# Patient Record
Sex: FEMALE | Race: WHITE | NOT HISPANIC OR LATINO | Employment: OTHER | URBAN - METROPOLITAN AREA
[De-identification: names, ages, dates, MRNs, and addresses within clinical notes are randomized per-mention and may not be internally consistent; named-entity substitution may affect disease eponyms.]

---

## 2018-09-22 ENCOUNTER — APPOINTMENT (OUTPATIENT)
Dept: RADIOLOGY | Facility: MEDICAL CENTER | Age: 78
DRG: 023 | End: 2018-09-22
Attending: EMERGENCY MEDICINE
Payer: MEDICARE

## 2018-09-22 ENCOUNTER — HOSPITAL ENCOUNTER (INPATIENT)
Facility: MEDICAL CENTER | Age: 78
LOS: 17 days | DRG: 023 | End: 2018-10-09
Attending: EMERGENCY MEDICINE | Admitting: HOSPITALIST
Payer: MEDICARE

## 2018-09-22 ENCOUNTER — HOSPITAL ENCOUNTER (OUTPATIENT)
Dept: RADIOLOGY | Facility: MEDICAL CENTER | Age: 78
End: 2018-09-22

## 2018-09-22 ENCOUNTER — APPOINTMENT (OUTPATIENT)
Dept: RADIOLOGY | Facility: MEDICAL CENTER | Age: 78
DRG: 023 | End: 2018-09-22
Attending: NEUROLOGICAL SURGERY
Payer: MEDICARE

## 2018-09-22 DIAGNOSIS — S06.349A TRAUMATIC HEMORRHAGE OF RIGHT CEREBRUM WITH LOSS OF CONSCIOUSNESS, INITIAL ENCOUNTER (HCC): ICD-10-CM

## 2018-09-22 DIAGNOSIS — I62.9 ACUTE INTRACRANIAL HEMORRHAGE (HCC): ICD-10-CM

## 2018-09-22 DIAGNOSIS — D68.32 HEMORRHAGIC DISORDER DUE TO EXTRINSIC CIRCULATING ANTICOAGULANTS (HCC): ICD-10-CM

## 2018-09-22 PROBLEM — I48.0 PAROXYSMAL ATRIAL FIBRILLATION (HCC): Status: ACTIVE | Noted: 2018-09-22

## 2018-09-22 LAB
ALBUMIN SERPL BCP-MCNC: 4.4 G/DL (ref 3.2–4.9)
ALBUMIN/GLOB SERPL: 1.8 G/DL
ALP SERPL-CCNC: 83 U/L (ref 30–99)
ALT SERPL-CCNC: 18 U/L (ref 2–50)
ANION GAP SERPL CALC-SCNC: 10 MMOL/L (ref 0–11.9)
APTT PPP: 29 SEC (ref 24.7–36)
AST SERPL-CCNC: 16 U/L (ref 12–45)
BASOPHILS # BLD AUTO: 0.3 % (ref 0–1.8)
BASOPHILS # BLD: 0.03 K/UL (ref 0–0.12)
BILIRUB SERPL-MCNC: 0.5 MG/DL (ref 0.1–1.5)
BUN SERPL-MCNC: 25 MG/DL (ref 8–22)
CALCIUM SERPL-MCNC: 9.5 MG/DL (ref 8.5–10.5)
CHLORIDE SERPL-SCNC: 104 MMOL/L (ref 96–112)
CHOLEST SERPL-MCNC: 183 MG/DL (ref 100–199)
CO2 SERPL-SCNC: 25 MMOL/L (ref 20–33)
CREAT SERPL-MCNC: 0.65 MG/DL (ref 0.5–1.4)
EKG IMPRESSION: NORMAL
EOSINOPHIL # BLD AUTO: 0.02 K/UL (ref 0–0.51)
EOSINOPHIL NFR BLD: 0.2 % (ref 0–6.9)
ERYTHROCYTE [DISTWIDTH] IN BLOOD BY AUTOMATED COUNT: 43.8 FL (ref 35.9–50)
GLOBULIN SER CALC-MCNC: 2.5 G/DL (ref 1.9–3.5)
GLUCOSE BLD-MCNC: 142 MG/DL (ref 65–99)
GLUCOSE BLD-MCNC: 158 MG/DL (ref 65–99)
GLUCOSE SERPL-MCNC: 144 MG/DL (ref 65–99)
HCT VFR BLD AUTO: 36.5 % (ref 37–47)
HDLC SERPL-MCNC: 37 MG/DL
HGB BLD-MCNC: 12.2 G/DL (ref 12–16)
IMM GRANULOCYTES # BLD AUTO: 0.05 K/UL (ref 0–0.11)
IMM GRANULOCYTES NFR BLD AUTO: 0.5 % (ref 0–0.9)
INR PPP: 1.32 (ref 0.87–1.13)
LDLC SERPL CALC-MCNC: 131 MG/DL
LYMPHOCYTES # BLD AUTO: 0.85 K/UL (ref 1–4.8)
LYMPHOCYTES NFR BLD: 8.5 % (ref 22–41)
MAGNESIUM SERPL-MCNC: 1.8 MG/DL (ref 1.5–2.5)
MCH RBC QN AUTO: 29.8 PG (ref 27–33)
MCHC RBC AUTO-ENTMCNC: 33.4 G/DL (ref 33.6–35)
MCV RBC AUTO: 89 FL (ref 81.4–97.8)
MONOCYTES # BLD AUTO: 0.61 K/UL (ref 0–0.85)
MONOCYTES NFR BLD AUTO: 6.1 % (ref 0–13.4)
NEUTROPHILS # BLD AUTO: 8.45 K/UL (ref 2–7.15)
NEUTROPHILS NFR BLD: 84.4 % (ref 44–72)
NRBC # BLD AUTO: 0 K/UL
NRBC BLD-RTO: 0 /100 WBC
PLATELET # BLD AUTO: 132 K/UL (ref 164–446)
PMV BLD AUTO: 10.6 FL (ref 9–12.9)
POTASSIUM SERPL-SCNC: 3.9 MMOL/L (ref 3.6–5.5)
PROT SERPL-MCNC: 6.9 G/DL (ref 6–8.2)
PROTHROMBIN TIME: 16.5 SEC (ref 12–14.6)
RBC # BLD AUTO: 4.1 M/UL (ref 4.2–5.4)
SODIUM SERPL-SCNC: 137 MMOL/L (ref 135–145)
SODIUM SERPL-SCNC: 138 MMOL/L (ref 135–145)
SODIUM SERPL-SCNC: 139 MMOL/L (ref 135–145)
TRIGL SERPL-MCNC: 74 MG/DL (ref 0–149)
WBC # BLD AUTO: 10 K/UL (ref 4.8–10.8)

## 2018-09-22 PROCEDURE — 160035 HCHG PACU - 1ST 60 MINS PHASE I: Performed by: NEUROLOGICAL SURGERY

## 2018-09-22 PROCEDURE — 501445 HCHG STAPLER, SKIN DISP: Performed by: NEUROLOGICAL SURGERY

## 2018-09-22 PROCEDURE — 770022 HCHG ROOM/CARE - ICU (200)

## 2018-09-22 PROCEDURE — 501838 HCHG SUTURE GENERAL: Performed by: NEUROLOGICAL SURGERY

## 2018-09-22 PROCEDURE — 009630Z DRAINAGE OF CEREBRAL VENTRICLE WITH DRAINAGE DEVICE, PERCUTANEOUS APPROACH: ICD-10-PCS | Performed by: NEUROLOGICAL SURGERY

## 2018-09-22 PROCEDURE — 700102 HCHG RX REV CODE 250 W/ 637 OVERRIDE(OP)

## 2018-09-22 PROCEDURE — 93005 ELECTROCARDIOGRAM TRACING: CPT

## 2018-09-22 PROCEDURE — 160029 HCHG SURGERY MINUTES - 1ST 30 MINS LEVEL 4: Performed by: NEUROLOGICAL SURGERY

## 2018-09-22 PROCEDURE — 160002 HCHG RECOVERY MINUTES (STAT): Performed by: NEUROLOGICAL SURGERY

## 2018-09-22 PROCEDURE — 500394: Performed by: NEUROLOGICAL SURGERY

## 2018-09-22 PROCEDURE — 700117 HCHG RX CONTRAST REV CODE 255: Performed by: EMERGENCY MEDICINE

## 2018-09-22 PROCEDURE — 85610 PROTHROMBIN TIME: CPT

## 2018-09-22 PROCEDURE — 700105 HCHG RX REV CODE 258: Performed by: HOSPITALIST

## 2018-09-22 PROCEDURE — 99223 1ST HOSP IP/OBS HIGH 75: CPT | Mod: AI | Performed by: HOSPITALIST

## 2018-09-22 PROCEDURE — 85025 COMPLETE CBC W/AUTO DIFF WBC: CPT

## 2018-09-22 PROCEDURE — 160048 HCHG OR STATISTICAL LEVEL 1-5: Performed by: NEUROLOGICAL SURGERY

## 2018-09-22 PROCEDURE — 700111 HCHG RX REV CODE 636 W/ 250 OVERRIDE (IP): Performed by: NEUROLOGICAL SURGERY

## 2018-09-22 PROCEDURE — 70496 CT ANGIOGRAPHY HEAD: CPT

## 2018-09-22 PROCEDURE — 700111 HCHG RX REV CODE 636 W/ 250 OVERRIDE (IP): Performed by: HOSPITALIST

## 2018-09-22 PROCEDURE — 700101 HCHG RX REV CODE 250: Mod: JW

## 2018-09-22 PROCEDURE — 700111 HCHG RX REV CODE 636 W/ 250 OVERRIDE (IP): Performed by: ANESTHESIOLOGY

## 2018-09-22 PROCEDURE — 500075 HCHG BLADE, CLIPPER NEURO: Performed by: NEUROLOGICAL SURGERY

## 2018-09-22 PROCEDURE — C1729 CATH, DRAINAGE: HCPCS | Performed by: NEUROLOGICAL SURGERY

## 2018-09-22 PROCEDURE — 96365 THER/PROPH/DIAG IV INF INIT: CPT

## 2018-09-22 PROCEDURE — 700111 HCHG RX REV CODE 636 W/ 250 OVERRIDE (IP)

## 2018-09-22 PROCEDURE — 700105 HCHG RX REV CODE 258: Performed by: NEUROLOGICAL SURGERY

## 2018-09-22 PROCEDURE — 160009 HCHG ANES TIME/MIN: Performed by: NEUROLOGICAL SURGERY

## 2018-09-22 PROCEDURE — 80053 COMPREHEN METABOLIC PANEL: CPT

## 2018-09-22 PROCEDURE — 99291 CRITICAL CARE FIRST HOUR: CPT

## 2018-09-22 PROCEDURE — C9132 KCENTRA, PER I.U.: HCPCS | Mod: JG | Performed by: EMERGENCY MEDICINE

## 2018-09-22 PROCEDURE — 85730 THROMBOPLASTIN TIME PARTIAL: CPT

## 2018-09-22 PROCEDURE — 84295 ASSAY OF SERUM SODIUM: CPT

## 2018-09-22 PROCEDURE — 500440 HCHG DRESSING, STERILE ROLL (KERLIX): Performed by: NEUROLOGICAL SURGERY

## 2018-09-22 PROCEDURE — 500393 HCHG DRAIN, VENTRIC CATH: Performed by: NEUROLOGICAL SURGERY

## 2018-09-22 PROCEDURE — 700111 HCHG RX REV CODE 636 W/ 250 OVERRIDE (IP): Performed by: EMERGENCY MEDICINE

## 2018-09-22 PROCEDURE — 99291 CRITICAL CARE FIRST HOUR: CPT | Performed by: INTERNAL MEDICINE

## 2018-09-22 PROCEDURE — 160041 HCHG SURGERY MINUTES - EA ADDL 1 MIN LEVEL 4: Performed by: NEUROLOGICAL SURGERY

## 2018-09-22 PROCEDURE — A9270 NON-COVERED ITEM OR SERVICE: HCPCS

## 2018-09-22 PROCEDURE — 80061 LIPID PANEL: CPT

## 2018-09-22 PROCEDURE — 96375 TX/PRO/DX INJ NEW DRUG ADDON: CPT

## 2018-09-22 PROCEDURE — 700101 HCHG RX REV CODE 250: Performed by: INTERNAL MEDICINE

## 2018-09-22 PROCEDURE — 83735 ASSAY OF MAGNESIUM: CPT

## 2018-09-22 PROCEDURE — 70450 CT HEAD/BRAIN W/O DYE: CPT

## 2018-09-22 PROCEDURE — 36415 COLL VENOUS BLD VENIPUNCTURE: CPT

## 2018-09-22 PROCEDURE — 82962 GLUCOSE BLOOD TEST: CPT | Mod: 91

## 2018-09-22 RX ORDER — HALOPERIDOL 5 MG/ML
1 INJECTION INTRAMUSCULAR
Status: DISCONTINUED | OUTPATIENT
Start: 2018-09-22 | End: 2018-09-22 | Stop reason: HOSPADM

## 2018-09-22 RX ORDER — ONDANSETRON 4 MG/1
4 TABLET, ORALLY DISINTEGRATING ORAL EVERY 4 HOURS PRN
Status: DISCONTINUED | OUTPATIENT
Start: 2018-09-22 | End: 2018-09-24

## 2018-09-22 RX ORDER — SODIUM CHLORIDE 9 MG/ML
INJECTION, SOLUTION INTRAVENOUS CONTINUOUS
Status: DISCONTINUED | OUTPATIENT
Start: 2018-09-22 | End: 2018-09-24

## 2018-09-22 RX ORDER — ACETAMINOPHEN 325 MG/1
650 TABLET ORAL EVERY 4 HOURS PRN
Status: DISCONTINUED | OUTPATIENT
Start: 2018-09-22 | End: 2018-09-24

## 2018-09-22 RX ORDER — BISACODYL 10 MG
10 SUPPOSITORY, RECTAL RECTAL
Status: DISCONTINUED | OUTPATIENT
Start: 2018-09-22 | End: 2018-09-24

## 2018-09-22 RX ORDER — GLYCOPYRROLATE 0.2 MG/ML
0.2 INJECTION INTRAMUSCULAR; INTRAVENOUS
Status: DISCONTINUED | OUTPATIENT
Start: 2018-09-22 | End: 2018-09-22 | Stop reason: HOSPADM

## 2018-09-22 RX ORDER — LORAZEPAM 2 MG/ML
2 INJECTION INTRAMUSCULAR
Status: DISCONTINUED | OUTPATIENT
Start: 2018-09-22 | End: 2018-10-09 | Stop reason: HOSPADM

## 2018-09-22 RX ORDER — HYDRALAZINE HYDROCHLORIDE 20 MG/ML
10 INJECTION INTRAMUSCULAR; INTRAVENOUS EVERY 4 HOURS PRN
Status: DISCONTINUED | OUTPATIENT
Start: 2018-09-22 | End: 2018-10-09 | Stop reason: HOSPADM

## 2018-09-22 RX ORDER — ACETAMINOPHEN 650 MG/1
650 SUPPOSITORY RECTAL EVERY 4 HOURS PRN
Status: DISCONTINUED | OUTPATIENT
Start: 2018-09-22 | End: 2018-09-24

## 2018-09-22 RX ORDER — ONDANSETRON 2 MG/ML
4 INJECTION INTRAMUSCULAR; INTRAVENOUS EVERY 4 HOURS PRN
Status: DISCONTINUED | OUTPATIENT
Start: 2018-09-22 | End: 2018-09-24

## 2018-09-22 RX ORDER — ONDANSETRON 2 MG/ML
4 INJECTION INTRAMUSCULAR; INTRAVENOUS ONCE
Status: COMPLETED | OUTPATIENT
Start: 2018-09-22 | End: 2018-09-22

## 2018-09-22 RX ORDER — LABETALOL HYDROCHLORIDE 5 MG/ML
10 INJECTION, SOLUTION INTRAVENOUS EVERY 4 HOURS PRN
Status: DISCONTINUED | OUTPATIENT
Start: 2018-09-22 | End: 2018-09-24

## 2018-09-22 RX ORDER — SODIUM CHLORIDE 9 MG/ML
INJECTION, SOLUTION INTRAVENOUS CONTINUOUS
Status: DISCONTINUED | OUTPATIENT
Start: 2018-09-22 | End: 2018-09-22 | Stop reason: HOSPADM

## 2018-09-22 RX ORDER — NALOXONE HYDROCHLORIDE 0.4 MG/ML
0.1 INJECTION, SOLUTION INTRAMUSCULAR; INTRAVENOUS; SUBCUTANEOUS PRN
Status: DISCONTINUED | OUTPATIENT
Start: 2018-09-22 | End: 2018-09-22 | Stop reason: HOSPADM

## 2018-09-22 RX ORDER — ONDANSETRON 2 MG/ML
4 INJECTION INTRAMUSCULAR; INTRAVENOUS
Status: DISCONTINUED | OUTPATIENT
Start: 2018-09-22 | End: 2018-09-22 | Stop reason: HOSPADM

## 2018-09-22 RX ORDER — HYDROMORPHONE HYDROCHLORIDE 2 MG/ML
0.25 INJECTION, SOLUTION INTRAMUSCULAR; INTRAVENOUS; SUBCUTANEOUS
Status: DISCONTINUED | OUTPATIENT
Start: 2018-09-22 | End: 2018-09-23

## 2018-09-22 RX ORDER — ENALAPRILAT 1.25 MG/ML
1.25 INJECTION INTRAVENOUS EVERY 6 HOURS PRN
Status: DISCONTINUED | OUTPATIENT
Start: 2018-09-22 | End: 2018-10-09 | Stop reason: HOSPADM

## 2018-09-22 RX ORDER — OXYCODONE HYDROCHLORIDE 5 MG/1
5 TABLET ORAL
Status: DISCONTINUED | OUTPATIENT
Start: 2018-09-22 | End: 2018-09-23

## 2018-09-22 RX ORDER — CEFAZOLIN SODIUM 1 G/50ML
1 INJECTION, SOLUTION INTRAVENOUS EVERY 8 HOURS
Status: DISCONTINUED | OUTPATIENT
Start: 2018-09-22 | End: 2018-10-04

## 2018-09-22 RX ORDER — POLYETHYLENE GLYCOL 3350 17 G/17G
1 POWDER, FOR SOLUTION ORAL
Status: DISCONTINUED | OUTPATIENT
Start: 2018-09-22 | End: 2018-09-24

## 2018-09-22 RX ORDER — OXYCODONE HYDROCHLORIDE 5 MG/1
2.5 TABLET ORAL
Status: DISCONTINUED | OUTPATIENT
Start: 2018-09-22 | End: 2018-09-23

## 2018-09-22 RX ORDER — METOPROLOL TARTRATE 1 MG/ML
5 INJECTION, SOLUTION INTRAVENOUS EVERY 6 HOURS
Status: DISCONTINUED | OUTPATIENT
Start: 2018-09-22 | End: 2018-09-24

## 2018-09-22 RX ORDER — AMOXICILLIN 250 MG
2 CAPSULE ORAL 2 TIMES DAILY
Status: DISCONTINUED | OUTPATIENT
Start: 2018-09-22 | End: 2018-09-24

## 2018-09-22 RX ADMIN — HYDROMORPHONE HYDROCHLORIDE 0.25 MG: 2 INJECTION INTRAMUSCULAR; INTRAVENOUS; SUBCUTANEOUS at 15:13

## 2018-09-22 RX ADMIN — CEFAZOLIN SODIUM 1 G: 1 INJECTION, SOLUTION INTRAVENOUS at 21:38

## 2018-09-22 RX ADMIN — METOPROLOL TARTRATE 5 MG: 1 INJECTION, SOLUTION INTRAVENOUS at 20:01

## 2018-09-22 RX ADMIN — SODIUM CHLORIDE 500 MG: 9 INJECTION, SOLUTION INTRAVENOUS at 17:55

## 2018-09-22 RX ADMIN — SODIUM CHLORIDE: 9 INJECTION, SOLUTION INTRAVENOUS at 12:16

## 2018-09-22 RX ADMIN — METOPROLOL TARTRATE 5 MG: 1 INJECTION, SOLUTION INTRAVENOUS at 15:13

## 2018-09-22 RX ADMIN — IOHEXOL 88 ML: 350 INJECTION, SOLUTION INTRAVENOUS at 08:51

## 2018-09-22 RX ADMIN — PROTHROMBIN, COAGULATION FACTOR VII HUMAN, COAGULATION FACTOR IX HUMAN, COAGULATION FACTOR X HUMAN, PROTEIN C, PROTEIN S HUMAN, AND WATER 3252 UNITS: KIT at 08:49

## 2018-09-22 RX ADMIN — ONDANSETRON 4 MG: 2 INJECTION INTRAMUSCULAR; INTRAVENOUS at 15:13

## 2018-09-22 RX ADMIN — ONDANSETRON HYDROCHLORIDE 4 MG: 2 INJECTION, SOLUTION INTRAMUSCULAR; INTRAVENOUS at 08:38

## 2018-09-22 RX ADMIN — CEFAZOLIN SODIUM 1 G: 1 INJECTION, SOLUTION INTRAVENOUS at 14:14

## 2018-09-22 RX ADMIN — HYDROMORPHONE HYDROCHLORIDE 0.25 MG: 2 INJECTION INTRAMUSCULAR; INTRAVENOUS; SUBCUTANEOUS at 21:29

## 2018-09-22 RX ADMIN — FENTANYL CITRATE 50 MCG: 50 INJECTION, SOLUTION INTRAMUSCULAR; INTRAVENOUS at 11:16

## 2018-09-22 ASSESSMENT — PAIN SCALES - GENERAL
PAINLEVEL_OUTOF10: 3
PAINLEVEL_OUTOF10: 7
PAINLEVEL_OUTOF10: 6
PAINLEVEL_OUTOF10: 3
PAINLEVEL_OUTOF10: 10
PAINLEVEL_OUTOF10: 5
PAINLEVEL_OUTOF10: 8
PAINLEVEL_OUTOF10: 4
PAINLEVEL_OUTOF10: 3

## 2018-09-22 ASSESSMENT — LIFESTYLE VARIABLES: EVER_SMOKED: NEVER

## 2018-09-22 ASSESSMENT — COPD QUESTIONNAIRES
DO YOU EVER COUGH UP ANY MUCUS OR PHLEGM?: NO/ONLY WITH OCCASIONAL COLDS OR INFECTIONS
DURING THE PAST 4 WEEKS HOW MUCH DID YOU FEEL SHORT OF BREATH: NONE/LITTLE OF THE TIME
HAVE YOU SMOKED AT LEAST 100 CIGARETTES IN YOUR ENTIRE LIFE: NO/DON'T KNOW
COPD SCREENING SCORE: 2

## 2018-09-22 ASSESSMENT — PAIN SCALES - WONG BAKER: WONGBAKER_NUMERICALRESPONSE: HURTS A LITTLE MORE

## 2018-09-22 NOTE — CONSULTS
PULMONARY AND CRITICAL CARE MEDICINE CONSULTATION    Date of Consultation:  9/22/2018    Requesting Physician:  Juan Alberto Santizo MD    Consulting Physician:  Zeke Norton MD    Reason for Consultation: Critical care management in lady with intracranial hemorrhage    Chief Complaint: Intracranial hemorrhage    History of Present Illness:    I was kindly asked to see and evaluate Yessi Hess, a 78 y.o. female for evaluation and management of the above problem.    The history is obtained from healthcare providers and the medical record as this lady cannot give me any history.  This lady has a history of paroxysmal atrial fibrillation and takes Eliquis.  She apparently fell 1 week ago.  She presented to an outside hospital at that time and a CT scan of her head revealed no evidence of an intracranial hemorrhage.  This morning she awakened with a headache and presented back to an outside hospital and CT imaging revealed an acute intracranial hemorrhage.  She has an intraparenchymal hemorrhage with intraventricular hemorrhage and mass-effect.  Dr. Juan Alberto Santizo evaluated her from neurosurgery and has emergently taken her to the operating theater and performed bilateral extraventricular drain placement.  She is now critically ill in the ICU for very close neurological observation.    Medications Prior to Admission:    No current facility-administered medications on file prior to encounter.      No current outpatient prescriptions on file prior to encounter.       Current Medications:      Current Facility-Administered Medications:   •  Respiratory Care per Protocol, , Nebulization, Continuous RT, Rowena Calero M.D.  •  NS infusion, , Intravenous, Continuous, Rowena Calero M.D., Last Rate: 80 mL/hr at 09/22/18 1216  •  acetaminophen (TYLENOL) tablet 650 mg, 650 mg, Oral, Q4HRS PRN **OR** acetaminophen (TYLENOL) suppository 650 mg, 650 mg, Rectal, Q4HRS PRN, Rowena Calero M.D.  •  MD Alert...ICU  Electrolyte Replacement per Pharmacy, , Other, pharmacy to dose, Rowena Calreo M.D.  •  ondansetron (ZOFRAN ODT) dispertab 4 mg, 4 mg, Oral, Q4HRS PRN **OR** ondansetron (ZOFRAN) syringe/vial injection 4 mg, 4 mg, Intravenous, Q4HRS PRN, Rowena Calero M.D.  •  senna-docusate (PERICOLACE or SENOKOT S) 8.6-50 MG per tablet 2 Tab, 2 Tab, Oral, BID **AND** polyethylene glycol/lytes (MIRALAX) PACKET 1 Packet, 1 Packet, Oral, QDAY PRN **AND** magnesium hydroxide (MILK OF MAGNESIA) suspension 30 mL, 30 mL, Oral, QDAY PRN **AND** bisacodyl (DULCOLAX) suppository 10 mg, 10 mg, Rectal, QDAY PRN, Rowena Calero M.D.  •  LORazepam (ATIVAN) injection 2 mg, 2 mg, Intravenous, Q5 MIN PRN, Rowena Calero M.D.  •  Notify provider if pain remains uncontrolled, , , CONTINUOUS **AND** Use the numeric rating scale (NRS-11) on regular floors and Critical-Care Pain Observation Tool (CPOT) on ICUs/Trauma to assess pain, , , CONTINUOUS **AND** Pulse Ox (Oximetry), , , CONTINUOUS **AND** Pharmacy Consult Request ...Pain Management Review, , Other, PRN **AND** If patient difficult to arouse and/or has respiratory depression, stop any opiates that are currently infusing and call a Rapid Response., , , CONTINUOUS **AND** oxyCODONE immediate-release (ROXICODONE) tablet 2.5 mg, 2.5 mg, Oral, Q3HRS PRN **AND** oxyCODONE immediate-release (ROXICODONE) tablet 5 mg, 5 mg, Oral, Q3HRS PRN **AND** HYDROmorphone (DILAUDID) injection 0.25 mg, 0.25 mg, Intravenous, Q3HRS PRN, Rowena Calero M.D.  •  labetalol (NORMODYNE,TRANDATE) injection 10 mg, 10 mg, Intravenous, Q4HRS PRN, Rowena Calero M.D.  •  hydrALAZINE (APRESOLINE) injection 10 mg, 10 mg, Intravenous, Q4HRS PRN, Rowena Calero M.D.  •  enalaprilat (VASOTEC) injection 1.25 mg, 1.25 mg, Intravenous, Q6HRS PRN, Rowena Calero M.D.  •  ceFAZolin in dextrose (ANCEF) IVPB premix 1 g, 1 g, Intravenous, Q8HRS, Juan Alberto Santizo III, M.D.  •  levETIRAcetam (KEPPRA) 500 mg in  mL  "IVPB, 500 mg, Intravenous, Q12HRS, Juan Alberto Santizo III, M.D.  •  Metoprolol Tartrate (LOPRESSOR) injection 5 mg, 5 mg, Intravenous, Q6HRS, Zeke Norton M.D.    Allergies:    Corticosteroids    Past Surgical History:    No past surgical history on file.    Past Medical History:    History reviewed. No pertinent past medical history.    Social History:    Social History     Social History   • Marital status:      Spouse name: N/A   • Number of children: N/A   • Years of education: N/A     Occupational History   • Not on file.     Social History Main Topics   • Smoking status: Not on file   • Smokeless tobacco: Not on file   • Alcohol use Not on file   • Drug use: Unknown   • Sexual activity: Not on file     Other Topics Concern   • Not on file     Social History Narrative   • No narrative on file       Family History:    No family history on file.    Review of System:    Review of Systems   Unable to perform ROS: Acuity of condition       Physical Examination:    /62   Pulse 69   Temp (!) 35.5 °C (95.9 °F)   Resp 14   Ht 1.702 m (5' 7\")   Wt 65.8 kg (145 lb)   SpO2 100%   BMI 22.71 kg/m²   Physical Exam   HENT:   Mouth/Throat: Oropharynx is clear and moist.   Bilateral extraventricular drains are present.  She has extensive ecchymosis on her forehead and face, mostly on the left side.   Eyes: Pupils are equal, round, and reactive to light. Right eye exhibits no discharge. Left eye exhibits no discharge. No scleral icterus.   Neck: Normal range of motion. Neck supple. No JVD present. No tracheal deviation present.   Cardiovascular: Intact distal pulses.  Exam reveals no gallop and no friction rub.    Sinus rhythm   Pulmonary/Chest: No stridor. She has no wheezes. She has no rales.   Abdominal: Soft. Bowel sounds are normal. She exhibits no distension. There is no tenderness. There is no rebound.   Musculoskeletal: She exhibits no tenderness or deformity.   No clubbing or cyanosis "   Neurological:   Somnolent following surgery   Skin: Skin is warm and dry. No rash noted. She is not diaphoretic. No erythema.       Laboratory Data:          Invalid input(s): [id number]  Recent Labs      09/22/18   0810   WBC  10.0   RBC  4.10*   HEMOGLOBIN  12.2   HEMATOCRIT  36.5*   MCV  89.0   MCH  29.8   MCHC  33.4*   RDW  43.8   PLATELETCT  132*   MPV  10.6     Recent Labs      09/22/18   0810   SODIUM  139   POTASSIUM  3.9   CHLORIDE  104   CO2  25   GLUCOSE  144*   BUN  25*   CREATININE  0.65   CALCIUM  9.5                   Imaging:    I personally viewed the CXR and CT scan images as well as reviewed the radiology interpretation reports.    OUTSIDE IMAGES-CT HEAD   Final Result      CT-CTA HEAD WITH & W/O-POST PROCESS   Final Result   Addendum 1 of 1   These findings were discussed with Dr. Santizo on 9/22/2018 9:08 AM.      Final      1.  Stable intraventricular, intraparenchymal, subarachnoid and subdural hemorrhage with 5 mm RIGHT-to-LEFT midline shift again seen.   2.  No intracranial aneurysm, focal stenosis, or abrupt large vessel cut off.   3.  LEFT frontal scalp hematoma.      OUTSIDE IMAGES-CT CERVICAL SPINE   Final Result      CT-HEAD W/O    (Results Pending)       Assessment and Plan:    Acute intracranial hemorrhage (HCC)   Assessment & Plan    S/P fall 1 week ago - CT head on 9/14 negative for acute intracranial hemorrhage  Acute onset of headache earlier this morning  CT scan today with intraventricular hemorrhage, right greater than left and intraparenchymal hemorrhage  Noncommunicating hydrocephalus  S/P emergent bilateral EVD placement by Dr. Sukhdev Park, 500 mg IV twice daily for seizure prophylaxis  Strict blood pressure control - goal SBP < 160  Hourly neurologic checks        Paroxysmal atrial fibrillation (HCC)   Assessment & Plan    Previously on apixaban, 5 mg twice daily - now strictly contraindicated  Currently in sinus rhythm  Rate control  Takes metoprolol, 25 mg twice  daily            This lady is critically ill in the ICU following placement of bilateral EVDs for acute intracranial hemorrhage with hydrocephalus.  I have assessed and reassessed her respiratory status, blood pressure, hemodynamics, cardiovascular status and neurologic status.  She requires very close neurological observation in the ICU.  She is at high risk for worsening respiratory, cardiovascular and CNS system dysfunction.    High risk of deterioration and worsening vital organ dysfunction and death without the above critical care interventions.    Thank you for allowing me to participate in the care of this lady.  I will continue to follow her with great interest.    Critical Care Time: 35 minutes  [id number]  No time overlap  Time excludes procedures  Discussed with RN, RT, hospitalist    Zeke Norton MD  Pulmonary and Critical Care Medicine

## 2018-09-22 NOTE — ED NOTES
Pt to Pre-op via transport tech.    Per admit MD request Pre-Op notified to have Dr. Santizo contact Pt's daughter before operation.

## 2018-09-22 NOTE — PROGRESS NOTES
Casted IVH on Eliquis. CT with developeing HCP. Rapid reversal, CTA STAT to r/o aneurysm, needs L +/- R EVD to attempt to clear IVH, admit hospitalist ICU, may benefit from IV tPA

## 2018-09-22 NOTE — OR SURGEON
Immediate Post OP Note    PreOp Diagnosis: IVH/IPH, non communcating hydrocephalus    PostOp Diagnosis: same    Procedure(s):  VENTRICULOSTOMY, bilateral    Surgeon(s):  Juan Alberto Santizo III, M.D.    Anesthesiologist/Type of Anesthesia:  No anesthesia staff entered./* No anesthesia type entered *    Surgical Staff:  * No surgical staff found *    Specimens removed if any:  * No specimens in log *    Estimated Blood Loss: < 5 cc    Findings: ICP 15 on insertion, bloody CSF on right with slow flow, brick clear CSF on left    Complications: none    Dispo: attempt extubation, EVD at 0, head Ct later today, family updated        9/22/2018 10:03 AM Juan Alberto Santizo III, M.D.

## 2018-09-22 NOTE — ED NOTES
Med rec complete per pt family and home pharmacy   Allergies reviewed  No oral ABX within lat 30 days

## 2018-09-22 NOTE — ASSESSMENT & PLAN NOTE
S/P fall 1 week prior  CT scan on 9/22 with intraventricular hemorrhage, right greater than left and intraparenchymal hemorrhage  Noncommunicating hydrocephalus - s/p emergent bilateral EVD placement by Dr. Santizo on 9/22 --> removed 10/2  Intraventricular TPA given by NeuroSx with improvement in EVD output  Seizure precautions, continue Kera  Neurology consultation at the request of rehab  Therapies/rehabilitation

## 2018-09-22 NOTE — PROGRESS NOTES
1200 patient picked up from PACU.  Bedside report/neuro assessment done.    1225 pt arrived on the unit.  Very lethargic but orientedx4.  Delayed responses but moves all extremities.

## 2018-09-22 NOTE — H&P
"Hospital Medicine History & Physical Note    Date of Service  9/22/2018    Primary Care Physician  No primary care provider on file.    Consultants  Inrtensivist Dr. Frank  Neurosurgery Dr. Santizo    Code Status  FULL    Chief Complaint  Headache, transferred from [facility] for ICH    History of Presenting Illness  78 y.o. female who presented 9/22/2018 with a headache this morning.  She had suffered a fall from water under a new kitchen mat a week ago, and had had a CT head at that time which was negative.  She presented to the [facility] ER for a headache today some nausea, and was found to have a subdural hematoma as well as a subarachnoid hemorrhage.  She was transferred to [facility] for further evaluation by neurosurgery.  She has been taken now to the OR.    Review of Systems  Review of Systems   Unable to perform ROS: Mental acuity   Is able to say she's nauseated, and her head hurts    Past Medical History   has no past medical history on file.  Unable to obtain, we do know she has a history of atrial fibrillation for which she takes eliquis and metoprolol    Surgical History   has no past surgical history on file.   Unknown    Family History  family history is not on file.   Ujnobtainable at this time as she is lethargic    Social History   Unknown, unobtainable at this time    Allergies  Allergies   Allergen Reactions   • Corticosteroids Rash     \"Family does not know the exact name of the drug\"       Medications  Prior to Admission Medications   Prescriptions Last Dose Informant Patient Reported? Taking?   apixaban (ELIQUIS) 5mg Tab 9/22/2018 at 0400 Patient Yes Yes   Sig: Take 5 mg by mouth 2 Times a Day.   metoprolol (LOPRESSOR) 25 MG Tab unk at unk Patient Yes Yes   Sig: Take 25 mg by mouth 2 times a day.      Facility-Administered Medications: None       Physical Exam  Temp:  [35.5 °C (95.9 °F)] 35.5 °C (95.9 °F)  Pulse:  [65-74] 72  Resp:  [17-23] 20  BP: (137)/(62) 137/62    Physical Exam "   Constitutional: She is oriented to person, place, and time. She appears well-developed and well-nourished.   HENT:   Head: Normocephalic and atraumatic.   Eyes: Pupils are equal, round, and reactive to light.   Neck: Normal range of motion. Tracheal deviation present.   Cardiovascular: Normal rate and regular rhythm.    Murmur (3/6 SABA) heard.  Pulmonary/Chest: Effort normal and breath sounds normal. No respiratory distress. She has no wheezes. She has no rales.   Abdominal: Soft. Bowel sounds are normal. She exhibits no distension. There is no tenderness.   Musculoskeletal: Normal range of motion. She exhibits no edema.   5/5 MS throughout when prompted   Neurological: She is alert and oriented to person, place, and time. No cranial nerve deficit.   Lethargic but arousable   Skin: Skin is warm and dry. No erythema.   Psychiatric: She has a normal mood and affect.   Nursing note and vitals reviewed.      Laboratory:  Recent Labs      09/22/18   0810   WBC  10.0   RBC  4.10*   HEMOGLOBIN  12.2   HEMATOCRIT  36.5*   MCV  89.0   MCH  29.8   MCHC  33.4*   RDW  43.8   PLATELETCT  132*   MPV  10.6     Recent Labs      09/22/18   0810   SODIUM  139   POTASSIUM  3.9   CHLORIDE  104   CO2  25   GLUCOSE  144*   BUN  25*   CREATININE  0.65   CALCIUM  9.5     Recent Labs      09/22/18   0810   ALTSGPT  18   ASTSGOT  16   ALKPHOSPHAT  83   TBILIRUBIN  0.5   GLUCOSE  144*     Recent Labs      09/22/18   0810   APTT  29.0   INR  1.32*       Urinalysis:    No results found     Imaging:  CT-HEAD W/O   Final Result      1.  Interval placement of bilateral ventriculostomy catheters with reduction of ventricular dilation.   2.  RIGHT temporal subarachnoid hemorrhage is slightly more apparent than prior exam.   3.  RIGHT hemispheric subdural hematoma and temporal intraparenchymal hemorrhage are stable.   4.  Slight worsening RIGHT LEFT midline shift.      OUTSIDE IMAGES-CT HEAD   Final Result      CT-CTA HEAD WITH & W/O-POST PROCESS    Final Result   Addendum 1 of 1   These findings were discussed with Dr. Santizo on 9/22/2018 9:08 AM.      Final      1.  Stable intraventricular, intraparenchymal, subarachnoid and subdural hemorrhage with 5 mm RIGHT-to-LEFT midline shift again seen.   2.  No intracranial aneurysm, focal stenosis, or abrupt large vessel cut off.   3.  LEFT frontal scalp hematoma.      OUTSIDE IMAGES-CT CERVICAL SPINE   Final Result      EC-ECHOCARDIOGRAM COMPLETE W/O CONT    (Results Pending)         Assessment/Plan:  I anticipate this patient will require at least two midnights for appropriate medical management, necessitating inpatient admission.    Acute intracranial hemorrhage (HCC)- (present on admission)   Assessment & Plan    She has been given reversal for Eliquis, and taken to the OR by Dr. Santizo  She received drains bilaterally  I have ordered ICH order set, bp control (currently 130s/80s)  Dr. Santizo following  Will go to ICU for q1 hour neuro checks        Paroxysmal atrial fibrillation (HCC)- (present on admission)   Assessment & Plan    Holding eliquis, got reversal  Continue lopressor prn  Currently rate controlled            VTE prophylaxis: SCDs

## 2018-09-22 NOTE — ED TRIAGE NOTES
Chief Complaint   Patient presents with   • Other     Transfer from [facility] for brain bleed   • Head Ache     This AM   • N/V     This AM       Pt transferred by EMS from [facility] for a brain bleed.     EMS Vitals:   BP: 123/80s  HR: 60s  Spo2: 96% on RA   BGL: 153    Interventions: 18G saline lock Right AC    On arrival to ED pt is A&O x 4. Pt received 50mcg fentanyl and 4mg zofran at [facility] at 0630.    Pt has Hx significant for fall with head injury 1 week ago. CT showed negative bleed at the time.   Pt takes: Eliquis

## 2018-09-22 NOTE — OP REPORT
DATE OF SERVICE:  09/22/2018    PREOPERATIVE DIAGNOSES:  1.  Intracerebral hemorrhage.  2.  Interventricular hemorrhage.  3.  Anticoagulation.  4.  Hydrocephalus, noncommunicative.    POSTOPERATIVE DIAGNOSES:  1.  Intracerebral hemorrhage.  2.  Interventricular hemorrhage.  3.  Anticoagulation.  4.  Hydrocephalus, noncommunicative.    PROCEDURES PERFORMED:  1.  Left-sided external ventricular drain.  2.  Right-sided external ventricular drain, both 50 modifier.    SURGEON:  Juan Alberto Santizo III, MD    ASSISTANT:  None.    COMPLICATIONS:  None.    DRAINS LEFT:  Bilateral external ventricular drain set at 0.    ANESTHESIA:  General.    COMPLICATIONS:  None.    ESTIMATED BLOOD LOSS:  Less  than 5 mL.    FINDINGS:  Clear blood-tinged clear CSF on the left, ICP approximately 12-15,   blood-tinged slow flow on the right.    DISPOSITION:  Patient is extubated to recovery and back to the ICU.    HISTORY OF PRESENT ILLNESS:  This 78-year-old woman, although she fell a week   ago, was doing well.  She had a negative CAT scan reportedly at an outside   hospital.  She had a sudden onset headache, has a right intraparenchymal   hemorrhage with intraventricular extension, near complete casting of right   much more than left third and fourth ventricles.  I explained risks, benefits   and alternatives via phone to her and her family for bilateral external   ventricular drainage for possible interventricular TPA administration and   treatment of the hydrocephalus to allow the blood to resolve.  I explained   risks, benefits and alternatives, which include pain, infection, bleeding, CSF   leak, failure to completely resolve symptoms, neurologic deficit, weakness,   numbness, speech difficulties, prolonged intubation, need for a shunt in the   future.  They understood the risks and benefits and agreed the consent.  She   was reversed on Eliquis.  She had a CT angiogram that ruled out aneurysm or   AVM.    SUMMARY OF OPERATIVE  PROCEDURE:  The patient was brought to the operating   suite and placed under general anesthesia.  Mack catheter was placed, lines   were secured.  She was on a regular OR bed supine in a horseshoe head and a   donut.  All padded pressure points secured.  We luisa out at the mid pupillary   line in the mid glabella, performed bilaterally, left and right, 2 small   incisions.  We clipped the frontal area bilaterally of hair.  She was prepped   and draped in sterile fashion.  Preoperative antibiotics were given.  Proper   time-out was performed.  Patient was given preoperative Keppra.    The bilateral incisions were made and soft tissues were resected.  Hemostasis   was achieved with monopolar electrocautery.  With self-retaining retractors,   we made a twist drill bur hole bilaterally.  We punctured the dura.  On the   left, we placed an EVD catheter at 6 cm at the skull.  Clear CSF draining ICP   approximately 12-15.  We tunneled this out laterally and secured to the skin   with a stitch.  The right-sided drain had a slow flow blood-tinged CSF as   expected.  I put a little bit deeper 7 cm at the skull in order to be inside   the clot to allow TPA administration.  This was also tunneled out laterally   and secured to the skin with stitch.  We irrigated the wounds and closed them   with running locking 3-0 Vicryl.  We stitched drains additionally to the head   at other points.  We applied a sterile dressing with triple antibiotic   ointment.    There were no complications.  Needle and sponge count were correct at the end   of the case.       ____________________________________     MD SASKIA Pierre III / WHITLEY    DD:  09/22/2018 11:04:04  DT:  09/22/2018 12:17:01    D#:  [id number]  Job#:  [id number]

## 2018-09-22 NOTE — ED NOTES
CT reports Pt with N/V. ERP informed and order received for 4mg Zofran.     4mg Zofran given per MAR.

## 2018-09-22 NOTE — ASSESSMENT & PLAN NOTE
On metoprolol 50mg TID and Digoxin 125 mcg daily  Anticoagulation will continue to be held until cleared by neurosurgery

## 2018-09-22 NOTE — ED PROVIDER NOTES
ED Provider Note    ED Provider Note    Primary care provider: No primary care provider on file.  Means of arrival: EMS, transfer  History obtained from: Patient  History limited by: None    CHIEF COMPLAINT  Chief Complaint   Patient presents with   • Other     Transfer from [place] for brain bleed   • Head Ache     This AM   • N/V     This AM       HPI  Yessi Hess is a 78 y.o. female who presents to the Emergency Department in transfer from [facility] where patient was found to have an intraparenchymal and intraventricular bleed.  No mass-effect noted.  She has a history of atrial fibrillation is on Eliquis.  She reportedly fell about a week ago.  She underwent CT scanning at that time which was normal.  She states she did not have a headache and was feeling okay until early this morning when she developed a headache which prompted her presentation to [facility].  She has had some nausea and vomiting.  No new falls reported.  She did suffer some trauma and hematoma to her left face at the time.  CT head results show extensive new intraventricular hemorrhage a new right subdural hematoma, new intraparenchymal hemorrhage involving the right temporal lobe and new subfalcine herniation of to 9 mm.    REVIEW OF SYSTEMS  Review of Systems   Constitutional: Negative for fever.   HENT: Negative for congestion.    Respiratory: Negative for shortness of breath.    Cardiovascular: Negative for chest pain.   Gastrointestinal: Positive for nausea and vomiting.   Musculoskeletal: Positive for falls. Negative for neck pain.   Neurological: Positive for headaches. Negative for sensory change.       PAST MEDICAL HISTORY   has a past medical history of A-fib (HCC) and Lymphoma of spleen (HCC) (2016).    SURGICAL HISTORY  patient denies any surgical history    SOCIAL HISTORY  Social History   Substance Use Topics   • Smoking status: Not on file   • Smokeless tobacco: Not on file   • Alcohol use Not on file  "     History   Drug use: Unknown       FAMILY HISTORY  No family history on file.    CURRENT MEDICATIONS  Home Medications     Reviewed by Payton Dixon, Pharmacy Intern (Pharmacy Intern) on 09/22/18 at 0926  Med List Status: Complete   Medication Last Dose Status   apixaban (ELIQUIS) 5mg Tab 9/22/2018 Active   metoprolol (LOPRESSOR) 25 MG Tab unk Active                ALLERGIES  Allergies   Allergen Reactions   • Corticosteroids Rash     \"Family does not know the exact name of the drug\"       PHYSICAL EXAM  VITAL SIGNS: /62   Pulse 78   Temp 37.1 °C (98.8 °F)   Resp 13   Ht 1.702 m (5' 7\")   Wt 66.2 kg (145 lb 15.1 oz)   SpO2 94%   BMI 22.86 kg/m²   Vitals reviewed.  Constitutional: Patient is oriented to person, place, and time. Appears well-developed and well-nourished. Appears uncomfortable, tired.    Head/face: Normocephalic and atraumatic.  Patient has resolving ecchymosis to her left face that extends up her forehead, and down into her left neck.  It is ecchymotic but also shows signs of resolution with some yellowing.  Ears: Normal external ears bilaterally.   Mouth/Throat: Oropharynx is clear and moist  Eyes: Conjunctivae are normal. Pupils are equal, round, and reactive to light.   Neck: Normal range of motion. Neck supple.  No midline tenderness or step-offs.  Cardiovascular: Normal rate, regular rhythm and normal heart sounds. Normal peripheral pulses.  Pulmonary/Chest: Effort normal and breath sounds normal. No respiratory distress, no wheezes, rhonchi, or rales.  Abdominal: Soft. Bowel sounds are normal. There is no tenderness. No rebound or guarding, or peritoneal signs.  Musculoskeletal: No edema and no tenderness.   Lymphadenopathy: No cervical adenopathy.   Neurological: No focal deficits. CN II through XII intact.  Normal strength and sensation of her bilateral upper and lower extremities.  Gait not tested.  She is slow to respond but she is appropriate and can answer questions in " a low voice.  Skin: Skin is warm and dry. No erythema. No pallor.   Psychiatric: Patient has a normal mood and affect.     LABS  Results for orders placed or performed during the hospital encounter of 09/22/18   CBC WITH DIFFERENTIAL   Result Value Ref Range    WBC 10.0 4.8 - 10.8 K/uL    RBC 4.10 (L) 4.20 - 5.40 M/uL    Hemoglobin 12.2 12.0 - 16.0 g/dL    Hematocrit 36.5 (L) 37.0 - 47.0 %    MCV 89.0 81.4 - 97.8 fL    MCH 29.8 27.0 - 33.0 pg    MCHC 33.4 (L) 33.6 - 35.0 g/dL    RDW 43.8 35.9 - 50.0 fL    Platelet Count 132 (L) 164 - 446 K/uL    MPV 10.6 9.0 - 12.9 fL    Neutrophils-Polys 84.40 (H) 44.00 - 72.00 %    Lymphocytes 8.50 (L) 22.00 - 41.00 %    Monocytes 6.10 0.00 - 13.40 %    Eosinophils 0.20 0.00 - 6.90 %    Basophils 0.30 0.00 - 1.80 %    Immature Granulocytes 0.50 0.00 - 0.90 %    Nucleated RBC 0.00 /100 WBC    Neutrophils (Absolute) 8.45 (H) 2.00 - 7.15 K/uL    Lymphs (Absolute) 0.85 (L) 1.00 - 4.80 K/uL    Monos (Absolute) 0.61 0.00 - 0.85 K/uL    Eos (Absolute) 0.02 0.00 - 0.51 K/uL    Baso (Absolute) 0.03 0.00 - 0.12 K/uL    Immature Granulocytes (abs) 0.05 0.00 - 0.11 K/uL    NRBC (Absolute) 0.00 K/uL   COMP METABOLIC PANEL   Result Value Ref Range    Sodium 139 135 - 145 mmol/L    Potassium 3.9 3.6 - 5.5 mmol/L    Chloride 104 96 - 112 mmol/L    Co2 25 20 - 33 mmol/L    Anion Gap 10.0 0.0 - 11.9    Glucose 144 (H) 65 - 99 mg/dL    Bun 25 (H) 8 - 22 mg/dL    Creatinine 0.65 0.50 - 1.40 mg/dL    Calcium 9.5 8.5 - 10.5 mg/dL    AST(SGOT) 16 12 - 45 U/L    ALT(SGPT) 18 2 - 50 U/L    Alkaline Phosphatase 83 30 - 99 U/L    Total Bilirubin 0.5 0.1 - 1.5 mg/dL    Albumin 4.4 3.2 - 4.9 g/dL    Total Protein 6.9 6.0 - 8.2 g/dL    Globulin 2.5 1.9 - 3.5 g/dL    A-G Ratio 1.8 g/dL   PROTHROMBIN TIME   Result Value Ref Range    PT 16.5 (H) 12.0 - 14.6 sec    INR 1.32 (H) 0.87 - 1.13   APTT   Result Value Ref Range    APTT 29.0 24.7 - 36.0 sec   ESTIMATED GFR   Result Value Ref Range    GFR If   American >60 >60 mL/min/1.73 m 2    GFR If Non African American >60 >60 mL/min/1.73 m 2   Sodium Serum (NA)   Result Value Ref Range    Sodium 137 135 - 145 mmol/L   LIPID PROFILE   Result Value Ref Range    Cholesterol,Tot 183 100 - 199 mg/dL    Triglycerides 74 0 - 149 mg/dL    HDL 37 (A) >=40 mg/dL     (H) <100 mg/dL   MAGNESIUM   Result Value Ref Range    Magnesium 1.8 1.5 - 2.5 mg/dL   Sodium Serum (NA)   Result Value Ref Range    Sodium 138 135 - 145 mmol/L   Sodium Serum (NA)   Result Value Ref Range    Sodium 137 135 - 145 mmol/L   ACCU-CHEK GLUCOSE   Result Value Ref Range    Glucose - Accu-Ck 158 (H) 65 - 99 mg/dL   ACCU-CHEK GLUCOSE   Result Value Ref Range    Glucose - Accu-Ck 142 (H) 65 - 99 mg/dL   ACCU-CHEK GLUCOSE   Result Value Ref Range    Glucose - Accu-Ck 128 (H) 65 - 99 mg/dL   EKG (NOW)   Result Value Ref Range    Report       [facility] Emergency Dept.    Test Date:  2018  Pt Name:    COURTNEY KAMARA                 Department: ER  MRN:        [id number]                      Room:       [facility]  Gender:     Female                       Technician: [id number]  :        [id number]                   Requested By:ER TRIAGE PROTOCOL  Order #:    [id number]                    Reading MD:    Measurements  Intervals                                Axis  Rate:       66                           P:          60  AZ:         180                          QRS:        13  QRSD:       80                           T:          20  QT:         416  QTc:        436    Interpretive Statements  SINUS RHYTHM  MULTIPLE PREMATURE COMPLEXES, VENT & SUPRAVEN  No previous ECG available for comparison         All labs reviewed by me.    EKG Interpretation  Interpreted by me    Rhythm: normal sinus   Rate: 66  Axis: normal  Ectopy: none  Conduction: Multiple PVCs ST Segments: no acute change  T Waves: no acute change  Q Waves: none    Clinical Impression: No old EKG for comparison.  No acute findings.   Multiple PVCs.  No acute ST segment elevation.  Sinus rhythm normal rate.    RADIOLOGY  CT-HEAD W/O   Final Result      1.  Interval placement of bilateral ventriculostomy catheters with reduction of ventricular dilation.   2.  RIGHT temporal subarachnoid hemorrhage is slightly more apparent than prior exam.   3.  RIGHT hemispheric subdural hematoma and temporal intraparenchymal hemorrhage are stable.   4.  Slight worsening RIGHT LEFT midline shift.      OUTSIDE IMAGES-CT HEAD   Final Result      CT-CTA HEAD WITH & W/O-POST PROCESS   Final Result   Addendum 1 of 1   These findings were discussed with Dr. Santizo on 9/22/2018 9:08 AM.      Final      1.  Stable intraventricular, intraparenchymal, subarachnoid and subdural hemorrhage with 5 mm RIGHT-to-LEFT midline shift again seen.   2.  No intracranial aneurysm, focal stenosis, or abrupt large vessel cut off.   3.  LEFT frontal scalp hematoma.      OUTSIDE IMAGES-CT CERVICAL SPINE   Final Result      EC-ECHOCARDIOGRAM COMPLETE W/O CONT    (Results Pending)   MR-BRAIN-WITH & W/O    (Results Pending)     The radiologist's interpretation of all radiological studies have been reviewed by me.    COURSE & MEDICAL DECISION MAKING  Pertinent Labs & Imaging studies reviewed. (See chart for details)    Obtained and reviewed past medical records.  No prior encounters in our EMR    7:28 AM - Patient seen and examined at bedside.  Patient's transferred from an outlying facility with a history of a fall a few weeks ago.  She did well but then suddenly developed headache the morning before her presentation and was found to have an intra-cranial hemorrhage and transferred here for neurosurgical evaluation.  On my exam, patient appears tired, she appears uncomfortable but she is neurologically intact.  And though she has a soft voice, she is appropriate and can answer questions.      7:53 AM neurosurgery paged    8 AM, discussed with Dr. Santizo, neurosurgeon who recommends admission to  the hospitalist, to the ICU with every hour neuro checks.  Recommends keeping the blood pressure less than 160 systolic.  Recommend CTA in 3-4 hours and reversing her anticoagulation.  Hospitalist paged.    8:05 AM, discussed with pharmacy regarding reversal agents.  Will find out when patient last took her Eliquis.    807, discussed with Dr. Rowena Calero, hospitalist who is updated on plan of care and neurosurgical intervention.    8:09 AM, CT called to urgently get the patient to CT for scanner as neurosurgery is awaiting results.    8:10 AM, nurse updated on plan of care.    Dr. Santizo called back shortly after nurse update to request CTA be done now is that he feels the patient will likely need more urgent/emergent surgical intervention.    She received reversal agent here in the ED.  Labs reviewed.  Patient remains intact here in the emergency department.  She did have nausea and vomiting while getting imaged and received additional anti-emetics.  Patient was transferred to the neuro ICU in critical condition.    The total critical care time on this patient is 40 minutes, resuscitating patient, speaking with admitting physician, and deciphering test results. This 40 minutes is exclusive of separately billable procedures.      FINAL IMPRESSION  1. Traumatic hemorrhage of right cerebrum with loss of consciousness, initial encounter ([facility])       Critical care time: 40 minutes

## 2018-09-22 NOTE — PROGRESS NOTES
2 RN skin check    EVD drains on bilateral head  Large bruising on left side of the face  Red, slow blenching bilateral heels: elevated using pillows

## 2018-09-22 NOTE — CARE PLAN
Problem: Pain Management  Goal: Pain level will decrease to patient's comfort goal    Intervention: Follow pain managment plan developed in collaboration with patient and Interdisciplinary Team  CPOT scale used to ensure adequate pain control      Problem: Skin Integrity  Goal: Risk for impaired skin integrity will decrease    Intervention: Assess risk factors for impaired skin integrity and/or pressure ulcers  2RN skin check done upon pt's arrival to the unit.

## 2018-09-22 NOTE — ASSESSMENT & PLAN NOTE
She had been on anticoagulation due to afib which was reversed on admission  S/P bilateral EVDs  Repeat CT noted.  She has been working with PT and OT, rehab consulted.  Neurosurgery following  Close BP control

## 2018-09-22 NOTE — CONSULTS
DATE OF SERVICE:  09/22/2018    EMERGENCY ROOM CONSULTATION    CHIEF COMPLAINT:  Mental status changes, interventricular hemorrhage, and   intraparenchymal hemorrhage.    HISTORY OF PRESENT ILLNESS:  This 78-year-old right-handed woman who   apparently fell from standing without any problems afterwards.  She had a lot   of facial bruising.  [place]  She reportedly woke up this morning with a significant headache.  She   did take her Eliquis this morning, she is becoming more sleepy.  She was taken   to an outside hospital and it shows nearly casted right greater than left   interventricular hemorrhage secondary to an intraparenchymal hemorrhage   decompression.  She has a small subdural on the right.  She has minimal right   to left midline shift.  She has no overlying skull fracture.  She has a   cephalohematoma on the left side.  She is on her way to CT angiogram.  Her   Eliquis is being reversed.    PAST MEDICAL HISTORY:  She denies any further medical history except for   atrial fibrillation.    PAST SURGICAL HISTORY:  She denies.    SOCIAL HISTORY:  She does not smoke.  She does not drink.  She has no family   at her bedside.    PHYSICAL EXAMINATION:  GENERAL:  Although, she is lethargic, she answers her name.  She knows where   she is.  She does not know the year.  HEENT:  Her pupils are symmetric.  Her extraocular motions are intact.  Face   is full.  Tongue is midline.  she had a lot of neck ecchymosis in various   stages of healing.  EXTREMITIES:  She seems to move her arms and legs symmetrically with good   sensation in the face, arms and legs.    ASSESSMENT AND PLAN:  The patient is anticoagulated for atrial fibrillation   with spontaneous intracerebral hemorrhage with casted interventricular   hemorrhage.  I am recommending a left versus right versus bilateral external   ventricular drains for possible intraventricular TPA administration and    drainage of the hydrocephalus, which is forming.  I explained the risks,   benefits, and alternatives to her and her family including pain, infection,   bleeding, CSF leak, failure to completely resolve symptoms, neurologic   deficits, prolonged intubation and need for a shunt in the future.  They can   move forward with emergent EVD placement, should go to the ICU for q. 1 hour   neuro checks, head CT later on today.  MRI of the brain with and without   contrast to evaluate for bleeding mass in the morning.  The mortality of   casting ventricles like this is 50%, but right now she is doing surprisingly   well.    Thank you for allowing us to participate in her care.  Should get daily coags.    We will evaluate need for future administration of PCC, Eliquis reversal in   the future.       ____________________________________     MD SASKIA Pierre III / WHITLEY    DD:  09/22/2018 10:03:19  DT:  09/22/2018 13:00:14    D#:  [id number]  Job#:  [id number]

## 2018-09-22 NOTE — ASSESSMENT & PLAN NOTE
Unable to fully anticoagulate for now until cleared by neurosurgery  Continue digoxin and metoprolol

## 2018-09-23 ENCOUNTER — APPOINTMENT (OUTPATIENT)
Dept: CARDIOLOGY | Facility: MEDICAL CENTER | Age: 78
DRG: 023 | End: 2018-09-23
Attending: INTERNAL MEDICINE
Payer: MEDICARE

## 2018-09-23 ENCOUNTER — APPOINTMENT (OUTPATIENT)
Dept: RADIOLOGY | Facility: MEDICAL CENTER | Age: 78
DRG: 023 | End: 2018-09-23
Attending: NEUROLOGICAL SURGERY
Payer: MEDICARE

## 2018-09-23 PROBLEM — E83.42 HYPOMAGNESEMIA: Status: ACTIVE | Noted: 2018-09-23

## 2018-09-23 PROBLEM — E87.6 HYPOKALEMIA: Status: ACTIVE | Noted: 2018-09-23

## 2018-09-23 LAB
ANION GAP SERPL CALC-SCNC: 8 MMOL/L (ref 0–11.9)
BASOPHILS # BLD AUTO: 0.2 % (ref 0–1.8)
BASOPHILS # BLD: 0.03 K/UL (ref 0–0.12)
BUN SERPL-MCNC: 18 MG/DL (ref 8–22)
CALCIUM SERPL-MCNC: 8.5 MG/DL (ref 8.5–10.5)
CHLORIDE SERPL-SCNC: 107 MMOL/L (ref 96–112)
CO2 SERPL-SCNC: 24 MMOL/L (ref 20–33)
CREAT SERPL-MCNC: 0.57 MG/DL (ref 0.5–1.4)
EOSINOPHIL # BLD AUTO: 0.02 K/UL (ref 0–0.51)
EOSINOPHIL NFR BLD: 0.2 % (ref 0–6.9)
ERYTHROCYTE [DISTWIDTH] IN BLOOD BY AUTOMATED COUNT: 43.4 FL (ref 35.9–50)
GLUCOSE BLD-MCNC: 110 MG/DL (ref 65–99)
GLUCOSE BLD-MCNC: 119 MG/DL (ref 65–99)
GLUCOSE BLD-MCNC: 128 MG/DL (ref 65–99)
GLUCOSE BLD-MCNC: 131 MG/DL (ref 65–99)
GLUCOSE SERPL-MCNC: 113 MG/DL (ref 65–99)
HCT VFR BLD AUTO: 31.2 % (ref 37–47)
HGB BLD-MCNC: 10.2 G/DL (ref 12–16)
IMM GRANULOCYTES # BLD AUTO: 0.06 K/UL (ref 0–0.11)
IMM GRANULOCYTES NFR BLD AUTO: 0.5 % (ref 0–0.9)
INR PPP: 1.19 (ref 0.87–1.13)
LV EJECT FRACT  99904: 65
LV EJECT FRACT MOD 2C 99903: 85.06
LV EJECT FRACT MOD 4C 99902: 68.95
LV EJECT FRACT MOD BP 99901: 77.46
LYMPHOCYTES # BLD AUTO: 0.89 K/UL (ref 1–4.8)
LYMPHOCYTES NFR BLD: 7.2 % (ref 22–41)
MAGNESIUM SERPL-MCNC: 1.9 MG/DL (ref 1.5–2.5)
MCH RBC QN AUTO: 29 PG (ref 27–33)
MCHC RBC AUTO-ENTMCNC: 32.7 G/DL (ref 33.6–35)
MCV RBC AUTO: 88.6 FL (ref 81.4–97.8)
MONOCYTES # BLD AUTO: 0.85 K/UL (ref 0–0.85)
MONOCYTES NFR BLD AUTO: 6.9 % (ref 0–13.4)
NEUTROPHILS # BLD AUTO: 10.54 K/UL (ref 2–7.15)
NEUTROPHILS NFR BLD: 85 % (ref 44–72)
NRBC # BLD AUTO: 0.02 K/UL
NRBC BLD-RTO: 0.2 /100 WBC
PLATELET # BLD AUTO: 145 K/UL (ref 164–446)
PMV BLD AUTO: 10.6 FL (ref 9–12.9)
POTASSIUM SERPL-SCNC: 3.6 MMOL/L (ref 3.6–5.5)
PROTHROMBIN TIME: 15.3 SEC (ref 12–14.6)
RBC # BLD AUTO: 3.52 M/UL (ref 4.2–5.4)
SODIUM SERPL-SCNC: 137 MMOL/L (ref 135–145)
SODIUM SERPL-SCNC: 139 MMOL/L (ref 135–145)
WBC # BLD AUTO: 12.4 K/UL (ref 4.8–10.8)

## 2018-09-23 PROCEDURE — 85610 PROTHROMBIN TIME: CPT

## 2018-09-23 PROCEDURE — 70553 MRI BRAIN STEM W/O & W/DYE: CPT

## 2018-09-23 PROCEDURE — 84295 ASSAY OF SERUM SODIUM: CPT

## 2018-09-23 PROCEDURE — 700111 HCHG RX REV CODE 636 W/ 250 OVERRIDE (IP): Performed by: HOSPITALIST

## 2018-09-23 PROCEDURE — 85025 COMPLETE CBC W/AUTO DIFF WBC: CPT

## 2018-09-23 PROCEDURE — 700105 HCHG RX REV CODE 258: Performed by: NEUROLOGICAL SURGERY

## 2018-09-23 PROCEDURE — G8997 SWALLOW GOAL STATUS: HCPCS | Mod: CJ

## 2018-09-23 PROCEDURE — 82962 GLUCOSE BLOOD TEST: CPT

## 2018-09-23 PROCEDURE — A9585 GADOBUTROL INJECTION: HCPCS | Performed by: NEUROLOGICAL SURGERY

## 2018-09-23 PROCEDURE — 99233 SBSQ HOSP IP/OBS HIGH 50: CPT | Performed by: HOSPITALIST

## 2018-09-23 PROCEDURE — 700117 HCHG RX CONTRAST REV CODE 255: Performed by: NEUROLOGICAL SURGERY

## 2018-09-23 PROCEDURE — 700101 HCHG RX REV CODE 250: Performed by: INTERNAL MEDICINE

## 2018-09-23 PROCEDURE — 700105 HCHG RX REV CODE 258: Performed by: HOSPITALIST

## 2018-09-23 PROCEDURE — 700111 HCHG RX REV CODE 636 W/ 250 OVERRIDE (IP): Performed by: NEUROLOGICAL SURGERY

## 2018-09-23 PROCEDURE — 80048 BASIC METABOLIC PNL TOTAL CA: CPT

## 2018-09-23 PROCEDURE — 83735 ASSAY OF MAGNESIUM: CPT

## 2018-09-23 PROCEDURE — 93306 TTE W/DOPPLER COMPLETE: CPT | Mod: 26 | Performed by: INTERNAL MEDICINE

## 2018-09-23 PROCEDURE — 700105 HCHG RX REV CODE 258: Performed by: INTERNAL MEDICINE

## 2018-09-23 PROCEDURE — 92610 EVALUATE SWALLOWING FUNCTION: CPT

## 2018-09-23 PROCEDURE — 700111 HCHG RX REV CODE 636 W/ 250 OVERRIDE (IP): Performed by: INTERNAL MEDICINE

## 2018-09-23 PROCEDURE — 93306 TTE W/DOPPLER COMPLETE: CPT

## 2018-09-23 PROCEDURE — 770022 HCHG ROOM/CARE - ICU (200)

## 2018-09-23 PROCEDURE — 99291 CRITICAL CARE FIRST HOUR: CPT | Performed by: INTERNAL MEDICINE

## 2018-09-23 PROCEDURE — G8996 SWALLOW CURRENT STATUS: HCPCS | Mod: CK

## 2018-09-23 RX ORDER — MAGNESIUM SULFATE HEPTAHYDRATE 40 MG/ML
2 INJECTION, SOLUTION INTRAVENOUS ONCE
Status: COMPLETED | OUTPATIENT
Start: 2018-09-23 | End: 2018-09-23

## 2018-09-23 RX ORDER — OXYCODONE HYDROCHLORIDE 5 MG/1
2.5 TABLET ORAL
Status: DISCONTINUED | OUTPATIENT
Start: 2018-09-23 | End: 2018-09-24

## 2018-09-23 RX ORDER — SODIUM CHLORIDE 9 MG/ML
500 INJECTION, SOLUTION INTRAVENOUS ONCE
Status: COMPLETED | OUTPATIENT
Start: 2018-09-23 | End: 2018-09-23

## 2018-09-23 RX ORDER — POTASSIUM CHLORIDE 7.45 MG/ML
10 INJECTION INTRAVENOUS
Status: COMPLETED | OUTPATIENT
Start: 2018-09-23 | End: 2018-09-23

## 2018-09-23 RX ORDER — OXYCODONE HYDROCHLORIDE 5 MG/1
5 TABLET ORAL
Status: DISCONTINUED | OUTPATIENT
Start: 2018-09-23 | End: 2018-09-24

## 2018-09-23 RX ORDER — HYDROMORPHONE HYDROCHLORIDE 2 MG/ML
.25-1 INJECTION, SOLUTION INTRAMUSCULAR; INTRAVENOUS; SUBCUTANEOUS
Status: DISCONTINUED | OUTPATIENT
Start: 2018-09-23 | End: 2018-09-24

## 2018-09-23 RX ORDER — GADOBUTROL 604.72 MG/ML
6.5 INJECTION INTRAVENOUS ONCE
Status: COMPLETED | OUTPATIENT
Start: 2018-09-23 | End: 2018-09-23

## 2018-09-23 RX ADMIN — METOPROLOL TARTRATE 5 MG: 1 INJECTION, SOLUTION INTRAVENOUS at 00:22

## 2018-09-23 RX ADMIN — CEFAZOLIN SODIUM 1 G: 1 INJECTION, SOLUTION INTRAVENOUS at 05:45

## 2018-09-23 RX ADMIN — POTASSIUM CHLORIDE 10 MEQ: 7.46 INJECTION, SOLUTION INTRAVENOUS at 09:10

## 2018-09-23 RX ADMIN — SODIUM CHLORIDE 500 ML: 9 INJECTION, SOLUTION INTRAVENOUS at 15:30

## 2018-09-23 RX ADMIN — POTASSIUM CHLORIDE 10 MEQ: 7.46 INJECTION, SOLUTION INTRAVENOUS at 10:14

## 2018-09-23 RX ADMIN — CEFAZOLIN SODIUM 1 G: 1 INJECTION, SOLUTION INTRAVENOUS at 22:41

## 2018-09-23 RX ADMIN — SODIUM CHLORIDE 500 MG: 9 INJECTION, SOLUTION INTRAVENOUS at 04:58

## 2018-09-23 RX ADMIN — METOPROLOL TARTRATE 5 MG: 1 INJECTION, SOLUTION INTRAVENOUS at 23:55

## 2018-09-23 RX ADMIN — HYDROMORPHONE HYDROCHLORIDE 0.5 MG: 2 INJECTION INTRAMUSCULAR; INTRAVENOUS; SUBCUTANEOUS at 11:27

## 2018-09-23 RX ADMIN — SODIUM CHLORIDE 500 MG: 9 INJECTION, SOLUTION INTRAVENOUS at 18:10

## 2018-09-23 RX ADMIN — HYDROMORPHONE HYDROCHLORIDE 0.25 MG: 2 INJECTION INTRAMUSCULAR; INTRAVENOUS; SUBCUTANEOUS at 01:12

## 2018-09-23 RX ADMIN — ONDANSETRON 4 MG: 2 INJECTION INTRAMUSCULAR; INTRAVENOUS at 11:12

## 2018-09-23 RX ADMIN — HYDROMORPHONE HYDROCHLORIDE 0.25 MG: 2 INJECTION INTRAMUSCULAR; INTRAVENOUS; SUBCUTANEOUS at 07:14

## 2018-09-23 RX ADMIN — SODIUM CHLORIDE: 9 INJECTION, SOLUTION INTRAVENOUS at 01:55

## 2018-09-23 RX ADMIN — HYDROMORPHONE HYDROCHLORIDE 1 MG: 2 INJECTION INTRAMUSCULAR; INTRAVENOUS; SUBCUTANEOUS at 15:00

## 2018-09-23 RX ADMIN — ONDANSETRON 4 MG: 2 INJECTION INTRAMUSCULAR; INTRAVENOUS at 22:22

## 2018-09-23 RX ADMIN — METOPROLOL TARTRATE 5 MG: 1 INJECTION, SOLUTION INTRAVENOUS at 18:10

## 2018-09-23 RX ADMIN — MAGNESIUM SULFATE HEPTAHYDRATE 2 G: 40 INJECTION, SOLUTION INTRAVENOUS at 07:59

## 2018-09-23 RX ADMIN — GADOBUTROL 6.5 ML: 604.72 INJECTION INTRAVENOUS at 15:15

## 2018-09-23 RX ADMIN — METOPROLOL TARTRATE 5 MG: 1 INJECTION, SOLUTION INTRAVENOUS at 12:56

## 2018-09-23 RX ADMIN — CEFAZOLIN SODIUM 1 G: 1 INJECTION, SOLUTION INTRAVENOUS at 15:29

## 2018-09-23 ASSESSMENT — ENCOUNTER SYMPTOMS
NERVOUS/ANXIOUS: 0
MYALGIAS: 0
ADENOPATHY: 0
HALLUCINATIONS: 0
CHILLS: 0
COUGH: 0
SORE THROAT: 0
FALLS: 1
CHOKING: 0
FEVER: 0
NAUSEA: 1
COLOR CHANGE: 0
NAUSEA: 0
ABDOMINAL PAIN: 0
SHORTNESS OF BREATH: 0
FATIGUE: 1
VOMITING: 0
NECK PAIN: 0
SENSORY CHANGE: 0
EYE REDNESS: 0
JOINT SWELLING: 0
CONSTIPATION: 0
PHOTOPHOBIA: 0
SEIZURES: 0
CHEST TIGHTNESS: 0
HEADACHES: 1
BACK PAIN: 0
EYE PAIN: 0
DIZZINESS: 0
VOMITING: 1

## 2018-09-23 ASSESSMENT — PAIN SCALES - GENERAL
PAINLEVEL_OUTOF10: 6
PAINLEVEL_OUTOF10: 0
PAINLEVEL_OUTOF10: 7
PAINLEVEL_OUTOF10: 0
PAINLEVEL_OUTOF10: 4
PAINLEVEL_OUTOF10: 7
PAINLEVEL_OUTOF10: 7
PAINLEVEL_OUTOF10: 0
PAINLEVEL_OUTOF10: 5
PAINLEVEL_OUTOF10: 4

## 2018-09-23 NOTE — PROGRESS NOTES
Neurosurgery Progress Note    Subjective:  Pt with IVH / SAH / SDH  Bilateral EVD day #1, at 0 tragus, putting out about 5cc each  ICP low  Pt stable  CT stable , CTA benign, MRI pending     Exam:  GCS 15, awake, appropriate, moves ext x4, grossly strong     Pulse  Av.6  Min: 69  Max: 79  Resp  Av.1  Min: 12  Max: 29  Temp  Av.9 °C (98.5 °F)  Min: 35.7 °C (96.2 °F)  Max: 37.6 °C (99.6 °F)  SpO2  Av.6 %  Min: 92 %  Max: 100 %    ICP  Av.4 MM HG  Min: 1 MM HG  Max: 16 MM HG    Recent Labs      18   0810  18   0542   WBC  10.0  12.4*   RBC  4.10*  3.52*   HEMOGLOBIN  12.2  10.2*   HEMATOCRIT  36.5*  31.2*   MCV  89.0  88.6   MCH  29.8  29.0   MCHC  33.4*  32.7*   RDW  43.8  43.4   PLATELETCT  132*  145*   MPV  10.6  10.6     Recent Labs      18   0810   18   1808  18   0009  18   0542   SODIUM  139   < >  138  137  139   POTASSIUM  3.9   --    --    --   3.6   CHLORIDE  104   --    --    --   107   CO2  25   --    --    --   24   GLUCOSE  144*   --    --    --   113*   BUN  25*   --    --    --   18   CREATININE  0.65   --    --    --   0.57   CALCIUM  9.5   --    --    --   8.5    < > = values in this interval not displayed.     Recent Labs      18   0810  18   0542   APTT  29.0   --    INR  1.32*  1.19*           Intake/Output       18 - 18 - 1859       Total  Total       Intake    P.O.  0  -- 0  --  -- --    P.O. 0 -- 0 -- -- --    I.V.  458.7  960 1418.7  320  -- 320    Volume (mL) (NS infusion) 458.7 960 1418.7 320 -- 320    IV Piggyback  50  608.3 658.3  --  -- --    Volume (mL) (ceFAZolin in dextrose (ANCEF) IVPB premix 1 g) 50 75 125 -- -- --    Volume (mL) (levETIRAcetam (KEPPRA) 500 mg in  mL IVPB) -- 533.3 533.3 -- -- --    Total Intake 508.7 1568.3 2077 320 -- 320       Output    Urine  500  540 1040  150  -- 150    Output (mL) (Urethral Catheter  Latex 16 Fr.)  150 -- 150    Drains  85  130 215  43  -- 43    Output (mL) (ICP/Ventriculostomy Right) 22 59 81 22 -- 22    Output (mL) (ICP/Ventriculostomy Left) 63 71 134 21 -- 21    Stool  --  -- --  --  -- --    Number of Times Stooled 0 x 0 x 0 x 0 x -- 0 x    Total Output  193 -- 193       Net I/O     -76.3 898.3 822 127 -- 127            Intake/Output Summary (Last 24 hours) at 09/23/18 1126  Last data filed at 09/23/18 1100   Gross per 24 hour   Intake             2397 ml   Output             1448 ml   Net              949 ml            • Pharmacy Consult Request   PRN    And   • oxyCODONE immediate-release  2.5 mg Q3HRS PRN    And   • oxyCODONE immediate-release  5 mg Q3HRS PRN    And   • HYDROmorphone  0.25-1 mg Q2HRS PRN   • Respiratory Care per Protocol   Continuous RT   • NS   Continuous   • acetaminophen  650 mg Q4HRS PRN    Or   • acetaminophen  650 mg Q4HRS PRN   • MD Alert...Adult ICU Electrolyte Replacement per Pharmacy   pharmacy to dose   • ondansetron  4 mg Q4HRS PRN    Or   • ondansetron  4 mg Q4HRS PRN   • senna-docusate  2 Tab BID    And   • polyethylene glycol/lytes  1 Packet QDAY PRN    And   • magnesium hydroxide  30 mL QDAY PRN    And   • bisacodyl  10 mg QDAY PRN   • LORazepam  2 mg Q5 MIN PRN   • labetalol  10 mg Q4HRS PRN   • hydrALAZINE  10 mg Q4HRS PRN   • enalaprilat  1.25 mg Q6HRS PRN   • ceFAZolin  1 g Q8HRS   • levETIRAcetam (KEPPRA) IV  500 mg Q12HRS   • Metoprolol Tartrate  5 mg Q6HRS       Assessment and Plan:  Hospital day #2  EVD #1  Will continue to follow  Keep EVD at 0, pt/family understands they will be in for some time  MRI today  Continue IM / pulmonary care

## 2018-09-23 NOTE — CARE PLAN
Problem: Pain Management  Goal: Pain level will decrease to patient's comfort goal  Outcome: PROGRESSING AS EXPECTED  Assess pain Q2H and intervene as needed through pharmacological and non-pharmacological interventions.

## 2018-09-23 NOTE — PROGRESS NOTES
Hospital Medicine Daily Progress Note    Date of Service  9/23/2018    Chief Complaint  78 y.o. female admitted 9/22/2018 with headache.    Hospital Course    Mr Hess has a history of atrial fibrillation and is on anticoagulation.  She presented on 9/22/18 to [facility] with a headache and was found to have subdural and well as subarachnoid hemmorhage.  She was transferred to [facility] for neurosurgical coverage and higher level of care       Interval Problem Update  ICP 5-16, on q1 hour neuro checks, moves all ext with good strengh  On 2L nasal cannula  She is technically oriented x 3, gets confused, doesn't recall recent events  Complains of 3/10 headache, no blurry vision, no N/V, better with dilaudid    Consultants/Specialty  Critical Care, I discussed the patient's condition with Dr. Norton this morning on ICU Rounds  Neurosurgery    Code Status  Full Code    Disposition  At high risk of neurologic decompensation, keep in ICU    Review of Systems  Review of Systems   Constitutional: Positive for malaise/fatigue.   Eyes: Negative for blurred vision and double vision.   Respiratory: Negative for cough and hemoptysis.    Gastrointestinal: Negative for nausea and vomiting.   Musculoskeletal: Negative for back pain and neck pain.   Skin: Negative for itching and rash.   Neurological: Positive for weakness and headaches.        Physical Exam  Temp:  [35.7 °C (96.2 °F)-37.6 °C (99.6 °F)] 37 °C (98.6 °F)  Pulse:  [69-79] 75  Resp:  [12-29] 12    Physical Exam   Constitutional: She is oriented to person, place, and time. She appears well-developed and well-nourished.   HENT:   Head: Normocephalic.   Bilateral EVD, pink CSF  Bruising around face   Eyes: Pupils are equal, round, and reactive to light. Conjunctivae and EOM are normal. Right eye exhibits no discharge. Left eye exhibits no discharge.   Cardiovascular: Regular rhythm and intact distal pulses.    No murmur heard.  Tachycardic   Pulmonary/Chest: Effort  normal and breath sounds normal. No respiratory distress. She has no wheezes.   Abdominal: Soft. Bowel sounds are normal. She exhibits no distension. There is no tenderness. There is no rebound.   Musculoskeletal: Normal range of motion. She exhibits no edema.   Neurological: She is alert and oriented to person, place, and time. No cranial nerve deficit.   Skin: Skin is warm and dry. No rash noted. She is not diaphoretic. No erythema.   ]   Psychiatric: She has a normal mood and affect.       Fluids    Intake/Output Summary (Last 24 hours) at 09/23/18 0856  Last data filed at 09/23/18 0800   Gross per 24 hour   Intake             2237 ml   Output             1371 ml   Net              866 ml       Laboratory  Recent Labs      09/22/18   0810  09/23/18   0542   WBC  10.0  12.4*   RBC  4.10*  3.52*   HEMOGLOBIN  12.2  10.2*   HEMATOCRIT  36.5*  31.2*   MCV  89.0  88.6   MCH  29.8  29.0   MCHC  33.4*  32.7*   RDW  43.8  43.4   PLATELETCT  132*  145*   MPV  10.6  10.6     Recent Labs      09/22/18   0810   09/22/18   1808  09/23/18   0009  09/23/18   0542   SODIUM  139   < >  138  137  139   POTASSIUM  3.9   --    --    --   3.6   CHLORIDE  104   --    --    --   107   CO2  25   --    --    --   24   GLUCOSE  144*   --    --    --   113*   BUN  25*   --    --    --   18   CREATININE  0.65   --    --    --   0.57   CALCIUM  9.5   --    --    --   8.5    < > = values in this interval not displayed.     Recent Labs      09/22/18   0810  09/23/18   0542   APTT  29.0   --    INR  1.32*  1.19*         Recent Labs      09/22/18   1300   TRIGLYCERIDE  74   HDL  37*   LDL  131*       Imaging  EC-ECHOCARDIOGRAM COMPLETE W/O CONT   Final Result      MR-BRAIN-WITH & W/O   Final Result      1.  Large intraparenchymal hemorrhage in the right posterior medial temporal lobe which measures 5 x 3.3 x 3.3 cm in greatest diameter and has a moderate amount of surrounding vasogenic edema displacing the right temporal horn anteriorly and  causing    marked right to left midline shift of the ventricular system of 13 mm.      2.  Marked amount of diffuse intraventricular hemorrhage especially pronounced in the right lateral ventricle and fourth ventricle.      3.  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      4.  Small holohemispheric right sided subdural hematoma effacing the underlying cortical sulci and gyri and measuring 13 mm in greatest diameter in the right frontal temporal region.      5.  No significant interval change from earlier head CT scan dated 9/22/2018      6.  3.6 cm subcutaneous hematoma in the left frontal region      CT-HEAD W/O   Final Result      1.  Interval placement of bilateral ventriculostomy catheters with reduction of ventricular dilation.   2.  RIGHT temporal subarachnoid hemorrhage is slightly more apparent than prior exam.   3.  RIGHT hemispheric subdural hematoma and temporal intraparenchymal hemorrhage are stable.   4.  Slight worsening RIGHT LEFT midline shift.      OUTSIDE IMAGES-CT HEAD   Final Result      CT-CTA HEAD WITH & W/O-POST PROCESS   Final Result   Addendum 1 of 1   These findings were discussed with Dr. Santizo on 9/22/2018 9:08 AM.      Final      1.  Stable intraventricular, intraparenchymal, subarachnoid and subdural hemorrhage with 5 mm RIGHT-to-LEFT midline shift again seen.   2.  No intracranial aneurysm, focal stenosis, or abrupt large vessel cut off.   3.  LEFT frontal scalp hematoma.      OUTSIDE IMAGES-CT CERVICAL SPINE   Final Result      DX-ABDOMEN FOR TUBE PLACEMENT    (Results Pending)        Assessment/Plan  Acute intracranial hemorrhage (HCC)- (present on admission)   Assessment & Plan    Anticoagulation reveresed  S/P bilateral ventriculostomy drains  Monitor ICP continuously  Neurosurgery following        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)- (present on admission)   Assessment & Plan    Anticoagulation reveresed         Paroxysmal atrial fibrillation (HCC)- (present on admission)   Assessment & Plan    Currently rate controlled  No anticoagulation due to intracranial bleed        Hypomagnesemia- (present on admission)   Assessment & Plan    Replace        Hypokalemia   Assessment & Plan    Replace             VTE prophylaxis: no chemoprophylaxis as she has ICH

## 2018-09-23 NOTE — PROGRESS NOTES
Dr. Santizo and BORIS Gold at the bedside.  Order received to discontinue Q5hrs sodium level.  Maintain NA normal.

## 2018-09-23 NOTE — PROGRESS NOTES
Critical Care Progress Note    Date of admission  9/22/2018    Chief Complaint  78 y.o. female admitted 9/22/2018 with intracranial hemorrhage.    Hospital Course  This lady was admitted with an acute intracranial hemorrhage.  She is undergone bilateral EVDs for hydrocephalus.    Interval Problem Update  Reviewed last 24 hour events:       -lethargic   -replete K and Mg   -EVD's in place   -ICP 5-16   -neuro checks q 1   -SR   -MRI today      Review of Systems  Review of Systems   Constitutional: Positive for fatigue. Negative for chills and fever.   HENT: Negative for ear discharge, ear pain, nosebleeds, sore throat and tinnitus.    Eyes: Negative for photophobia, pain and redness.   Respiratory: Negative for cough, choking, chest tightness and shortness of breath.    Cardiovascular: Negative for chest pain and leg swelling.   Gastrointestinal: Negative for abdominal pain, constipation, nausea and vomiting.   Endocrine: Negative for cold intolerance and heat intolerance.   Genitourinary: Negative for dysuria, frequency, hematuria and urgency.   Musculoskeletal: Negative for back pain, joint swelling and myalgias.   Skin: Negative for color change, pallor and rash.   Allergic/Immunologic: Negative for food allergies.   Neurological: Positive for headaches. Negative for dizziness and seizures.   Hematological: Negative for adenopathy.   Psychiatric/Behavioral: Negative for hallucinations and self-injury. The patient is not nervous/anxious.         Vital Signs for last 24 hours   Temp:  [35.5 °C (95.9 °F)-37.6 °C (99.6 °F)] 37.2 °C (99 °F)  Pulse:  [65-79] 79  Resp:  [12-29] 20  BP: (137)/(62) 137/62    Hemodynamic parameters for last 24 hours       Vent Settings for last 24 hours       Physical Exam   Physical Exam   HENT:   Head: Normocephalic.   Right Ear: External ear normal.   Left Ear: External ear normal.   Mouth/Throat: Oropharynx is clear and moist.   Bruising along her left forehead and left face.  Bilateral  EVDs in place.   Eyes: Pupils are equal, round, and reactive to light. Conjunctivae are normal. Right eye exhibits no discharge. Left eye exhibits no discharge. No scleral icterus.   Neck: Neck supple. No tracheal deviation present.   Cardiovascular: Exam reveals no gallop and no friction rub.    No murmur heard.  Sinus rhythm   Pulmonary/Chest: No stridor. She has no wheezes. She has no rales.   Abdominal: Soft. Bowel sounds are normal. She exhibits no distension. There is no tenderness. There is no rebound.   Musculoskeletal: She exhibits no tenderness or deformity.   No clubbing or cyanosis   Neurological:   A little lethargic.  Arouses.  Answers questions.  Moves all 4 extremities.   Skin: Skin is warm and dry. No rash noted. She is not diaphoretic. No erythema. No pallor.       Medications  Current Facility-Administered Medications   Medication Dose Route Frequency Provider Last Rate Last Dose   • Respiratory Care per Protocol   Nebulization Continuous RT Rowena Calero M.D.       • NS infusion   Intravenous Continuous Rowena Calero M.D. 80 mL/hr at 09/23/18 0155     • acetaminophen (TYLENOL) tablet 650 mg  650 mg Oral Q4HRS PRN Rowena Calero M.D.        Or   • acetaminophen (TYLENOL) suppository 650 mg  650 mg Rectal Q4HRS PRN Rowena Calero M.D.       • MD Alert...ICU Electrolyte Replacement per Pharmacy   Other pharmacy to dose Rowena Calero M.D.       • ondansetron (ZOFRAN ODT) dispertab 4 mg  4 mg Oral Q4HRS PRN Rowena Calero M.D.        Or   • ondansetron (ZOFRAN) syringe/vial injection 4 mg  4 mg Intravenous Q4HRS PRN Rowena Calero M.D.   4 mg at 09/22/18 1513   • senna-docusate (PERICOLACE or SENOKOT S) 8.6-50 MG per tablet 2 Tab  2 Tab Oral BID Rowena Calero M.D.   Stopped at 09/22/18 1800    And   • polyethylene glycol/lytes (MIRALAX) PACKET 1 Packet  1 Packet Oral QDAY PRN Rowena Calero M.D.        And   • magnesium hydroxide (MILK OF MAGNESIA) suspension 30 mL  30 mL Oral  QDAY PRN Rowena Calero M.D.        And   • bisacodyl (DULCOLAX) suppository 10 mg  10 mg Rectal QDAY PRN Rowena Calero M.D.       • LORazepam (ATIVAN) injection 2 mg  2 mg Intravenous Q5 MIN PRN Rowena Calero M.D.       • Pharmacy Consult Request ...Pain Management Review   Other PRN Rowena Calero M.D.        And   • oxyCODONE immediate-release (ROXICODONE) tablet 2.5 mg  2.5 mg Oral Q3HRS PRN Rowena Calero M.D.        And   • oxyCODONE immediate-release (ROXICODONE) tablet 5 mg  5 mg Oral Q3HRS PRN Rowena Calero M.D.        And   • HYDROmorphone (DILAUDID) injection 0.25 mg  0.25 mg Intravenous Q3HRS PRN Rowena Calero M.D.   0.25 mg at 09/23/18 0112   • labetalol (NORMODYNE,TRANDATE) injection 10 mg  10 mg Intravenous Q4HRS PRN Rowena Calero M.D.       • hydrALAZINE (APRESOLINE) injection 10 mg  10 mg Intravenous Q4HRS PRMO Calero M.D.       • enalaprilat (VASOTEC) injection 1.25 mg  1.25 mg Intravenous Q6HRS PRMO Calero M.D.       • ceFAZolin in dextrose (ANCEF) IVPB premix 1 g  1 g Intravenous Q8HRS Juan Alberto Santizo III, M.D.   Stopped at 09/23/18 0615   • levETIRAcetam (KEPPRA) 500 mg in  mL IVPB  500 mg Intravenous Q12HRS Juan Alberto Santizo III, M.D.   Stopped at 09/23/18 0513   • Metoprolol Tartrate (LOPRESSOR) injection 5 mg  5 mg Intravenous Q6HRS Zeke Norton M.D.   Stopped at 09/23/18 0600       Fluids    Intake/Output Summary (Last 24 hours) at 09/23/18 0643  Last data filed at 09/23/18 0600   Gross per 24 hour   Intake             2077 ml   Output             1255 ml   Net              822 ml       Laboratory  Recent Results (from the past 48 hour(s))   EKG (NOW)    Collection Time: 09/22/18  7:23 AM   Result Value Ref Range    Report       [facility]    Test Date:  2018-09-22  Pt Name:    COURTNEY KAMARA                 Department: ER  MRN:        [id number]                      Room:       [facility]  Gender:     Female                        Technician: [id number]  :        [id number]                   Requested By:ER TRIAGE PROTOCOL  Order #:    [id number]                    Reading MD:    Measurements  Intervals                                Axis  Rate:       66                           P:          60  MS:         180                          QRS:        13  QRSD:       80                           T:          20  QT:         416  QTc:        436    Interpretive Statements  SINUS RHYTHM  MULTIPLE PREMATURE COMPLEXES, VENT & SUPRAVEN  No previous ECG available for comparison     CBC WITH DIFFERENTIAL    Collection Time: 18  8:10 AM   Result Value Ref Range    WBC 10.0 4.8 - 10.8 K/uL    RBC 4.10 (L) 4.20 - 5.40 M/uL    Hemoglobin 12.2 12.0 - 16.0 g/dL    Hematocrit 36.5 (L) 37.0 - 47.0 %    MCV 89.0 81.4 - 97.8 fL    MCH 29.8 27.0 - 33.0 pg    MCHC 33.4 (L) 33.6 - 35.0 g/dL    RDW 43.8 35.9 - 50.0 fL    Platelet Count 132 (L) 164 - 446 K/uL    MPV 10.6 9.0 - 12.9 fL    Neutrophils-Polys 84.40 (H) 44.00 - 72.00 %    Lymphocytes 8.50 (L) 22.00 - 41.00 %    Monocytes 6.10 0.00 - 13.40 %    Eosinophils 0.20 0.00 - 6.90 %    Basophils 0.30 0.00 - 1.80 %    Immature Granulocytes 0.50 0.00 - 0.90 %    Nucleated RBC 0.00 /100 WBC    Neutrophils (Absolute) 8.45 (H) 2.00 - 7.15 K/uL    Lymphs (Absolute) 0.85 (L) 1.00 - 4.80 K/uL    Monos (Absolute) 0.61 0.00 - 0.85 K/uL    Eos (Absolute) 0.02 0.00 - 0.51 K/uL    Baso (Absolute) 0.03 0.00 - 0.12 K/uL    Immature Granulocytes (abs) 0.05 0.00 - 0.11 K/uL    NRBC (Absolute) 0.00 K/uL   COMP METABOLIC PANEL    Collection Time: 18  8:10 AM   Result Value Ref Range    Sodium 139 135 - 145 mmol/L    Potassium 3.9 3.6 - 5.5 mmol/L    Chloride 104 96 - 112 mmol/L    Co2 25 20 - 33 mmol/L    Anion Gap 10.0 0.0 - 11.9    Glucose 144 (H) 65 - 99 mg/dL    Bun 25 (H) 8 - 22 mg/dL    Creatinine 0.65 0.50 - 1.40 mg/dL    Calcium 9.5 8.5 - 10.5 mg/dL    AST(SGOT) 16 12 - 45 U/L    ALT(SGPT) 18 2 - 50 U/L     Alkaline Phosphatase 83 30 - 99 U/L    Total Bilirubin 0.5 0.1 - 1.5 mg/dL    Albumin 4.4 3.2 - 4.9 g/dL    Total Protein 6.9 6.0 - 8.2 g/dL    Globulin 2.5 1.9 - 3.5 g/dL    A-G Ratio 1.8 g/dL   PROTHROMBIN TIME    Collection Time: 09/22/18  8:10 AM   Result Value Ref Range    PT 16.5 (H) 12.0 - 14.6 sec    INR 1.32 (H) 0.87 - 1.13   APTT    Collection Time: 09/22/18  8:10 AM   Result Value Ref Range    APTT 29.0 24.7 - 36.0 sec   ESTIMATED GFR    Collection Time: 09/22/18  8:10 AM   Result Value Ref Range    GFR If African American >60 >60 mL/min/1.73 m 2    GFR If Non African American >60 >60 mL/min/1.73 m 2   Sodium Serum (NA)    Collection Time: 09/22/18  1:00 PM   Result Value Ref Range    Sodium 137 135 - 145 mmol/L   LIPID PROFILE    Collection Time: 09/22/18  1:00 PM   Result Value Ref Range    Cholesterol,Tot 183 100 - 199 mg/dL    Triglycerides 74 0 - 149 mg/dL    HDL 37 (A) >=40 mg/dL     (H) <100 mg/dL   MAGNESIUM    Collection Time: 09/22/18  1:00 PM   Result Value Ref Range    Magnesium 1.8 1.5 - 2.5 mg/dL   ACCU-CHEK GLUCOSE    Collection Time: 09/22/18  1:03 PM   Result Value Ref Range    Glucose - Accu-Ck 158 (H) 65 - 99 mg/dL   ACCU-CHEK GLUCOSE    Collection Time: 09/22/18  6:07 PM   Result Value Ref Range    Glucose - Accu-Ck 142 (H) 65 - 99 mg/dL   Sodium Serum (NA)    Collection Time: 09/22/18  6:08 PM   Result Value Ref Range    Sodium 138 135 - 145 mmol/L   ACCU-CHEK GLUCOSE    Collection Time: 09/23/18 12:08 AM   Result Value Ref Range    Glucose - Accu-Ck 128 (H) 65 - 99 mg/dL   Sodium Serum (NA)    Collection Time: 09/23/18 12:09 AM   Result Value Ref Range    Sodium 137 135 - 145 mmol/L   ACCU-CHEK GLUCOSE    Collection Time: 09/23/18  5:39 AM   Result Value Ref Range    Glucose - Accu-Ck 110 (H) 65 - 99 mg/dL   CBC WITH DIFFERENTIAL    Collection Time: 09/23/18  5:42 AM   Result Value Ref Range    WBC 12.4 (H) 4.8 - 10.8 K/uL    RBC 3.52 (L) 4.20 - 5.40 M/uL    Hemoglobin 10.2  (L) 12.0 - 16.0 g/dL    Hematocrit 31.2 (L) 37.0 - 47.0 %    MCV 88.6 81.4 - 97.8 fL    MCH 29.0 27.0 - 33.0 pg    MCHC 32.7 (L) 33.6 - 35.0 g/dL    RDW 43.4 35.9 - 50.0 fL    Platelet Count 145 (L) 164 - 446 K/uL    MPV 10.6 9.0 - 12.9 fL    Neutrophils-Polys 85.00 (H) 44.00 - 72.00 %    Lymphocytes 7.20 (L) 22.00 - 41.00 %    Monocytes 6.90 0.00 - 13.40 %    Eosinophils 0.20 0.00 - 6.90 %    Basophils 0.20 0.00 - 1.80 %    Immature Granulocytes 0.50 0.00 - 0.90 %    Nucleated RBC 0.20 /100 WBC    Neutrophils (Absolute) 10.54 (H) 2.00 - 7.15 K/uL    Lymphs (Absolute) 0.89 (L) 1.00 - 4.80 K/uL    Monos (Absolute) 0.85 0.00 - 0.85 K/uL    Eos (Absolute) 0.02 0.00 - 0.51 K/uL    Baso (Absolute) 0.03 0.00 - 0.12 K/uL    Immature Granulocytes (abs) 0.06 0.00 - 0.11 K/uL    NRBC (Absolute) 0.02 K/uL   BASIC METABOLIC PANEL    Collection Time: 09/23/18  5:42 AM   Result Value Ref Range    Sodium 139 135 - 145 mmol/L    Potassium 3.6 3.6 - 5.5 mmol/L    Chloride 107 96 - 112 mmol/L    Co2 24 20 - 33 mmol/L    Glucose 113 (H) 65 - 99 mg/dL    Bun 18 8 - 22 mg/dL    Creatinine 0.57 0.50 - 1.40 mg/dL    Calcium 8.5 8.5 - 10.5 mg/dL    Anion Gap 8.0 0.0 - 11.9   MAGNESIUM    Collection Time: 09/23/18  5:42 AM   Result Value Ref Range    Magnesium 1.9 1.5 - 2.5 mg/dL   PROTHROMBIN TIME    Collection Time: 09/23/18  5:42 AM   Result Value Ref Range    PT 15.3 (H) 12.0 - 14.6 sec    INR 1.19 (H) 0.87 - 1.13   ESTIMATED GFR    Collection Time: 09/23/18  5:42 AM   Result Value Ref Range    GFR If African American >60 >60 mL/min/1.73 m 2    GFR If Non African American >60 >60 mL/min/1.73 m 2       Imaging  CT:    Reviewed    Assessment/Plan  Acute intracranial hemorrhage (HCC)- (present on admission)   Assessment & Plan    S/P fall 1 week ago - CT head on 9/14 negative for acute intracranial hemorrhage  CT scan on 9/22 with intraventricular hemorrhage, right greater than left and intraparenchymal hemorrhage  Noncommunicating  hydrocephalus - S/P emergent bilateral EVD placement by Dr. Santizo on 9/22  Continue Keppra, 500 mg IV twice daily for seizure prophylaxis  Strict blood pressure control - goal SBP < 160  Continue hourly neuro checks  MRI today        Paroxysmal atrial fibrillation (HCC)- (present on admission)   Assessment & Plan    Previously on apixaban, 5 mg twice daily - now strictly contraindicated  She is now in sinus rhythm  Takes metoprolol, 25 mg twice daily - I will give her IV metoprolol until she can safely take enteral medications        Hypomagnesemia   Assessment & Plan    Replete magnesium        Hypokalemia   Assessment & Plan    Replete potassium             VTE:  Contraindicated  Ulcer: Not Indicated  Lines: Mack Catheter  Ongoing indication addressed    I have performed a physical exam and reviewed and updated ROS and Plan today (9/23/2018). In review of yesterday's note (9/22/2018), there are no changes except as documented above.     Discussed patient condition and risk of morbidity and/or mortality with Hospitalist, Family, RN, RT, Charge nurse / hot rounds and QA team  The patient remains critically ill.  Critical care time = 32 minutes in directly providing and coordinating critical care and extensive data review.  No time overlap and excludes procedures.    [id number]    I have assessed and reassessed her blood pressure, hemodynamics, cardiovascular status and neurologic status.  She is at increased risk for worsening CNS system dysfunction.  She is critically ill in ICU with an acute intracranial hemorrhage and bilateral EVDs in place with ICP monitoring.    High risk of deterioration and worsening vital organ dysfunction and death without the above critical care interventions.    Zeke Norton MD  Pulmonary and Critical Care Medicine

## 2018-09-23 NOTE — CARE PLAN
Problem: Infection  Goal: Will remain free from infection  Outcome: PROGRESSING AS EXPECTED  Implement standard precautions and perform hand washing before and after patient contact. Standard precautions implemented and hand washing performed before and after patient contact and as needed.

## 2018-09-23 NOTE — PROGRESS NOTES
BORIS Gold updated on pt's confusion.  Pt oriented x 2 but otherwise her neuro exams are unchanged.  Bilateral EVD drains still work appropriately for ICP 5-15.  No new orders at this time.

## 2018-09-24 ENCOUNTER — APPOINTMENT (OUTPATIENT)
Dept: RADIOLOGY | Facility: MEDICAL CENTER | Age: 78
DRG: 023 | End: 2018-09-24
Attending: INTERNAL MEDICINE
Payer: MEDICARE

## 2018-09-24 PROBLEM — D68.32 HEMORRHAGIC DISORDER DUE TO EXTRINSIC CIRCULATING ANTICOAGULANTS (HCC): Status: ACTIVE | Noted: 2018-09-24

## 2018-09-24 LAB
ANION GAP SERPL CALC-SCNC: 8 MMOL/L (ref 0–11.9)
APTT PPP: 27.9 SEC (ref 24.7–36)
BASOPHILS # BLD AUTO: 0.2 % (ref 0–1.8)
BASOPHILS # BLD: 0.02 K/UL (ref 0–0.12)
BUN SERPL-MCNC: 16 MG/DL (ref 8–22)
CALCIUM SERPL-MCNC: 9.6 MG/DL (ref 8.5–10.5)
CHLORIDE SERPL-SCNC: 109 MMOL/L (ref 96–112)
CO2 SERPL-SCNC: 26 MMOL/L (ref 20–33)
CREAT SERPL-MCNC: 0.59 MG/DL (ref 0.5–1.4)
EKG IMPRESSION: NORMAL
EOSINOPHIL # BLD AUTO: 0.03 K/UL (ref 0–0.51)
EOSINOPHIL NFR BLD: 0.3 % (ref 0–6.9)
ERYTHROCYTE [DISTWIDTH] IN BLOOD BY AUTOMATED COUNT: 44.2 FL (ref 35.9–50)
GLUCOSE BLD-MCNC: 117 MG/DL (ref 65–99)
GLUCOSE SERPL-MCNC: 99 MG/DL (ref 65–99)
HCT VFR BLD AUTO: 33.7 % (ref 37–47)
HGB BLD-MCNC: 10.7 G/DL (ref 12–16)
IMM GRANULOCYTES # BLD AUTO: 0.05 K/UL (ref 0–0.11)
IMM GRANULOCYTES NFR BLD AUTO: 0.5 % (ref 0–0.9)
INR PPP: 1.16 (ref 0.87–1.13)
INR PPP: 1.25 (ref 0.87–1.13)
LYMPHOCYTES # BLD AUTO: 1.5 K/UL (ref 1–4.8)
LYMPHOCYTES NFR BLD: 14.2 % (ref 22–41)
MAGNESIUM SERPL-MCNC: 2.6 MG/DL (ref 1.5–2.5)
MCH RBC QN AUTO: 28.7 PG (ref 27–33)
MCHC RBC AUTO-ENTMCNC: 31.8 G/DL (ref 33.6–35)
MCV RBC AUTO: 90.3 FL (ref 81.4–97.8)
MONOCYTES # BLD AUTO: 0.82 K/UL (ref 0–0.85)
MONOCYTES NFR BLD AUTO: 7.8 % (ref 0–13.4)
NEUTROPHILS # BLD AUTO: 8.11 K/UL (ref 2–7.15)
NEUTROPHILS NFR BLD: 77 % (ref 44–72)
NRBC # BLD AUTO: 0 K/UL
NRBC BLD-RTO: 0 /100 WBC
OSMOLALITY SERPL: 301 MOSM/KG H2O (ref 278–298)
OSMOLALITY UR: 429 MOSM/KG H2O (ref 300–900)
PLATELET # BLD AUTO: 149 K/UL (ref 164–446)
PMV BLD AUTO: 10.4 FL (ref 9–12.9)
POTASSIUM SERPL-SCNC: 4.1 MMOL/L (ref 3.6–5.5)
PROTHROMBIN TIME: 14.9 SEC (ref 12–14.6)
PROTHROMBIN TIME: 15.8 SEC (ref 12–14.6)
RBC # BLD AUTO: 3.73 M/UL (ref 4.2–5.4)
SODIUM SERPL-SCNC: 143 MMOL/L (ref 135–145)
SODIUM SERPL-SCNC: 145 MMOL/L (ref 135–145)
SODIUM SERPL-SCNC: 146 MMOL/L (ref 135–145)
SODIUM UR-SCNC: 168 MMOL/L
WBC # BLD AUTO: 10.5 K/UL (ref 4.8–10.8)

## 2018-09-24 PROCEDURE — 85025 COMPLETE CBC W/AUTO DIFF WBC: CPT

## 2018-09-24 PROCEDURE — 770022 HCHG ROOM/CARE - ICU (200)

## 2018-09-24 PROCEDURE — 700111 HCHG RX REV CODE 636 W/ 250 OVERRIDE (IP): Performed by: INTERNAL MEDICINE

## 2018-09-24 PROCEDURE — 99233 SBSQ HOSP IP/OBS HIGH 50: CPT | Performed by: HOSPITALIST

## 2018-09-24 PROCEDURE — 93005 ELECTROCARDIOGRAM TRACING: CPT | Performed by: HOSPITALIST

## 2018-09-24 PROCEDURE — 83935 ASSAY OF URINE OSMOLALITY: CPT

## 2018-09-24 PROCEDURE — A9270 NON-COVERED ITEM OR SERVICE: HCPCS | Performed by: INTERNAL MEDICINE

## 2018-09-24 PROCEDURE — 92526 ORAL FUNCTION THERAPY: CPT

## 2018-09-24 PROCEDURE — 700102 HCHG RX REV CODE 250 W/ 637 OVERRIDE(OP): Performed by: INTERNAL MEDICINE

## 2018-09-24 PROCEDURE — 70450 CT HEAD/BRAIN W/O DYE: CPT

## 2018-09-24 PROCEDURE — 700101 HCHG RX REV CODE 250: Performed by: INTERNAL MEDICINE

## 2018-09-24 PROCEDURE — 83735 ASSAY OF MAGNESIUM: CPT

## 2018-09-24 PROCEDURE — 700101 HCHG RX REV CODE 250: Performed by: NEUROLOGICAL SURGERY

## 2018-09-24 PROCEDURE — 700111 HCHG RX REV CODE 636 W/ 250 OVERRIDE (IP): Performed by: NEUROLOGICAL SURGERY

## 2018-09-24 PROCEDURE — 84300 ASSAY OF URINE SODIUM: CPT

## 2018-09-24 PROCEDURE — 93010 ELECTROCARDIOGRAM REPORT: CPT | Performed by: INTERNAL MEDICINE

## 2018-09-24 PROCEDURE — 85730 THROMBOPLASTIN TIME PARTIAL: CPT

## 2018-09-24 PROCEDURE — 3E0S3GC INTRODUCTION OF OTHER THERAPEUTIC SUBSTANCE INTO EPIDURAL SPACE, PERCUTANEOUS APPROACH: ICD-10-PCS | Performed by: NEUROLOGICAL SURGERY

## 2018-09-24 PROCEDURE — 700105 HCHG RX REV CODE 258: Performed by: NEUROLOGICAL SURGERY

## 2018-09-24 PROCEDURE — 700111 HCHG RX REV CODE 636 W/ 250 OVERRIDE (IP): Performed by: HOSPITALIST

## 2018-09-24 PROCEDURE — 85610 PROTHROMBIN TIME: CPT | Mod: 91

## 2018-09-24 PROCEDURE — 99291 CRITICAL CARE FIRST HOUR: CPT | Performed by: INTERNAL MEDICINE

## 2018-09-24 PROCEDURE — 82962 GLUCOSE BLOOD TEST: CPT

## 2018-09-24 PROCEDURE — 84295 ASSAY OF SERUM SODIUM: CPT

## 2018-09-24 PROCEDURE — 80048 BASIC METABOLIC PNL TOTAL CA: CPT

## 2018-09-24 PROCEDURE — 700105 HCHG RX REV CODE 258: Performed by: INTERNAL MEDICINE

## 2018-09-24 PROCEDURE — 99292 CRITICAL CARE ADDL 30 MIN: CPT | Performed by: INTERNAL MEDICINE

## 2018-09-24 PROCEDURE — 83930 ASSAY OF BLOOD OSMOLALITY: CPT

## 2018-09-24 RX ORDER — ACETAMINOPHEN 650 MG/1
650 SUPPOSITORY RECTAL EVERY 4 HOURS PRN
Status: DISCONTINUED | OUTPATIENT
Start: 2018-09-24 | End: 2018-09-30

## 2018-09-24 RX ORDER — DILTIAZEM HYDROCHLORIDE 5 MG/ML
20 INJECTION INTRAVENOUS ONCE
Status: COMPLETED | OUTPATIENT
Start: 2018-09-24 | End: 2018-09-24

## 2018-09-24 RX ORDER — DIGOXIN 0.25 MG/ML
500 INJECTION INTRAMUSCULAR; INTRAVENOUS ONCE
Status: COMPLETED | OUTPATIENT
Start: 2018-09-24 | End: 2018-09-24

## 2018-09-24 RX ORDER — SODIUM CHLORIDE, SODIUM LACTATE, POTASSIUM CHLORIDE, CALCIUM CHLORIDE 600; 310; 30; 20 MG/100ML; MG/100ML; MG/100ML; MG/100ML
500 INJECTION, SOLUTION INTRAVENOUS ONCE
Status: COMPLETED | OUTPATIENT
Start: 2018-09-24 | End: 2018-09-24

## 2018-09-24 RX ORDER — HYDROMORPHONE HYDROCHLORIDE 2 MG/ML
.25-1 INJECTION, SOLUTION INTRAMUSCULAR; INTRAVENOUS; SUBCUTANEOUS
Status: DISCONTINUED | OUTPATIENT
Start: 2018-09-24 | End: 2018-10-09

## 2018-09-24 RX ORDER — OXYCODONE HYDROCHLORIDE 5 MG/1
2.5 TABLET ORAL
Status: DISCONTINUED | OUTPATIENT
Start: 2018-09-24 | End: 2018-10-09

## 2018-09-24 RX ORDER — LABETALOL HYDROCHLORIDE 5 MG/ML
5-10 INJECTION, SOLUTION INTRAVENOUS EVERY 4 HOURS PRN
Status: DISCONTINUED | OUTPATIENT
Start: 2018-09-24 | End: 2018-10-09 | Stop reason: HOSPADM

## 2018-09-24 RX ORDER — DIGOXIN 0.25 MG/ML
250 INJECTION INTRAMUSCULAR; INTRAVENOUS ONCE
Status: DISCONTINUED | OUTPATIENT
Start: 2018-09-24 | End: 2018-09-24

## 2018-09-24 RX ORDER — DIGOXIN 0.25 MG/ML
250 INJECTION INTRAMUSCULAR; INTRAVENOUS EVERY 6 HOURS
Status: DISCONTINUED | OUTPATIENT
Start: 2018-09-24 | End: 2018-09-24

## 2018-09-24 RX ORDER — POLYETHYLENE GLYCOL 3350 17 G/17G
1 POWDER, FOR SOLUTION ORAL
Status: DISCONTINUED | OUTPATIENT
Start: 2018-09-24 | End: 2018-10-09

## 2018-09-24 RX ORDER — AMOXICILLIN 250 MG
2 CAPSULE ORAL 2 TIMES DAILY
Status: DISCONTINUED | OUTPATIENT
Start: 2018-09-24 | End: 2018-10-09

## 2018-09-24 RX ORDER — ACETAMINOPHEN 325 MG/1
650 TABLET ORAL EVERY 4 HOURS PRN
Status: DISCONTINUED | OUTPATIENT
Start: 2018-09-24 | End: 2018-09-30

## 2018-09-24 RX ORDER — METOPROLOL TARTRATE 50 MG/1
50 TABLET, FILM COATED ORAL TWICE DAILY
Status: DISCONTINUED | OUTPATIENT
Start: 2018-09-24 | End: 2018-09-25

## 2018-09-24 RX ORDER — METOPROLOL TARTRATE 50 MG/1
50 TABLET, FILM COATED ORAL TWICE DAILY
Status: DISCONTINUED | OUTPATIENT
Start: 2018-09-24 | End: 2018-09-24

## 2018-09-24 RX ORDER — DIGOXIN 0.25 MG/ML
125 INJECTION INTRAMUSCULAR; INTRAVENOUS EVERY 6 HOURS
Status: DISCONTINUED | OUTPATIENT
Start: 2018-09-24 | End: 2018-09-24

## 2018-09-24 RX ORDER — ONDANSETRON 4 MG/1
4 TABLET, ORALLY DISINTEGRATING ORAL EVERY 4 HOURS PRN
Status: DISCONTINUED | OUTPATIENT
Start: 2018-09-24 | End: 2018-10-09 | Stop reason: HOSPADM

## 2018-09-24 RX ORDER — ONDANSETRON 2 MG/ML
4 INJECTION INTRAMUSCULAR; INTRAVENOUS EVERY 4 HOURS PRN
Status: DISCONTINUED | OUTPATIENT
Start: 2018-09-24 | End: 2018-10-09 | Stop reason: HOSPADM

## 2018-09-24 RX ORDER — DIGOXIN 125 MCG
125 TABLET ORAL DAILY
Status: DISCONTINUED | OUTPATIENT
Start: 2018-09-25 | End: 2018-10-09

## 2018-09-24 RX ORDER — BISACODYL 10 MG
10 SUPPOSITORY, RECTAL RECTAL
Status: DISCONTINUED | OUTPATIENT
Start: 2018-09-24 | End: 2018-10-09

## 2018-09-24 RX ORDER — SODIUM CHLORIDE, SODIUM LACTATE, POTASSIUM CHLORIDE, CALCIUM CHLORIDE 600; 310; 30; 20 MG/100ML; MG/100ML; MG/100ML; MG/100ML
INJECTION, SOLUTION INTRAVENOUS CONTINUOUS
Status: DISCONTINUED | OUTPATIENT
Start: 2018-09-24 | End: 2018-09-27

## 2018-09-24 RX ORDER — DIGOXIN 125 MCG
125 TABLET ORAL DAILY
Status: DISCONTINUED | OUTPATIENT
Start: 2018-09-25 | End: 2018-09-24

## 2018-09-24 RX ORDER — OXYCODONE HYDROCHLORIDE 5 MG/1
5 TABLET ORAL
Status: DISCONTINUED | OUTPATIENT
Start: 2018-09-24 | End: 2018-10-09

## 2018-09-24 RX ADMIN — SODIUM CHLORIDE 30 ML: 9 INJECTION INTRAMUSCULAR; INTRAVENOUS; SUBCUTANEOUS at 21:01

## 2018-09-24 RX ADMIN — SODIUM CHLORIDE 70 ML: 9 INJECTION, SOLUTION INTRAMUSCULAR; INTRAVENOUS; SUBCUTANEOUS at 21:00

## 2018-09-24 RX ADMIN — CEFAZOLIN SODIUM 1 G: 1 INJECTION, SOLUTION INTRAVENOUS at 06:22

## 2018-09-24 RX ADMIN — LABETALOL HYDROCHLORIDE 5 MG: 5 INJECTION, SOLUTION INTRAVENOUS at 17:30

## 2018-09-24 RX ADMIN — DILTIAZEM HYDROCHLORIDE 5 MG/HR: 5 INJECTION INTRAVENOUS at 19:43

## 2018-09-24 RX ADMIN — LABETALOL HYDROCHLORIDE 5 MG: 5 INJECTION, SOLUTION INTRAVENOUS at 10:30

## 2018-09-24 RX ADMIN — HYDROMORPHONE HYDROCHLORIDE 0.5 MG: 2 INJECTION INTRAMUSCULAR; INTRAVENOUS; SUBCUTANEOUS at 16:14

## 2018-09-24 RX ADMIN — DIGOXIN 500 MCG: 0.25 INJECTION INTRAMUSCULAR; INTRAVENOUS at 22:22

## 2018-09-24 RX ADMIN — SODIUM CHLORIDE 30 ML: 9 INJECTION INTRAMUSCULAR; INTRAVENOUS; SUBCUTANEOUS at 21:00

## 2018-09-24 RX ADMIN — HYDROMORPHONE HYDROCHLORIDE 0.5 MG: 2 INJECTION INTRAMUSCULAR; INTRAVENOUS; SUBCUTANEOUS at 09:55

## 2018-09-24 RX ADMIN — LABETALOL HYDROCHLORIDE 5 MG: 5 INJECTION, SOLUTION INTRAVENOUS at 16:50

## 2018-09-24 RX ADMIN — SODIUM CHLORIDE, POTASSIUM CHLORIDE, SODIUM LACTATE AND CALCIUM CHLORIDE 500 ML: 600; 310; 30; 20 INJECTION, SOLUTION INTRAVENOUS at 15:00

## 2018-09-24 RX ADMIN — ALTEPLASE 1 MG: 2.2 INJECTION, POWDER, LYOPHILIZED, FOR SOLUTION INTRAVENOUS at 21:00

## 2018-09-24 RX ADMIN — DIGOXIN 500 MCG: 0.25 INJECTION INTRAMUSCULAR; INTRAVENOUS at 14:51

## 2018-09-24 RX ADMIN — ALTEPLASE 1 MG: 2.2 INJECTION, POWDER, LYOPHILIZED, FOR SOLUTION INTRAVENOUS at 21:01

## 2018-09-24 RX ADMIN — LABETALOL HYDROCHLORIDE 5 MG: 5 INJECTION, SOLUTION INTRAVENOUS at 10:15

## 2018-09-24 RX ADMIN — CEFAZOLIN SODIUM 1 G: 1 INJECTION, SOLUTION INTRAVENOUS at 22:00

## 2018-09-24 RX ADMIN — METOPROLOL TARTRATE 5 MG: 1 INJECTION, SOLUTION INTRAVENOUS at 05:56

## 2018-09-24 RX ADMIN — SODIUM CHLORIDE 500 MG: 9 INJECTION, SOLUTION INTRAVENOUS at 05:53

## 2018-09-24 RX ADMIN — METOPROLOL TARTRATE 50 MG: 50 TABLET ORAL at 11:43

## 2018-09-24 RX ADMIN — DILTIAZEM HYDROCHLORIDE 20 MG: 5 INJECTION INTRAVENOUS at 22:27

## 2018-09-24 RX ADMIN — CEFAZOLIN SODIUM 1 G: 1 INJECTION, SOLUTION INTRAVENOUS at 14:30

## 2018-09-24 RX ADMIN — SODIUM CHLORIDE, POTASSIUM CHLORIDE, SODIUM LACTATE AND CALCIUM CHLORIDE: 600; 310; 30; 20 INJECTION, SOLUTION INTRAVENOUS at 15:01

## 2018-09-24 RX ADMIN — SODIUM CHLORIDE 500 MG: 9 INJECTION, SOLUTION INTRAVENOUS at 17:36

## 2018-09-24 ASSESSMENT — ENCOUNTER SYMPTOMS
FATIGUE: 1
DOUBLE VISION: 0
ABDOMINAL PAIN: 0
NAUSEA: 0
WEAKNESS: 1
NECK PAIN: 0
HEADACHES: 1
BLURRED VISION: 0
MYALGIAS: 0
SHORTNESS OF BREATH: 0
VOMITING: 0
BACK PAIN: 0
HEMOPTYSIS: 0
COUGH: 0
VOICE CHANGE: 0
ABDOMINAL DISTENTION: 0
CONFUSION: 1
ARTHRALGIAS: 0
WHEEZING: 0
PALPITATIONS: 0

## 2018-09-24 ASSESSMENT — PAIN SCALES - GENERAL
PAINLEVEL_OUTOF10: 5

## 2018-09-24 NOTE — PROGRESS NOTES
Critical Care Progress Note    Date of admission  9/22/2018    Chief Complaint  78 y.o. female admitted 9/22/2018 with intracranial hemorrhage.    Hospital Course  This lady was admitted with an acute intracranial hemorrhage.  She is undergone bilateral EVDs for hydrocephalus.    Interval Problem Update  Reviewed last 24 hour events:   - B/L EVD   - Neuro: q1 hr neurochecks, AO x1-3   - HR: AF RVR   - BP: normotensive   - GI: cortrak to be placed today   - UOP: increased in the last 1-2 hours   - Mack: yes   - Tm: afeb   - Lines: PIV   - PPx: GI not indicated, DVT SCDs    Course of the day: continue AF with RVR despite digoxin loading, cardizem gtt ordered for rate control. L EVD stopped draining today and R EVD drainage markedly decreased. STAT head CT obtained for decreasing mental status. NeuroSx made aware.     Yesterday   -lethargic   -replete K and Mg   -EVD's in place   -ICP 5-16   -neuro checks q 1   -SR   -MRI today      Review of Systems  Review of Systems   Constitutional: Positive for fatigue.   HENT: Negative for voice change.    Eyes: Negative for visual disturbance.   Respiratory: Negative for shortness of breath and wheezing.    Cardiovascular: Negative for chest pain, palpitations and leg swelling.   Gastrointestinal: Negative for abdominal distention, abdominal pain, nausea and vomiting.   Musculoskeletal: Negative for arthralgias and myalgias.   Neurological: Positive for headaches.   Psychiatric/Behavioral: Positive for confusion.        Vital Signs for last 24 hours   Temp:  [36.3 °C (97.3 °F)-37.2 °C (98.9 °F)] 37 °C (98.6 °F)  Pulse:  [64-87] (P) 68  Resp:  [12-29] (P) 15    Hemodynamic parameters for last 24 hours       Vent Settings for last 24 hours       Physical Exam   Physical Exam   HENT:   Head: Normocephalic.   Right Ear: External ear normal.   Left Ear: External ear normal.   Mouth/Throat: Oropharynx is clear and moist.   Bruising along her left forehead and left face.  Bilateral  EVDs in place.   Eyes: Pupils are equal, round, and reactive to light. Conjunctivae are normal. Right eye exhibits no discharge. Left eye exhibits no discharge. No scleral icterus.   Neck: Neck supple. No tracheal deviation present.   Cardiovascular: Exam reveals no gallop and no friction rub.    No murmur heard.  Sinus rhythm   Pulmonary/Chest: No stridor. She has no wheezes. She has no rales.   Abdominal: Soft. Bowel sounds are normal. She exhibits no distension. There is no tenderness. There is no rebound.   Musculoskeletal: She exhibits no tenderness or deformity.   No clubbing or cyanosis   Neurological:   A little lethargic - worsening throughout the day.  Arouses. Answers questions appropriately, moves all 4 extremities.   Skin: Skin is warm and dry. No rash noted. She is not diaphoretic. No erythema. No pallor.       Medications  Current Facility-Administered Medications   Medication Dose Route Frequency Provider Last Rate Last Dose   • Pharmacy Consult Request ...Pain Management Review   Other PRN Zeke Norton M.D.        And   • oxyCODONE immediate-release (ROXICODONE) tablet 2.5 mg  2.5 mg Oral Q3HRS PRN Zeke Norton M.D.        And   • oxyCODONE immediate-release (ROXICODONE) tablet 5 mg  5 mg Oral Q3HRS PRN Zeke Norton M.D.        And   • HYDROmorphone (DILAUDID) injection 0.25-1 mg  0.25-1 mg Intravenous Q2HRS PRN Zeke Norton M.D.   1 mg at 09/23/18 1500   • Respiratory Care per Protocol   Nebulization Continuous RT Rowena Calero M.D.       • NS infusion   Intravenous Continuous Rowena Calero M.D. 80 mL/hr at 09/24/18 0530     • acetaminophen (TYLENOL) tablet 650 mg  650 mg Oral Q4HRS PRN Rowena Calero M.D.        Or   • acetaminophen (TYLENOL) suppository 650 mg  650 mg Rectal Q4HRS PRN Rowena Calero M.D.       • MD Alert...ICU Electrolyte Replacement per Pharmacy   Other pharmacy to dose Rowena Calero M.D.       • ondansetron (ZOFRAN ODT)  dispertab 4 mg  4 mg Oral Q4HRS PRN Rowena Calero M.D.        Or   • ondansetron (ZOFRAN) syringe/vial injection 4 mg  4 mg Intravenous Q4HRS PRN Rowena Calero M.D.   4 mg at 09/23/18 2222   • senna-docusate (PERICOLACE or SENOKOT S) 8.6-50 MG per tablet 2 Tab  2 Tab Oral BID Rowena Calero M.D.   Stopped at 09/22/18 1800    And   • polyethylene glycol/lytes (MIRALAX) PACKET 1 Packet  1 Packet Oral QDAY PRN Rowena Calero M.D.        And   • magnesium hydroxide (MILK OF MAGNESIA) suspension 30 mL  30 mL Oral QDAY PRN Rowena Calero M.D.        And   • bisacodyl (DULCOLAX) suppository 10 mg  10 mg Rectal QDAY PRN Rowena Calero M.D.       • LORazepam (ATIVAN) injection 2 mg  2 mg Intravenous Q5 MIN PRN Rowena Calero M.D.       • labetalol (NORMODYNE,TRANDATE) injection 10 mg  10 mg Intravenous Q4HRS PRN Rowena Calero M.D.       • hydrALAZINE (APRESOLINE) injection 10 mg  10 mg Intravenous Q4HRS PRN Rowena Calero M.D.       • enalaprilat (VASOTEC) injection 1.25 mg  1.25 mg Intravenous Q6HRS PRN Rowena Calero M.D.       • ceFAZolin in dextrose (ANCEF) IVPB premix 1 g  1 g Intravenous Q8HRS Juan Alberto Santizo III, M.D.   Stopped at 09/24/18 0652   • levETIRAcetam (KEPPRA) 500 mg in  mL IVPB  500 mg Intravenous Q12HRS Juan Alberto Santizo III, M.D.   Stopped at 09/24/18 0608   • Metoprolol Tartrate (LOPRESSOR) injection 5 mg  5 mg Intravenous Q6HRS Zeke Norton M.D.   5 mg at 09/24/18 0556       Fluids    Intake/Output Summary (Last 24 hours) at 09/24/18 0751  Last data filed at 09/24/18 0700   Gross per 24 hour   Intake          2721.67 ml   Output             2678 ml   Net            43.67 ml       Laboratory  Recent Results (from the past 48 hour(s))   CBC WITH DIFFERENTIAL    Collection Time: 09/22/18  8:10 AM   Result Value Ref Range    WBC 10.0 4.8 - 10.8 K/uL    RBC 4.10 (L) 4.20 - 5.40 M/uL    Hemoglobin 12.2 12.0 - 16.0 g/dL    Hematocrit 36.5 (L) 37.0 - 47.0 %    MCV 89.0 81.4 -  97.8 fL    MCH 29.8 27.0 - 33.0 pg    MCHC 33.4 (L) 33.6 - 35.0 g/dL    RDW 43.8 35.9 - 50.0 fL    Platelet Count 132 (L) 164 - 446 K/uL    MPV 10.6 9.0 - 12.9 fL    Neutrophils-Polys 84.40 (H) 44.00 - 72.00 %    Lymphocytes 8.50 (L) 22.00 - 41.00 %    Monocytes 6.10 0.00 - 13.40 %    Eosinophils 0.20 0.00 - 6.90 %    Basophils 0.30 0.00 - 1.80 %    Immature Granulocytes 0.50 0.00 - 0.90 %    Nucleated RBC 0.00 /100 WBC    Neutrophils (Absolute) 8.45 (H) 2.00 - 7.15 K/uL    Lymphs (Absolute) 0.85 (L) 1.00 - 4.80 K/uL    Monos (Absolute) 0.61 0.00 - 0.85 K/uL    Eos (Absolute) 0.02 0.00 - 0.51 K/uL    Baso (Absolute) 0.03 0.00 - 0.12 K/uL    Immature Granulocytes (abs) 0.05 0.00 - 0.11 K/uL    NRBC (Absolute) 0.00 K/uL   COMP METABOLIC PANEL    Collection Time: 09/22/18  8:10 AM   Result Value Ref Range    Sodium 139 135 - 145 mmol/L    Potassium 3.9 3.6 - 5.5 mmol/L    Chloride 104 96 - 112 mmol/L    Co2 25 20 - 33 mmol/L    Anion Gap 10.0 0.0 - 11.9    Glucose 144 (H) 65 - 99 mg/dL    Bun 25 (H) 8 - 22 mg/dL    Creatinine 0.65 0.50 - 1.40 mg/dL    Calcium 9.5 8.5 - 10.5 mg/dL    AST(SGOT) 16 12 - 45 U/L    ALT(SGPT) 18 2 - 50 U/L    Alkaline Phosphatase 83 30 - 99 U/L    Total Bilirubin 0.5 0.1 - 1.5 mg/dL    Albumin 4.4 3.2 - 4.9 g/dL    Total Protein 6.9 6.0 - 8.2 g/dL    Globulin 2.5 1.9 - 3.5 g/dL    A-G Ratio 1.8 g/dL   PROTHROMBIN TIME    Collection Time: 09/22/18  8:10 AM   Result Value Ref Range    PT 16.5 (H) 12.0 - 14.6 sec    INR 1.32 (H) 0.87 - 1.13   APTT    Collection Time: 09/22/18  8:10 AM   Result Value Ref Range    APTT 29.0 24.7 - 36.0 sec   ESTIMATED GFR    Collection Time: 09/22/18  8:10 AM   Result Value Ref Range    GFR If African American >60 >60 mL/min/1.73 m 2    GFR If Non African American >60 >60 mL/min/1.73 m 2   Sodium Serum (NA)    Collection Time: 09/22/18  1:00 PM   Result Value Ref Range    Sodium 137 135 - 145 mmol/L   LIPID PROFILE    Collection Time: 09/22/18  1:00 PM   Result  Value Ref Range    Cholesterol,Tot 183 100 - 199 mg/dL    Triglycerides 74 0 - 149 mg/dL    HDL 37 (A) >=40 mg/dL     (H) <100 mg/dL   MAGNESIUM    Collection Time: 09/22/18  1:00 PM   Result Value Ref Range    Magnesium 1.8 1.5 - 2.5 mg/dL   ACCU-CHEK GLUCOSE    Collection Time: 09/22/18  1:03 PM   Result Value Ref Range    Glucose - Accu-Ck 158 (H) 65 - 99 mg/dL   ACCU-CHEK GLUCOSE    Collection Time: 09/22/18  6:07 PM   Result Value Ref Range    Glucose - Accu-Ck 142 (H) 65 - 99 mg/dL   Sodium Serum (NA)    Collection Time: 09/22/18  6:08 PM   Result Value Ref Range    Sodium 138 135 - 145 mmol/L   ACCU-CHEK GLUCOSE    Collection Time: 09/23/18 12:08 AM   Result Value Ref Range    Glucose - Accu-Ck 128 (H) 65 - 99 mg/dL   Sodium Serum (NA)    Collection Time: 09/23/18 12:09 AM   Result Value Ref Range    Sodium 137 135 - 145 mmol/L   ACCU-CHEK GLUCOSE    Collection Time: 09/23/18  5:39 AM   Result Value Ref Range    Glucose - Accu-Ck 110 (H) 65 - 99 mg/dL   CBC WITH DIFFERENTIAL    Collection Time: 09/23/18  5:42 AM   Result Value Ref Range    WBC 12.4 (H) 4.8 - 10.8 K/uL    RBC 3.52 (L) 4.20 - 5.40 M/uL    Hemoglobin 10.2 (L) 12.0 - 16.0 g/dL    Hematocrit 31.2 (L) 37.0 - 47.0 %    MCV 88.6 81.4 - 97.8 fL    MCH 29.0 27.0 - 33.0 pg    MCHC 32.7 (L) 33.6 - 35.0 g/dL    RDW 43.4 35.9 - 50.0 fL    Platelet Count 145 (L) 164 - 446 K/uL    MPV 10.6 9.0 - 12.9 fL    Neutrophils-Polys 85.00 (H) 44.00 - 72.00 %    Lymphocytes 7.20 (L) 22.00 - 41.00 %    Monocytes 6.90 0.00 - 13.40 %    Eosinophils 0.20 0.00 - 6.90 %    Basophils 0.20 0.00 - 1.80 %    Immature Granulocytes 0.50 0.00 - 0.90 %    Nucleated RBC 0.20 /100 WBC    Neutrophils (Absolute) 10.54 (H) 2.00 - 7.15 K/uL    Lymphs (Absolute) 0.89 (L) 1.00 - 4.80 K/uL    Monos (Absolute) 0.85 0.00 - 0.85 K/uL    Eos (Absolute) 0.02 0.00 - 0.51 K/uL    Baso (Absolute) 0.03 0.00 - 0.12 K/uL    Immature Granulocytes (abs) 0.06 0.00 - 0.11 K/uL    NRBC (Absolute)  0.02 K/uL   BASIC METABOLIC PANEL    Collection Time: 09/23/18  5:42 AM   Result Value Ref Range    Sodium 139 135 - 145 mmol/L    Potassium 3.6 3.6 - 5.5 mmol/L    Chloride 107 96 - 112 mmol/L    Co2 24 20 - 33 mmol/L    Glucose 113 (H) 65 - 99 mg/dL    Bun 18 8 - 22 mg/dL    Creatinine 0.57 0.50 - 1.40 mg/dL    Calcium 8.5 8.5 - 10.5 mg/dL    Anion Gap 8.0 0.0 - 11.9   MAGNESIUM    Collection Time: 09/23/18  5:42 AM   Result Value Ref Range    Magnesium 1.9 1.5 - 2.5 mg/dL   PROTHROMBIN TIME    Collection Time: 09/23/18  5:42 AM   Result Value Ref Range    PT 15.3 (H) 12.0 - 14.6 sec    INR 1.19 (H) 0.87 - 1.13   ESTIMATED GFR    Collection Time: 09/23/18  5:42 AM   Result Value Ref Range    GFR If African American >60 >60 mL/min/1.73 m 2    GFR If Non African American >60 >60 mL/min/1.73 m 2   ACCU-CHEK GLUCOSE    Collection Time: 09/23/18  1:27 PM   Result Value Ref Range    Glucose - Accu-Ck 131 (H) 65 - 99 mg/dL   EC-ECHOCARDIOGRAM COMPLETE W/O CONT    Collection Time: 09/23/18  4:36 PM   Result Value Ref Range    Eject.Frac. MOD BP 77.46     Eject.Frac. MOD 4C 68.95     Eject.Frac. MOD 2C 85.06     Left Ventrical Ejection Fraction 65    ACCU-CHEK GLUCOSE    Collection Time: 09/23/18  6:14 PM   Result Value Ref Range    Glucose - Accu-Ck 119 (H) 65 - 99 mg/dL   ACCU-CHEK GLUCOSE    Collection Time: 09/24/18 12:01 AM   Result Value Ref Range    Glucose - Accu-Ck 117 (H) 65 - 99 mg/dL   CBC WITH DIFFERENTIAL    Collection Time: 09/24/18  5:30 AM   Result Value Ref Range    WBC 10.5 4.8 - 10.8 K/uL    RBC 3.73 (L) 4.20 - 5.40 M/uL    Hemoglobin 10.7 (L) 12.0 - 16.0 g/dL    Hematocrit 33.7 (L) 37.0 - 47.0 %    MCV 90.3 81.4 - 97.8 fL    MCH 28.7 27.0 - 33.0 pg    MCHC 31.8 (L) 33.6 - 35.0 g/dL    RDW 44.2 35.9 - 50.0 fL    Platelet Count 149 (L) 164 - 446 K/uL    MPV 10.4 9.0 - 12.9 fL    Neutrophils-Polys 77.00 (H) 44.00 - 72.00 %    Lymphocytes 14.20 (L) 22.00 - 41.00 %    Monocytes 7.80 0.00 - 13.40 %     Eosinophils 0.30 0.00 - 6.90 %    Basophils 0.20 0.00 - 1.80 %    Immature Granulocytes 0.50 0.00 - 0.90 %    Nucleated RBC 0.00 /100 WBC    Neutrophils (Absolute) 8.11 (H) 2.00 - 7.15 K/uL    Lymphs (Absolute) 1.50 1.00 - 4.80 K/uL    Monos (Absolute) 0.82 0.00 - 0.85 K/uL    Eos (Absolute) 0.03 0.00 - 0.51 K/uL    Baso (Absolute) 0.02 0.00 - 0.12 K/uL    Immature Granulocytes (abs) 0.05 0.00 - 0.11 K/uL    NRBC (Absolute) 0.00 K/uL   BASIC METABOLIC PANEL    Collection Time: 18  5:30 AM   Result Value Ref Range    Sodium 143 135 - 145 mmol/L    Potassium 4.1 3.6 - 5.5 mmol/L    Chloride 109 96 - 112 mmol/L    Co2 26 20 - 33 mmol/L    Glucose 99 65 - 99 mg/dL    Bun 16 8 - 22 mg/dL    Creatinine 0.59 0.50 - 1.40 mg/dL    Calcium 9.6 8.5 - 10.5 mg/dL    Anion Gap 8.0 0.0 - 11.9   MAGNESIUM    Collection Time: 18  5:30 AM   Result Value Ref Range    Magnesium 2.6 (H) 1.5 - 2.5 mg/dL   PROTHROMBIN TIME    Collection Time: 18  5:30 AM   Result Value Ref Range    PT 14.9 (H) 12.0 - 14.6 sec    INR 1.16 (H) 0.87 - 1.13   ESTIMATED GFR    Collection Time: 18  5:30 AM   Result Value Ref Range    GFR If African American >60 >60 mL/min/1.73 m 2    GFR If Non African American >60 >60 mL/min/1.73 m 2   EKG    Collection Time: 18  7:19 AM   Result Value Ref Range    Report       [facility]    Test Date:  2018  Pt Name:    COURTNEY KAMARA                 Department: [facility]  MRN:        [id number]                      Room:       [facility]  Gender:     Female                       Technician: [facility]  :        [id number]                   Requested By:SANTO BARNES  Order #:    [id number]                    Reading MD:    Measurements  Intervals                                Axis  Rate:       124                          P:  FL:                                      QRS:        30  QRSD:       72                           T:          -71  QT:         292  QTc:        420    Interpretive  Statements  ATRIAL FIBRILLATION, V-RATE    MULTIPLE PREMATURE COMPLEXES, VENT & SUPRAVEN  BORDERLINE REPOLARIZATION ABNORMALITY  Compared to ECG 09/22/2018 07:23:41  Sinus rhythm no longer present         Imaging  CT:    Reviewed    Assessment/Plan  Acute intracranial hemorrhage (HCC)- (present on admission)   Assessment & Plan    - S/P fall 1 week ago - CT head on 9/14 negative for acute intracranial hemorrhage  - CT scan on 9/22 with intraventricular hemorrhage, right greater than left and intraparenchymal hemorrhage  - Noncommunicating hydrocephalus - S/P emergent bilateral EVD placement by Dr. Santizo on 9/22.  - Intraventricular TPA considered by NeuroSx  - Continue Keppra, 500 mg IV twice daily for seizure prophylaxis  - Strict blood pressure control - goal SBP <160 mmHg  - Continue hourly neuro checks  - STAT head CT today  - consider hypertonic saline for vasogenic edema if neurostatus/edema worsens        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)- (present on admission)   Assessment & Plan    - reversed on hospital day 1        Paroxysmal atrial fibrillation (HCC)- (present on admission)   Assessment & Plan    - Previously on apixaban, 5 mg twice daily - now strictly contraindicated  - IV metoprolol until she can safely take enteral medications  - dig load today for RVR  - dilt gtt if needed to maintain rate <110        Hypomagnesemia- (present on admission)   Assessment & Plan    - Electrolyte replacement protocol to maintain >2        Hypokalemia   Assessment & Plan    - Electrolyte replacement protocol to maintain >4             VTE:  Contraindicated  Ulcer: Not Indicated  Lines: Mack Catheter  Ongoing indication addressed    I have performed a physical exam and reviewed and updated ROS and Plan today (9/24/2018). In review of yesterday's note (9/23/2018), there are no changes except as documented above.     Neuro status tenuous, at high likelihood to require airway  protection. This patient is critically ill, at high risk for decompensation leading to worsening vital organ dysfunction and death without critical care interventions.    Discussed patient condition and risk of morbidity and/or mortality with Hospitalist, Family, RN, RT, Pharmacy, , , Charge nurse / hot rounds and Patient.      The patient remains critically ill.  Critical care time = 90 minutes in directly providing and coordinating critical care and extensive data review.  No time overlap and excludes procedures

## 2018-09-24 NOTE — PROGRESS NOTES
1500  Dr. Leone at the bedside to assess the patient.  MD updated on low UOP and still elevated heart rate (even after loading dose of digoxin) .  Orders received.    1510  Left EVD has not drained over 2hrs.  No neuro changes but decreased output from right one and increased ICP (14.) BORIS Barron updated.  No new orders at this time but continue to monitor ICP/Neuro/drains Q2hrs and notify MD with neuro changes or increased ICPs.

## 2018-09-24 NOTE — THERAPY
Holding PT eval per RN request; pt currently in a fib and rate is not yet controlled. Will re-attempt as able.

## 2018-09-24 NOTE — PROGRESS NOTES
Hospital Medicine Daily Progress Note    Date of Service  9/24/2018    Chief Complaint  78 y.o. female admitted 9/22/2018 with headache.    Hospital Course    Ms Hess has a history of atrial fibrillation and is on anticoagulation.  She presented on 9/22/18 to [facility] with a headache and was found to have subdural and well as subarachnoid hemmorhage.  She was transferred to [facility] for neurosurgical coverage and higher level of care.  Patient was seen by Dr. Santizo of neurosurgery and had bilateral ventriculostomy's placed on 9/22/18.  Worsened mental status with lethargy as of hospital day 3      Interval Problem Update  She appears to be sleeping, tries to open eyes to loud voice but can't keep her awake  Moves all ext with purpose, not to command, doesn't appear to have focal findings but she is too confused to participate with the neurologic exam  Too sedated to provide interval history    Consultants/Specialty  Critical care, I discussed the patient's condition with Dr. Leone today on ICU rounds  Neurosurgery    Code Status  Full Code    Disposition  At high risk of neurologic decompensation, keep in ICU    Review of Systems  Review of Systems   Unable to perform ROS: Mental acuity        Physical Exam  Temp:  [36.3 °C (97.3 °F)-37.8 °C (100 °F)] (P) 37.8 °C (100 °F)  Pulse:  [] (P) 131  Resp:  [12-29] (P) 15    Physical Exam   Constitutional: She appears well-developed and well-nourished.   HENT:   Head: Normocephalic.   Bilateral EVD, pink CSF  Bruising around face   Eyes: Pupils are equal, round, and reactive to light. Conjunctivae and EOM are normal. Right eye exhibits no discharge. Left eye exhibits no discharge.   Cardiovascular: Regular rhythm and intact distal pulses.    No murmur heard.  Tachycardic   Pulmonary/Chest: Effort normal and breath sounds normal. No respiratory distress. She has no wheezes.   Abdominal: Soft. Bowel sounds are normal. She exhibits no distension. There is no  tenderness. There is no rebound.   Musculoskeletal: Normal range of motion. She exhibits no edema.   Neurological: No cranial nerve deficit.   Asleep/lethargic  Moves spontaneously   Skin: Skin is warm and dry. No rash noted. She is not diaphoretic. No erythema.   ]   Psychiatric: She has a normal mood and affect.       Fluids    Intake/Output Summary (Last 24 hours) at 09/24/18 1002  Last data filed at 09/24/18 0900   Gross per 24 hour   Intake          2561.67 ml   Output             3866 ml   Net         -1304.33 ml       Laboratory  Recent Labs      09/22/18   0810  09/23/18   0542  09/24/18   0530   WBC  10.0  12.4*  10.5   RBC  4.10*  3.52*  3.73*   HEMOGLOBIN  12.2  10.2*  10.7*   HEMATOCRIT  36.5*  31.2*  33.7*   MCV  89.0  88.6  90.3   MCH  29.8  29.0  28.7   MCHC  33.4*  32.7*  31.8*   RDW  43.8  43.4  44.2   PLATELETCT  132*  145*  149*   MPV  10.6  10.6  10.4     Recent Labs      09/22/18   0810   09/23/18   0009  09/23/18   0542  09/24/18   0530   SODIUM  139   < >  137  139  143   POTASSIUM  3.9   --    --   3.6  4.1   CHLORIDE  104   --    --   107  109   CO2  25   --    --   24  26   GLUCOSE  144*   --    --   113*  99   BUN  25*   --    --   18  16   CREATININE  0.65   --    --   0.57  0.59   CALCIUM  9.5   --    --   8.5  9.6    < > = values in this interval not displayed.     Recent Labs      09/22/18   0810  09/23/18   0542  09/24/18   0530   APTT  29.0   --    --    INR  1.32*  1.19*  1.16*         Recent Labs      09/22/18   1300   TRIGLYCERIDE  74   HDL  37*   LDL  131*       Imaging  EC-ECHOCARDIOGRAM COMPLETE W/O CONT   Final Result      MR-BRAIN-WITH & W/O   Final Result      1.  Large intraparenchymal hemorrhage in the right posterior medial temporal lobe which measures 5 x 3.3 x 3.3 cm in greatest diameter and has a moderate amount of surrounding vasogenic edema displacing the right temporal horn anteriorly and causing    marked right to left midline shift of the ventricular system of 13  mm.      2.  Marked amount of diffuse intraventricular hemorrhage especially pronounced in the right lateral ventricle and fourth ventricle.      3.  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      4.  Small holohemispheric right sided subdural hematoma effacing the underlying cortical sulci and gyri and measuring 13 mm in greatest diameter in the right frontal temporal region.      5.  No significant interval change from earlier head CT scan dated 9/22/2018      6.  3.6 cm subcutaneous hematoma in the left frontal region      CT-HEAD W/O   Final Result      1.  Interval placement of bilateral ventriculostomy catheters with reduction of ventricular dilation.   2.  RIGHT temporal subarachnoid hemorrhage is slightly more apparent than prior exam.   3.  RIGHT hemispheric subdural hematoma and temporal intraparenchymal hemorrhage are stable.   4.  Slight worsening RIGHT LEFT midline shift.      OUTSIDE IMAGES-CT HEAD   Final Result      CT-CTA HEAD WITH & W/O-POST PROCESS   Final Result   Addendum 1 of 1   These findings were discussed with Dr. Santizo on 9/22/2018 9:08 AM.      Final      1.  Stable intraventricular, intraparenchymal, subarachnoid and subdural hemorrhage with 5 mm RIGHT-to-LEFT midline shift again seen.   2.  No intracranial aneurysm, focal stenosis, or abrupt large vessel cut off.   3.  LEFT frontal scalp hematoma.      OUTSIDE IMAGES-CT CERVICAL SPINE   Final Result      DX-ABDOMEN FOR TUBE PLACEMENT    (Results Pending)        Assessment/Plan  Acute intracranial hemorrhage (HCC)- (present on admission)   Assessment & Plan    Anticoagulation reveresed  S/P bilateral ventriculostomy drains  Monitor ICP continuously  Neurosurgery following  As expected the patient has become more lethargic, continue ICU care, expected to have a long hospital cource        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)- (present on admission)   Assessment & Plan     Anticoagulation reveresed        Paroxysmal atrial fibrillation (HCC)- (present on admission)   Assessment & Plan    Rate uncontrolled to 130's  Metoprolol PO and IV as needed with goal HR less than 100  No anticoagulation due to ICH        Hypomagnesemia- (present on admission)   Assessment & Plan    Replace        Hypokalemia   Assessment & Plan    Replace             VTE prophylaxis: no chemoprophylaxis as she has ICH

## 2018-09-24 NOTE — CARE PLAN
Problem: Safety  Goal: Will remain free from injury  Pt assessed for risk to fall at beginning of shift, appropriate interventions in place per flowsheets. Pt given call light and educated on use, pt does not call. Pt educated on need for bed alarm, bed alarm on.    Problem: Acute Care of the Stroke Patient  Goal: Optimal Outcome for the Stroke patient  Baseline neuro assessment and NIHSS completed at beginning of shift with day RN. Neuro checks completed every hour. EVD drain output recorded every hour. ICP checked every hour.

## 2018-09-24 NOTE — PROGRESS NOTES
Bedside report received from Sven POLLARD. Orders reviewed. VSS. Pt resting comfortably. Family at bedside.

## 2018-09-24 NOTE — THERAPY
"Speech Language Therapy dysphagia treatment completed.   Functional Status:  Patient seen as a high follow up on this date.  Nectar thick full liquid diet was initiated yesterday, however, RN reported the patient has been lethargic and not consuming PO intake.  Patient sleeping upon entering the room but was aroused with MOD verbal and tactile cues.  Presentation of PO included single ice chips x5.  The patient presented with prolonged mastication of ice chips, delayed initiation of swallow trigger x3 seconds and weak laryngela elevation upon palpation.  The patient had no overt s/sx of aspiration with minimal PO intake consumed but she required consistent cues to maintain arousal throughout the session, which places her at a high risk for aspiration and suspect she would not meet her nutritional needs.  Provided education to recommendations of NPO/TF.  Patient stated, \"But what about soup?\"  She was agreeable to trial applesauce instead.  However, patient unable to stay awake while applesauce was opened.     Recommendations: At this time, given increased lethargy which increases her risk for aspiration, recommend the patient be NPO with alternative source of nutrition.  OK for single ice chips x3 per hour while awake.  SLP following.   Plan of Care: Will benefit from Speech Therapy 5 times per week  Post-Acute Therapy: Discharge to a transitional care facility for continued skilled therapy services.    See \"Rehab Therapy-Acute\" Patient Summary Report for complete documentation.     "

## 2018-09-24 NOTE — DIETARY
"Nutrition Support/TF Assessment     Nutrition services:   Day 2 of admit.  Yessi Hess is a 78 y.o. female with admitting DX of headache.     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           Assessment:  Estimated Nutritional Needs: based on:   Height: 170.2 cm (5' 7.01\") (copied from last entry)  Weight: 64.7 kg (142 lb 10.2 oz)  Weight to Use in Calculations: 64.7 kg (142 lb 10.2 oz)  Ideal Body Weight: 61.2 kg (135 lb)  Percent Ideal Body Weight: 105.7  Body mass index is 22.33 kg/m².    Calculation/Equation: MSJ X 1.4=5567  Total Calories / day: 0909-9262 (Calories / k-23)  Total Grams Protein / day: 78-91 (Grams Protein / k.2 - 1.4)  Total Free Water/day: 8153-4174 ml free water/day (25-30 ml/kg)      Evaluation:   1. TF appropriate due to poor mentation per discussion in rounds.   2. Cortrak placement pending.   3. Pt with low Mg (2.6) Pt on electrolyte replacement per MAR.  4. Fluids: NS @80 ml/hr     Recommendations/Plan:  1. Begin Replete with Fiber @ 25 ml/hr and advance per TF protocol to goal rate of 60 ml/hr. This will provide  1440 kcals/day, 92 g pro/day, and 1198 ml free water/day.  2. Continue to monitor and replete electrolytes PRN.  3. Fluids per MD.  4. RD following.                 "

## 2018-09-24 NOTE — PROGRESS NOTES
Dr. Leone updated on pt's elevated HR with A.fib after PRN/scheduled B-blockers.  MD will input orders.

## 2018-09-25 ENCOUNTER — APPOINTMENT (OUTPATIENT)
Dept: RADIOLOGY | Facility: MEDICAL CENTER | Age: 78
DRG: 023 | End: 2018-09-25
Attending: INTERNAL MEDICINE
Payer: MEDICARE

## 2018-09-25 ENCOUNTER — APPOINTMENT (OUTPATIENT)
Dept: RADIOLOGY | Facility: MEDICAL CENTER | Age: 78
DRG: 023 | End: 2018-09-25
Attending: NEUROLOGICAL SURGERY
Payer: MEDICARE

## 2018-09-25 LAB
ANION GAP SERPL CALC-SCNC: 9 MMOL/L (ref 0–11.9)
BASOPHILS # BLD AUTO: 0.4 % (ref 0–1.8)
BASOPHILS # BLD: 0.04 K/UL (ref 0–0.12)
BUN SERPL-MCNC: 18 MG/DL (ref 8–22)
CALCIUM SERPL-MCNC: 8.8 MG/DL (ref 8.5–10.5)
CHLORIDE SERPL-SCNC: 107 MMOL/L (ref 96–112)
CO2 SERPL-SCNC: 25 MMOL/L (ref 20–33)
CREAT SERPL-MCNC: 0.67 MG/DL (ref 0.5–1.4)
EOSINOPHIL # BLD AUTO: 0.02 K/UL (ref 0–0.51)
EOSINOPHIL NFR BLD: 0.2 % (ref 0–6.9)
ERYTHROCYTE [DISTWIDTH] IN BLOOD BY AUTOMATED COUNT: 44.4 FL (ref 35.9–50)
GLUCOSE SERPL-MCNC: 123 MG/DL (ref 65–99)
HCT VFR BLD AUTO: 33.6 % (ref 37–47)
HGB BLD-MCNC: 11 G/DL (ref 12–16)
IMM GRANULOCYTES # BLD AUTO: 0.08 K/UL (ref 0–0.11)
IMM GRANULOCYTES NFR BLD AUTO: 0.7 % (ref 0–0.9)
INR PPP: 1.29 (ref 0.87–1.13)
LYMPHOCYTES # BLD AUTO: 0.99 K/UL (ref 1–4.8)
LYMPHOCYTES NFR BLD: 8.7 % (ref 22–41)
MAGNESIUM SERPL-MCNC: 1.9 MG/DL (ref 1.5–2.5)
MCH RBC QN AUTO: 29.6 PG (ref 27–33)
MCHC RBC AUTO-ENTMCNC: 32.7 G/DL (ref 33.6–35)
MCV RBC AUTO: 90.6 FL (ref 81.4–97.8)
MONOCYTES # BLD AUTO: 0.9 K/UL (ref 0–0.85)
MONOCYTES NFR BLD AUTO: 7.9 % (ref 0–13.4)
NEUTROPHILS # BLD AUTO: 9.37 K/UL (ref 2–7.15)
NEUTROPHILS NFR BLD: 82.1 % (ref 44–72)
NRBC # BLD AUTO: 0 K/UL
NRBC BLD-RTO: 0 /100 WBC
PLATELET # BLD AUTO: 173 K/UL (ref 164–446)
PMV BLD AUTO: 10.7 FL (ref 9–12.9)
POTASSIUM SERPL-SCNC: 3.9 MMOL/L (ref 3.6–5.5)
PROTHROMBIN TIME: 16.2 SEC (ref 12–14.6)
RBC # BLD AUTO: 3.71 M/UL (ref 4.2–5.4)
SODIUM SERPL-SCNC: 141 MMOL/L (ref 135–145)
SODIUM SERPL-SCNC: 143 MMOL/L (ref 135–145)
WBC # BLD AUTO: 11.4 K/UL (ref 4.8–10.8)

## 2018-09-25 PROCEDURE — 700111 HCHG RX REV CODE 636 W/ 250 OVERRIDE (IP): Performed by: HOSPITALIST

## 2018-09-25 PROCEDURE — 700105 HCHG RX REV CODE 258: Performed by: NEUROLOGICAL SURGERY

## 2018-09-25 PROCEDURE — 700102 HCHG RX REV CODE 250 W/ 637 OVERRIDE(OP): Performed by: HOSPITALIST

## 2018-09-25 PROCEDURE — 84295 ASSAY OF SERUM SODIUM: CPT

## 2018-09-25 PROCEDURE — 85610 PROTHROMBIN TIME: CPT

## 2018-09-25 PROCEDURE — 3E0S3GC INTRODUCTION OF OTHER THERAPEUTIC SUBSTANCE INTO EPIDURAL SPACE, PERCUTANEOUS APPROACH: ICD-10-PCS | Performed by: NEUROLOGICAL SURGERY

## 2018-09-25 PROCEDURE — 85025 COMPLETE CBC W/AUTO DIFF WBC: CPT

## 2018-09-25 PROCEDURE — A9270 NON-COVERED ITEM OR SERVICE: HCPCS | Performed by: HOSPITALIST

## 2018-09-25 PROCEDURE — 700111 HCHG RX REV CODE 636 W/ 250 OVERRIDE (IP): Performed by: NEUROLOGICAL SURGERY

## 2018-09-25 PROCEDURE — 70450 CT HEAD/BRAIN W/O DYE: CPT

## 2018-09-25 PROCEDURE — 83735 ASSAY OF MAGNESIUM: CPT

## 2018-09-25 PROCEDURE — 99233 SBSQ HOSP IP/OBS HIGH 50: CPT | Performed by: HOSPITALIST

## 2018-09-25 PROCEDURE — 99291 CRITICAL CARE FIRST HOUR: CPT | Performed by: INTERNAL MEDICINE

## 2018-09-25 PROCEDURE — 80048 BASIC METABOLIC PNL TOTAL CA: CPT

## 2018-09-25 PROCEDURE — 700101 HCHG RX REV CODE 250: Performed by: INTERNAL MEDICINE

## 2018-09-25 PROCEDURE — 700105 HCHG RX REV CODE 258: Performed by: INTERNAL MEDICINE

## 2018-09-25 PROCEDURE — 770022 HCHG ROOM/CARE - ICU (200)

## 2018-09-25 PROCEDURE — 700111 HCHG RX REV CODE 636 W/ 250 OVERRIDE (IP): Performed by: INTERNAL MEDICINE

## 2018-09-25 RX ORDER — METOPROLOL TARTRATE 50 MG/1
50 TABLET, FILM COATED ORAL EVERY 8 HOURS
Status: DISCONTINUED | OUTPATIENT
Start: 2018-09-25 | End: 2018-10-09

## 2018-09-25 RX ORDER — DEXTROSE MONOHYDRATE 50 MG/ML
INJECTION, SOLUTION INTRAVENOUS CONTINUOUS
Status: DISCONTINUED | OUTPATIENT
Start: 2018-09-25 | End: 2018-09-26

## 2018-09-25 RX ORDER — METOPROLOL TARTRATE 1 MG/ML
5 INJECTION, SOLUTION INTRAVENOUS
Status: COMPLETED | OUTPATIENT
Start: 2018-09-25 | End: 2018-09-25

## 2018-09-25 RX ADMIN — ACETAMINOPHEN 650 MG: 325 TABLET, FILM COATED ORAL at 16:53

## 2018-09-25 RX ADMIN — METOPROLOL TARTRATE 5 MG: 1 INJECTION, SOLUTION INTRAVENOUS at 01:36

## 2018-09-25 RX ADMIN — SENNOSIDES AND DOCUSATE SODIUM 2 TABLET: 8.6; 5 TABLET ORAL at 16:53

## 2018-09-25 RX ADMIN — METOPROLOL TARTRATE 50 MG: 50 TABLET ORAL at 22:16

## 2018-09-25 RX ADMIN — AMIODARONE HYDROCHLORIDE 1 MG/MIN: 50 INJECTION, SOLUTION INTRAVENOUS at 05:00

## 2018-09-25 RX ADMIN — METOPROLOL TARTRATE 50 MG: 50 TABLET ORAL at 15:05

## 2018-09-25 RX ADMIN — AMIODARONE HYDROCHLORIDE 150 MG: 1.5 INJECTION, SOLUTION INTRAVENOUS at 04:48

## 2018-09-25 RX ADMIN — CEFAZOLIN SODIUM 1 G: 1 INJECTION, SOLUTION INTRAVENOUS at 05:45

## 2018-09-25 RX ADMIN — METOPROLOL TARTRATE 5 MG: 1 INJECTION, SOLUTION INTRAVENOUS at 01:07

## 2018-09-25 RX ADMIN — METOPROLOL TARTRATE 5 MG: 1 INJECTION, SOLUTION INTRAVENOUS at 03:04

## 2018-09-25 RX ADMIN — METOPROLOL TARTRATE 5 MG: 1 INJECTION, SOLUTION INTRAVENOUS at 03:31

## 2018-09-25 RX ADMIN — ALTEPLASE 1 MG: 2.2 INJECTION, POWDER, LYOPHILIZED, FOR SOLUTION INTRAVENOUS at 07:59

## 2018-09-25 RX ADMIN — SODIUM CHLORIDE 500 MG: 9 INJECTION, SOLUTION INTRAVENOUS at 05:36

## 2018-09-25 RX ADMIN — CEFAZOLIN SODIUM 1 G: 1 INJECTION, SOLUTION INTRAVENOUS at 15:05

## 2018-09-25 RX ADMIN — ALTEPLASE 1 MG: 2.2 INJECTION, POWDER, LYOPHILIZED, FOR SOLUTION INTRAVENOUS at 17:00

## 2018-09-25 RX ADMIN — DIGOXIN 125 MCG: 125 TABLET ORAL at 16:53

## 2018-09-25 RX ADMIN — DEXTROSE MONOHYDRATE: 50 INJECTION, SOLUTION INTRAVENOUS at 04:49

## 2018-09-25 RX ADMIN — SODIUM CHLORIDE 500 MG: 9 INJECTION, SOLUTION INTRAVENOUS at 16:53

## 2018-09-25 RX ADMIN — HYDROMORPHONE HYDROCHLORIDE 1 MG: 2 INJECTION INTRAMUSCULAR; INTRAVENOUS; SUBCUTANEOUS at 04:45

## 2018-09-25 RX ADMIN — CEFAZOLIN SODIUM 1 G: 1 INJECTION, SOLUTION INTRAVENOUS at 22:15

## 2018-09-25 NOTE — PROGRESS NOTES
Unable to maintain optimal waveform with R EVD. MD aware. Drainage is present post tpa instillation by PA

## 2018-09-25 NOTE — PROGRESS NOTES
2015 Dr. Santizo at bedside to flush left and right EVD drains. MD educated daughter, Nan, about procedure risks and benefits     2030 PMA paged to come to bedside to discuss with Dr. Santizo

## 2018-09-25 NOTE — PROGRESS NOTES
[facility] Hospitalist Progress Note    Date of Service: 2018    Chief Complaint  78 y.o. female admitted 2018 with HA to [facility].  Had had a GLF with neg CT one week prior.  CT on this admit showing SDH and SAH  B EVDs placed Dr Santizo     PMHx PAFIb, AC on apixaban    Interval Problem Update    tPA to EVD  CT showing increased  Moving x 4  AFib 120s  1 LNC  Pulled cortrak  UOP 850ml/12hrs    Consultants/Specialty  Neurosurgery  Pulmonology    Disposition  Cont in ICU for close monitoring of Neuro status    Dw family at bedside x 20mins.          Review of Systems   Unable to perform ROS: Mental status change      Physical Exam  Laboratory/Imaging   Hemodynamics  Temp (24hrs), Av.4 °C (99.4 °F), Min:37 °C (98.6 °F), Max:37.8 °C (100 °F)   Temperature: 37.4 °C (99.3 °F)  Pulse  Av.8  Min: 60  Max: 131 Heart Rate (Monitored): (!) 145  NIBP: 129/65      Respiratory      Respiration: 12, Pulse Oximetry: 95 %        RUL Breath Sounds: Clear, RML Breath Sounds: Clear, RLL Breath Sounds: Diminished, JUAN Breath Sounds: Clear, LLL Breath Sounds: Diminished    Fluids    Intake/Output Summary (Last 24 hours) at 18 0550  Last data filed at 18 0500   Gross per 24 hour   Intake          3007.87 ml   Output             3979 ml   Net          -971.13 ml       Nutrition  Orders Placed This Encounter   Procedures   • Diet NPO     Standing Status:   Standing     Number of Occurrences:   1     Order Specific Question:   Restrict to:     Answer:   Strict [1]     Physical Exam   Constitutional: She appears well-developed and well-nourished. No distress.   HENT:   Head: Normocephalic and atraumatic.   Post op dressing   Neck: No JVD present.   Cardiovascular: Normal rate and regular rhythm.    Pulmonary/Chest: Effort normal. No stridor. No respiratory distress. She has no wheezes. She has no rales.   Abdominal: Soft. There is no tenderness. There is no rebound and no guarding.   Musculoskeletal: She exhibits no  edema.   Neurological: She is alert.   Skin: Skin is warm and dry. No rash noted. She is not diaphoretic.   Nursing note and vitals reviewed.      Recent Labs      09/23/18   0542  09/24/18   0530 09/25/18   0442   WBC  12.4*  10.5  11.4*   RBC  3.52*  3.73*  3.71*   HEMOGLOBIN  10.2*  10.7*  11.0*   HEMATOCRIT  31.2*  33.7*  33.6*   MCV  88.6  90.3  90.6   MCH  29.0  28.7  29.6   MCHC  32.7*  31.8*  32.7*   RDW  43.4  44.2  44.4   PLATELETCT  145*  149*  173   MPV  10.6  10.4  10.7     Recent Labs      09/22/18   0810   09/23/18   0542  09/24/18   0530  09/24/18   1150  09/24/18   1740  09/25/18   0004   SODIUM  139   < >  139  143  146*  145  143   POTASSIUM  3.9   --   3.6  4.1   --    --    --    CHLORIDE  104   --   107  109   --    --    --    CO2  25   --   24  26   --    --    --    GLUCOSE  144*   --   113*  99   --    --    --    BUN  25*   --   18  16   --    --    --    CREATININE  0.65   --   0.57  0.59   --    --    --    CALCIUM  9.5   --   8.5  9.6   --    --    --     < > = values in this interval not displayed.     Recent Labs      09/22/18   0810   09/24/18   0530  09/24/18   1940  09/25/18   0442   APTT  29.0   --    --   27.9   --    INR  1.32*   < >  1.16*  1.25*  1.29*    < > = values in this interval not displayed.         Recent Labs      09/22/18   1300   TRIGLYCERIDE  74   HDL  37*   LDL  131*          Assessment/Plan     Acute intracranial hemorrhage (HCC)- (present on admission)   Assessment & Plan    Anticoagulation reveresed  S/P bilateral ventriculostomy drains: have clotted, giving tPA per Neurosurgery  Monitor ICP continuously  Neurosurgery following  As expected the patient has become more lethargic, continue ICU care, expected to have a long hospital cource        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)- (present on admission)   Assessment & Plan    Anticoagulation reveresed        Paroxysmal atrial fibrillation (HCC)- (present on admission)    Assessment & Plan    Rate uncontrolled to 130's  Metoprolol PO and IV as needed with goal HR less than 100  No anticoagulation due to ICH        Hypomagnesemia- (present on admission)   Assessment & Plan    Replace        Hypokalemia   Assessment & Plan    Replace          Quality-Core Measures   Reviewed items::  EKG reviewed, Labs reviewed, Medications reviewed and Radiology images reviewed  DVT prophylaxis pharmacological::  Contraindicated - High bleeding risk  DVT prophylaxis - mechanical:  SCDs  Ulcer Prophylaxis::  Not indicated

## 2018-09-25 NOTE — PROGRESS NOTES
Dr. Suzanne munoz, patients HR still sustaining above 110 regardless of 4 doses of metoprolol. Amiodarone protocol ordered

## 2018-09-25 NOTE — DISCHARGE PLANNING
Assessment pending. Pt unable to speak, left voice mail message for spouse requesting return call.

## 2018-09-25 NOTE — PROGRESS NOTES
EVD is without consistent waveform and appears dampened after zero process. Dr Santizo aware. TPA to follow. Patient a and o 2 and sleeping.

## 2018-09-25 NOTE — THERAPY
Discussed with RN; Pt not medically stable for skilled PT intervention. Will DC order and await re-order when medically appropriate.

## 2018-09-25 NOTE — CARE PLAN
Problem: Skin Integrity  Goal: Risk for impaired skin integrity will decrease  Outcome: PROGRESSING AS EXPECTED  q two hour turns, checking under medical devices, mepilex in place, 2 RN skin assessment     Problem: Acute Care of the Stroke Patient  Goal: Optimal Outcome for the Stroke patient    Intervention: Vital Signs and Neuro Checks per order  Neuro check and EVD check q one hour. NIHSS completed and documented. CT at 0300 on 9/23

## 2018-09-25 NOTE — PROGRESS NOTES
Dr. Palacios paged regarding patients heart rate sustaining in 115-135 regardless of previous orders. New orders received

## 2018-09-25 NOTE — PROGRESS NOTES
EVD's clogged with blood and brain material, not draining. ICP w/o waveform. Patient somnolent. CT showed increased posterior temporal IPH stroke and edema, increased IVH    First flushed each drain with 5cc preservative free saline proximally, and 5 distally    Then each catheter repositioned by pulling out 1 cm, secured to scalp, redressed    Small amount of drainage noted. Still no waveform.    Then 1 mg sterile IV-tPA given into each catheter proximally, followed by 3 cc saline flush, clamped for 10 minutes, then reopened.    Will perform this CLEAR IVH trial-like protocol for 5 more doses, 7am and 5pm, with surveillence CT Qam x 3 days to monitor for bleeding. D/w pharmacy.    Family updated, overall mortality from casted IVH is 50%. Hopefully we can clear this issue.

## 2018-09-25 NOTE — PROGRESS NOTES
1650  Dr. Leone at the bedside.  Updated on pt's still elevated -140s.  MD will put orders in.    1800  Pt with markedly decreased level on consciousness.  Unable to follow commands and oriented x1.  ICP briefly in 22 for couple seconds but lowered to 8-10 but right EVD not putting out and unable to obtain good ICP waveform;  Dr. Leone updated, order received for STAT head CT.    1830  Pt down to CT    1850  Pt back to unit    1900  NeuroSx paged regarding pt's neuro change and EVD drain status.  L EVD still not draining and R EVD barely draining but very poor ICP waveform.  Unable to obtain accurate ICP.  Pt still obtunded but moves all extremities spont and PERRL.    1915  Apolinar Ervin from neuro surgery called back.  Updated on pt's current status.    1930  BORIS Jiang called back and per Dr. Sukhdev Jiang on his way to see the patient.    1940  Dr. Santizo on the phone.  Order received for STAT coag.  MD on his way to see the patient.

## 2018-09-25 NOTE — PROGRESS NOTES
Neurosurgery Progress Note    Subjective:  Pt with IVH / SAH / SDH  Bilateral EVD day #3, at 0 tragus,  EVDs had not been working. Evaluated by Dr. Santizo.   tPA administered through each EVD yesterday pm. EVD has been working overnight, but with poor waveform and questionable ICPs reading 2.   Now drowsy similar to yesterday.     Exam:  Drowsy. Will arouse. Will open eyes.  PERRL  Will follow simple commands.    Pulse  Av.7  Min: 60  Max: 131  Resp  Av.1  Min: 12  Max: 34  Temp  Av.4 °C (99.4 °F)  Min: 37.2 °C (99 °F)  Max: 37.5 °C (99.5 °F)  SpO2  Av.7 %  Min: 92 %  Max: 98 %    ICP  Av.3 MM HG  Min: 0 MM HG  Max: 22 MM HG    Recent Labs      18   0542  18   0530  18   0442   WBC  12.4*  10.5  11.4*   RBC  3.52*  3.73*  3.71*   HEMOGLOBIN  10.2*  10.7*  11.0*   HEMATOCRIT  31.2*  33.7*  33.6*   MCV  88.6  90.3  90.6   MCH  29.0  28.7  29.6   MCHC  32.7*  31.8*  32.7*   RDW  43.4  44.2  44.4   PLATELETCT  145*  149*  173   MPV  10.6  10.4  10.7     Recent Labs      18   0542  18   0530   18   1740  18   0004  18   0442   SODIUM  139  143   < >  145  143  141   POTASSIUM  3.6  4.1   --    --    --   3.9   CHLORIDE  107  109   --    --    --   107   CO2  24  26   --    --    --      GLUCOSE  113*  99   --    --    --   123*   BUN  18  16   --    --    --   18   CREATININE  0.57  0.59   --    --    --   0.67   CALCIUM  8.5  9.6   --    --    --   8.8    < > = values in this interval not displayed.     Recent Labs      18   0530  18   1940  18   0442   APTT   --   27.9   --    INR  1.16*  1.25*  1.29*           Intake/Output       18 - 1859 18 - 18 Total  Total       Intake    P.O.  0  -- 0  --  -- --    P.O. 0 -- 0 -- -- --    I.V.  1527.6  1313.8 2841.4  --  -- --    Amiodarone Volume -- 150 150 -- -- --    Diltiazem Volume -- 132.3 132.3 --  -- --    IV Volume (NS) 640 -- 640 -- -- --    Volume (mL) (NS infusion) 640 -- 640 -- -- --    Volume (mL) (lactated ringers infusion) 247.6 996 1243.6 -- -- --    Volume (mL) (lactated ringers infusion) 0 -- 0 -- -- --    Volume (mL) (D5W infusion) -- 35.5 35.5 -- -- --    Other  90  -- 90  --  -- --    Medications (PO/Enteral Liquids) 90 -- 90 -- -- --    NG/GT  100  -- 100  --  -- --    Intake (mL) ([REMOVED] Enteral Tube 09/24/18 Cortrak - Gastric 14 Fr. Right nare) 100 -- 100 -- -- --    IV Piggyback  --  300 300  --  -- --    Volume (mL) (ceFAZolin in dextrose (ANCEF) IVPB premix 1 g) -- 100 100 -- -- --    Volume (mL) (levETIRAcetam (KEPPRA) 500 mg in  mL IVPB) -- 200 200 -- -- --    Total Intake 1717.6 1613.8 3331.4 -- -- --       Output    Urine  1945  945 2890  100  -- 100    Output (mL) (Urethral Catheter Latex 16 Fr.) 9974 127 1506 100 -- 100    Drains  69  230 299  41  -- 41    Output (mL) (ICP/Ventriculostomy Right) 28 76 104 30 -- 30    Output (mL) (ICP/Ventriculostomy Left) 41 154 195 11 -- 11    Stool  --  -- --  --  -- --    Number of Times Stooled 1 x -- 1 x -- -- --    Total Output 2014 1175 3189 141 -- 141       Net I/O     -296.4 438.8 142.4 -141 -- -141            Intake/Output Summary (Last 24 hours) at 09/25/18 0821  Last data filed at 09/25/18 0800   Gross per 24 hour   Intake          3171.37 ml   Output             1967 ml   Net          1204.37 ml            • D5W   Continuous   • amiodarone infusion  0.5-1 mg/min Continuous   • metoprolol  50 mg Q8HRS   • Pharmacy   PRN   • labetalol  5-10 mg Q4HRS PRN   • LR   Continuous   • acetaminophen  650 mg Q4HRS PRN    Or   • acetaminophen  650 mg Q4HRS PRN   • digoxin  125 mcg DAILY AT 1800   • ondansetron  4 mg Q4HRS PRN    Or   • ondansetron  4 mg Q4HRS PRN   • Pharmacy Consult Request   PRN    And   • oxyCODONE immediate-release  2.5 mg Q3HRS PRN    And   • oxyCODONE immediate-release  5 mg Q3HRS PRN    And   • HYDROmorphone  0.25-1  mg Q2HRS PRN   • polyethylene glycol/lytes  1 Packet QDAY PRN    And   • senna-docusate  2 Tab BID    And   • magnesium hydroxide  30 mL QDAY PRN    And   • bisacodyl  10 mg QDAY PRN   • DILTIAZem infusion  0-15 mg/hr Continuous   • alteplase  1 mg BID    And   • alteplase  1 mg BID   • NORepinephrine (LEVOPHED) infusion  0-30 mcg/min Continuous   • Respiratory Care per Protocol   Continuous RT   • MD Alert...Adult ICU Electrolyte Replacement per Pharmacy   pharmacy to dose   • LORazepam  2 mg Q5 MIN PRN   • hydrALAZINE  10 mg Q4HRS PRN   • enalaprilat  1.25 mg Q6HRS PRN   • ceFAZolin  1 g Q8HRS   • levETIRAcetam (KEPPRA) IV  500 mg Q12HRS       Assessment and Plan:  Hospital day #4  EVD #3  Under sterile technique, 1mg IV-tPA administered to each EVD followed by 2cc of sterile saline flush. Drain clamped for 10 min then reopened.  CT scan each am X3  Dr. Santizo has reviewed this am CT. Still concern with area of stroke with surrounding edema. Would do craniectomy as a life saving measure.   Dr. Santizo will administer the t-PA this evening.

## 2018-09-25 NOTE — PROGRESS NOTES
Critical Care Progress Note    Date of admission  9/22/2018    Chief Complaint  78 y.o. female admitted 9/22/2018 with intracranial hemorrhage.    Hospital Course  This lady was admitted with an acute intracranial hemorrhage.  She is undergone bilateral EVDs for hydrocephalus.    Interval Problem Update  Reviewed last 24 hour events:   - EVD stopped draining, TPA infused   - Neuro: q1 neurochecks   - HR: AF, RVR   - BP: Goal SBP <160, no PRNs needed   - GI: TF at goal   - UOP: adequate   - Mack: yes   - Tm: afeb   - Lines: PIV   - PPx: GI not indicated, DVT SCDs   - NC    Yesterday              - B/L EVD              - Neuro: q1 hr neurochecks, AO x1-3              - HR: AF RVR              - BP: normotensive              - GI: cortrak to be placed today              - UOP: increased in the last 1-2 hours              - Mack: yes              - Tm: afeb              - Lines: PIV              - PPx: GI not indicated, DVT SCDs    Review of Systems  Review of Systems   Unable to perform ROS: Mental status change        Vital Signs for last 24 hours   Temp:  [37.2 °C (99 °F)-37.5 °C (99.5 °F)] 37.4 °C (99.3 °F)  Pulse:  [] 129  Resp:  [12-34] 14    Hemodynamic parameters for last 24 hours       Vent Settings for last 24 hours       Physical Exam   Physical Exam   HENT:   Head: Normocephalic.   Right Ear: External ear normal.   Left Ear: External ear normal.   Mouth/Throat: Oropharynx is clear and moist.   Bruising along her left forehead and left face.  Bilateral EVDs in place.   Eyes: Pupils are equal, round, and reactive to light. Conjunctivae are normal. Right eye exhibits no discharge. Left eye exhibits no discharge. No scleral icterus.   Neck: Neck supple. No tracheal deviation present.   Cardiovascular: Exam reveals no gallop and no friction rub.    No murmur heard.  Pulmonary/Chest: No stridor. She has no wheezes. She has no rales.   Abdominal: Soft. Bowel sounds are normal. She exhibits no distension.  There is no tenderness.   Musculoskeletal: She exhibits no tenderness or deformity.   No clubbing or cyanosis   Neurological:   More somnolence today.  Follows commands   Skin: Skin is warm and dry. No rash noted. She is not diaphoretic. No erythema. No pallor.   Nursing note and vitals reviewed.      Medications  Current Facility-Administered Medications   Medication Dose Route Frequency Provider Last Rate Last Dose   • D5W infusion   Intravenous Continuous Devan Palacios M.D. 30 mL/hr at 09/25/18 0449     • amiodarone (CORDARONE) 450 mg in D5W 250 mL Infusion  0.5-1 mg/min Intravenous Continuous Devan Palacios M.D. 33 mL/hr at 09/25/18 0500 1 mg/min at 09/25/18 0500   • metoprolol (LOPRESSOR) tablet 50 mg  50 mg Feeding Tube Q8HRS Andres Wells D.O.       • Pharmacy Consult: Enteral tube feeding - review meds/change route/product selection   Other PRN Gil Leone Jr., D.O.       • labetalol (NORMODYNE,TRANDATE) injection 5-10 mg  5-10 mg Intravenous Q4HRS PRN Gil Leone Jr., D.O.   5 mg at 09/24/18 1730   • lactated ringers infusion   Intravenous Continuous Gil Leone Jr., D.O. 83 mL/hr at 09/24/18 1501     • acetaminophen (TYLENOL) tablet 650 mg  650 mg Feeding Tube Q4HRS PRN William Medina M.D.        Or   • acetaminophen (TYLENOL) suppository 650 mg  650 mg Rectal Q4HRS PRN William Medina M.D.       • digoxin (LANOXIN) tablet 125 mcg  125 mcg Feeding Tube DAILY AT 1800 William Medina M.D.       • ondansetron (ZOFRAN ODT) dispertab 4 mg  4 mg Feeding Tube Q4HRS PRN William Medina M.D.        Or   • ondansetron (ZOFRAN) syringe/vial injection 4 mg  4 mg Intravenous Q4HRS PRN William Medina M.D.       • Pharmacy Consult Request ...Pain Management Review   Other PRN William Medina M.D.        And   • oxyCODONE immediate-release (ROXICODONE) tablet 2.5 mg  2.5 mg Feeding Tube Q3HRS PRN William Medina M.D.        And   • oxyCODONE immediate-release (ROXICODONE) tablet 5 mg  5 mg Feeding Tube  Q3HRS PRN William Medina M.D.        And   • HYDROmorphone (DILAUDID) injection 0.25-1 mg  0.25-1 mg Intravenous Q2HRS PRN William Medina M.D.   1 mg at 09/25/18 0445   • polyethylene glycol/lytes (MIRALAX) PACKET 1 Packet  1 Packet Feeding Tube QDAY PRN William Medina M.D.        And   • senna-docusate (PERICOLACE or SENOKOT S) 8.6-50 MG per tablet 2 Tab  2 Tab Feeding Tube BID William Medina M.D.   Stopped at 09/24/18 1800    And   • magnesium hydroxide (MILK OF MAGNESIA) suspension 30 mL  30 mL Feeding Tube QDAY PRN William Medina M.D.        And   • bisacodyl (DULCOLAX) suppository 10 mg  10 mg Rectal QDAY PRN William Medina M.D.       • DILTIAZem (CARDIZEM) 100 mg in D5W 100 mL Infusion  0-15 mg/hr Intravenous Continuous Gil Leone Jr., D.OJhoana   Stopped at 09/25/18 0448   • alteplase (CATHFLO) syringe 1 mg  1 mg Intracatheter BID Juan Alberto Santizo III, M.D.   1 mg at 09/25/18 0759    And   • alteplase (CATHFLO) syringe 1 mg  1 mg Intracatheter BID Juan Alberto Santizo III, M.D.   1 mg at 09/25/18 0759   • norepinephrine (LEVOPHED) 8 mg in  mL Infusion  0-30 mcg/min Intravenous Continuous Devan Palacios M.D.   Stopped at 09/24/18 2215   • Respiratory Care per Protocol   Nebulization Continuous RT Rowena Calero M.D.       • MD Alert...ICU Electrolyte Replacement per Pharmacy   Other pharmacy to dose Rowena Calero M.D.       • LORazepam (ATIVAN) injection 2 mg  2 mg Intravenous Q5 MIN PRN Rowena Calero M.D.       • hydrALAZINE (APRESOLINE) injection 10 mg  10 mg Intravenous Q4HRS PRN Rowena Calero M.D.       • enalaprilat (VASOTEC) injection 1.25 mg  1.25 mg Intravenous Q6HRS PRN Rowena Calero M.D.       • ceFAZolin in dextrose (ANCEF) IVPB premix 1 g  1 g Intravenous Q8HRS Juan Alberto Santizo III, M.D.   Stopped at 09/25/18 0615   • levETIRAcetam (KEPPRA) 500 mg in  mL IVPB  500 mg Intravenous Q12HRS Juan Alberto Santizo III, M.D.   Stopped at 09/25/18 0551       Fluids    Intake/Output Summary (Last 24  hours) at 09/25/18 0843  Last data filed at 09/25/18 0800   Gross per 24 hour   Intake          3171.37 ml   Output             1967 ml   Net          1204.37 ml       Laboratory  Recent Results (from the past 48 hour(s))   ACCU-CHEK GLUCOSE    Collection Time: 09/23/18  1:27 PM   Result Value Ref Range    Glucose - Accu-Ck 131 (H) 65 - 99 mg/dL   EC-ECHOCARDIOGRAM COMPLETE W/O CONT    Collection Time: 09/23/18  4:36 PM   Result Value Ref Range    Eject.Frac. MOD BP 77.46     Eject.Frac. MOD 4C 68.95     Eject.Frac. MOD 2C 85.06     Left Ventrical Ejection Fraction 65    ACCU-CHEK GLUCOSE    Collection Time: 09/23/18  6:14 PM   Result Value Ref Range    Glucose - Accu-Ck 119 (H) 65 - 99 mg/dL   ACCU-CHEK GLUCOSE    Collection Time: 09/24/18 12:01 AM   Result Value Ref Range    Glucose - Accu-Ck 117 (H) 65 - 99 mg/dL   CBC WITH DIFFERENTIAL    Collection Time: 09/24/18  5:30 AM   Result Value Ref Range    WBC 10.5 4.8 - 10.8 K/uL    RBC 3.73 (L) 4.20 - 5.40 M/uL    Hemoglobin 10.7 (L) 12.0 - 16.0 g/dL    Hematocrit 33.7 (L) 37.0 - 47.0 %    MCV 90.3 81.4 - 97.8 fL    MCH 28.7 27.0 - 33.0 pg    MCHC 31.8 (L) 33.6 - 35.0 g/dL    RDW 44.2 35.9 - 50.0 fL    Platelet Count 149 (L) 164 - 446 K/uL    MPV 10.4 9.0 - 12.9 fL    Neutrophils-Polys 77.00 (H) 44.00 - 72.00 %    Lymphocytes 14.20 (L) 22.00 - 41.00 %    Monocytes 7.80 0.00 - 13.40 %    Eosinophils 0.30 0.00 - 6.90 %    Basophils 0.20 0.00 - 1.80 %    Immature Granulocytes 0.50 0.00 - 0.90 %    Nucleated RBC 0.00 /100 WBC    Neutrophils (Absolute) 8.11 (H) 2.00 - 7.15 K/uL    Lymphs (Absolute) 1.50 1.00 - 4.80 K/uL    Monos (Absolute) 0.82 0.00 - 0.85 K/uL    Eos (Absolute) 0.03 0.00 - 0.51 K/uL    Baso (Absolute) 0.02 0.00 - 0.12 K/uL    Immature Granulocytes (abs) 0.05 0.00 - 0.11 K/uL    NRBC (Absolute) 0.00 K/uL   BASIC METABOLIC PANEL    Collection Time: 09/24/18  5:30 AM   Result Value Ref Range    Sodium 143 135 - 145 mmol/L    Potassium 4.1 3.6 - 5.5 mmol/L     Chloride 109 96 - 112 mmol/L    Co2 26 20 - 33 mmol/L    Glucose 99 65 - 99 mg/dL    Bun 16 8 - 22 mg/dL    Creatinine 0.59 0.50 - 1.40 mg/dL    Calcium 9.6 8.5 - 10.5 mg/dL    Anion Gap 8.0 0.0 - 11.9   MAGNESIUM    Collection Time: 18  5:30 AM   Result Value Ref Range    Magnesium 2.6 (H) 1.5 - 2.5 mg/dL   PROTHROMBIN TIME    Collection Time: 18  5:30 AM   Result Value Ref Range    PT 14.9 (H) 12.0 - 14.6 sec    INR 1.16 (H) 0.87 - 1.13   ESTIMATED GFR    Collection Time: 18  5:30 AM   Result Value Ref Range    GFR If African American >60 >60 mL/min/1.73 m 2    GFR If Non African American >60 >60 mL/min/1.73 m 2   EKG    Collection Time: 18  7:19 AM   Result Value Ref Range    Report       [facility]    Test Date:  2018  Pt Name:    COURTNEY KAMARA                 Department: [facility]  MRN:        [id number]                      Room:       [facility]  Gender:     Female                       Technician: [facility]  :        [id number]                   Requested By:SANTO BARNES  Order #:    [id number]                    Reading MD: Munir Maza MD    Measurements  Intervals                                Axis  Rate:       124                          P:  NE:                                      QRS:        30  QRSD:       72                           T:          -71  QT:         292  QTc:        420    Interpretive Statements  ATRIAL FIBRILLATION, V-RATE    MULTIPLE PREMATURE COMPLEXES, VENT & SUPRAVEN  BORDERLINE REPOLARIZATION ABNORMALITY  Compared to ECG 2018 07:23:41  Sinus rhythm no longer present    Electronically Signed On 2018 14:54:56 PDT by Munir Maza MD     OSMOLALITY URINE    Collection Time: 18  9:09 AM   Result Value Ref Range    Osmolality Urine 429 300 - 900 mOsm/kg H2O   OSMOLALITY SERUM    Collection Time: 18  9:09 AM   Result Value Ref Range    Osmolality Serum 301 (H) 278 - 298 mOsm/kg H2O   URINE SODIUM RANDOM    Collection Time:  09/24/18  9:09 AM   Result Value Ref Range    Sodium, Urine -per volume 168 mmol/L   SODIUM SERUM (NA)    Collection Time: 09/24/18 11:50 AM   Result Value Ref Range    Sodium 146 (H) 135 - 145 mmol/L   SODIUM SERUM (NA)    Collection Time: 09/24/18  5:40 PM   Result Value Ref Range    Sodium 145 135 - 145 mmol/L   APTT    Collection Time: 09/24/18  7:40 PM   Result Value Ref Range    APTT 27.9 24.7 - 36.0 sec   PROTHROMBIN TIME    Collection Time: 09/24/18  7:40 PM   Result Value Ref Range    PT 15.8 (H) 12.0 - 14.6 sec    INR 1.25 (H) 0.87 - 1.13   SODIUM SERUM (NA)    Collection Time: 09/25/18 12:04 AM   Result Value Ref Range    Sodium 143 135 - 145 mmol/L   CBC WITH DIFFERENTIAL    Collection Time: 09/25/18  4:42 AM   Result Value Ref Range    WBC 11.4 (H) 4.8 - 10.8 K/uL    RBC 3.71 (L) 4.20 - 5.40 M/uL    Hemoglobin 11.0 (L) 12.0 - 16.0 g/dL    Hematocrit 33.6 (L) 37.0 - 47.0 %    MCV 90.6 81.4 - 97.8 fL    MCH 29.6 27.0 - 33.0 pg    MCHC 32.7 (L) 33.6 - 35.0 g/dL    RDW 44.4 35.9 - 50.0 fL    Platelet Count 173 164 - 446 K/uL    MPV 10.7 9.0 - 12.9 fL    Neutrophils-Polys 82.10 (H) 44.00 - 72.00 %    Lymphocytes 8.70 (L) 22.00 - 41.00 %    Monocytes 7.90 0.00 - 13.40 %    Eosinophils 0.20 0.00 - 6.90 %    Basophils 0.40 0.00 - 1.80 %    Immature Granulocytes 0.70 0.00 - 0.90 %    Nucleated RBC 0.00 /100 WBC    Neutrophils (Absolute) 9.37 (H) 2.00 - 7.15 K/uL    Lymphs (Absolute) 0.99 (L) 1.00 - 4.80 K/uL    Monos (Absolute) 0.90 (H) 0.00 - 0.85 K/uL    Eos (Absolute) 0.02 0.00 - 0.51 K/uL    Baso (Absolute) 0.04 0.00 - 0.12 K/uL    Immature Granulocytes (abs) 0.08 0.00 - 0.11 K/uL    NRBC (Absolute) 0.00 K/uL   BASIC METABOLIC PANEL    Collection Time: 09/25/18  4:42 AM   Result Value Ref Range    Sodium 141 135 - 145 mmol/L    Potassium 3.9 3.6 - 5.5 mmol/L    Chloride 107 96 - 112 mmol/L    Co2 25 20 - 33 mmol/L    Glucose 123 (H) 65 - 99 mg/dL    Bun 18 8 - 22 mg/dL    Creatinine 0.67 0.50 - 1.40 mg/dL     Calcium 8.8 8.5 - 10.5 mg/dL    Anion Gap 9.0 0.0 - 11.9   MAGNESIUM    Collection Time: 09/25/18  4:42 AM   Result Value Ref Range    Magnesium 1.9 1.5 - 2.5 mg/dL   PROTHROMBIN TIME    Collection Time: 09/25/18  4:42 AM   Result Value Ref Range    PT 16.2 (H) 12.0 - 14.6 sec    INR 1.29 (H) 0.87 - 1.13   ESTIMATED GFR    Collection Time: 09/25/18  4:42 AM   Result Value Ref Range    GFR If African American >60 >60 mL/min/1.73 m 2    GFR If Non African American >60 >60 mL/min/1.73 m 2       Imaging  CT:    Reviewed    Assessment/Plan  Acute intracranial hemorrhage (HCC)- (present on admission)   Assessment & Plan    - S/P fall 1 week ago - CT head on 9/14 negative for acute intracranial hemorrhage  - CT scan on 9/22 with intraventricular hemorrhage, right greater than left and intraparenchymal hemorrhage  - Noncommunicating hydrocephalus - S/P emergent bilateral EVD placement by Dr. Santizo on 9/22.  - Intraventricular TPA given by NeuroSx  - Continue Keppra, 500 mg IV twice daily for seizure prophylaxis  - Strict blood pressure control - goal SBP <160 mmHg  - Continue hourly neuro checks  - Repeat CT in the morning        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)- (present on admission)   Assessment & Plan    - reversed on hospital day 1        Paroxysmal atrial fibrillation (HCC)- (present on admission)   Assessment & Plan    - Previously on apixaban, 5 mg twice daily - now strictly contraindicated  - Continue digoxin and metoprolol        Hypomagnesemia- (present on admission)   Assessment & Plan    - Electrolyte replacement protocol to maintain >2        Hypokalemia   Assessment & Plan    - Electrolyte replacement protocol to maintain >4             VTE:  Contraindicated  Ulcer: Not Indicated  Lines: Mack Catheter  Ongoing indication addressed    I have performed a physical exam and reviewed and updated ROS and Plan today (9/25/2018). In review of yesterday's note (9/24/2018), there  are no changes except as documented above.     Remains at high likelihood for complete neurologic collapse requiring aggressive resuscitation, EVD is remain in place and are undergoing TPA installation. This patient is critically ill, at high risk for decompensation leading to worsening vital organ dysfunction and death without critical care interventions.    Discussed patient condition and risk of morbidity and/or mortality with Hospitalist, Family, RN, RT, Pharmacy, , , Charge nurse / hot rounds and neurosurgery.      The patient remains critically ill.  Critical care time = 34 minutes in directly providing and coordinating critical care and extensive data review.  No time overlap and excludes procedures

## 2018-09-25 NOTE — PROGRESS NOTES
Cortrak Placement    Tube Team verified patient name and medical record number prior to tube placement.  Cortrak tube (43 inches, 10 Serbian) placed at 85 cm in right nare.  Per Cortrak picture, tube appears to be in the stomach.  Nursing Instructions: Awaiting KUB to confirm placement before use for medications or feeding. Once placement confirmed, flush tube with 30 ml of water, and then remove and save stylet, in patient medication drawer.

## 2018-09-25 NOTE — ASSESSMENT & PLAN NOTE
Reversed on hospital day 1  Continue subcutaneous heparin for now but hold on full dose anticoagulation

## 2018-09-25 NOTE — PROGRESS NOTES
2 RN skin assessment   - bilateral surgical sites on head  - Bruising to face  No other areas of concern, mepilex in place, heels floated, turns q 2 hours, checking under medical devices

## 2018-09-26 ENCOUNTER — APPOINTMENT (OUTPATIENT)
Dept: RADIOLOGY | Facility: MEDICAL CENTER | Age: 78
DRG: 023 | End: 2018-09-26
Attending: NEUROLOGICAL SURGERY
Payer: MEDICARE

## 2018-09-26 LAB
ANION GAP SERPL CALC-SCNC: 10 MMOL/L (ref 0–11.9)
BASOPHILS # BLD AUTO: 0.2 % (ref 0–1.8)
BASOPHILS # BLD: 0.02 K/UL (ref 0–0.12)
BUN SERPL-MCNC: 22 MG/DL (ref 8–22)
CALCIUM SERPL-MCNC: 8.9 MG/DL (ref 8.5–10.5)
CHLORIDE SERPL-SCNC: 104 MMOL/L (ref 96–112)
CO2 SERPL-SCNC: 25 MMOL/L (ref 20–33)
CREAT SERPL-MCNC: 0.51 MG/DL (ref 0.5–1.4)
EOSINOPHIL # BLD AUTO: 0.02 K/UL (ref 0–0.51)
EOSINOPHIL NFR BLD: 0.2 % (ref 0–6.9)
ERYTHROCYTE [DISTWIDTH] IN BLOOD BY AUTOMATED COUNT: 43 FL (ref 35.9–50)
GLUCOSE SERPL-MCNC: 162 MG/DL (ref 65–99)
HCT VFR BLD AUTO: 32 % (ref 37–47)
HGB BLD-MCNC: 10.7 G/DL (ref 12–16)
IMM GRANULOCYTES # BLD AUTO: 0.12 K/UL (ref 0–0.11)
IMM GRANULOCYTES NFR BLD AUTO: 1.1 % (ref 0–0.9)
INR PPP: 1.35 (ref 0.87–1.13)
LYMPHOCYTES # BLD AUTO: 1.11 K/UL (ref 1–4.8)
LYMPHOCYTES NFR BLD: 9.9 % (ref 22–41)
MAGNESIUM SERPL-MCNC: 1.8 MG/DL (ref 1.5–2.5)
MCH RBC QN AUTO: 29.2 PG (ref 27–33)
MCHC RBC AUTO-ENTMCNC: 33.4 G/DL (ref 33.6–35)
MCV RBC AUTO: 87.4 FL (ref 81.4–97.8)
MONOCYTES # BLD AUTO: 0.89 K/UL (ref 0–0.85)
MONOCYTES NFR BLD AUTO: 7.9 % (ref 0–13.4)
NEUTROPHILS # BLD AUTO: 9.06 K/UL (ref 2–7.15)
NEUTROPHILS NFR BLD: 80.7 % (ref 44–72)
NRBC # BLD AUTO: 0 K/UL
NRBC BLD-RTO: 0 /100 WBC
PLATELET # BLD AUTO: 203 K/UL (ref 164–446)
PMV BLD AUTO: 10.4 FL (ref 9–12.9)
POTASSIUM SERPL-SCNC: 3.6 MMOL/L (ref 3.6–5.5)
PROTHROMBIN TIME: 16.8 SEC (ref 12–14.6)
RBC # BLD AUTO: 3.66 M/UL (ref 4.2–5.4)
SODIUM SERPL-SCNC: 139 MMOL/L (ref 135–145)
WBC # BLD AUTO: 11.2 K/UL (ref 4.8–10.8)

## 2018-09-26 PROCEDURE — 700102 HCHG RX REV CODE 250 W/ 637 OVERRIDE(OP): Performed by: HOSPITALIST

## 2018-09-26 PROCEDURE — 80048 BASIC METABOLIC PNL TOTAL CA: CPT

## 2018-09-26 PROCEDURE — 700102 HCHG RX REV CODE 250 W/ 637 OVERRIDE(OP): Performed by: INTERNAL MEDICINE

## 2018-09-26 PROCEDURE — 85610 PROTHROMBIN TIME: CPT

## 2018-09-26 PROCEDURE — 99233 SBSQ HOSP IP/OBS HIGH 50: CPT | Performed by: HOSPITALIST

## 2018-09-26 PROCEDURE — A9270 NON-COVERED ITEM OR SERVICE: HCPCS | Performed by: HOSPITALIST

## 2018-09-26 PROCEDURE — 700105 HCHG RX REV CODE 258: Performed by: NEUROLOGICAL SURGERY

## 2018-09-26 PROCEDURE — 3E0S3GC INTRODUCTION OF OTHER THERAPEUTIC SUBSTANCE INTO EPIDURAL SPACE, PERCUTANEOUS APPROACH: ICD-10-PCS | Performed by: NEUROLOGICAL SURGERY

## 2018-09-26 PROCEDURE — 700111 HCHG RX REV CODE 636 W/ 250 OVERRIDE (IP): Performed by: NEUROLOGICAL SURGERY

## 2018-09-26 PROCEDURE — 770022 HCHG ROOM/CARE - ICU (200)

## 2018-09-26 PROCEDURE — A9270 NON-COVERED ITEM OR SERVICE: HCPCS | Performed by: PHYSICIAN ASSISTANT

## 2018-09-26 PROCEDURE — 700111 HCHG RX REV CODE 636 W/ 250 OVERRIDE (IP): Performed by: INTERNAL MEDICINE

## 2018-09-26 PROCEDURE — 85025 COMPLETE CBC W/AUTO DIFF WBC: CPT

## 2018-09-26 PROCEDURE — 700102 HCHG RX REV CODE 250 W/ 637 OVERRIDE(OP): Performed by: PHYSICIAN ASSISTANT

## 2018-09-26 PROCEDURE — 99291 CRITICAL CARE FIRST HOUR: CPT | Performed by: INTERNAL MEDICINE

## 2018-09-26 PROCEDURE — A9270 NON-COVERED ITEM OR SERVICE: HCPCS | Performed by: INTERNAL MEDICINE

## 2018-09-26 PROCEDURE — 70450 CT HEAD/BRAIN W/O DYE: CPT

## 2018-09-26 PROCEDURE — 83735 ASSAY OF MAGNESIUM: CPT

## 2018-09-26 RX ORDER — MAGNESIUM SULFATE HEPTAHYDRATE 40 MG/ML
2 INJECTION, SOLUTION INTRAVENOUS ONCE
Status: COMPLETED | OUTPATIENT
Start: 2018-09-26 | End: 2018-09-26

## 2018-09-26 RX ORDER — LEVETIRACETAM 500 MG/1
500 TABLET ORAL 2 TIMES DAILY
Status: COMPLETED | OUTPATIENT
Start: 2018-09-26 | End: 2018-09-28

## 2018-09-26 RX ADMIN — LABETALOL HYDROCHLORIDE 5 MG: 5 INJECTION, SOLUTION INTRAVENOUS at 03:03

## 2018-09-26 RX ADMIN — MAGNESIUM SULFATE HEPTAHYDRATE 2 G: 40 INJECTION, SOLUTION INTRAVENOUS at 08:13

## 2018-09-26 RX ADMIN — POTASSIUM BICARBONATE 50 MEQ: 25 TABLET, EFFERVESCENT ORAL at 08:13

## 2018-09-26 RX ADMIN — ALTEPLASE 1 MG: 2.2 INJECTION, POWDER, LYOPHILIZED, FOR SOLUTION INTRAVENOUS at 07:00

## 2018-09-26 RX ADMIN — ACETAMINOPHEN 650 MG: 325 TABLET, FILM COATED ORAL at 04:45

## 2018-09-26 RX ADMIN — CEFAZOLIN SODIUM 1 G: 1 INJECTION, SOLUTION INTRAVENOUS at 21:34

## 2018-09-26 RX ADMIN — ALTEPLASE 1 MG: 2.2 INJECTION, POWDER, LYOPHILIZED, FOR SOLUTION INTRAVENOUS at 08:14

## 2018-09-26 RX ADMIN — LEVETIRACETAM 500 MG: 500 TABLET, FILM COATED ORAL at 17:26

## 2018-09-26 RX ADMIN — ACETAMINOPHEN 650 MG: 325 TABLET, FILM COATED ORAL at 17:26

## 2018-09-26 RX ADMIN — DIGOXIN 125 MCG: 125 TABLET ORAL at 17:26

## 2018-09-26 RX ADMIN — METOPROLOL TARTRATE 50 MG: 50 TABLET ORAL at 05:38

## 2018-09-26 RX ADMIN — SENNOSIDES AND DOCUSATE SODIUM 2 TABLET: 8.6; 5 TABLET ORAL at 18:00

## 2018-09-26 RX ADMIN — SENNOSIDES AND DOCUSATE SODIUM 2 TABLET: 8.6; 5 TABLET ORAL at 06:00

## 2018-09-26 RX ADMIN — CEFAZOLIN SODIUM 1 G: 1 INJECTION, SOLUTION INTRAVENOUS at 14:00

## 2018-09-26 RX ADMIN — POLYETHYLENE GLYCOL 3350 1 PACKET: 17 POWDER, FOR SOLUTION ORAL at 17:25

## 2018-09-26 RX ADMIN — METOPROLOL TARTRATE 50 MG: 50 TABLET ORAL at 21:34

## 2018-09-26 RX ADMIN — CEFAZOLIN SODIUM 1 G: 1 INJECTION, SOLUTION INTRAVENOUS at 05:38

## 2018-09-26 RX ADMIN — METOPROLOL TARTRATE 50 MG: 50 TABLET ORAL at 14:00

## 2018-09-26 RX ADMIN — SODIUM CHLORIDE 500 MG: 9 INJECTION, SOLUTION INTRAVENOUS at 05:38

## 2018-09-26 NOTE — PROGRESS NOTES
2 RN skin assessment. Bruise noted on left buttox, bruising on face. Blanching redness noted on posterior side of head- head shaved and waffle cushion applied. Bruise on left upper arm and middle of posterior back.

## 2018-09-26 NOTE — PROGRESS NOTES
Neurosurgery Progress Note    Subjective:  Pt with IVH / SAH / SDH  Bilateral EVD day #4, at 0 tragus,  EVDs with improved function with tPA  Now drowsy similar to yesterday.     Exam:  Drowsy. Will arouse. Will open eyes.  PERRL  Will follow simple commands.    Pulse  Av.8  Min: 68  Max: 125  Resp  Av.1  Min: 17  Max: 52  Temp  Av.3 °C (99.2 °F)  Min: 36.7 °C (98.1 °F)  Max: 38.2 °C (100.7 °F)  SpO2  Av.3 %  Min: 97 %  Max: 100 %    ICP  Av.9 MM HG  Min: -2 MM HG  Max: 14 MM HG    Recent Labs      18   0518   0543   WBC  10.5  11.4*  11.2*   RBC  3.73*  3.71*  3.66*   HEMOGLOBIN  10.7*  11.0*  10.7*   HEMATOCRIT  33.7*  33.6*  32.0*   MCV  90.3  90.6  87.4   MCH  28.7  29.6  29.2   MCHC  31.8*  32.7*  33.4*   RDW  44.2  44.4  43.0   PLATELETCT  149*  173  203   MPV  10.4  10.7  10.4     Recent Labs      18   0530   18   0004  18   0543   SODIUM  143   < >  143  141  139   POTASSIUM  4.1   --    --   3.9  3.6   CHLORIDE  109   --    --   107  104   CO2  26   --    --   25  25   GLUCOSE  99   --    --   123*  162*   BUN  16   --    --   18  22   CREATININE  0.59   --    --   0.67  0.51   CALCIUM  9.6   --    --   8.8  8.9    < > = values in this interval not displayed.     Recent Labs      18   1940  18   0543   APTT  27.9   --    --    INR  1.25*  1.29*  1.35*           Intake/Output       18 0700 - 18 0659 18 0700 - 18 659 Total  Total       Intake    P.O.  0  0 0  --  -- --    P.O. 0 0 0 -- -- --    I.V.  --  1076 1076  --  -- --    IV Volume (NS) -- 80 80 -- -- --    Volume (mL) (lactated ringers infusion) -- 996 996 -- -- --    Other  --  0 0  --  -- --    Medications (PO/Enteral Liquids) -- 0 0 -- -- --    NG/GT  75  978 950  --  -- --    Intake (mL) (Enteral Tube 18 Cortrak - Gastric 10 Fr. Right nare) 54 307 623 --  -- --    IV Piggyback  --  150 150  --  -- --    Volume (mL) (ceFAZolin in dextrose (ANCEF) IVPB premix 1 g) -- 150 150 -- -- --    Total Intake 75 1796 1871 -- -- --       Output    Urine  800  1400 2200  --  -- --    Output (mL) (Urethral Catheter Latex 16 Fr.) 800 1400 2200 -- -- --    Drains  197  174 371  --  -- --    Output (mL) (ICP/Ventriculostomy Right) 93 16 109 -- -- --    Output (mL) (ICP/Ventriculostomy Left) 104 158 262 -- -- --    Stool  --  -- --  --  -- --    Number of Times Stooled -- 0 x 0 x -- -- --    Total Output 997 1574 2571 -- -- --       Net I/O     -922 222 -700 -- -- --            Intake/Output Summary (Last 24 hours) at 09/26/18 0833  Last data filed at 09/26/18 0608   Gross per 24 hour   Intake             1871 ml   Output             2430 ml   Net             -559 ml            • magnesium sulfate  2 g Once   • metoprolol  50 mg Q8HRS   • Pharmacy   PRN   • labetalol  5-10 mg Q4HRS PRN   • LR   Continuous   • acetaminophen  650 mg Q4HRS PRN    Or   • acetaminophen  650 mg Q4HRS PRN   • digoxin  125 mcg DAILY AT 1800   • ondansetron  4 mg Q4HRS PRN    Or   • ondansetron  4 mg Q4HRS PRN   • Pharmacy Consult Request   PRN    And   • oxyCODONE immediate-release  2.5 mg Q3HRS PRN    And   • oxyCODONE immediate-release  5 mg Q3HRS PRN    And   • HYDROmorphone  0.25-1 mg Q2HRS PRN   • polyethylene glycol/lytes  1 Packet QDAY PRN    And   • senna-docusate  2 Tab BID    And   • magnesium hydroxide  30 mL QDAY PRN    And   • bisacodyl  10 mg QDAY PRN   • DILTIAZem infusion  0-15 mg/hr Continuous   • NORepinephrine (LEVOPHED) infusion  0-30 mcg/min Continuous   • Respiratory Care per Protocol   Continuous RT   • MD Alert...Adult ICU Electrolyte Replacement per Pharmacy   pharmacy to dose   • LORazepam  2 mg Q5 MIN PRN   • hydrALAZINE  10 mg Q4HRS PRN   • enalaprilat  1.25 mg Q6HRS PRN   • ceFAZolin  1 g Q8HRS   • levETIRAcetam (KEPPRA) IV  500 mg Q12HRS       Assessment and Plan:  Hospital day  #5  EVD #4  Under sterile technique, 1mg IV-tPA administered to each EVD followed by 2cc of sterile saline flush. Drain clamped for 10 min then reopened.  CT tomorrow morning.  Dr. Santizo has reviewed this am CT. Still concern with area of stroke with surrounding edema. Would do craniectomy as a life saving measure.   This morning was the final dose of tPA

## 2018-09-26 NOTE — CARE PLAN
Problem: Venous Thromboembolism (VTW)/Deep Vein Thrombosis (DVT) Prevention:  Goal: Patient will participate in Venous Thrombosis (VTE)/Deep Vein Thrombosis (DVT)Prevention Measures    Intervention: Ensure patient wears graduated elastic stockings (TANNER hose) and/or SCDs, if ordered, when in bed or chair (Remove at least once per shift for skin check)  SCD's remain on patient, in the on position while in bed.      Problem: Skin Integrity  Goal: Risk for impaired skin integrity will decrease    Intervention: Implement precautions to protect skin integrity in collaboration with the interdisciplinary team  Turn pt q2hr, assess skin integrity/ signs of new skin breakdown each turn, etc.

## 2018-09-26 NOTE — PROGRESS NOTES
[facility] Hospitalist Progress Note    Date of Service: 2018    Chief Complaint  78 y.o. female admitted 2018 with HA to [facility].  Had had a GLF with neg CT one week prior.  CT on this admit showing SDH and SAH  B EVDs placed Dr Santizo     PMHx PAFIb, AC on apixaban    Interval Problem Update    tPA to EVD  CT showing increased  Moving x 4  AFib   1 LNC  Pulled cortrak  UOP adequate    Consultants/Specialty  Neurosurgery  Pulmonology    Disposition  Cont in ICU for close monitoring of Neuro status    Dw family at bedside x 20mins.          Review of Systems   Unable to perform ROS: Mental status change      Physical Exam  Laboratory/Imaging   Hemodynamics  Temp (24hrs), Av.3 °C (99.2 °F), Min:36.9 °C (98.4 °F), Max:37.8 °C (100 °F)   Temperature: 37.6 °C (99.7 °F)  Pulse  Av.1  Min: 60  Max: 131 Heart Rate (Monitored): 76  NIBP: 158/85      Respiratory      Respiration: (!) 23, Pulse Oximetry: 100 %        RUL Breath Sounds: Clear, RML Breath Sounds: Clear, RLL Breath Sounds: Diminished, JUAN Breath Sounds: Clear, LLL Breath Sounds: Diminished    Fluids    Intake/Output Summary (Last 24 hours) at 18 0453  Last data filed at 18 0200   Gross per 24 hour   Intake           1692.5 ml   Output             1951 ml   Net           -258.5 ml       Nutrition  Orders Placed This Encounter   Procedures   • Diet NPO     Standing Status:   Standing     Number of Occurrences:   1     Order Specific Question:   Restrict to:     Answer:   Strict [1]     Physical Exam   Constitutional: She appears well-developed and well-nourished. No distress.   HENT:   Head: Normocephalic and atraumatic.   Post op dressing   Neck: No JVD present.   Cardiovascular: Normal rate and regular rhythm.    Pulmonary/Chest: Effort normal. No stridor. No respiratory distress. She has no wheezes. She has no rales.   Abdominal: Soft. There is no tenderness. There is no rebound and no guarding.   Musculoskeletal: She exhibits edema.    Neurological: She is alert.   Follows x 4 extremities   Skin: Skin is warm and dry. No rash noted. She is not diaphoretic.   Nursing note and vitals reviewed.      Recent Labs      09/23/18   0542  09/24/18 0530 09/25/18 0442   WBC  12.4*  10.5  11.4*   RBC  3.52*  3.73*  3.71*   HEMOGLOBIN  10.2*  10.7*  11.0*   HEMATOCRIT  31.2*  33.7*  33.6*   MCV  88.6  90.3  90.6   MCH  29.0  28.7  29.6   MCHC  32.7*  31.8*  32.7*   RDW  43.4  44.2  44.4   PLATELETCT  145*  149*  173   MPV  10.6  10.4  10.7     Recent Labs      09/23/18   0542  09/24/18 0530   09/24/18   1740  09/25/18   0004  09/25/18 0442   SODIUM  139  143   < >  145  143  141   POTASSIUM  3.6  4.1   --    --    --   3.9   CHLORIDE  107  109   --    --    --   107   CO2  24  26   --    --    --   25   GLUCOSE  113*  99   --    --    --   123*   BUN  18  16   --    --    --   18   CREATININE  0.57  0.59   --    --    --   0.67   CALCIUM  8.5  9.6   --    --    --   8.8    < > = values in this interval not displayed.     Recent Labs      09/24/18 0530  09/24/18 1940  09/25/18 0442   APTT   --   27.9   --    INR  1.16*  1.25*  1.29*                  Assessment/Plan     Acute intracranial hemorrhage (HCC)- (present on admission)   Assessment & Plan    Anticoagulation reveresed  S/P bilateral ventriculostomy drains: drains working s/p tPA yesterday  Repeat CT stable  Monitor ICP continuously  Neurosurgery following  As expected the patient has become more lethargic, continue ICU care, expected to have a long hospital cource        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)- (present on admission)   Assessment & Plan    Anticoagulation reveresed        Paroxysmal atrial fibrillation (HCC)- (present on admission)   Assessment & Plan    On metoprolol 50mg Q8, Dig 125mcg QD  Prn Dilt for breakthrough rate control  No anticoagulation due to ICH        Hypomagnesemia- (present on admission)   Assessment & Plan    Replace         Hypokalemia   Assessment & Plan    Replace          Quality-Core Measures   Reviewed items::  EKG reviewed, Labs reviewed, Medications reviewed and Radiology images reviewed  DVT prophylaxis pharmacological::  Contraindicated - High bleeding risk  DVT prophylaxis - mechanical:  SCDs  Ulcer Prophylaxis::  Not indicated

## 2018-09-26 NOTE — CARE PLAN
Problem: Nutritional:  Goal: Nutrition support tolerated and meeting greater than 85% of estimated needs  Outcome: MET Date Met: 09/26/18  Replete with Fiber @ 60 mL/hr

## 2018-09-26 NOTE — PROGRESS NOTES
2 RN skin check complete. mepilex placed. Moderate bruising to R lateral flank.  Blanching redness to posto sacrum.erior head.

## 2018-09-27 ENCOUNTER — APPOINTMENT (OUTPATIENT)
Dept: RADIOLOGY | Facility: MEDICAL CENTER | Age: 78
DRG: 023 | End: 2018-09-27
Attending: INTERNAL MEDICINE
Payer: MEDICARE

## 2018-09-27 ENCOUNTER — APPOINTMENT (OUTPATIENT)
Dept: RADIOLOGY | Facility: MEDICAL CENTER | Age: 78
DRG: 023 | End: 2018-09-27
Attending: NEUROLOGICAL SURGERY
Payer: MEDICARE

## 2018-09-27 PROBLEM — G93.40 ENCEPHALOPATHY ACUTE: Status: ACTIVE | Noted: 2018-09-27

## 2018-09-27 LAB
ANION GAP SERPL CALC-SCNC: 9 MMOL/L (ref 0–11.9)
BASOPHILS # BLD AUTO: 0.3 % (ref 0–1.8)
BASOPHILS # BLD: 0.03 K/UL (ref 0–0.12)
BUN SERPL-MCNC: 25 MG/DL (ref 8–22)
CALCIUM SERPL-MCNC: 9.9 MG/DL (ref 8.5–10.5)
CHLORIDE SERPL-SCNC: 102 MMOL/L (ref 96–112)
CO2 SERPL-SCNC: 29 MMOL/L (ref 20–33)
CREAT SERPL-MCNC: 0.65 MG/DL (ref 0.5–1.4)
EOSINOPHIL # BLD AUTO: 0.11 K/UL (ref 0–0.51)
EOSINOPHIL NFR BLD: 1 % (ref 0–6.9)
ERYTHROCYTE [DISTWIDTH] IN BLOOD BY AUTOMATED COUNT: 43.4 FL (ref 35.9–50)
GLUCOSE SERPL-MCNC: 126 MG/DL (ref 65–99)
HCT VFR BLD AUTO: 38.4 % (ref 37–47)
HGB BLD-MCNC: 13.1 G/DL (ref 12–16)
IMM GRANULOCYTES # BLD AUTO: 0.09 K/UL (ref 0–0.11)
IMM GRANULOCYTES NFR BLD AUTO: 0.8 % (ref 0–0.9)
INR PPP: 1.25 (ref 0.87–1.13)
LYMPHOCYTES # BLD AUTO: 0.98 K/UL (ref 1–4.8)
LYMPHOCYTES NFR BLD: 8.8 % (ref 22–41)
MAGNESIUM SERPL-MCNC: 2.3 MG/DL (ref 1.5–2.5)
MCH RBC QN AUTO: 29.2 PG (ref 27–33)
MCHC RBC AUTO-ENTMCNC: 33.7 G/DL (ref 33.6–35)
MCV RBC AUTO: 86.7 FL (ref 81.4–97.8)
MONOCYTES # BLD AUTO: 0.84 K/UL (ref 0–0.85)
MONOCYTES NFR BLD AUTO: 7.6 % (ref 0–13.4)
NEUTROPHILS # BLD AUTO: 9.04 K/UL (ref 2–7.15)
NEUTROPHILS NFR BLD: 81.5 % (ref 44–72)
NRBC # BLD AUTO: 0 K/UL
NRBC BLD-RTO: 0 /100 WBC
PLATELET # BLD AUTO: 220 K/UL (ref 164–446)
PMV BLD AUTO: 10.6 FL (ref 9–12.9)
POTASSIUM SERPL-SCNC: 3.5 MMOL/L (ref 3.6–5.5)
PROTHROMBIN TIME: 15.8 SEC (ref 12–14.6)
RBC # BLD AUTO: 4.45 M/UL (ref 4.2–5.4)
SODIUM SERPL-SCNC: 140 MMOL/L (ref 135–145)
WBC # BLD AUTO: 11.1 K/UL (ref 4.8–10.8)

## 2018-09-27 PROCEDURE — A9270 NON-COVERED ITEM OR SERVICE: HCPCS | Performed by: PHYSICIAN ASSISTANT

## 2018-09-27 PROCEDURE — 3E0S3GC INTRODUCTION OF OTHER THERAPEUTIC SUBSTANCE INTO EPIDURAL SPACE, PERCUTANEOUS APPROACH: ICD-10-PCS | Performed by: NEUROLOGICAL SURGERY

## 2018-09-27 PROCEDURE — 85025 COMPLETE CBC W/AUTO DIFF WBC: CPT

## 2018-09-27 PROCEDURE — 700111 HCHG RX REV CODE 636 W/ 250 OVERRIDE (IP): Performed by: NEUROLOGICAL SURGERY

## 2018-09-27 PROCEDURE — 93971 EXTREMITY STUDY: CPT | Mod: LT

## 2018-09-27 PROCEDURE — 92526 ORAL FUNCTION THERAPY: CPT

## 2018-09-27 PROCEDURE — 83735 ASSAY OF MAGNESIUM: CPT

## 2018-09-27 PROCEDURE — 99233 SBSQ HOSP IP/OBS HIGH 50: CPT | Performed by: HOSPITALIST

## 2018-09-27 PROCEDURE — 99291 CRITICAL CARE FIRST HOUR: CPT | Performed by: INTERNAL MEDICINE

## 2018-09-27 PROCEDURE — 700102 HCHG RX REV CODE 250 W/ 637 OVERRIDE(OP): Performed by: INTERNAL MEDICINE

## 2018-09-27 PROCEDURE — 700102 HCHG RX REV CODE 250 W/ 637 OVERRIDE(OP): Performed by: PHYSICIAN ASSISTANT

## 2018-09-27 PROCEDURE — 700102 HCHG RX REV CODE 250 W/ 637 OVERRIDE(OP): Performed by: HOSPITALIST

## 2018-09-27 PROCEDURE — A9270 NON-COVERED ITEM OR SERVICE: HCPCS | Performed by: INTERNAL MEDICINE

## 2018-09-27 PROCEDURE — 93971 EXTREMITY STUDY: CPT | Mod: 26 | Performed by: SURGERY

## 2018-09-27 PROCEDURE — A9270 NON-COVERED ITEM OR SERVICE: HCPCS | Performed by: HOSPITALIST

## 2018-09-27 PROCEDURE — 70450 CT HEAD/BRAIN W/O DYE: CPT

## 2018-09-27 PROCEDURE — 80048 BASIC METABOLIC PNL TOTAL CA: CPT

## 2018-09-27 PROCEDURE — 770022 HCHG ROOM/CARE - ICU (200)

## 2018-09-27 PROCEDURE — 85610 PROTHROMBIN TIME: CPT

## 2018-09-27 RX ORDER — AMLODIPINE BESYLATE 5 MG/1
5 TABLET ORAL
Status: DISCONTINUED | OUTPATIENT
Start: 2018-09-27 | End: 2018-09-27

## 2018-09-27 RX ORDER — AMLODIPINE BESYLATE 5 MG/1
5 TABLET ORAL
Status: DISCONTINUED | OUTPATIENT
Start: 2018-09-28 | End: 2018-10-09

## 2018-09-27 RX ADMIN — ACETAMINOPHEN 650 MG: 325 TABLET, FILM COATED ORAL at 10:05

## 2018-09-27 RX ADMIN — CEFAZOLIN SODIUM 1 G: 1 INJECTION, SOLUTION INTRAVENOUS at 14:06

## 2018-09-27 RX ADMIN — CEFAZOLIN SODIUM 1 G: 1 INJECTION, SOLUTION INTRAVENOUS at 22:00

## 2018-09-27 RX ADMIN — CEFAZOLIN SODIUM 1 G: 1 INJECTION, SOLUTION INTRAVENOUS at 05:31

## 2018-09-27 RX ADMIN — SENNOSIDES AND DOCUSATE SODIUM 2 TABLET: 8.6; 5 TABLET ORAL at 06:00

## 2018-09-27 RX ADMIN — METOPROLOL TARTRATE 50 MG: 50 TABLET ORAL at 05:29

## 2018-09-27 RX ADMIN — AMLODIPINE BESYLATE 5 MG: 5 TABLET ORAL at 08:50

## 2018-09-27 RX ADMIN — DIGOXIN 125 MCG: 125 TABLET ORAL at 17:04

## 2018-09-27 RX ADMIN — METOPROLOL TARTRATE 50 MG: 50 TABLET ORAL at 14:06

## 2018-09-27 RX ADMIN — LEVETIRACETAM 500 MG: 500 TABLET, FILM COATED ORAL at 05:30

## 2018-09-27 RX ADMIN — LEVETIRACETAM 500 MG: 500 TABLET, FILM COATED ORAL at 17:04

## 2018-09-27 RX ADMIN — ACETAMINOPHEN 650 MG: 325 TABLET, FILM COATED ORAL at 17:11

## 2018-09-27 RX ADMIN — POTASSIUM BICARBONATE 50 MEQ: 25 TABLET, EFFERVESCENT ORAL at 08:25

## 2018-09-27 ASSESSMENT — PAIN SCALES - GENERAL
PAINLEVEL_OUTOF10: 0
PAINLEVEL_OUTOF10: 0

## 2018-09-27 NOTE — THERAPY
"Speech Language Therapy dysphagia treatment completed.   Functional Status:  The patient was seen for dysphagia therapy on this date with daughter at bedside.  She was pleasantly confused and agreeable. Presentation of PO included ice chips, nectars, and purees.  The patient demonstrated delayed oral phase with purees.  She had delayed initiation of swallow trigger from 2-3 seconds and laryngeal elevation was palpated as weak but complete.  The patient required min cues to clear oral bolus prior to talking with nectars.  She had no overt s/sx of aspiration but was only agreeable to minimal PO trials stating, \"It's hard to swallow when you are this dry.\"  RN reported the patient waxes and wanes throughout the day.     Recommendations: Continue NPO with tube feeding.  OK for single ice chips x5-6 an hour with RN only.  SLP following.   Plan of Care: Will benefit from Speech Therapy 5 times per week  Post-Acute Therapy: Discharge to a transitional care facility for continued skilled therapy services.    See \"Rehab Therapy-Acute\" Patient Summary Report for complete documentation.     "

## 2018-09-27 NOTE — DISCHARGE PLANNING
Current chart documentation indicates potential for inpatient rehab. Please consider a PMR referral to assist with discharge planning.

## 2018-09-27 NOTE — CARE PLAN
Problem: Safety  Goal: Will remain free from falls  Patient assessed on risk for falls. Fall prevention tools in place- bed in lowest position, non-skid slippers on feet prior to getting up, pt educated to call for assistance.    Problem: Infection  Goal: Will remain free from infection    Intervention: Implement standard precautions and perform hand washing before and after patient contact  Hand Hygiene performed prior to each patient encounter. Gloves worn when coming in contact with bodily fluids.

## 2018-09-27 NOTE — PROGRESS NOTES
Neurosurgery Progress Note    Subjective:  Pt with IVH / SAH / SDH  Bilateral EVD day #5, at 0 tragus,  EVDs with improved function with tPA  Today, more awake and alert.    Exam:  Awake. Eyes open. Conversant.   PERRL  Will follow simple commands.    Pulse  Av.2  Min: 61  Max: 107  Resp  Av.1  Min: 16  Max: 46  Temp  Av.8 °C (98.3 °F)  Min: 35.9 °C (96.7 °F)  Max: 37.7 °C (99.8 °F)  SpO2  Av %  Min: 97 %  Max: 100 %    ICP  Av.3 MM HG  Min: 0 MM HG  Max: 7 MM HG    Recent Labs      18   0543  18   034   WBC  11.4*  11.2*  11.1*   RBC  3.71*  3.66*  4.45   HEMOGLOBIN  11.0*  10.7*  13.1   HEMATOCRIT  33.6*  32.0*  38.4   MCV  90.6  87.4  86.7   MCH  29.6  29.2  29.2   MCHC  32.7*  33.4*  33.7   RDW  44.4  43.0  43.4   PLATELETCT  173  203  220   MPV  10.7  10.4  10.6     Recent Labs      18   0543  18   0345   SODIUM  141  139  140   POTASSIUM  3.9  3.6  3.5*   CHLORIDE  107  104  102   CO2  25  25  29   GLUCOSE  123*  162*  126*   BUN  18  22  25*   CREATININE  0.67  0.51  0.65   CALCIUM  8.8  8.9  9.9     Recent Labs      18   19418   0543  18   0345   APTT  27.9   --    --    --    INR  1.25*  1.29*  1.35*  1.25*           Intake/Output       18 - 18 - 1859       Total  Total       Intake    P.O.  0  0 0  --  -- --    P.O. 0 0 0 -- -- --    I.V.  --  1096 1096  --  -- --    IV Volume (NS) -- 100 100 -- -- --    Volume (mL) (lactated ringers infusion) -- 996 996 -- -- --    Other  --  90 90  --  -- --    Medications (PO/Enteral Liquids) -- 90 90 -- -- --    NG/GT  420  720 1140  --  -- --    Intake (mL) (Enteral Tube 18 Cortrak - Gastric 10 Fr. Right nare)  -- -- --    Total Intake 420 1902 2326 -- -- --       Output    Urine  1820  1450 3270  --  -- --    Output (mL) (Urethral Catheter Latex 16  Fr.) 5608 1266 3440 -- -- --    Drains  155  124 279  --  -- --    Output (mL) (ICP/Ventriculostomy Right) 42 0 42 -- -- --    Output (mL) (ICP/Ventriculostomy Left) 113 124 237 -- -- --    Stool  --  -- --  --  -- --    Number of Times Stooled -- 1 x 1 x -- -- --    Total Output 1975 1574 3549 -- -- --       Net I/O     -1555 332 -1223 -- -- --            Intake/Output Summary (Last 24 hours) at 09/27/18 0812  Last data filed at 09/27/18 0600   Gross per 24 hour   Intake             2326 ml   Output             3154 ml   Net             -828 ml            • potassium bicarbonate  50 mEq Once   • levETIRAcetam  500 mg BID   • metoprolol  50 mg Q8HRS   • Pharmacy   PRN   • labetalol  5-10 mg Q4HRS PRN   • LR   Continuous   • acetaminophen  650 mg Q4HRS PRN    Or   • acetaminophen  650 mg Q4HRS PRN   • digoxin  125 mcg DAILY AT 1800   • ondansetron  4 mg Q4HRS PRN    Or   • ondansetron  4 mg Q4HRS PRN   • Pharmacy Consult Request   PRN    And   • oxyCODONE immediate-release  2.5 mg Q3HRS PRN    And   • oxyCODONE immediate-release  5 mg Q3HRS PRN    And   • HYDROmorphone  0.25-1 mg Q2HRS PRN   • polyethylene glycol/lytes  1 Packet QDAY PRN    And   • senna-docusate  2 Tab BID    And   • magnesium hydroxide  30 mL QDAY PRN    And   • bisacodyl  10 mg QDAY PRN   • NORepinephrine (LEVOPHED) infusion  0-30 mcg/min Continuous   • Respiratory Care per Protocol   Continuous RT   • MD Alert...Adult ICU Electrolyte Replacement per Pharmacy   pharmacy to dose   • LORazepam  2 mg Q5 MIN PRN   • hydrALAZINE  10 mg Q4HRS PRN   • enalaprilat  1.25 mg Q6HRS PRN   • ceFAZolin  1 g Q8HRS       Assessment and Plan:  Hospital day #6  EVD #5  Continue EVDs  Dr. Santizo has reviewed this am CT. Still concern with area of stroke with surrounding edema. Would do craniectomy as a life saving measure.   This morning was the final dose of tPA

## 2018-09-27 NOTE — PROGRESS NOTES
Call placed to Anderson ESCALANTE to update him on neuro exam. 1340- call back received. Updated by phone with changes with difficulty to arouse, required sternal rub stimulation. GCS from 13 to 11. Increase in urine, pale. VS and ICP stable. No new orders received at this time.

## 2018-09-27 NOTE — PROGRESS NOTES
Critical Care Progress Note    Date of admission  9/22/2018    Chief Complaint  78 y.o. female admitted 9/22/2018 with intracranial hemorrhage.    Hospital Course  This lady was admitted with an acute intracranial hemorrhage.  She is undergone bilateral EVDs for hydrocephalus.    Interval Problem Update  Reviewed last 24 hour events:   - CT improved, TPA completed   - Neuro: q1 checks, 3/5 in all extremities   - HR: Sinus/PAF   - BP: normotensive   - GI: TF at goal   - UOP: adequate   - Mack: yes   - Tm: 100.7   - Lines: PIV   - PPx: GI not indicated, DVT SCDs   - NC   - MIRALAX! Needs to poop    Yesterday   - EVD stopped draining, TPA infused   - Neuro: q1 neurochecks   - HR: AF, RVR   - BP: Goal SBP <160, no PRNs needed   - GI: TF at goal   - UOP: adequate   - Mack: yes   - Tm: afeb   - Lines: PIV   - PPx: GI not indicated, DVT SCDs   - NC      Review of Systems  Review of Systems   Unable to perform ROS: Mental status change        Vital Signs for last 24 hours   Temp:  [35.9 °C (96.7 °F)-37.7 °C (99.8 °F)] 37.7 °C (99.8 °F)  Pulse:  [] 76  Resp:  [16-46] 18    Hemodynamic parameters for last 24 hours       Vent Settings for last 24 hours       Physical Exam   Physical Exam   HENT:   Head: Normocephalic.   Right Ear: External ear normal.   Left Ear: External ear normal.   Mouth/Throat: Oropharynx is clear and moist.   Bruising along her left forehead and left face.  Bilateral EVDs in place.   Eyes: Pupils are equal, round, and reactive to light. Conjunctivae are normal. Right eye exhibits no discharge. Left eye exhibits no discharge. No scleral icterus.   Neck: Neck supple. No tracheal deviation present.   Cardiovascular: Exam reveals no gallop and no friction rub.    No murmur heard.  Sinus rhythm   Pulmonary/Chest: No stridor. She has no wheezes. She has no rales.   Abdominal: Soft. Bowel sounds are normal. She exhibits no distension. There is no tenderness.   Musculoskeletal: She exhibits no tenderness  or deformity.   No clubbing or cyanosis   Neurological:   Moving all 4 extremities, very somnolent.  Follows commands, EVD output increased following TPA   Skin: Skin is warm and dry. No rash noted. She is not diaphoretic. No erythema. No pallor.       Medications  Current Facility-Administered Medications   Medication Dose Route Frequency Provider Last Rate Last Dose   • levETIRAcetam (KEPPRA) tablet 500 mg  500 mg Per NG Tube BID Melchor Garrido P.A.-C.   500 mg at 09/27/18 0530   • metoprolol (LOPRESSOR) tablet 50 mg  50 mg Feeding Tube Q8HRS Andres Wells D.O.   50 mg at 09/27/18 0529   • Pharmacy Consult: Enteral tube feeding - review meds/change route/product selection   Other PRN Gil Leone Jr., D.O.       • labetalol (NORMODYNE,TRANDATE) injection 5-10 mg  5-10 mg Intravenous Q4HRS PRN Gil Leone Jr., D.O.   5 mg at 09/26/18 0303   • lactated ringers infusion   Intravenous Continuous Gil Leone Jr., D.O. 83 mL/hr at 09/24/18 1501     • acetaminophen (TYLENOL) tablet 650 mg  650 mg Feeding Tube Q4HRS PRMO Medina M.D.   650 mg at 09/26/18 1726    Or   • acetaminophen (TYLENOL) suppository 650 mg  650 mg Rectal Q4HRS PRN William Medina M.D.       • digoxin (LANOXIN) tablet 125 mcg  125 mcg Feeding Tube DAILY AT 1800 William Medina M.D.   125 mcg at 09/26/18 1726   • ondansetron (ZOFRAN ODT) dispertab 4 mg  4 mg Feeding Tube Q4HRS PRN William Medina M.D.        Or   • ondansetron (ZOFRAN) syringe/vial injection 4 mg  4 mg Intravenous Q4HRS PRMO Medina M.D.       • Pharmacy Consult Request ...Pain Management Review   Other PRN William Medina M.D.        And   • oxyCODONE immediate-release (ROXICODONE) tablet 2.5 mg  2.5 mg Feeding Tube Q3HRS PRN William Medina M.D.        And   • oxyCODONE immediate-release (ROXICODONE) tablet 5 mg  5 mg Feeding Tube Q3HRS PRMO Medina M.D.        And   • HYDROmorphone (DILAUDID) injection 0.25-1 mg  0.25-1 mg Intravenous Q2HRS PRMO ARANGO  JAYRO Medina   1 mg at 09/25/18 0445   • polyethylene glycol/lytes (MIRALAX) PACKET 1 Packet  1 Packet Feeding Tube QDAY PRMO Medina M.D.   1 Packet at 09/26/18 1725    And   • senna-docusate (PERICOLACE or SENOKOT S) 8.6-50 MG per tablet 2 Tab  2 Tab Feeding Tube BID William Medina M.D.   2 Tab at 09/27/18 0600    And   • magnesium hydroxide (MILK OF MAGNESIA) suspension 30 mL  30 mL Feeding Tube QDAY PRN William Medina M.D.        And   • bisacodyl (DULCOLAX) suppository 10 mg  10 mg Rectal QDAY PRN William Medina M.D.       • norepinephrine (LEVOPHED) 8 mg in  mL Infusion  0-30 mcg/min Intravenous Continuous Devan Palacios M.D.   Stopped at 09/24/18 2215   • Respiratory Care per Protocol   Nebulization Continuous RT Rowena Calero M.D.       • MD Alert...ICU Electrolyte Replacement per Pharmacy   Other pharmacy to dose Rowena Calero M.D.       • LORazepam (ATIVAN) injection 2 mg  2 mg Intravenous Q5 MIN PRN Rowena Calero M.D.       • hydrALAZINE (APRESOLINE) injection 10 mg  10 mg Intravenous Q4HRS PRN Rowena Calero M.D.       • enalaprilat (VASOTEC) injection 1.25 mg  1.25 mg Intravenous Q6HRS PRN Rowena Calero M.D.       • ceFAZolin in dextrose (ANCEF) IVPB premix 1 g  1 g Intravenous Q8HRS Juan Alberto Santizo III, M.D.   Stopped at 09/27/18 0601       Fluids    Intake/Output Summary (Last 24 hours) at 09/27/18 0741  Last data filed at 09/27/18 0600   Gross per 24 hour   Intake             2326 ml   Output             3549 ml   Net            -1223 ml       Laboratory  Recent Results (from the past 48 hour(s))   CBC WITH DIFFERENTIAL    Collection Time: 09/26/18  5:43 AM   Result Value Ref Range    WBC 11.2 (H) 4.8 - 10.8 K/uL    RBC 3.66 (L) 4.20 - 5.40 M/uL    Hemoglobin 10.7 (L) 12.0 - 16.0 g/dL    Hematocrit 32.0 (L) 37.0 - 47.0 %    MCV 87.4 81.4 - 97.8 fL    MCH 29.2 27.0 - 33.0 pg    MCHC 33.4 (L) 33.6 - 35.0 g/dL    RDW 43.0 35.9 - 50.0 fL    Platelet Count 203 164 - 446 K/uL    MPV  10.4 9.0 - 12.9 fL    Neutrophils-Polys 80.70 (H) 44.00 - 72.00 %    Lymphocytes 9.90 (L) 22.00 - 41.00 %    Monocytes 7.90 0.00 - 13.40 %    Eosinophils 0.20 0.00 - 6.90 %    Basophils 0.20 0.00 - 1.80 %    Immature Granulocytes 1.10 (H) 0.00 - 0.90 %    Nucleated RBC 0.00 /100 WBC    Neutrophils (Absolute) 9.06 (H) 2.00 - 7.15 K/uL    Lymphs (Absolute) 1.11 1.00 - 4.80 K/uL    Monos (Absolute) 0.89 (H) 0.00 - 0.85 K/uL    Eos (Absolute) 0.02 0.00 - 0.51 K/uL    Baso (Absolute) 0.02 0.00 - 0.12 K/uL    Immature Granulocytes (abs) 0.12 (H) 0.00 - 0.11 K/uL    NRBC (Absolute) 0.00 K/uL   BASIC METABOLIC PANEL    Collection Time: 09/26/18  5:43 AM   Result Value Ref Range    Sodium 139 135 - 145 mmol/L    Potassium 3.6 3.6 - 5.5 mmol/L    Chloride 104 96 - 112 mmol/L    Co2 25 20 - 33 mmol/L    Glucose 162 (H) 65 - 99 mg/dL    Bun 22 8 - 22 mg/dL    Creatinine 0.51 0.50 - 1.40 mg/dL    Calcium 8.9 8.5 - 10.5 mg/dL    Anion Gap 10.0 0.0 - 11.9   MAGNESIUM    Collection Time: 09/26/18  5:43 AM   Result Value Ref Range    Magnesium 1.8 1.5 - 2.5 mg/dL   PROTHROMBIN TIME    Collection Time: 09/26/18  5:43 AM   Result Value Ref Range    PT 16.8 (H) 12.0 - 14.6 sec    INR 1.35 (H) 0.87 - 1.13   ESTIMATED GFR    Collection Time: 09/26/18  5:43 AM   Result Value Ref Range    GFR If African American >60 >60 mL/min/1.73 m 2    GFR If Non African American >60 >60 mL/min/1.73 m 2   CBC WITH DIFFERENTIAL    Collection Time: 09/27/18  3:45 AM   Result Value Ref Range    WBC 11.1 (H) 4.8 - 10.8 K/uL    RBC 4.45 4.20 - 5.40 M/uL    Hemoglobin 13.1 12.0 - 16.0 g/dL    Hematocrit 38.4 37.0 - 47.0 %    MCV 86.7 81.4 - 97.8 fL    MCH 29.2 27.0 - 33.0 pg    MCHC 33.7 33.6 - 35.0 g/dL    RDW 43.4 35.9 - 50.0 fL    Platelet Count 220 164 - 446 K/uL    MPV 10.6 9.0 - 12.9 fL    Neutrophils-Polys 81.50 (H) 44.00 - 72.00 %    Lymphocytes 8.80 (L) 22.00 - 41.00 %    Monocytes 7.60 0.00 - 13.40 %    Eosinophils 1.00 0.00 - 6.90 %    Basophils  0.30 0.00 - 1.80 %    Immature Granulocytes 0.80 0.00 - 0.90 %    Nucleated RBC 0.00 /100 WBC    Neutrophils (Absolute) 9.04 (H) 2.00 - 7.15 K/uL    Lymphs (Absolute) 0.98 (L) 1.00 - 4.80 K/uL    Monos (Absolute) 0.84 0.00 - 0.85 K/uL    Eos (Absolute) 0.11 0.00 - 0.51 K/uL    Baso (Absolute) 0.03 0.00 - 0.12 K/uL    Immature Granulocytes (abs) 0.09 0.00 - 0.11 K/uL    NRBC (Absolute) 0.00 K/uL   BASIC METABOLIC PANEL    Collection Time: 09/27/18  3:45 AM   Result Value Ref Range    Sodium 140 135 - 145 mmol/L    Potassium 3.5 (L) 3.6 - 5.5 mmol/L    Chloride 102 96 - 112 mmol/L    Co2 29 20 - 33 mmol/L    Glucose 126 (H) 65 - 99 mg/dL    Bun 25 (H) 8 - 22 mg/dL    Creatinine 0.65 0.50 - 1.40 mg/dL    Calcium 9.9 8.5 - 10.5 mg/dL    Anion Gap 9.0 0.0 - 11.9   MAGNESIUM    Collection Time: 09/27/18  3:45 AM   Result Value Ref Range    Magnesium 2.3 1.5 - 2.5 mg/dL   PROTHROMBIN TIME    Collection Time: 09/27/18  3:45 AM   Result Value Ref Range    PT 15.8 (H) 12.0 - 14.6 sec    INR 1.25 (H) 0.87 - 1.13   ESTIMATED GFR    Collection Time: 09/27/18  3:45 AM   Result Value Ref Range    GFR If African American >60 >60 mL/min/1.73 m 2    GFR If Non African American >60 >60 mL/min/1.73 m 2       Imaging  CT:    Reviewed    Assessment/Plan  Acute intracranial hemorrhage (HCC)- (present on admission)   Assessment & Plan    - S/P fall 1 week ago - CT head on 9/14 negative for acute intracranial hemorrhage  - CT scan on 9/22 with intraventricular hemorrhage, right greater than left and intraparenchymal hemorrhage  - Noncommunicating hydrocephalus - S/P emergent bilateral EVD placement by Dr. Santizo on 9/22.  - Intraventricular TPA considered by NeuroSx  - Continue Keppra, 500 mg IV twice daily for seizure prophylaxis  - Strict blood pressure control - goal SBP <160 mmHg  - Continue hourly neuro checks  - Repeat CT in the morning  - consider hypertonic saline for vasogenic edema if neurostatus/edema worsens, hemicraniectomy if  needed for lifesaving intervention        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)- (present on admission)   Assessment & Plan    - reversed on hospital day 1        Paroxysmal atrial fibrillation (HCC)- (present on admission)   Assessment & Plan    - Previously on apixaban, 5 mg twice daily - now strictly contraindicated  - Continue digoxin and metoprolol        Hypomagnesemia- (present on admission)   Assessment & Plan    - Electrolyte replacement protocol to maintain >2        Hypokalemia   Assessment & Plan    - Electrolyte replacement protocol to maintain >4             VTE:  Contraindicated  Ulcer: Not Indicated  Lines: Mack Catheter  Ongoing indication addressed    I have performed a physical exam and reviewed and updated ROS and Plan today (9/25/2018). In review of yesterday's note (9/26/2018), there are no changes except as documented above.     Remains at high likelihood for complete neurologic collapse requiring aggressive resuscitation, EVD is remain in place and are undergoing TPA installation. This patient is critically ill, at high risk for decompensation leading to worsening vital organ dysfunction and death without critical care interventions.    Discussed patient condition and risk of morbidity and/or mortality with Hospitalist, Family, RN, RT, Pharmacy, , , Charge nurse / hot rounds and neurosurgery.      The patient remains critically ill.  Critical care time = 38 minutes in directly providing and coordinating critical care and extensive data review.  No time overlap and excludes procedures

## 2018-09-27 NOTE — PROGRESS NOTES
[facility] Hospitalist Progress Note    Date of Service: 2018    Chief Complaint  78 y.o. female admitted 2018 with HA to [facility].  Had had a GLF with neg CT one week prior.  CT on this admit showing SDH and SAH  B EVDs placed Dr Santizo     PMHx PAFIb, AC on apixaban    Interval Problem Update    O x 2  Moving x 4  AFib/sinus rate controled  1 LNC  TFs goal  UOP adequate  ICPs 3-4  EVD 25ml/hr    Consultants/Specialty  Neurosurgery  Pulmonology    Disposition  Cont in ICU for close monitoring of Neuro status    Dw family at bedside x 20mins.          Review of Systems   Unable to perform ROS: Mental status change      Physical Exam  Laboratory/Imaging   Hemodynamics  Temp (24hrs), Av.8 °C (98.2 °F), Min:35.9 °C (96.7 °F), Max:37.5 °C (99.5 °F)   Temperature: 37 °C (98.6 °F)  Pulse  Av  Min: 60  Max: 131 Heart Rate (Monitored): 80  NIBP: 153/72      Respiratory      Respiration: 19, Pulse Oximetry: 99 %        RUL Breath Sounds: Clear, RML Breath Sounds: Clear, RLL Breath Sounds: Diminished, JUAN Breath Sounds: Clear, LLL Breath Sounds: Diminished    Fluids    Intake/Output Summary (Last 24 hours) at 18 0604  Last data filed at 18 0400   Gross per 24 hour   Intake             2120 ml   Output             3390 ml   Net            -1270 ml       Nutrition  Orders Placed This Encounter   Procedures   • Diet NPO     Standing Status:   Standing     Number of Occurrences:   1     Order Specific Question:   Restrict to:     Answer:   Strict [1]     Physical Exam   Constitutional: She appears well-developed and well-nourished. No distress.   HENT:   Head: Normocephalic and atraumatic.   Post op dressing   Neck: No JVD present.   Cardiovascular: Normal rate and regular rhythm.    Pulmonary/Chest: Effort normal. No stridor. No respiratory distress. She has no wheezes. She has no rales.   Abdominal: Soft. There is no tenderness. There is no rebound and no guarding.   Musculoskeletal: She exhibits edema.    Neurological:   Follows x 4 extremities  Sleepy rounds to physical stim   Skin: Skin is warm and dry. No rash noted. She is not diaphoretic.   Nursing note and vitals reviewed.      Recent Labs      09/25/18 0442 09/26/18   0543   WBC  11.4*  11.2*   RBC  3.71*  3.66*   HEMOGLOBIN  11.0*  10.7*   HEMATOCRIT  33.6*  32.0*   MCV  90.6  87.4   MCH  29.6  29.2   MCHC  32.7*  33.4*   RDW  44.4  43.0   PLATELETCT  173  203   MPV  10.7  10.4     Recent Labs      09/25/18 0442 09/26/18   0543  09/27/18   0345   SODIUM  141  139  140   POTASSIUM  3.9  3.6  3.5*   CHLORIDE  107  104  102   CO2  25  25  29   GLUCOSE  123*  162*  126*   BUN  18  22  25*   CREATININE  0.67  0.51  0.65   CALCIUM  8.8  8.9  9.9     Recent Labs      09/24/18   1940  09/25/18 0442 09/26/18   0543  09/27/18   0345   APTT  27.9   --    --    --    INR  1.25*  1.29*  1.35*  1.25*                  Assessment/Plan     Acute intracranial hemorrhage (HCC)- (present on admission)   Assessment & Plan    Anticoagulation reveresed  S/P bilateral ventriculostomy drains: drains working s/p tPA yesterday  Repeat CT stable  Monitor ICP continuously  Neurosurgery following  Close BP control        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)- (present on admission)   Assessment & Plan    Anticoagulation reveresed        Paroxysmal atrial fibrillation (HCC)- (present on admission)   Assessment & Plan    On metoprolol 50mg Q8, Dig 125mcg QD  Prn Dilt for breakthrough rate control  No anticoagulation due to ICH        Hypomagnesemia- (present on admission)   Assessment & Plan    Replace        Hypokalemia   Assessment & Plan    Replace          Quality-Core Measures   Reviewed items::  EKG reviewed, Labs reviewed, Medications reviewed and Radiology images reviewed  DVT prophylaxis pharmacological::  Contraindicated - High bleeding risk  DVT prophylaxis - mechanical:  SCDs  Ulcer Prophylaxis::  Not indicated

## 2018-09-27 NOTE — CARE PLAN
Problem: Infection  Goal: Will remain free from infection    Intervention: Assess signs and symptoms of infection  Fever 101.8, medicated with tylenol with effect.

## 2018-09-27 NOTE — PROGRESS NOTES
2 RN skin assessment. Bruise noted on left buttox, bruising on face. Blanching redness noted on posterior side of head.Blanching redness to posterior sacrum, mepilex in place.

## 2018-09-27 NOTE — PROGRESS NOTES
MD's here for morning rounds, update given, orders reviewed and plan of care was discussed. New orders were received.

## 2018-09-27 NOTE — PROGRESS NOTES
Critical Care Progress Note    Date of admission  9/22/2018    Chief Complaint  78 y.o. female admitted 9/22/2018 with intracranial hemorrhage.    Hospital Course  This lady was admitted with an acute intracranial hemorrhage.  She is undergone bilateral EVDs for hydrocephalus.    Interval Problem Update  Reviewed last 24 hour events:   - CT this AM with less shift, stable bleeds   - Neuro: Alert, oriented x1-2, follows   - HR: 60s-70s SR   - BP: 120s-150s systolic   - GI: TF at goal, + BM   - UOP: adequate   - Mack: yes   - Tm: 100   - Lines: PIV   - PPx: GI not indicated, DVT SCDs   - 2L NC    Yesterday   - CT improved, TPA completed   - Neuro: q1 checks, 3/5 in all extremities   - HR: Sinus/PAF   - BP: normotensive   - GI: TF at goal   - UOP: adequate   - Mack: yes   - Tm: 100.7   - Lines: PIV   - PPx: GI not indicated, DVT SCDs   - NC   - MIRALAX! Needs to poop    Review of Systems  Review of Systems   Unable to perform ROS: Mental status change        Vital Signs for last 24 hours   Temp:  [35.9 °C (96.7 °F)-37.7 °C (99.8 °F)] 37.7 °C (99.8 °F)  Pulse:  [] 76  Resp:  [16-46] 18    Hemodynamic parameters for last 24 hours       Vent Settings for last 24 hours       Physical Exam   Physical Exam   HENT:   Head: Normocephalic.   Right Ear: External ear normal.   Left Ear: External ear normal.   Mouth/Throat: Oropharynx is clear and moist.   Bruising along her left forehead and left face.  Bilateral EVDs in place, draining blood-tinged CSF   Eyes: Pupils are equal, round, and reactive to light. Conjunctivae are normal. Right eye exhibits no discharge. Left eye exhibits no discharge. No scleral icterus.   Neck: Neck supple. No tracheal deviation present.   Cardiovascular: Exam reveals no gallop and no friction rub.    No murmur heard.  Sinus rhythm   Pulmonary/Chest: No stridor. She has no wheezes. She has no rales.   Abdominal: Soft. Bowel sounds are normal. She exhibits no distension. There is no  tenderness.   Musculoskeletal: She exhibits no tenderness or deformity.   No clubbing or cyanosis   Neurological:   Moving all 4 extremities.  Alert and interactive today, following commands   Skin: Skin is warm and dry. No rash noted. She is not diaphoretic. No erythema. No pallor.   Nursing note and vitals reviewed.      Medications  Current Facility-Administered Medications   Medication Dose Route Frequency Provider Last Rate Last Dose   • levETIRAcetam (KEPPRA) tablet 500 mg  500 mg Per NG Tube BID Melchor Garrido P.A.-C.   500 mg at 09/27/18 0530   • metoprolol (LOPRESSOR) tablet 50 mg  50 mg Feeding Tube Q8HRS Andres Wells D.OJhoana   50 mg at 09/27/18 0529   • Pharmacy Consult: Enteral tube feeding - review meds/change route/product selection   Other PRN Gil Leone Jr., D.O.       • labetalol (NORMODYNE,TRANDATE) injection 5-10 mg  5-10 mg Intravenous Q4HRS PRN Gil Leone Jr., D.O.   5 mg at 09/26/18 0303   • lactated ringers infusion   Intravenous Continuous Gil Leone Jr., D.O. 83 mL/hr at 09/24/18 1501     • acetaminophen (TYLENOL) tablet 650 mg  650 mg Feeding Tube Q4HRS PRMO Medina M.D.   650 mg at 09/26/18 1726    Or   • acetaminophen (TYLENOL) suppository 650 mg  650 mg Rectal Q4HRS PRMO Medina M.D.       • digoxin (LANOXIN) tablet 125 mcg  125 mcg Feeding Tube DAILY AT 1800 William Medina M.D.   125 mcg at 09/26/18 1726   • ondansetron (ZOFRAN ODT) dispertab 4 mg  4 mg Feeding Tube Q4HRS PRN William Medina M.D.        Or   • ondansetron (ZOFRAN) syringe/vial injection 4 mg  4 mg Intravenous Q4HRS PRMO Medina M.D.       • Pharmacy Consult Request ...Pain Management Review   Other PRN William Medina M.D.        And   • oxyCODONE immediate-release (ROXICODONE) tablet 2.5 mg  2.5 mg Feeding Tube Q3HRS PRN William Medina M.D.        And   • oxyCODONE immediate-release (ROXICODONE) tablet 5 mg  5 mg Feeding Tube Q3HRS PRN William Medina M.D.        And   • HYDROmorphone  (DILAUDID) injection 0.25-1 mg  0.25-1 mg Intravenous Q2HRS PRN William Medina M.D.   1 mg at 09/25/18 0445   • polyethylene glycol/lytes (MIRALAX) PACKET 1 Packet  1 Packet Feeding Tube QDAY PRMO Medina M.D.   1 Packet at 09/26/18 1725    And   • senna-docusate (PERICOLACE or SENOKOT S) 8.6-50 MG per tablet 2 Tab  2 Tab Feeding Tube BID William Medina M.D.   2 Tab at 09/27/18 0600    And   • magnesium hydroxide (MILK OF MAGNESIA) suspension 30 mL  30 mL Feeding Tube QDAY PRMO Medina M.D.        And   • bisacodyl (DULCOLAX) suppository 10 mg  10 mg Rectal QDAY PRMO Medina M.D.       • norepinephrine (LEVOPHED) 8 mg in  mL Infusion  0-30 mcg/min Intravenous Continuous Devan Palacios M.D.   Stopped at 09/24/18 2215   • Respiratory Care per Protocol   Nebulization Continuous RT Rowena Calero M.D.       • MD Alert...ICU Electrolyte Replacement per Pharmacy   Other pharmacy to dose Rowena Calero M.D.       • LORazepam (ATIVAN) injection 2 mg  2 mg Intravenous Q5 MIN PRN Rowena Calero M.D.       • hydrALAZINE (APRESOLINE) injection 10 mg  10 mg Intravenous Q4HRS PRN Rowena Calero M.D.       • enalaprilat (VASOTEC) injection 1.25 mg  1.25 mg Intravenous Q6HRS PRN Rowena Calero M.D.       • ceFAZolin in dextrose (ANCEF) IVPB premix 1 g  1 g Intravenous Q8HRS Juan Alberto Santizo III, M.D.   Stopped at 09/27/18 0601       Fluids    Intake/Output Summary (Last 24 hours) at 09/27/18 0739  Last data filed at 09/27/18 0600   Gross per 24 hour   Intake             2326 ml   Output             3549 ml   Net            -1223 ml       Laboratory  Recent Results (from the past 48 hour(s))   CBC WITH DIFFERENTIAL    Collection Time: 09/26/18  5:43 AM   Result Value Ref Range    WBC 11.2 (H) 4.8 - 10.8 K/uL    RBC 3.66 (L) 4.20 - 5.40 M/uL    Hemoglobin 10.7 (L) 12.0 - 16.0 g/dL    Hematocrit 32.0 (L) 37.0 - 47.0 %    MCV 87.4 81.4 - 97.8 fL    MCH 29.2 27.0 - 33.0 pg    MCHC 33.4 (L) 33.6 - 35.0 g/dL     RDW 43.0 35.9 - 50.0 fL    Platelet Count 203 164 - 446 K/uL    MPV 10.4 9.0 - 12.9 fL    Neutrophils-Polys 80.70 (H) 44.00 - 72.00 %    Lymphocytes 9.90 (L) 22.00 - 41.00 %    Monocytes 7.90 0.00 - 13.40 %    Eosinophils 0.20 0.00 - 6.90 %    Basophils 0.20 0.00 - 1.80 %    Immature Granulocytes 1.10 (H) 0.00 - 0.90 %    Nucleated RBC 0.00 /100 WBC    Neutrophils (Absolute) 9.06 (H) 2.00 - 7.15 K/uL    Lymphs (Absolute) 1.11 1.00 - 4.80 K/uL    Monos (Absolute) 0.89 (H) 0.00 - 0.85 K/uL    Eos (Absolute) 0.02 0.00 - 0.51 K/uL    Baso (Absolute) 0.02 0.00 - 0.12 K/uL    Immature Granulocytes (abs) 0.12 (H) 0.00 - 0.11 K/uL    NRBC (Absolute) 0.00 K/uL   BASIC METABOLIC PANEL    Collection Time: 09/26/18  5:43 AM   Result Value Ref Range    Sodium 139 135 - 145 mmol/L    Potassium 3.6 3.6 - 5.5 mmol/L    Chloride 104 96 - 112 mmol/L    Co2 25 20 - 33 mmol/L    Glucose 162 (H) 65 - 99 mg/dL    Bun 22 8 - 22 mg/dL    Creatinine 0.51 0.50 - 1.40 mg/dL    Calcium 8.9 8.5 - 10.5 mg/dL    Anion Gap 10.0 0.0 - 11.9   MAGNESIUM    Collection Time: 09/26/18  5:43 AM   Result Value Ref Range    Magnesium 1.8 1.5 - 2.5 mg/dL   PROTHROMBIN TIME    Collection Time: 09/26/18  5:43 AM   Result Value Ref Range    PT 16.8 (H) 12.0 - 14.6 sec    INR 1.35 (H) 0.87 - 1.13   ESTIMATED GFR    Collection Time: 09/26/18  5:43 AM   Result Value Ref Range    GFR If African American >60 >60 mL/min/1.73 m 2    GFR If Non African American >60 >60 mL/min/1.73 m 2   CBC WITH DIFFERENTIAL    Collection Time: 09/27/18  3:45 AM   Result Value Ref Range    WBC 11.1 (H) 4.8 - 10.8 K/uL    RBC 4.45 4.20 - 5.40 M/uL    Hemoglobin 13.1 12.0 - 16.0 g/dL    Hematocrit 38.4 37.0 - 47.0 %    MCV 86.7 81.4 - 97.8 fL    MCH 29.2 27.0 - 33.0 pg    MCHC 33.7 33.6 - 35.0 g/dL    RDW 43.4 35.9 - 50.0 fL    Platelet Count 220 164 - 446 K/uL    MPV 10.6 9.0 - 12.9 fL    Neutrophils-Polys 81.50 (H) 44.00 - 72.00 %    Lymphocytes 8.80 (L) 22.00 - 41.00 %     Monocytes 7.60 0.00 - 13.40 %    Eosinophils 1.00 0.00 - 6.90 %    Basophils 0.30 0.00 - 1.80 %    Immature Granulocytes 0.80 0.00 - 0.90 %    Nucleated RBC 0.00 /100 WBC    Neutrophils (Absolute) 9.04 (H) 2.00 - 7.15 K/uL    Lymphs (Absolute) 0.98 (L) 1.00 - 4.80 K/uL    Monos (Absolute) 0.84 0.00 - 0.85 K/uL    Eos (Absolute) 0.11 0.00 - 0.51 K/uL    Baso (Absolute) 0.03 0.00 - 0.12 K/uL    Immature Granulocytes (abs) 0.09 0.00 - 0.11 K/uL    NRBC (Absolute) 0.00 K/uL   BASIC METABOLIC PANEL    Collection Time: 09/27/18  3:45 AM   Result Value Ref Range    Sodium 140 135 - 145 mmol/L    Potassium 3.5 (L) 3.6 - 5.5 mmol/L    Chloride 102 96 - 112 mmol/L    Co2 29 20 - 33 mmol/L    Glucose 126 (H) 65 - 99 mg/dL    Bun 25 (H) 8 - 22 mg/dL    Creatinine 0.65 0.50 - 1.40 mg/dL    Calcium 9.9 8.5 - 10.5 mg/dL    Anion Gap 9.0 0.0 - 11.9   MAGNESIUM    Collection Time: 09/27/18  3:45 AM   Result Value Ref Range    Magnesium 2.3 1.5 - 2.5 mg/dL   PROTHROMBIN TIME    Collection Time: 09/27/18  3:45 AM   Result Value Ref Range    PT 15.8 (H) 12.0 - 14.6 sec    INR 1.25 (H) 0.87 - 1.13   ESTIMATED GFR    Collection Time: 09/27/18  3:45 AM   Result Value Ref Range    GFR If African American >60 >60 mL/min/1.73 m 2    GFR If Non African American >60 >60 mL/min/1.73 m 2       Imaging  CT:    Reviewed    Assessment/Plan  Acute intracranial hemorrhage (HCC)- (present on admission)   Assessment & Plan    S/P fall 1 week ago - CT head on 9/14 negative for acute intracranial hemorrhage  CT scan on 9/22 with intraventricular hemorrhage, right greater than left and intraparenchymal hemorrhage  Noncommunicating hydrocephalus - S/P emergent bilateral EVD placement by Dr. Santizo on 9/22.  Intraventricular TPA given by NeuroSx with improvement in EVD output  Continue Keppra, 500 mg IV twice daily for seizure prophylaxis  Strict blood pressure control - goal SBP <160 mmHg  Continue hourly neuro checks  Repeat CT with improved shift, EVD  drain increased to 10 cmH2O        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)- (present on admission)   Assessment & Plan    reversed on hospital day 1        Paroxysmal atrial fibrillation (HCC)- (present on admission)   Assessment & Plan    Previously on apixaban, 5 mg twice daily - now strictly contraindicated  Continue digoxin and metoprolol        Encephalopathy acute- (present on admission)   Assessment & Plan    Structural, secondary to intracranial hemorrhage  re-orientation, encourage sleep, lights on during day, minimize benzos/anticholinergic agents.         Hypomagnesemia- (present on admission)   Assessment & Plan    Electrolyte replacement protocol to maintain >2        Hypokalemia   Assessment & Plan    Electrolyte replacement protocol to maintain >4             VTE:  Contraindicated  Ulcer: Not Indicated  Lines: Mack Catheter  Ongoing indication addressed    I have performed a physical exam and reviewed and updated ROS and Plan today (9/27/2018). In review of yesterday's note (9/26/2018), there are no changes except as documented above.     Continuing to address intracranial hemorrhage, EVD drains remain in place; she remains at high risk for worsening neurologic status. This patient is critically ill, at high risk for decompensation leading to worsening vital organ dysfunction and death without critical care interventions.    Discussed patient condition and risk of morbidity and/or mortality with Hospitalist, Family, RN, RT, Pharmacy, , , Charge nurse / hot rounds and neurosurgery.      The patient remains critically ill.  Critical care time = 36 minutes in directly providing and coordinating critical care and extensive data review.  No time overlap and excludes procedures

## 2018-09-28 ENCOUNTER — APPOINTMENT (OUTPATIENT)
Dept: RADIOLOGY | Facility: MEDICAL CENTER | Age: 78
DRG: 023 | End: 2018-09-28
Attending: INTERNAL MEDICINE
Payer: MEDICARE

## 2018-09-28 LAB
ANION GAP SERPL CALC-SCNC: 8 MMOL/L (ref 0–11.9)
BASOPHILS # BLD AUTO: 0.4 % (ref 0–1.8)
BASOPHILS # BLD: 0.05 K/UL (ref 0–0.12)
BUN SERPL-MCNC: 28 MG/DL (ref 8–22)
CALCIUM SERPL-MCNC: 9.5 MG/DL (ref 8.5–10.5)
CHLORIDE SERPL-SCNC: 98 MMOL/L (ref 96–112)
CO2 SERPL-SCNC: 29 MMOL/L (ref 20–33)
CREAT SERPL-MCNC: 0.6 MG/DL (ref 0.5–1.4)
EOSINOPHIL # BLD AUTO: 0.15 K/UL (ref 0–0.51)
EOSINOPHIL NFR BLD: 1.1 % (ref 0–6.9)
ERYTHROCYTE [DISTWIDTH] IN BLOOD BY AUTOMATED COUNT: 42.7 FL (ref 35.9–50)
GLUCOSE SERPL-MCNC: 145 MG/DL (ref 65–99)
HCT VFR BLD AUTO: 35.5 % (ref 37–47)
HGB BLD-MCNC: 12 G/DL (ref 12–16)
IMM GRANULOCYTES # BLD AUTO: 0.13 K/UL (ref 0–0.11)
IMM GRANULOCYTES NFR BLD AUTO: 0.9 % (ref 0–0.9)
LYMPHOCYTES # BLD AUTO: 1.35 K/UL (ref 1–4.8)
LYMPHOCYTES NFR BLD: 9.7 % (ref 22–41)
MAGNESIUM SERPL-MCNC: 2 MG/DL (ref 1.5–2.5)
MCH RBC QN AUTO: 29.3 PG (ref 27–33)
MCHC RBC AUTO-ENTMCNC: 33.8 G/DL (ref 33.6–35)
MCV RBC AUTO: 86.6 FL (ref 81.4–97.8)
MONOCYTES # BLD AUTO: 1.18 K/UL (ref 0–0.85)
MONOCYTES NFR BLD AUTO: 8.5 % (ref 0–13.4)
NEUTROPHILS # BLD AUTO: 11.08 K/UL (ref 2–7.15)
NEUTROPHILS NFR BLD: 79.4 % (ref 44–72)
NRBC # BLD AUTO: 0 K/UL
NRBC BLD-RTO: 0 /100 WBC
PLATELET # BLD AUTO: 215 K/UL (ref 164–446)
PMV BLD AUTO: 10.7 FL (ref 9–12.9)
POTASSIUM SERPL-SCNC: 4 MMOL/L (ref 3.6–5.5)
RBC # BLD AUTO: 4.1 M/UL (ref 4.2–5.4)
SODIUM SERPL-SCNC: 135 MMOL/L (ref 135–145)
WBC # BLD AUTO: 13.9 K/UL (ref 4.8–10.8)

## 2018-09-28 PROCEDURE — A9270 NON-COVERED ITEM OR SERVICE: HCPCS | Performed by: HOSPITALIST

## 2018-09-28 PROCEDURE — 700102 HCHG RX REV CODE 250 W/ 637 OVERRIDE(OP): Performed by: HOSPITALIST

## 2018-09-28 PROCEDURE — 85025 COMPLETE CBC W/AUTO DIFF WBC: CPT

## 2018-09-28 PROCEDURE — 70450 CT HEAD/BRAIN W/O DYE: CPT

## 2018-09-28 PROCEDURE — 99233 SBSQ HOSP IP/OBS HIGH 50: CPT | Performed by: HOSPITALIST

## 2018-09-28 PROCEDURE — A9270 NON-COVERED ITEM OR SERVICE: HCPCS | Performed by: PHYSICIAN ASSISTANT

## 2018-09-28 PROCEDURE — 700102 HCHG RX REV CODE 250 W/ 637 OVERRIDE(OP): Performed by: PHYSICIAN ASSISTANT

## 2018-09-28 PROCEDURE — 99291 CRITICAL CARE FIRST HOUR: CPT | Performed by: INTERNAL MEDICINE

## 2018-09-28 PROCEDURE — 700111 HCHG RX REV CODE 636 W/ 250 OVERRIDE (IP): Performed by: NEUROLOGICAL SURGERY

## 2018-09-28 PROCEDURE — 80048 BASIC METABOLIC PNL TOTAL CA: CPT

## 2018-09-28 PROCEDURE — 83735 ASSAY OF MAGNESIUM: CPT

## 2018-09-28 PROCEDURE — 770022 HCHG ROOM/CARE - ICU (200)

## 2018-09-28 RX ADMIN — ACETAMINOPHEN 650 MG: 325 TABLET, FILM COATED ORAL at 10:13

## 2018-09-28 RX ADMIN — OXYCODONE HYDROCHLORIDE 2.5 MG: 5 TABLET ORAL at 20:14

## 2018-09-28 RX ADMIN — METOPROLOL TARTRATE 50 MG: 50 TABLET ORAL at 05:20

## 2018-09-28 RX ADMIN — LEVETIRACETAM 500 MG: 500 TABLET, FILM COATED ORAL at 05:20

## 2018-09-28 RX ADMIN — CEFAZOLIN SODIUM 1 G: 1 INJECTION, SOLUTION INTRAVENOUS at 06:16

## 2018-09-28 RX ADMIN — LEVETIRACETAM 500 MG: 500 TABLET, FILM COATED ORAL at 17:32

## 2018-09-28 RX ADMIN — DIGOXIN 125 MCG: 125 TABLET ORAL at 17:32

## 2018-09-28 RX ADMIN — METOPROLOL TARTRATE 50 MG: 50 TABLET ORAL at 13:38

## 2018-09-28 RX ADMIN — AMLODIPINE BESYLATE 5 MG: 5 TABLET ORAL at 05:20

## 2018-09-28 RX ADMIN — CEFAZOLIN SODIUM 1 G: 1 INJECTION, SOLUTION INTRAVENOUS at 21:17

## 2018-09-28 RX ADMIN — METOPROLOL TARTRATE 50 MG: 50 TABLET ORAL at 00:10

## 2018-09-28 RX ADMIN — SENNOSIDES AND DOCUSATE SODIUM 2 TABLET: 8.6; 5 TABLET ORAL at 06:00

## 2018-09-28 RX ADMIN — CEFAZOLIN SODIUM 1 G: 1 INJECTION, SOLUTION INTRAVENOUS at 14:44

## 2018-09-28 RX ADMIN — METOPROLOL TARTRATE 50 MG: 50 TABLET ORAL at 20:53

## 2018-09-28 ASSESSMENT — PAIN SCALES - GENERAL
PAINLEVEL_OUTOF10: 0
PAINLEVEL_OUTOF10: 0
PAINLEVEL_OUTOF10: 2
PAINLEVEL_OUTOF10: 0
PAINLEVEL_OUTOF10: 6
PAINLEVEL_OUTOF10: 0

## 2018-09-28 NOTE — CARE PLAN
Problem: Venous Thromboembolism (VTW)/Deep Vein Thrombosis (DVT) Prevention:  Goal: Patient will participate in Venous Thrombosis (VTE)/Deep Vein Thrombosis (DVT)Prevention Measures  Outcome: PROGRESSING AS EXPECTED  SCDs on BLE    Problem: Safety - Medical Restraint  Goal: Remains free of injury from restraints (Restraint for Interference with Medical Device)  INTERVENTIONS:  1. Determine that other, less restrictive measures have been tried or would not be effective before applying the restraint  2. Evaluate the patient's condition at the time of restraint application  3. Inform patient/family regarding the reason for restraint  4. Q2H: Monitor safety, psychosocial status, comfort, nutrition and hydration     Outcome: PROGRESSING AS EXPECTED  Education given to patient and daughter Nan, Q2 hr restraint assessments and documentation, patient pulling on lines but remains safe this shift.

## 2018-09-28 NOTE — PROGRESS NOTES
[facility] Hospitalist Progress Note    Date of Service: 2018    Chief Complaint  78 y.o. female admitted 2018 with HA to [facility].  Had had a GLF with neg CT one week prior.  CT on this admit showing SDH and SAH  B EVDs placed Dr Santizo     PMHx PAFIb, AC on apixaban    Interval Problem Update    O x 2  Moving and following x 4  AFib/sinus rate controled  -150s no prns  1 LNC  TFs goal, ok for ice chips per SLP  UOP 1,000ml/12hrs  ICPs 4-7    Consultants/Specialty  Neurosurgery  Pulmonology    Disposition  Cont in ICU for close monitoring of Neuro status    Dw family at bedside x 20mins.          Review of Systems   Unable to perform ROS: Mental status change      Physical Exam  Laboratory/Imaging   Hemodynamics  Temp (24hrs), Av.4 °C (99.3 °F), Min:36.8 °C (98.3 °F), Max:38.8 °C (101.8 °F)   Temperature: 37 °C (98.6 °F)  Pulse  Av.9  Min: 60  Max: 131 Heart Rate (Monitored): 90  Blood Pressure : 127/64, NIBP: 142/64      Respiratory      Respiration: 18, Pulse Oximetry: 99 %     Work Of Breathing / Effort: Mild  RUL Breath Sounds: Clear, RML Breath Sounds: Clear;Diminished, RLL Breath Sounds: Diminished, JUAN Breath Sounds: Clear, LLL Breath Sounds: Diminished    Fluids    Intake/Output Summary (Last 24 hours) at 18 0559  Last data filed at 18 0200   Gross per 24 hour   Intake             1700 ml   Output             2656 ml   Net             -956 ml       Nutrition  Orders Placed This Encounter   Procedures   • Diet NPO     Standing Status:   Standing     Number of Occurrences:   1     Order Specific Question:   Restrict to:     Answer:   Strict [1]     Physical Exam   Constitutional: She appears well-developed and well-nourished. No distress.   HENT:   Head: Normocephalic and atraumatic.   Post op dressing   Neck: No JVD present.   Cardiovascular: Normal rate and regular rhythm.    Pulmonary/Chest: Effort normal. No stridor. No respiratory distress. She has no wheezes. She has no  rales.   Abdominal: Soft. There is no tenderness. There is no rebound and no guarding.   Musculoskeletal: She exhibits edema.   Neurological:   Follows x 4 extremities  Sleepy rouses to physical stim   Skin: Skin is warm and dry. No rash noted. She is not diaphoretic.   Nursing note and vitals reviewed.      Recent Labs      09/26/18   0543  09/27/18   0345  09/28/18   0506   WBC  11.2*  11.1*  13.9*   RBC  3.66*  4.45  4.10*   HEMOGLOBIN  10.7*  13.1  12.0   HEMATOCRIT  32.0*  38.4  35.5*   MCV  87.4  86.7  86.6   MCH  29.2  29.2  29.3   MCHC  33.4*  33.7  33.8   RDW  43.0  43.4  42.7   PLATELETCT  203  220  215   MPV  10.4  10.6  10.7     Recent Labs      09/26/18   0543  09/27/18   0345   SODIUM  139  140   POTASSIUM  3.6  3.5*   CHLORIDE  104  102   CO2  25  29   GLUCOSE  162*  126*   BUN  22  25*   CREATININE  0.51  0.65   CALCIUM  8.9  9.9     Recent Labs      09/26/18   0543  09/27/18   0345   INR  1.35*  1.25*                  Assessment/Plan     Acute intracranial hemorrhage (HCC)- (present on admission)   Assessment & Plan    Anticoagulation reveresed  S/P bilateral EVDs  Repeat CT stable  Monitor ICP continuously  Neurosurgery following  Close BP control        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)- (present on admission)   Assessment & Plan    Anticoagulation reveresed        Paroxysmal atrial fibrillation (HCC)- (present on admission)   Assessment & Plan    On metoprolol 50mg Q8, Dig 125mcg QD  Prn Dilt for breakthrough rate control  No anticoagulation due to ICH        Hypomagnesemia- (present on admission)   Assessment & Plan    Replace  Follow daily        Hypokalemia   Assessment & Plan    Replace  Follow daily          Quality-Core Measures   Reviewed items::  EKG reviewed, Labs reviewed, Medications reviewed and Radiology images reviewed  DVT prophylaxis pharmacological::  Contraindicated - High bleeding risk  DVT prophylaxis - mechanical:  SCDs  Ulcer  Prophylaxis::  Not indicated

## 2018-09-28 NOTE — PROGRESS NOTES
Notified Neymar ESCALANTE of patient status update, right and left drain sites leaking, dressings reinforced, zero output for Right drain, Left drain increased output from zero.  Neymar ESCALANTE aware, no new orders. Family request MD update, Neymar ESCALANTE stated she would notify Ovidio ESCALANTE of family at bedside.

## 2018-09-28 NOTE — CARE PLAN
Problem: Communication  Goal: The ability to communicate needs accurately and effectively will improve    Intervention: Collaborate with speech therapy  Repeat swallow evaluation completed at bedside, NPO status continued with TF at goal and small ice chips (5-6/hour) approved with sitting upright.

## 2018-09-28 NOTE — CARE PLAN
Problem: Safety  Goal: Will remain free from injury    Intervention: Provide assistance with mobility  Pt educated on reason not to attempt to pull out lines or tubes, reinforcement needed. Bilateral soft wrist restraints in use documentation in flowsheets. Bed in low locked position room near nurses station.       Problem: Skin Integrity  Goal: Risk for impaired skin integrity will decrease  Q2 turning, pillows in use for support and positioning, Tube feeds at goal, mepilex in place on posterior head and coccyx

## 2018-09-28 NOTE — PROGRESS NOTES
Critical Care Progress Note    Date of admission  9/22/2018    Chief Complaint  78 y.o. female admitted 9/22/2018 with intracranial hemorrhage.    Hospital Course  This lady was admitted with an acute intracranial hemorrhage.  She is undergone bilateral EVDs for hydrocephalus.    Interval Problem Update  Reviewed last 24 hour events:   - EVD x2 in place R ventric 20, L ventric 216,    - Neuro: follows, sedate   - HR: 70s-90s   - BP: 130s-150s   - GI: TF at goal, BMS out   - UOP: 1L overnight   - Mack: yes   - Tm: 100.6   - Lines: EVD x2,    - PPx: GI not indicated, DVT SCDs   -2 L nasal cannula    Yesterday   - CT this AM with less shift, stable bleeds   - Neuro: Alert, oriented x1-2, follows   - HR: 60s-70s SR   - BP: 120s-150s systolic   - GI: TF at goal, + BM   - UOP: adequate   - Mack: yes   - Tm: 100   - Lines: PIV   - PPx: GI not indicated, DVT SCDs   - 2L NC    Review of Systems  Review of Systems   Unable to perform ROS: Acuity of condition        Vital Signs for last 24 hours   Temp:  [36.8 °C (98.3 °F)-38.8 °C (101.8 °F)] 37.8 °C (100 °F)  Pulse:  [] 91  Resp:  [15-48] 44  BP: (127-140)/(61-66) 127/64    Hemodynamic parameters for last 24 hours       Vent Settings for last 24 hours       Physical Exam   Physical Exam   HENT:   Head: Normocephalic.   Right Ear: External ear normal.   Left Ear: External ear normal.   Mouth/Throat: Oropharynx is clear and moist.   Bruising along her left forehead and left face.  Bilateral EVDs in place, draining blood-tinged CSF   Eyes: Pupils are equal, round, and reactive to light. Conjunctivae are normal. Right eye exhibits no discharge. Left eye exhibits no discharge. No scleral icterus.   Neck: Neck supple. No tracheal deviation present.   Cardiovascular: Exam reveals no gallop and no friction rub.    No murmur heard.  Sinus rhythm   Pulmonary/Chest: No stridor. She has no wheezes. She has no rales.   Abdominal: Soft. Bowel sounds are normal. She exhibits no  distension. There is no tenderness.   Musculoskeletal: She exhibits no tenderness or deformity.   No clubbing or cyanosis   Neurological:   Moving all 4 extremities.  Alert and interactive today, following commands   Skin: Skin is warm and dry. No rash noted. She is not diaphoretic. No erythema. No pallor.   Nursing note and vitals reviewed.  Unchanged from 9/27/2018    Medications  Current Facility-Administered Medications   Medication Dose Route Frequency Provider Last Rate Last Dose   • amLODIPine (NORVASC) tablet 5 mg  5 mg Per NG Tube Q DAY Andres Wells D.O.   5 mg at 09/28/18 0520   • levETIRAcetam (KEPPRA) tablet 500 mg  500 mg Per NG Tube BID Melchor Garrido P.A.-C.   500 mg at 09/28/18 0520   • metoprolol (LOPRESSOR) tablet 50 mg  50 mg Feeding Tube Q8HRS Andres Wells D.O.   50 mg at 09/28/18 0520   • Pharmacy Consult: Enteral tube feeding - review meds/change route/product selection   Other PRN Gil Leone Jr., D.O.       • labetalol (NORMODYNE,TRANDATE) injection 5-10 mg  5-10 mg Intravenous Q4HRS PRN Gil Leone Jr., D.O.   5 mg at 09/26/18 0303   • acetaminophen (TYLENOL) tablet 650 mg  650 mg Feeding Tube Q4HRS PRN William Medina M.D.   650 mg at 09/27/18 1711    Or   • acetaminophen (TYLENOL) suppository 650 mg  650 mg Rectal Q4HRS PRN William Medina M.D.       • digoxin (LANOXIN) tablet 125 mcg  125 mcg Feeding Tube DAILY AT 1800 William Medina M.D.   125 mcg at 09/27/18 1704   • ondansetron (ZOFRAN ODT) dispertab 4 mg  4 mg Feeding Tube Q4HRS PRMO Medina M.D.        Or   • ondansetron (ZOFRAN) syringe/vial injection 4 mg  4 mg Intravenous Q4HRS PRN William Medina M.D.       • Pharmacy Consult Request ...Pain Management Review   Other PRN William Medina M.D.        And   • oxyCODONE immediate-release (ROXICODONE) tablet 2.5 mg  2.5 mg Feeding Tube Q3HRS PRN William Medina M.D.        And   • oxyCODONE immediate-release (ROXICODONE) tablet 5 mg  5 mg Feeding Tube Q3HRS  PRN William Medina M.D.        And   • HYDROmorphone (DILAUDID) injection 0.25-1 mg  0.25-1 mg Intravenous Q2HRS PRN William Medina M.D.   1 mg at 09/25/18 0445   • polyethylene glycol/lytes (MIRALAX) PACKET 1 Packet  1 Packet Feeding Tube QDAY PRMO Medina M.D.   1 Packet at 09/26/18 1725    And   • senna-docusate (PERICOLACE or SENOKOT S) 8.6-50 MG per tablet 2 Tab  2 Tab Feeding Tube BID William Medina M.D.   2 Tab at 09/28/18 0600    And   • magnesium hydroxide (MILK OF MAGNESIA) suspension 30 mL  30 mL Feeding Tube QDAY PRN William Medina M.D.        And   • bisacodyl (DULCOLAX) suppository 10 mg  10 mg Rectal QDAY PRN William Medina M.D.       • Respiratory Care per Protocol   Nebulization Continuous RT Rowena Calero M.D.       • MD Alert...ICU Electrolyte Replacement per Pharmacy   Other pharmacy to dose Rowena Calero M.D.       • LORazepam (ATIVAN) injection 2 mg  2 mg Intravenous Q5 MIN PRN Rowena Calero M.D.       • hydrALAZINE (APRESOLINE) injection 10 mg  10 mg Intravenous Q4HRS PRN Rowean Calero M.D.       • enalaprilat (VASOTEC) injection 1.25 mg  1.25 mg Intravenous Q6HRS PRN Rowena Calero M.D.       • ceFAZolin in dextrose (ANCEF) IVPB premix 1 g  1 g Intravenous Q8HRS Juan Alberto Santizo III, M.D.   Stopped at 09/28/18 0646       Fluids    Intake/Output Summary (Last 24 hours) at 09/28/18 0836  Last data filed at 09/28/18 0700   Gross per 24 hour   Intake             1138 ml   Output             2796 ml   Net            -1658 ml       Laboratory  Recent Results (from the past 48 hour(s))   CBC WITH DIFFERENTIAL    Collection Time: 09/27/18  3:45 AM   Result Value Ref Range    WBC 11.1 (H) 4.8 - 10.8 K/uL    RBC 4.45 4.20 - 5.40 M/uL    Hemoglobin 13.1 12.0 - 16.0 g/dL    Hematocrit 38.4 37.0 - 47.0 %    MCV 86.7 81.4 - 97.8 fL    MCH 29.2 27.0 - 33.0 pg    MCHC 33.7 33.6 - 35.0 g/dL    RDW 43.4 35.9 - 50.0 fL    Platelet Count 220 164 - 446 K/uL    MPV 10.6 9.0 - 12.9 fL     Neutrophils-Polys 81.50 (H) 44.00 - 72.00 %    Lymphocytes 8.80 (L) 22.00 - 41.00 %    Monocytes 7.60 0.00 - 13.40 %    Eosinophils 1.00 0.00 - 6.90 %    Basophils 0.30 0.00 - 1.80 %    Immature Granulocytes 0.80 0.00 - 0.90 %    Nucleated RBC 0.00 /100 WBC    Neutrophils (Absolute) 9.04 (H) 2.00 - 7.15 K/uL    Lymphs (Absolute) 0.98 (L) 1.00 - 4.80 K/uL    Monos (Absolute) 0.84 0.00 - 0.85 K/uL    Eos (Absolute) 0.11 0.00 - 0.51 K/uL    Baso (Absolute) 0.03 0.00 - 0.12 K/uL    Immature Granulocytes (abs) 0.09 0.00 - 0.11 K/uL    NRBC (Absolute) 0.00 K/uL   BASIC METABOLIC PANEL    Collection Time: 09/27/18  3:45 AM   Result Value Ref Range    Sodium 140 135 - 145 mmol/L    Potassium 3.5 (L) 3.6 - 5.5 mmol/L    Chloride 102 96 - 112 mmol/L    Co2 29 20 - 33 mmol/L    Glucose 126 (H) 65 - 99 mg/dL    Bun 25 (H) 8 - 22 mg/dL    Creatinine 0.65 0.50 - 1.40 mg/dL    Calcium 9.9 8.5 - 10.5 mg/dL    Anion Gap 9.0 0.0 - 11.9   MAGNESIUM    Collection Time: 09/27/18  3:45 AM   Result Value Ref Range    Magnesium 2.3 1.5 - 2.5 mg/dL   PROTHROMBIN TIME    Collection Time: 09/27/18  3:45 AM   Result Value Ref Range    PT 15.8 (H) 12.0 - 14.6 sec    INR 1.25 (H) 0.87 - 1.13   ESTIMATED GFR    Collection Time: 09/27/18  3:45 AM   Result Value Ref Range    GFR If African American >60 >60 mL/min/1.73 m 2    GFR If Non African American >60 >60 mL/min/1.73 m 2   CBC WITH DIFFERENTIAL    Collection Time: 09/28/18  5:06 AM   Result Value Ref Range    WBC 13.9 (H) 4.8 - 10.8 K/uL    RBC 4.10 (L) 4.20 - 5.40 M/uL    Hemoglobin 12.0 12.0 - 16.0 g/dL    Hematocrit 35.5 (L) 37.0 - 47.0 %    MCV 86.6 81.4 - 97.8 fL    MCH 29.3 27.0 - 33.0 pg    MCHC 33.8 33.6 - 35.0 g/dL    RDW 42.7 35.9 - 50.0 fL    Platelet Count 215 164 - 446 K/uL    MPV 10.7 9.0 - 12.9 fL    Neutrophils-Polys 79.40 (H) 44.00 - 72.00 %    Lymphocytes 9.70 (L) 22.00 - 41.00 %    Monocytes 8.50 0.00 - 13.40 %    Eosinophils 1.10 0.00 - 6.90 %    Basophils 0.40 0.00 - 1.80  %    Immature Granulocytes 0.90 0.00 - 0.90 %    Nucleated RBC 0.00 /100 WBC    Neutrophils (Absolute) 11.08 (H) 2.00 - 7.15 K/uL    Lymphs (Absolute) 1.35 1.00 - 4.80 K/uL    Monos (Absolute) 1.18 (H) 0.00 - 0.85 K/uL    Eos (Absolute) 0.15 0.00 - 0.51 K/uL    Baso (Absolute) 0.05 0.00 - 0.12 K/uL    Immature Granulocytes (abs) 0.13 (H) 0.00 - 0.11 K/uL    NRBC (Absolute) 0.00 K/uL   BASIC METABOLIC PANEL    Collection Time: 09/28/18  5:06 AM   Result Value Ref Range    Sodium 135 135 - 145 mmol/L    Potassium 4.0 3.6 - 5.5 mmol/L    Chloride 98 96 - 112 mmol/L    Co2 29 20 - 33 mmol/L    Glucose 145 (H) 65 - 99 mg/dL    Bun 28 (H) 8 - 22 mg/dL    Creatinine 0.60 0.50 - 1.40 mg/dL    Calcium 9.5 8.5 - 10.5 mg/dL    Anion Gap 8.0 0.0 - 11.9   MAGNESIUM    Collection Time: 09/28/18  5:06 AM   Result Value Ref Range    Magnesium 2.0 1.5 - 2.5 mg/dL   ESTIMATED GFR    Collection Time: 09/28/18  5:06 AM   Result Value Ref Range    GFR If African American >60 >60 mL/min/1.73 m 2    GFR If Non African American >60 >60 mL/min/1.73 m 2       Imaging  CT:    Reviewed    Assessment/Plan  Acute intracranial hemorrhage (HCC)- (present on admission)   Assessment & Plan    S/P fall 1 week ago - CT head on 9/14 negative for acute intracranial hemorrhage  CT scan on 9/22 with intraventricular hemorrhage, right greater than left and intraparenchymal hemorrhage  Noncommunicating hydrocephalus - S/P emergent bilateral EVD placement by Dr. Santizo on 9/22.  Intraventricular TPA given by NeuroSx with improvement in EVD output  Continue Keppra 500 mg IV twice daily for seizure prophylaxis  Strict blood pressure control - goal SBP <160 mmHg  Continue close neuro checks  Repeat CT with improved shift again from 1 cm to 7.1 mm, continue EVD drainage        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)- (present on admission)   Assessment & Plan    Reversed on hospital day 1        Paroxysmal atrial fibrillation (HCC)-  (present on admission)   Assessment & Plan    Previously on apixaban, 5 mg twice daily - now strictly contraindicated  Continue digoxin and metoprolol as this regimen works well        Encephalopathy acute- (present on admission)   Assessment & Plan    Improving  Structural, secondary to intracranial hemorrhage  re-orientation, encourage sleep, lights on during day, minimize benzos/anticholinergic agents.         Hypomagnesemia- (present on admission)   Assessment & Plan    Electrolyte replacement protocol to maintain >2        Hypokalemia   Assessment & Plan    Electrolyte replacement protocol to maintain >4             VTE:  Contraindicated  Ulcer: Not Indicated  Lines: Mack Catheter  Ongoing indication addressed    I have performed a physical exam and reviewed and updated ROS and Plan today (9/28/2018). In review of yesterday's note (9/27/2018), there are no changes except as documented above.     Close neurologic monitoring and support of EVD management. This patient is critically ill, at high risk for decompensation leading to worsening vital organ dysfunction and death without critical care interventions.    Discussed patient condition and risk of morbidity and/or mortality with Hospitalist, Family, RN, RT, Pharmacy, , , Charge nurse / hot rounds and neurosurgery.      The patient remains critically ill.  Critical care time = 36 minutes in directly providing and coordinating critical care and extensive data review.  No time overlap and excludes procedures

## 2018-09-28 NOTE — PROGRESS NOTES
Neurosurgery Progress Note    Subjective:  Pt with IVH / SAH / SDH  Bilateral EVD day #6  Raised to 10mm yesterday  RN notes some drainage from scalp area  Very minimal drainage from right EVD-5cc/8hours  Left EVD-5-15/hour  Drainage remains bloody    Exam:  Awake. Eyes open.  Knows name   PERRL  Will follow simple commands.  Drainage around EVD insertion site on left (which is maintaing output)    BP  Min: 127/64  Max: 140/66  Pulse  Av.6  Min: 75  Max: 100  Resp  Av.5  Min: 15  Max: 48  Temp  Av.3 °C (99.2 °F)  Min: 36.8 °C (98.3 °F)  Max: 38.8 °C (101.8 °F)  SpO2  Av.4 %  Min: 96 %  Max: 100 %    ICP  Av.8 MM HG  Min: 1 MM HG  Max: 11 MM HG  CPP   Av.5  Min: 75  Max: 114    Recent Labs      18   0543  18   0345  18   0506   WBC  11.2*  11.1*  13.9*   RBC  3.66*  4.45  4.10*   HEMOGLOBIN  10.7*  13.1  12.0   HEMATOCRIT  32.0*  38.4  35.5*   MCV  87.4  86.7  86.6   MCH  29.2  29.2  29.3   MCHC  33.4*  33.7  33.8   RDW  43.0  43.4  42.7   PLATELETCT  203  220  215   MPV  10.4  10.6  10.7     Recent Labs      18   0543  18   0345  18   0506   SODIUM  139  140  135   POTASSIUM  3.6  3.5*  4.0   CHLORIDE  104  102  98   CO2  25  29  29   GLUCOSE  162*  126*  145*   BUN  22  25*  28*   CREATININE  0.51  0.65  0.60   CALCIUM  8.9  9.9  9.5     Recent Labs      18   0543  18   0345   INR  1.35*  1.25*           Intake/Output       18 0700 - 18 0659 18 0700 - 18 0659      [id number] Total [id number] 7585-2590 Total       Intake    P.O.  75  60 135  --  -- --    P.O. 75 60 135 -- -- --    I.V.  409  80 489  --  -- --    IV Volume (NS at TKO) 120 80 200 -- -- --    Volume (mL) (lactated ringers infusion) 289 -- 289 -- -- --    NG/GT  600  120 720  --  -- --    Intake (mL) (Enteral Tube 18 Cortrak - Gastric 10 Fr. Right nare) 600 120 720 -- -- --    IV Piggyback  50  50 100  --  -- --    Volume (mL) (ceFAZolin in  dextrose (ANCEF) IVPB premix 1 g) 50 50 100 -- -- --    Total Intake 0981 727 7625 -- -- --       Output    Urine  1675  1000 2675  --  -- --    Output (mL) (Urethral Catheter Latex 16 Fr.) 1675 1000 2675 -- -- --    Drains  119  117 236  7  -- 7    Output (mL) (ICP/Ventriculostomy Right) 7 13 20 1 -- 1    Output (mL) (ICP/Ventriculostomy Left) 112 104 216 6 -- 6    Stool  --  -- --  --  -- --    Number of Times Stooled 3 x -- 3 x -- -- --    Emesis/NG output  --  60 60  --  -- --    Output (mL) (Enteral Tube 09/25/18 Cortrak - Gastric 10 Fr. Right nare) -- 60 60 -- -- --    Total Output 1794 1177 2971 7 -- 7       Net I/O     -660 -867 -1527 -7 -- -7            Intake/Output Summary (Last 24 hours) at 09/28/18 0815  Last data filed at 09/28/18 0700   Gross per 24 hour   Intake             1138 ml   Output             2796 ml   Net            -1658 ml            • amLODIPine  5 mg Q DAY   • levETIRAcetam  500 mg BID   • metoprolol  50 mg Q8HRS   • Pharmacy   PRN   • labetalol  5-10 mg Q4HRS PRN   • acetaminophen  650 mg Q4HRS PRN    Or   • acetaminophen  650 mg Q4HRS PRN   • digoxin  125 mcg DAILY AT 1800   • ondansetron  4 mg Q4HRS PRN    Or   • ondansetron  4 mg Q4HRS PRN   • Pharmacy Consult Request   PRN    And   • oxyCODONE immediate-release  2.5 mg Q3HRS PRN    And   • oxyCODONE immediate-release  5 mg Q3HRS PRN    And   • HYDROmorphone  0.25-1 mg Q2HRS PRN   • polyethylene glycol/lytes  1 Packet QDAY PRN    And   • senna-docusate  2 Tab BID    And   • magnesium hydroxide  30 mL QDAY PRN    And   • bisacodyl  10 mg QDAY PRN   • Respiratory Care per Protocol   Continuous RT   • MD Alert...Adult ICU Electrolyte Replacement per Pharmacy   pharmacy to dose   • LORazepam  2 mg Q5 MIN PRN   • hydrALAZINE  10 mg Q4HRS PRN   • enalaprilat  1.25 mg Q6HRS PRN   • ceFAZolin  1 g Q8HRS       Assessment and Plan:  Hospital day #7  EVD #6  Minimal output from right EVD  Draining from EVD insertion site on left, but  maintaining output 5-15/hour  Will d/w Dr. Santizo, tpa ended yesterday  Continue IV Ancef  Dr. Santizo  reviewed yesterdays CT. Still concern with area of stroke with surrounding edema. Would do craniectomy as a life saving measure.

## 2018-09-29 ENCOUNTER — APPOINTMENT (OUTPATIENT)
Dept: RADIOLOGY | Facility: MEDICAL CENTER | Age: 78
DRG: 023 | End: 2018-09-29
Attending: HOSPITALIST
Payer: MEDICARE

## 2018-09-29 LAB
ANION GAP SERPL CALC-SCNC: 10 MMOL/L (ref 0–11.9)
BASOPHILS # BLD AUTO: 0.3 % (ref 0–1.8)
BASOPHILS # BLD: 0.04 K/UL (ref 0–0.12)
BUN SERPL-MCNC: 31 MG/DL (ref 8–22)
CALCIUM SERPL-MCNC: 9.2 MG/DL (ref 8.5–10.5)
CHLORIDE SERPL-SCNC: 99 MMOL/L (ref 96–112)
CO2 SERPL-SCNC: 27 MMOL/L (ref 20–33)
CREAT SERPL-MCNC: 0.55 MG/DL (ref 0.5–1.4)
EOSINOPHIL # BLD AUTO: 0.18 K/UL (ref 0–0.51)
EOSINOPHIL NFR BLD: 1.3 % (ref 0–6.9)
ERYTHROCYTE [DISTWIDTH] IN BLOOD BY AUTOMATED COUNT: 41.9 FL (ref 35.9–50)
GLUCOSE SERPL-MCNC: 142 MG/DL (ref 65–99)
HCT VFR BLD AUTO: 35.1 % (ref 37–47)
HGB BLD-MCNC: 12.3 G/DL (ref 12–16)
IMM GRANULOCYTES # BLD AUTO: 0.14 K/UL (ref 0–0.11)
IMM GRANULOCYTES NFR BLD AUTO: 1 % (ref 0–0.9)
INR PPP: 1.17 (ref 0.87–1.13)
LYMPHOCYTES # BLD AUTO: 1.83 K/UL (ref 1–4.8)
LYMPHOCYTES NFR BLD: 12.8 % (ref 22–41)
MAGNESIUM SERPL-MCNC: 2.2 MG/DL (ref 1.5–2.5)
MCH RBC QN AUTO: 30.1 PG (ref 27–33)
MCHC RBC AUTO-ENTMCNC: 35 G/DL (ref 33.6–35)
MCV RBC AUTO: 86 FL (ref 81.4–97.8)
MONOCYTES # BLD AUTO: 1.48 K/UL (ref 0–0.85)
MONOCYTES NFR BLD AUTO: 10.3 % (ref 0–13.4)
NEUTROPHILS # BLD AUTO: 10.65 K/UL (ref 2–7.15)
NEUTROPHILS NFR BLD: 74.3 % (ref 44–72)
NRBC # BLD AUTO: 0 K/UL
NRBC BLD-RTO: 0 /100 WBC
PLATELET # BLD AUTO: 238 K/UL (ref 164–446)
PMV BLD AUTO: 10.5 FL (ref 9–12.9)
POTASSIUM SERPL-SCNC: 4 MMOL/L (ref 3.6–5.5)
PROTHROMBIN TIME: 15 SEC (ref 12–14.6)
RBC # BLD AUTO: 4.08 M/UL (ref 4.2–5.4)
SODIUM SERPL-SCNC: 136 MMOL/L (ref 135–145)
WBC # BLD AUTO: 14.3 K/UL (ref 4.8–10.8)

## 2018-09-29 PROCEDURE — 770022 HCHG ROOM/CARE - ICU (200)

## 2018-09-29 PROCEDURE — 99291 CRITICAL CARE FIRST HOUR: CPT | Performed by: INTERNAL MEDICINE

## 2018-09-29 PROCEDURE — 83735 ASSAY OF MAGNESIUM: CPT

## 2018-09-29 PROCEDURE — 700102 HCHG RX REV CODE 250 W/ 637 OVERRIDE(OP): Performed by: HOSPITALIST

## 2018-09-29 PROCEDURE — 85610 PROTHROMBIN TIME: CPT

## 2018-09-29 PROCEDURE — A9270 NON-COVERED ITEM OR SERVICE: HCPCS | Performed by: HOSPITALIST

## 2018-09-29 PROCEDURE — 85025 COMPLETE CBC W/AUTO DIFF WBC: CPT

## 2018-09-29 PROCEDURE — 99233 SBSQ HOSP IP/OBS HIGH 50: CPT | Performed by: HOSPITALIST

## 2018-09-29 PROCEDURE — 71045 X-RAY EXAM CHEST 1 VIEW: CPT

## 2018-09-29 PROCEDURE — 700111 HCHG RX REV CODE 636 W/ 250 OVERRIDE (IP): Performed by: NEUROLOGICAL SURGERY

## 2018-09-29 PROCEDURE — 80048 BASIC METABOLIC PNL TOTAL CA: CPT

## 2018-09-29 RX ADMIN — CEFAZOLIN SODIUM 1 G: 1 INJECTION, SOLUTION INTRAVENOUS at 14:19

## 2018-09-29 RX ADMIN — SENNOSIDES AND DOCUSATE SODIUM 2 TABLET: 8.6; 5 TABLET ORAL at 05:20

## 2018-09-29 RX ADMIN — AMLODIPINE BESYLATE 5 MG: 5 TABLET ORAL at 05:20

## 2018-09-29 RX ADMIN — METOPROLOL TARTRATE 50 MG: 50 TABLET ORAL at 05:20

## 2018-09-29 RX ADMIN — CEFAZOLIN SODIUM 1 G: 1 INJECTION, SOLUTION INTRAVENOUS at 20:54

## 2018-09-29 RX ADMIN — DIGOXIN 125 MCG: 125 TABLET ORAL at 17:53

## 2018-09-29 RX ADMIN — CEFAZOLIN SODIUM 1 G: 1 INJECTION, SOLUTION INTRAVENOUS at 05:23

## 2018-09-29 RX ADMIN — OXYCODONE HYDROCHLORIDE 2.5 MG: 5 TABLET ORAL at 20:54

## 2018-09-29 RX ADMIN — METOPROLOL TARTRATE 50 MG: 50 TABLET ORAL at 20:54

## 2018-09-29 RX ADMIN — METOPROLOL TARTRATE 50 MG: 50 TABLET ORAL at 14:19

## 2018-09-29 ASSESSMENT — PAIN SCALES - GENERAL
PAINLEVEL_OUTOF10: 0
PAINLEVEL_OUTOF10: 7
PAINLEVEL_OUTOF10: 0

## 2018-09-29 ASSESSMENT — COGNITIVE AND FUNCTIONAL STATUS - GENERAL
DAILY ACTIVITIY SCORE: 6
EATING MEALS: TOTAL
STANDING UP FROM CHAIR USING ARMS: TOTAL
CLIMB 3 TO 5 STEPS WITH RAILING: TOTAL
SUGGESTED CMS G CODE MODIFIER DAILY ACTIVITY: CN
HELP NEEDED FOR BATHING: TOTAL
DRESSING REGULAR UPPER BODY CLOTHING: TOTAL
TURNING FROM BACK TO SIDE WHILE IN FLAT BAD: UNABLE
MOBILITY SCORE: 6
MOVING TO AND FROM BED TO CHAIR: UNABLE
TOILETING: TOTAL
MOVING FROM LYING ON BACK TO SITTING ON SIDE OF FLAT BED: UNABLE
DRESSING REGULAR LOWER BODY CLOTHING: TOTAL
WALKING IN HOSPITAL ROOM: TOTAL
SUGGESTED CMS G CODE MODIFIER MOBILITY: CN
PERSONAL GROOMING: TOTAL

## 2018-09-29 ASSESSMENT — PATIENT HEALTH QUESTIONNAIRE - PHQ9
1. LITTLE INTEREST OR PLEASURE IN DOING THINGS: NOT AT ALL
SUM OF ALL RESPONSES TO PHQ9 QUESTIONS 1 AND 2: 0
2. FEELING DOWN, DEPRESSED, IRRITABLE, OR HOPELESS: NOT AT ALL

## 2018-09-29 NOTE — PROGRESS NOTES
Critical Care Progress Note    Date of admission  9/22/2018    Chief Complaint  78 y.o. female admitted 9/22/2018 with intracranial hemorrhage.    Hospital Course  This lady was admitted with an acute intracranial hemorrhage.  She is undergone bilateral EVDs for hydrocephalus.    Interval Problem Update  Reviewed last 24 hour events:   - NAEON, R  mL, L EVD 98 mL, ICP 1-3   - Neuro: follows, intermittently speaks   - HR: 70s-90s SR   - BP: 130s-140s   - GI: TF at goal   - UOP: 1.7L/24hrs   - Mack: yes   - Tm: 100.6   - Lines: PIV   - PPx: GI not indicate, DVT SCDs   - 2L NC   - Ancef for EVD    Yesterday   - EVD x2 in place R ventric 20, L ventric 216,    - Neuro: follows, sedate   - HR: 70s-90s   - BP: 130s-150s   - GI: TF at goal, BMS out   - UOP: 1L overnight   - Mack: yes   - Tm: 100.6   - Lines: EVD x2,    - PPx: GI not indicated, DVT SCDs   -2 L nasal cannula    Review of Systems  Review of Systems   Unable to perform ROS: Acuity of condition        Vital Signs for last 24 hours   Temp:  [35.7 °C (96.3 °F)-38.1 °C (100.6 °F)] 37.3 °C (99.2 °F)  Pulse:  [71-99] 74  Resp:  [20-64] 30  BP: (114-123)/(65-66) 114/66    Hemodynamic parameters for last 24 hours       Vent Settings for last 24 hours       Physical Exam   Physical Exam   HENT:   Head: Normocephalic.   Right Ear: External ear normal.   Left Ear: External ear normal.   Mouth/Throat: Oropharynx is clear and moist.   Bruising along her left forehead and left face - improving.  Bilateral EVDs in place, draining blood-tinged CSF   Eyes: Pupils are equal, round, and reactive to light. Conjunctivae are normal. Right eye exhibits no discharge. Left eye exhibits no discharge. No scleral icterus.   Neck: Neck supple. No tracheal deviation present.   Cardiovascular: Exam reveals no gallop and no friction rub.    No murmur heard.  Pulmonary/Chest: No stridor. She has no wheezes. She has no rales.   Abdominal: Soft. Bowel sounds are normal. She exhibits no  distension. There is no tenderness.   Musculoskeletal: She exhibits no tenderness or deformity.   No clubbing or cyanosis   Neurological:   Continues to be interactive today, following commands in all 4 ext   Skin: Skin is warm and dry. No rash noted. She is not diaphoretic. No erythema. No pallor.   Nursing note and vitals reviewed.      Medications  Current Facility-Administered Medications   Medication Dose Route Frequency Provider Last Rate Last Dose   • amLODIPine (NORVASC) tablet 5 mg  5 mg Per NG Tube Q DAY Andres Wells D.O.   5 mg at 09/29/18 0520   • metoprolol (LOPRESSOR) tablet 50 mg  50 mg Feeding Tube Q8HRS Andres Wells D.O.   50 mg at 09/29/18 0520   • Pharmacy Consult: Enteral tube feeding - review meds/change route/product selection   Other PRN Gil Leone Jr., D.O.       • labetalol (NORMODYNE,TRANDATE) injection 5-10 mg  5-10 mg Intravenous Q4HRS PRN Gil Leone Jr., D.O.   5 mg at 09/26/18 0303   • acetaminophen (TYLENOL) tablet 650 mg  650 mg Feeding Tube Q4HRS PRMO Medina M.D.   650 mg at 09/28/18 1013    Or   • acetaminophen (TYLENOL) suppository 650 mg  650 mg Rectal Q4HRS PRMO Medina M.D.       • digoxin (LANOXIN) tablet 125 mcg  125 mcg Feeding Tube DAILY AT 1800 William Medina M.D.   125 mcg at 09/28/18 1732   • ondansetron (ZOFRAN ODT) dispertab 4 mg  4 mg Feeding Tube Q4HRS PRMO Medina M.D.        Or   • ondansetron (ZOFRAN) syringe/vial injection 4 mg  4 mg Intravenous Q4HRS PRMO Medina M.D.       • Pharmacy Consult Request ...Pain Management Review   Other PRMO Medina M.D.        And   • oxyCODONE immediate-release (ROXICODONE) tablet 2.5 mg  2.5 mg Feeding Tube Q3HRS PRN William Medina M.D.   2.5 mg at 09/28/18 2014    And   • oxyCODONE immediate-release (ROXICODONE) tablet 5 mg  5 mg Feeding Tube Q3HRS PRN William Medina M.D.        And   • HYDROmorphone (DILAUDID) injection 0.25-1 mg  0.25-1 mg Intravenous Q2HRS PRN William ARANGO  JAYRO Medina   1 mg at 09/25/18 0445   • polyethylene glycol/lytes (MIRALAX) PACKET 1 Packet  1 Packet Feeding Tube QDAY PRN William Medina M.D.   1 Packet at 09/26/18 1725    And   • senna-docusate (PERICOLACE or SENOKOT S) 8.6-50 MG per tablet 2 Tab  2 Tab Feeding Tube BID William Medina M.D.   2 Tab at 09/29/18 0520    And   • magnesium hydroxide (MILK OF MAGNESIA) suspension 30 mL  30 mL Feeding Tube QDAY PRN William Medina M.D.        And   • bisacodyl (DULCOLAX) suppository 10 mg  10 mg Rectal QDAY PRN William Medina M.D.       • Respiratory Care per Protocol   Nebulization Continuous RT Rowena Calero M.D.       • MD Alert...ICU Electrolyte Replacement per Pharmacy   Other pharmacy to dose Rowena Calero M.D.       • LORazepam (ATIVAN) injection 2 mg  2 mg Intravenous Q5 MIN PRN Rowena Calero M.D.       • hydrALAZINE (APRESOLINE) injection 10 mg  10 mg Intravenous Q4HRS PRN Rowena Calero M.D.       • enalaprilat (VASOTEC) injection 1.25 mg  1.25 mg Intravenous Q6HRS PRN Rowena Calero M.D.       • ceFAZolin in dextrose (ANCEF) IVPB premix 1 g  1 g Intravenous Q8HRS Juan Alberto Santizo III, M.D.   Stopped at 09/29/18 0553       Fluids    Intake/Output Summary (Last 24 hours) at 09/29/18 0745  Last data filed at 09/29/18 0700   Gross per 24 hour   Intake             1990 ml   Output             2050 ml   Net              -60 ml       Laboratory  Recent Results (from the past 48 hour(s))   CBC WITH DIFFERENTIAL    Collection Time: 09/28/18  5:06 AM   Result Value Ref Range    WBC 13.9 (H) 4.8 - 10.8 K/uL    RBC 4.10 (L) 4.20 - 5.40 M/uL    Hemoglobin 12.0 12.0 - 16.0 g/dL    Hematocrit 35.5 (L) 37.0 - 47.0 %    MCV 86.6 81.4 - 97.8 fL    MCH 29.3 27.0 - 33.0 pg    MCHC 33.8 33.6 - 35.0 g/dL    RDW 42.7 35.9 - 50.0 fL    Platelet Count 215 164 - 446 K/uL    MPV 10.7 9.0 - 12.9 fL    Neutrophils-Polys 79.40 (H) 44.00 - 72.00 %    Lymphocytes 9.70 (L) 22.00 - 41.00 %    Monocytes 8.50 0.00 - 13.40 %    Eosinophils  1.10 0.00 - 6.90 %    Basophils 0.40 0.00 - 1.80 %    Immature Granulocytes 0.90 0.00 - 0.90 %    Nucleated RBC 0.00 /100 WBC    Neutrophils (Absolute) 11.08 (H) 2.00 - 7.15 K/uL    Lymphs (Absolute) 1.35 1.00 - 4.80 K/uL    Monos (Absolute) 1.18 (H) 0.00 - 0.85 K/uL    Eos (Absolute) 0.15 0.00 - 0.51 K/uL    Baso (Absolute) 0.05 0.00 - 0.12 K/uL    Immature Granulocytes (abs) 0.13 (H) 0.00 - 0.11 K/uL    NRBC (Absolute) 0.00 K/uL   BASIC METABOLIC PANEL    Collection Time: 09/28/18  5:06 AM   Result Value Ref Range    Sodium 135 135 - 145 mmol/L    Potassium 4.0 3.6 - 5.5 mmol/L    Chloride 98 96 - 112 mmol/L    Co2 29 20 - 33 mmol/L    Glucose 145 (H) 65 - 99 mg/dL    Bun 28 (H) 8 - 22 mg/dL    Creatinine 0.60 0.50 - 1.40 mg/dL    Calcium 9.5 8.5 - 10.5 mg/dL    Anion Gap 8.0 0.0 - 11.9   MAGNESIUM    Collection Time: 09/28/18  5:06 AM   Result Value Ref Range    Magnesium 2.0 1.5 - 2.5 mg/dL   ESTIMATED GFR    Collection Time: 09/28/18  5:06 AM   Result Value Ref Range    GFR If African American >60 >60 mL/min/1.73 m 2    GFR If Non African American >60 >60 mL/min/1.73 m 2   CBC WITH DIFFERENTIAL    Collection Time: 09/29/18  5:30 AM   Result Value Ref Range    WBC 14.3 (H) 4.8 - 10.8 K/uL    RBC 4.08 (L) 4.20 - 5.40 M/uL    Hemoglobin 12.3 12.0 - 16.0 g/dL    Hematocrit 35.1 (L) 37.0 - 47.0 %    MCV 86.0 81.4 - 97.8 fL    MCH 30.1 27.0 - 33.0 pg    MCHC 35.0 33.6 - 35.0 g/dL    RDW 41.9 35.9 - 50.0 fL    Platelet Count 238 164 - 446 K/uL    MPV 10.5 9.0 - 12.9 fL    Neutrophils-Polys 74.30 (H) 44.00 - 72.00 %    Lymphocytes 12.80 (L) 22.00 - 41.00 %    Monocytes 10.30 0.00 - 13.40 %    Eosinophils 1.30 0.00 - 6.90 %    Basophils 0.30 0.00 - 1.80 %    Immature Granulocytes 1.00 (H) 0.00 - 0.90 %    Nucleated RBC 0.00 /100 WBC    Neutrophils (Absolute) 10.65 (H) 2.00 - 7.15 K/uL    Lymphs (Absolute) 1.83 1.00 - 4.80 K/uL    Monos (Absolute) 1.48 (H) 0.00 - 0.85 K/uL    Eos (Absolute) 0.18 0.00 - 0.51 K/uL    Baso  (Absolute) 0.04 0.00 - 0.12 K/uL    Immature Granulocytes (abs) 0.14 (H) 0.00 - 0.11 K/uL    NRBC (Absolute) 0.00 K/uL   BASIC METABOLIC PANEL    Collection Time: 09/29/18  5:30 AM   Result Value Ref Range    Sodium 136 135 - 145 mmol/L    Potassium 4.0 3.6 - 5.5 mmol/L    Chloride 99 96 - 112 mmol/L    Co2 27 20 - 33 mmol/L    Glucose 142 (H) 65 - 99 mg/dL    Bun 31 (H) 8 - 22 mg/dL    Creatinine 0.55 0.50 - 1.40 mg/dL    Calcium 9.2 8.5 - 10.5 mg/dL    Anion Gap 10.0 0.0 - 11.9   MAGNESIUM    Collection Time: 09/29/18  5:30 AM   Result Value Ref Range    Magnesium 2.2 1.5 - 2.5 mg/dL   PROTHROMBIN TIME    Collection Time: 09/29/18  5:30 AM   Result Value Ref Range    PT 15.0 (H) 12.0 - 14.6 sec    INR 1.17 (H) 0.87 - 1.13   ESTIMATED GFR    Collection Time: 09/29/18  5:30 AM   Result Value Ref Range    GFR If African American >60 >60 mL/min/1.73 m 2    GFR If Non African American >60 >60 mL/min/1.73 m 2       Imaging  CT:    Reviewed    Assessment/Plan  Acute intracranial hemorrhage (HCC)- (present on admission)   Assessment & Plan    S/P fall 1 week ago - CT head on 9/14 negative for acute intracranial hemorrhage  CT scan on 9/22 with intraventricular hemorrhage, right greater than left and intraparenchymal hemorrhage  Noncommunicating hydrocephalus - s/p emergent bilateral EVD placement by Dr. Santizo on 9/22.  Intraventricular TPA given by NeuroSx with improvement in EVD output  Continue Keppra 500 mg IV twice daily for seizure prophylaxis  Strict blood pressure control - goal SBP <160 mmHg  Continue close neuro checks  Repeat CT on 9/28 with improved shift again from 1 cm to 7.1 mm, continue EVD drainage this weekend        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)- (present on admission)   Assessment & Plan    Reversed on hospital day 1        Paroxysmal atrial fibrillation (HCC)- (present on admission)   Assessment & Plan    Previously on apixaban, 5 mg twice daily - now strictly  contraindicated due to ICH  Continue digoxin and metoprolol as this regimen has been working well        Encephalopathy acute- (present on admission)   Assessment & Plan    Improves every day  Structural, secondary to intracranial hemorrhage  Re-orientation, family, glasses, hearing aids, sleep, lights on during day, treat pain, ambulate, minimize benzos/anticholinergic agents        Hypomagnesemia- (present on admission)   Assessment & Plan    Electrolyte replacement protocol to maintain >2        Hypokalemia   Assessment & Plan    Electrolyte replacement protocol to maintain >4             VTE:  Contraindicated  Ulcer: Not Indicated  Lines: Mack Catheter  Ongoing indication addressed    I have performed a physical exam and reviewed and updated ROS and Plan today (9/29/2018). In review of yesterday's note (9/28/2018), there are no changes except as documented above.     Close neuro checks while EVD in place, at very high likelihood for neurologic decompensation with hydrocephalus and midline shift. This patient is critically ill, at high risk for decompensation leading to worsening vital organ dysfunction and death without critical care interventions.    Discussed patient condition and risk of morbidity and/or mortality with Hospitalist, Family, RN, RT, Pharmacy, , , Charge nurse / hot rounds and neurosurgery.      The patient remains critically ill.  Critical care time = 33 minutes in directly providing and coordinating critical care and extensive data review.  No time overlap and excludes procedures

## 2018-09-29 NOTE — PROGRESS NOTES
Neurosurgery Progress Note    Subjective:  Pt with IVH / SAH / SDH  Bilateral EVD day #7  Raised to 10mm yesterday  RN noted some drainage from scalp area yesterday but nothing was draining when  and I stopped over between surgeries.    Turban applied yesterday with no strike through   EVD output 0-1cc/hr on R and 4-6 cc/hr22 on   Drainage remains blood tinged    Exam:  Awake. Eyes open.  Knows name   PERRL  Will follow simple commands.  No visble Drainage around EVD insertion site     BP  Min: 114/66  Max: 123/65  Pulse  Av.4  Min: 71  Max: 96  Resp  Av.3  Min: 20  Max: 64  Temp  Av.3 °C (99.1 °F)  Min: 36.8 °C (98.3 °F)  Max: 37.8 °C (100 °F)  SpO2  Av.5 %  Min: 93 %  Max: 98 %    ICP  Av.4 MM HG  Min: 1 MM HG  Max: 4 MM HG    Recent Labs      18   0345  18   0506  18   0530   WBC  11.1*  13.9*  14.3*   RBC  4.45  4.10*  4.08*   HEMOGLOBIN  13.1  12.0  12.3   HEMATOCRIT  38.4  35.5*  35.1*   MCV  86.7  86.6  86.0   MCH  29.2  29.3  30.1   MCHC  33.7  33.8  35.0   RDW  43.4  42.7  41.9   PLATELETCT  220  215  238   MPV  10.6  10.7  10.5     Recent Labs      18   0345  18   0506  18   0530   SODIUM  140  135  136   POTASSIUM  3.5*  4.0  4.0   CHLORIDE  102  98  99   CO2  29  29  27   GLUCOSE  126*  145*  142*   BUN  25*  28*  31*   CREATININE  0.65  0.60  0.55   CALCIUM  9.9  9.5  9.2     Recent Labs      18   0345  18   0530   INR  1.25*  1.17*           Intake/Output       18 0700 - 18 0659 18 0700 - 18 0659      2799-3598 6743-9007 Total 4501-5921 9260-7711 Total       Intake    P.O.  40  -- 40  --  -- --    P.O. 40 -- 40 -- -- --    I.V.  120  -- 120  --  -- --    IV Volume (NS at TKO) 120 -- 120 -- -- --    Other  140  -- 140  30  -- 30    Medications (PO/Enteral Liquids) 140 -- 140 30 -- 30    NG/GT    240  -- 240    Intake (mL) (Enteral Tube 18 Cortrak - Gastric 10 Fr. Right nare) 436 910  1440 240 -- 240    IV Piggyback  50  100 150  100  -- 100    Volume (mL) (ceFAZolin in dextrose (ANCEF) IVPB premix 1 g) 50 100 150 100 -- 100    Total Intake 9993 589 7650 370 -- 370       Output    Urine  1265  445 1710  385  -- 385    Output (mL) (Urethral Catheter Latex 16 Fr.) 2193 698 7109 385 -- 385    Drains  90  126 216  46  -- 46    Output (mL) (ICP/Ventriculostomy Right) 53 65 118 19 -- 19    Output (mL) (ICP/Ventriculostomy Left) 37 61 98 27 -- 27    Total Output 5512 619 8880 431 -- 431       Net I/O     -285 249 -36 -61 -- -61            Intake/Output Summary (Last 24 hours) at 09/29/18 1228  Last data filed at 09/29/18 1200   Gross per 24 hour   Intake             1730 ml   Output             1842 ml   Net             -112 ml            • amLODIPine  5 mg Q DAY   • metoprolol  50 mg Q8HRS   • Pharmacy   PRN   • labetalol  5-10 mg Q4HRS PRN   • acetaminophen  650 mg Q4HRS PRN    Or   • acetaminophen  650 mg Q4HRS PRN   • digoxin  125 mcg DAILY AT 1800   • ondansetron  4 mg Q4HRS PRN    Or   • ondansetron  4 mg Q4HRS PRN   • Pharmacy Consult Request   PRN    And   • oxyCODONE immediate-release  2.5 mg Q3HRS PRN    And   • oxyCODONE immediate-release  5 mg Q3HRS PRN    And   • HYDROmorphone  0.25-1 mg Q2HRS PRN   • polyethylene glycol/lytes  1 Packet QDAY PRN    And   • senna-docusate  2 Tab BID    And   • magnesium hydroxide  30 mL QDAY PRN    And   • bisacodyl  10 mg QDAY PRN   • Respiratory Care per Protocol   Continuous RT   • MD Alert...Adult ICU Electrolyte Replacement per Pharmacy   pharmacy to dose   • LORazepam  2 mg Q5 MIN PRN   • hydrALAZINE  10 mg Q4HRS PRN   • enalaprilat  1.25 mg Q6HRS PRN   • ceFAZolin  1 g Q8HRS       Assessment and Plan:  Hospital day #8  EVD #7  Minimal output from right EVD and mild 4-6 cc/hr on Left EVD site  Dr Santizo plans to leave drains in place thru weekend and start challengingto remove then next week.  Would do craniectomy as a life saving measure only.

## 2018-09-29 NOTE — PROGRESS NOTES
[facility] Hospitalist Progress Note    Date of Service: 2018    Chief Complaint  78 y.o. female admitted 2018 with HA to [facility].  Had had a GLF with neg CT one week prior.  CT on this admit showing SDH and SAH  B EVDs placed Dr Santizo     PMHx PAFIb, AC on apixaban    Interval Problem Update  ROS limited by mental status.  On my exam pt denies all  O x 2  Oxy for HA  sinus rate controled  -150s no prns  2 LNC  TFs goal, ok for ice chips per SLP  UOP 445ml/12hrs  ICPs 1-3  EVDs at zero    Consultants/Specialty  Neurosurgery  Pulmonology    Disposition  Cont in ICU for close monitoring of Neuro status    Dw daughters at bedside x 20mins.          Review of Systems   Unable to perform ROS: Mental status change      Physical Exam  Laboratory/Imaging   Hemodynamics  Temp (24hrs), Av.3 °C (99.1 °F), Min:35.7 °C (96.3 °F), Max:38.1 °C (100.6 °F)   Temperature: 37.3 °C (99.2 °F)  Pulse  Av.1  Min: 60  Max: 131 Heart Rate (Monitored): 90  Blood Pressure : 114/66, NIBP: 151/68      Respiratory      Respiration: (!) 21, Pulse Oximetry: 94 %     Work Of Breathing / Effort: Mild  RUL Breath Sounds: Clear, RML Breath Sounds: Clear, RLL Breath Sounds: Diminished, JUAN Breath Sounds: Clear, LLL Breath Sounds: Diminished    Fluids    Intake/Output Summary (Last 24 hours) at 18 0558  Last data filed at 18 0400   Gross per 24 hour   Intake             1820 ml   Output             2082 ml   Net             -262 ml       Nutrition  Orders Placed This Encounter   Procedures   • Diet NPO     Standing Status:   Standing     Number of Occurrences:   1     Order Specific Question:   Restrict to:     Answer:   Strict [1]     Physical Exam   Constitutional: She appears well-developed and well-nourished. No distress.   HENT:   Head: Normocephalic and atraumatic.   Post op dressing   Neck: No JVD present.   Cardiovascular: Normal rate and regular rhythm.    Pulmonary/Chest: Effort normal. No stridor. No  respiratory distress. She has no wheezes. She has no rales.   Abdominal: Soft. There is no tenderness. There is no rebound and no guarding.   Musculoskeletal: She exhibits edema.   Neurological: She is alert.   O to place and person  Moving x 4  Responses slightly delayed, affect flat   Skin: Skin is warm and dry. No rash noted. She is not diaphoretic.   Nursing note and vitals reviewed.      Recent Labs      09/27/18   0345  09/28/18   0506  09/29/18   0530   WBC  11.1*  13.9*  14.3*   RBC  4.45  4.10*  4.08*   HEMOGLOBIN  13.1  12.0  12.3   HEMATOCRIT  38.4  35.5*  35.1*   MCV  86.7  86.6  86.0   MCH  29.2  29.3  30.1   MCHC  33.7  33.8  35.0   RDW  43.4  42.7  41.9   PLATELETCT  220  215  238   MPV  10.6  10.7  10.5     Recent Labs      09/27/18   0345  09/28/18   0506   SODIUM  140  135   POTASSIUM  3.5*  4.0   CHLORIDE  102  98   CO2  29  29   GLUCOSE  126*  145*   BUN  25*  28*   CREATININE  0.65  0.60   CALCIUM  9.9  9.5     Recent Labs      09/27/18   0345   INR  1.25*                  Assessment/Plan     Acute intracranial hemorrhage (HCC)- (present on admission)   Assessment & Plan    Anticoagulation reveresed  S/P bilateral EVDs: at zero today, plan challenge per Dr Santizo on Monday  Repeat CT stable  Monitor ICP continuously  Neurosurgery following  Close BP control  Mentation is improving slowly        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)- (present on admission)   Assessment & Plan    Anticoagulation reveresed        Paroxysmal atrial fibrillation (HCC)- (present on admission)   Assessment & Plan    On metoprolol 50mg Q8, Dig 125mcg QD  Prn Dilt for breakthrough rate control  No anticoagulation due to ICH        Hypomagnesemia- (present on admission)   Assessment & Plan    Replace  Follow daily        Hypokalemia   Assessment & Plan    Replace  Follow daily          Quality-Core Measures   Reviewed items::  EKG reviewed, Labs reviewed, Medications reviewed and Radiology  images reviewed  DVT prophylaxis pharmacological::  Contraindicated - High bleeding risk  DVT prophylaxis - mechanical:  SCDs  Ulcer Prophylaxis::  Not indicated

## 2018-09-30 LAB
ANION GAP SERPL CALC-SCNC: 8 MMOL/L (ref 0–11.9)
BASOPHILS # BLD AUTO: 0.4 % (ref 0–1.8)
BASOPHILS # BLD: 0.06 K/UL (ref 0–0.12)
BUN SERPL-MCNC: 27 MG/DL (ref 8–22)
CALCIUM SERPL-MCNC: 9.3 MG/DL (ref 8.5–10.5)
CHLORIDE SERPL-SCNC: 98 MMOL/L (ref 96–112)
CO2 SERPL-SCNC: 29 MMOL/L (ref 20–33)
CREAT SERPL-MCNC: 0.52 MG/DL (ref 0.5–1.4)
EOSINOPHIL # BLD AUTO: 0.19 K/UL (ref 0–0.51)
EOSINOPHIL NFR BLD: 1.3 % (ref 0–6.9)
ERYTHROCYTE [DISTWIDTH] IN BLOOD BY AUTOMATED COUNT: 40.7 FL (ref 35.9–50)
GLUCOSE SERPL-MCNC: 116 MG/DL (ref 65–99)
HCT VFR BLD AUTO: 37.2 % (ref 37–47)
HGB BLD-MCNC: 12.6 G/DL (ref 12–16)
IMM GRANULOCYTES # BLD AUTO: 0.12 K/UL (ref 0–0.11)
IMM GRANULOCYTES NFR BLD AUTO: 0.8 % (ref 0–0.9)
INR PPP: 1.12 (ref 0.87–1.13)
LYMPHOCYTES # BLD AUTO: 1.88 K/UL (ref 1–4.8)
LYMPHOCYTES NFR BLD: 12.4 % (ref 22–41)
MAGNESIUM SERPL-MCNC: 2.4 MG/DL (ref 1.5–2.5)
MCH RBC QN AUTO: 29.4 PG (ref 27–33)
MCHC RBC AUTO-ENTMCNC: 33.9 G/DL (ref 33.6–35)
MCV RBC AUTO: 86.7 FL (ref 81.4–97.8)
MONOCYTES # BLD AUTO: 1.43 K/UL (ref 0–0.85)
MONOCYTES NFR BLD AUTO: 9.4 % (ref 0–13.4)
NEUTROPHILS # BLD AUTO: 11.52 K/UL (ref 2–7.15)
NEUTROPHILS NFR BLD: 75.7 % (ref 44–72)
NRBC # BLD AUTO: 0 K/UL
NRBC BLD-RTO: 0 /100 WBC
PLATELET # BLD AUTO: 232 K/UL (ref 164–446)
PMV BLD AUTO: 10.8 FL (ref 9–12.9)
POTASSIUM SERPL-SCNC: 4.3 MMOL/L (ref 3.6–5.5)
PROTHROMBIN TIME: 14.5 SEC (ref 12–14.6)
RBC # BLD AUTO: 4.29 M/UL (ref 4.2–5.4)
SODIUM SERPL-SCNC: 135 MMOL/L (ref 135–145)
WBC # BLD AUTO: 15.2 K/UL (ref 4.8–10.8)

## 2018-09-30 PROCEDURE — 85610 PROTHROMBIN TIME: CPT

## 2018-09-30 PROCEDURE — 700111 HCHG RX REV CODE 636 W/ 250 OVERRIDE (IP): Performed by: NEUROLOGICAL SURGERY

## 2018-09-30 PROCEDURE — A9270 NON-COVERED ITEM OR SERVICE: HCPCS | Performed by: HOSPITALIST

## 2018-09-30 PROCEDURE — 99233 SBSQ HOSP IP/OBS HIGH 50: CPT | Performed by: HOSPITALIST

## 2018-09-30 PROCEDURE — 700102 HCHG RX REV CODE 250 W/ 637 OVERRIDE(OP): Performed by: HOSPITALIST

## 2018-09-30 PROCEDURE — 700102 HCHG RX REV CODE 250 W/ 637 OVERRIDE(OP): Performed by: INTERNAL MEDICINE

## 2018-09-30 PROCEDURE — 80048 BASIC METABOLIC PNL TOTAL CA: CPT

## 2018-09-30 PROCEDURE — 83735 ASSAY OF MAGNESIUM: CPT

## 2018-09-30 PROCEDURE — A9270 NON-COVERED ITEM OR SERVICE: HCPCS | Performed by: INTERNAL MEDICINE

## 2018-09-30 PROCEDURE — 770022 HCHG ROOM/CARE - ICU (200)

## 2018-09-30 PROCEDURE — 85025 COMPLETE CBC W/AUTO DIFF WBC: CPT

## 2018-09-30 PROCEDURE — 99291 CRITICAL CARE FIRST HOUR: CPT | Performed by: INTERNAL MEDICINE

## 2018-09-30 RX ORDER — ACETAMINOPHEN 650 MG/1
650 SUPPOSITORY RECTAL EVERY 4 HOURS PRN
Status: DISCONTINUED | OUTPATIENT
Start: 2018-09-30 | End: 2018-10-09

## 2018-09-30 RX ORDER — ACETAMINOPHEN 325 MG/1
650 TABLET ORAL EVERY 4 HOURS PRN
Status: DISCONTINUED | OUTPATIENT
Start: 2018-09-30 | End: 2018-10-09

## 2018-09-30 RX ADMIN — SENNOSIDES AND DOCUSATE SODIUM 2 TABLET: 8.6; 5 TABLET ORAL at 05:42

## 2018-09-30 RX ADMIN — ACETAMINOPHEN 650 MG: 325 TABLET, FILM COATED ORAL at 21:22

## 2018-09-30 RX ADMIN — METOPROLOL TARTRATE 50 MG: 50 TABLET ORAL at 21:22

## 2018-09-30 RX ADMIN — METOPROLOL TARTRATE 50 MG: 50 TABLET ORAL at 05:42

## 2018-09-30 RX ADMIN — CEFAZOLIN SODIUM 1 G: 1 INJECTION, SOLUTION INTRAVENOUS at 14:40

## 2018-09-30 RX ADMIN — AMLODIPINE BESYLATE 5 MG: 5 TABLET ORAL at 05:42

## 2018-09-30 RX ADMIN — METOPROLOL TARTRATE 50 MG: 50 TABLET ORAL at 14:40

## 2018-09-30 RX ADMIN — DIGOXIN 125 MCG: 125 TABLET ORAL at 18:52

## 2018-09-30 RX ADMIN — CEFAZOLIN SODIUM 1 G: 1 INJECTION, SOLUTION INTRAVENOUS at 21:40

## 2018-09-30 RX ADMIN — OXYCODONE HYDROCHLORIDE 2.5 MG: 5 TABLET ORAL at 02:03

## 2018-09-30 RX ADMIN — ACETAMINOPHEN 650 MG: 325 TABLET, FILM COATED ORAL at 09:07

## 2018-09-30 RX ADMIN — CEFAZOLIN SODIUM 1 G: 1 INJECTION, SOLUTION INTRAVENOUS at 05:43

## 2018-09-30 RX ADMIN — SENNOSIDES AND DOCUSATE SODIUM 2 TABLET: 8.6; 5 TABLET ORAL at 18:52

## 2018-09-30 RX ADMIN — OXYCODONE HYDROCHLORIDE 5 MG: 5 TABLET ORAL at 22:31

## 2018-09-30 ASSESSMENT — PAIN SCALES - GENERAL
PAINLEVEL_OUTOF10: 0
PAINLEVEL_OUTOF10: 3
PAINLEVEL_OUTOF10: 4
PAINLEVEL_OUTOF10: 5
PAINLEVEL_OUTOF10: 0
PAINLEVEL_OUTOF10: 6
PAINLEVEL_OUTOF10: 5
PAINLEVEL_OUTOF10: 0
PAINLEVEL_OUTOF10: 0
PAINLEVEL_OUTOF10: 3
PAINLEVEL_OUTOF10: 0
PAINLEVEL_OUTOF10: 0
PAINLEVEL_OUTOF10: 1
PAINLEVEL_OUTOF10: 0
PAINLEVEL_OUTOF10: 0

## 2018-09-30 ASSESSMENT — ENCOUNTER SYMPTOMS
WEAKNESS: 1
CONFUSION: 1
ABDOMINAL PAIN: 0
WHEEZING: 0
ABDOMINAL DISTENTION: 0
PALPITATIONS: 0
FEVER: 0
BRUISES/BLEEDS EASILY: 0
VOICE CHANGE: 0
ARTHRALGIAS: 0
NAUSEA: 0
HEADACHES: 1
MYALGIAS: 0
SHORTNESS OF BREATH: 0

## 2018-09-30 NOTE — PROGRESS NOTES
Family members repositioned patient again to laying left.  RN educated family members not to move patient without RN supervision due to EVDs.  EVDs leveled.

## 2018-09-30 NOTE — PROGRESS NOTES
Critical Care Progress Note    Date of admission  9/22/2018    Chief Complaint  78 y.o. female admitted 9/22/2018 with intracranial hemorrhage.    Hospital Course  This lady was admitted with an acute intracranial hemorrhage.  She is undergone bilateral EVDs for hydrocephalus.    Interval Problem Update  Reviewed last 24 hour events:   - No acute events overnight   - Neuro: conversing, follows. ICP 1-4, L , R EVD 42   - HR: 70s-80s   - BP: 120s-130s   - GI: NPO, TF   - UOP: 805 overnight   - Mack: yes   - Tm: afeb   - Lines: PIV   - PPx: GI TF, DVT SCDs   - 2L NC    Yesterday   - NAEON, R  mL, L EVD 98 mL, ICP 1-3   - Neuro: follows, intermittently speaks   - HR: 70s-90s SR   - BP: 130s-140s   - GI: TF at goal   - UOP: 1.7L/24hrs   - Mack: yes   - Tm: 100.6   - Lines: PIV   - PPx: GI not indicate, DVT SCDs   - 2L NC   - Ancef for EVD    Review of Systems  Review of Systems   Unable to perform ROS: Acuity of condition   Constitutional: Negative for fever.   HENT: Negative for voice change.    Eyes: Negative for visual disturbance.   Respiratory: Negative for shortness of breath and wheezing.    Cardiovascular: Negative for chest pain and palpitations.   Gastrointestinal: Negative for abdominal distention, abdominal pain and nausea.   Genitourinary: Negative for difficulty urinating.   Musculoskeletal: Negative for arthralgias and myalgias.   Skin: Negative for rash.   Neurological: Positive for weakness and headaches.   Hematological: Does not bruise/bleed easily.   Psychiatric/Behavioral: Positive for confusion.        Vital Signs for last 24 hours   Temp:  [36.8 °C (98.3 °F)-37.8 °C (100 °F)] 37.2 °C (98.9 °F)  Pulse:  [] 74  Resp:  [16-48] 20  BP: (127-131)/(58-64) 127/58    Hemodynamic parameters for last 24 hours       Vent Settings for last 24 hours       Physical Exam   Physical Exam   Constitutional: She is oriented to person, place, and time.   HENT:   Head: Normocephalic.   Right Ear:  External ear normal.   Left Ear: External ear normal.   Mouth/Throat: Oropharynx is clear and moist.   Bruising along her left forehead and left face - improving.  Bilateral EVDs in place, draining blood-tinged CSF   Eyes: Pupils are equal, round, and reactive to light. Conjunctivae are normal. Right eye exhibits no discharge. Left eye exhibits no discharge. No scleral icterus.   Neck: Neck supple. No tracheal deviation present.   Cardiovascular: Normal rate, regular rhythm and normal heart sounds.    No murmur heard.  Pulmonary/Chest: No stridor. She has no wheezes. She has no rales.   Abdominal: Soft. Bowel sounds are normal. She exhibits no distension. There is no tenderness.   Musculoskeletal: She exhibits no tenderness or deformity.   No clubbing or cyanosis   Neurological: She is alert and oriented to person, place, and time. She exhibits abnormal muscle tone (generally weak, not focal).   Continues to be interactive today, following commands in all 4 ext   Skin: Skin is warm and dry. No rash noted. She is not diaphoretic.   Nursing note and vitals reviewed.      Medications  Current Facility-Administered Medications   Medication Dose Route Frequency Provider Last Rate Last Dose   • amLODIPine (NORVASC) tablet 5 mg  5 mg Per NG Tube Q DAY Andres Wells D.O.   5 mg at 09/30/18 0542   • metoprolol (LOPRESSOR) tablet 50 mg  50 mg Feeding Tube Q8HRS Andres Wells D.O.   50 mg at 09/30/18 0542   • Pharmacy Consult: Enteral tube feeding - review meds/change route/product selection   Other PRN Gil Leone Jr., D.O.       • labetalol (NORMODYNE,TRANDATE) injection 5-10 mg  5-10 mg Intravenous Q4HRS PRN Gil Leone Jr., D.O.   5 mg at 09/26/18 0303   • acetaminophen (TYLENOL) tablet 650 mg  650 mg Feeding Tube Q4HRS PRN William Medina M.D.   650 mg at 09/28/18 1013    Or   • acetaminophen (TYLENOL) suppository 650 mg  650 mg Rectal Q4HRS PRN William Medina M.D.       • digoxin (LANOXIN) tablet  125 mcg  125 mcg Feeding Tube DAILY AT 1800 William Medina M.D.   125 mcg at 09/29/18 1753   • ondansetron (ZOFRAN ODT) dispertab 4 mg  4 mg Feeding Tube Q4HRS PRMO Medina M.D.        Or   • ondansetron (ZOFRAN) syringe/vial injection 4 mg  4 mg Intravenous Q4HRS PRMO Medina M.D.       • Pharmacy Consult Request ...Pain Management Review   Other PRN William Medina M.D.        And   • oxyCODONE immediate-release (ROXICODONE) tablet 2.5 mg  2.5 mg Feeding Tube Q3HRS PRMO Medina M.D.   2.5 mg at 09/30/18 0203    And   • oxyCODONE immediate-release (ROXICODONE) tablet 5 mg  5 mg Feeding Tube Q3HRS PRN William Medina M.D.        And   • HYDROmorphone (DILAUDID) injection 0.25-1 mg  0.25-1 mg Intravenous Q2HRS PRMO Medina M.D.   1 mg at 09/25/18 0445   • polyethylene glycol/lytes (MIRALAX) PACKET 1 Packet  1 Packet Feeding Tube QDAY PRMO Medina M.D.   1 Packet at 09/26/18 1725    And   • senna-docusate (PERICOLACE or SENOKOT S) 8.6-50 MG per tablet 2 Tab  2 Tab Feeding Tube BID William Medina M.D.   2 Tab at 09/30/18 0542    And   • magnesium hydroxide (MILK OF MAGNESIA) suspension 30 mL  30 mL Feeding Tube QDAY PRN William Medina M.D.        And   • bisacodyl (DULCOLAX) suppository 10 mg  10 mg Rectal QDAY PRN William Medina M.D.       • Respiratory Care per Protocol   Nebulization Continuous RT Rowena Calero M.D.       • MD Alert...ICU Electrolyte Replacement per Pharmacy   Other pharmacy to dose Rowena Calero M.D.       • LORazepam (ATIVAN) injection 2 mg  2 mg Intravenous Q5 MIN PRN Rowena Calero M.D.       • hydrALAZINE (APRESOLINE) injection 10 mg  10 mg Intravenous Q4HRS PRN Rowena Calero M.D.       • enalaprilat (VASOTEC) injection 1.25 mg  1.25 mg Intravenous Q6HRS PRN Rowena Calero M.D.       • ceFAZolin in dextrose (ANCEF) IVPB premix 1 g  1 g Intravenous Q8HRS Juan Alberto Santizo III, M.D.   Stopped at 09/30/18 0613       Fluids    Intake/Output Summary (Last 24 hours) at  09/30/18 0744  Last data filed at 09/30/18 0613   Gross per 24 hour   Intake             1950 ml   Output             1622 ml   Net              328 ml       Laboratory  Recent Results (from the past 48 hour(s))   CBC WITH DIFFERENTIAL    Collection Time: 09/29/18  5:30 AM   Result Value Ref Range    WBC 14.3 (H) 4.8 - 10.8 K/uL    RBC 4.08 (L) 4.20 - 5.40 M/uL    Hemoglobin 12.3 12.0 - 16.0 g/dL    Hematocrit 35.1 (L) 37.0 - 47.0 %    MCV 86.0 81.4 - 97.8 fL    MCH 30.1 27.0 - 33.0 pg    MCHC 35.0 33.6 - 35.0 g/dL    RDW 41.9 35.9 - 50.0 fL    Platelet Count 238 164 - 446 K/uL    MPV 10.5 9.0 - 12.9 fL    Neutrophils-Polys 74.30 (H) 44.00 - 72.00 %    Lymphocytes 12.80 (L) 22.00 - 41.00 %    Monocytes 10.30 0.00 - 13.40 %    Eosinophils 1.30 0.00 - 6.90 %    Basophils 0.30 0.00 - 1.80 %    Immature Granulocytes 1.00 (H) 0.00 - 0.90 %    Nucleated RBC 0.00 /100 WBC    Neutrophils (Absolute) 10.65 (H) 2.00 - 7.15 K/uL    Lymphs (Absolute) 1.83 1.00 - 4.80 K/uL    Monos (Absolute) 1.48 (H) 0.00 - 0.85 K/uL    Eos (Absolute) 0.18 0.00 - 0.51 K/uL    Baso (Absolute) 0.04 0.00 - 0.12 K/uL    Immature Granulocytes (abs) 0.14 (H) 0.00 - 0.11 K/uL    NRBC (Absolute) 0.00 K/uL   BASIC METABOLIC PANEL    Collection Time: 09/29/18  5:30 AM   Result Value Ref Range    Sodium 136 135 - 145 mmol/L    Potassium 4.0 3.6 - 5.5 mmol/L    Chloride 99 96 - 112 mmol/L    Co2 27 20 - 33 mmol/L    Glucose 142 (H) 65 - 99 mg/dL    Bun 31 (H) 8 - 22 mg/dL    Creatinine 0.55 0.50 - 1.40 mg/dL    Calcium 9.2 8.5 - 10.5 mg/dL    Anion Gap 10.0 0.0 - 11.9   MAGNESIUM    Collection Time: 09/29/18  5:30 AM   Result Value Ref Range    Magnesium 2.2 1.5 - 2.5 mg/dL   PROTHROMBIN TIME    Collection Time: 09/29/18  5:30 AM   Result Value Ref Range    PT 15.0 (H) 12.0 - 14.6 sec    INR 1.17 (H) 0.87 - 1.13   ESTIMATED GFR    Collection Time: 09/29/18  5:30 AM   Result Value Ref Range    GFR If African American >60 >60 mL/min/1.73 m 2    GFR If Non  African American >60 >60 mL/min/1.73 m 2   CBC WITH DIFFERENTIAL    Collection Time: 09/30/18  4:00 AM   Result Value Ref Range    WBC 15.2 (H) 4.8 - 10.8 K/uL    RBC 4.29 4.20 - 5.40 M/uL    Hemoglobin 12.6 12.0 - 16.0 g/dL    Hematocrit 37.2 37.0 - 47.0 %    MCV 86.7 81.4 - 97.8 fL    MCH 29.4 27.0 - 33.0 pg    MCHC 33.9 33.6 - 35.0 g/dL    RDW 40.7 35.9 - 50.0 fL    Platelet Count 232 164 - 446 K/uL    MPV 10.8 9.0 - 12.9 fL    Neutrophils-Polys 75.70 (H) 44.00 - 72.00 %    Lymphocytes 12.40 (L) 22.00 - 41.00 %    Monocytes 9.40 0.00 - 13.40 %    Eosinophils 1.30 0.00 - 6.90 %    Basophils 0.40 0.00 - 1.80 %    Immature Granulocytes 0.80 0.00 - 0.90 %    Nucleated RBC 0.00 /100 WBC    Neutrophils (Absolute) 11.52 (H) 2.00 - 7.15 K/uL    Lymphs (Absolute) 1.88 1.00 - 4.80 K/uL    Monos (Absolute) 1.43 (H) 0.00 - 0.85 K/uL    Eos (Absolute) 0.19 0.00 - 0.51 K/uL    Baso (Absolute) 0.06 0.00 - 0.12 K/uL    Immature Granulocytes (abs) 0.12 (H) 0.00 - 0.11 K/uL    NRBC (Absolute) 0.00 K/uL   BASIC METABOLIC PANEL    Collection Time: 09/30/18  4:00 AM   Result Value Ref Range    Sodium 135 135 - 145 mmol/L    Potassium 4.3 3.6 - 5.5 mmol/L    Chloride 98 96 - 112 mmol/L    Co2 29 20 - 33 mmol/L    Glucose 116 (H) 65 - 99 mg/dL    Bun 27 (H) 8 - 22 mg/dL    Creatinine 0.52 0.50 - 1.40 mg/dL    Calcium 9.3 8.5 - 10.5 mg/dL    Anion Gap 8.0 0.0 - 11.9   MAGNESIUM    Collection Time: 09/30/18  4:00 AM   Result Value Ref Range    Magnesium 2.4 1.5 - 2.5 mg/dL   PROTHROMBIN TIME    Collection Time: 09/30/18  4:00 AM   Result Value Ref Range    PT 14.5 12.0 - 14.6 sec    INR 1.12 0.87 - 1.13   ESTIMATED GFR    Collection Time: 09/30/18  4:00 AM   Result Value Ref Range    GFR If African American >60 >60 mL/min/1.73 m 2    GFR If Non African American >60 >60 mL/min/1.73 m 2       Imaging  CT:    Reviewed    Assessment/Plan  Acute intracranial hemorrhage (HCC)- (present on admission)   Assessment & Plan    S/P fall 1 week  prior- CT head on 9/14 negative for acute intracranial hemorrhage  CT scan on 9/22 with intraventricular hemorrhage, right greater than left and intraparenchymal hemorrhage  Noncommunicating hydrocephalus - s/p emergent bilateral EVD placement by Dr. Santizo on 9/22.  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)- (present on admission)   Assessment & Plan    Reversed on hospital day 1        Paroxysmal atrial fibrillation (HCC)- (present on admission)   Assessment & Plan    Previously on apixaban, 5 mg twice daily - now strictly contraindicated due to ICH  Continue digoxin and metoprolol as this regimen has been working well        Encephalopathy acute- (present on admission)   Assessment & Plan    Improves every day  Structural, secondary to intracranial hemorrhage  Re-orientation, family, glasses, hearing aids, sleep, lights on during day, treat pain, ambulate, minimize benzos/anticholinergic agents.           Hypomagnesemia- (present on admission)   Assessment & Plan    Electrolyte replacement protocol to maintain >2        Hypokalemia   Assessment & Plan    Electrolyte replacement protocol to maintain >4             VTE:  Contraindicated  Ulcer: Not Indicated  Lines: Mack Catheter  Ongoing indication addressed    I have performed a physical exam and reviewed and updated ROS and Plan today (9/30/2018). In review of yesterday's note (9/29/2018), there are no changes except as documented above.     EVDs in place at high risk for sudden neurologic compensation. This patient is critically ill, at high risk for decompensation leading to worsening vital organ  dysfunction and death without critical care interventions.    Discussed patient condition and risk of morbidity and/or mortality with Hospitalist, Family, RN, RT, Pharmacy, , , Charge nurse / hot rounds and neurosurgery.      The patient remains critically ill.  Critical care time = 37 minutes in directly providing and coordinating critical care and extensive data review.  No time overlap and excludes procedures

## 2018-09-30 NOTE — CARE PLAN
Problem: Safety - Medical Restraint  Goal: Remains free of injury from restraints (Restraint for Interference with Medical Device)  INTERVENTIONS:  1. Determine that other, less restrictive measures have been tried or would not be effective before applying the restraint  2. Evaluate the patient's condition at the time of restraint application  3. Inform patient/family regarding the reason for restraint  4. Q2H: Monitor safety, psychosocial status, comfort, nutrition and hydration     Outcome: PROGRESSING AS EXPECTED  Q2 hr restraint assessment and documentation, education given to patient and family

## 2018-09-30 NOTE — CARE PLAN
Problem: Communication  Goal: The ability to communicate needs accurately and effectively will improve  Outcome: PROGRESSING AS EXPECTED  Patient intermittently using voice to express needs, more alert and interactive when family present,     Problem: Venous Thromboembolism (VTW)/Deep Vein Thrombosis (DVT) Prevention:  Goal: Patient will participate in Venous Thrombosis (VTE)/Deep Vein Thrombosis (DVT)Prevention Measures  Outcome: PROGRESSING AS EXPECTED  SCDs on BLE    Problem: Skin Integrity  Goal: Risk for impaired skin integrity will decrease  Outcome: PROGRESSING AS EXPECTED  Q2 hr turns, Q shift skin check

## 2018-10-01 LAB
ANION GAP SERPL CALC-SCNC: 9 MMOL/L (ref 0–11.9)
BASOPHILS # BLD AUTO: 0.5 % (ref 0–1.8)
BASOPHILS # BLD: 0.06 K/UL (ref 0–0.12)
BUN SERPL-MCNC: 32 MG/DL (ref 8–22)
CALCIUM SERPL-MCNC: 9.8 MG/DL (ref 8.5–10.5)
CHLORIDE SERPL-SCNC: 95 MMOL/L (ref 96–112)
CO2 SERPL-SCNC: 30 MMOL/L (ref 20–33)
CREAT SERPL-MCNC: 0.59 MG/DL (ref 0.5–1.4)
EOSINOPHIL # BLD AUTO: 0.21 K/UL (ref 0–0.51)
EOSINOPHIL NFR BLD: 1.6 % (ref 0–6.9)
ERYTHROCYTE [DISTWIDTH] IN BLOOD BY AUTOMATED COUNT: 41.3 FL (ref 35.9–50)
GLUCOSE SERPL-MCNC: 151 MG/DL (ref 65–99)
HCT VFR BLD AUTO: 37.5 % (ref 37–47)
HGB BLD-MCNC: 12.8 G/DL (ref 12–16)
IMM GRANULOCYTES # BLD AUTO: 0.14 K/UL (ref 0–0.11)
IMM GRANULOCYTES NFR BLD AUTO: 1.1 % (ref 0–0.9)
INR PPP: 1.12 (ref 0.87–1.13)
LYMPHOCYTES # BLD AUTO: 1.28 K/UL (ref 1–4.8)
LYMPHOCYTES NFR BLD: 9.7 % (ref 22–41)
MAGNESIUM SERPL-MCNC: 2.5 MG/DL (ref 1.5–2.5)
MCH RBC QN AUTO: 30 PG (ref 27–33)
MCHC RBC AUTO-ENTMCNC: 34.1 G/DL (ref 33.6–35)
MCV RBC AUTO: 87.8 FL (ref 81.4–97.8)
MONOCYTES # BLD AUTO: 1.04 K/UL (ref 0–0.85)
MONOCYTES NFR BLD AUTO: 7.9 % (ref 0–13.4)
NEUTROPHILS # BLD AUTO: 10.42 K/UL (ref 2–7.15)
NEUTROPHILS NFR BLD: 79.2 % (ref 44–72)
NRBC # BLD AUTO: 0 K/UL
NRBC BLD-RTO: 0 /100 WBC
PLATELET # BLD AUTO: 258 K/UL (ref 164–446)
PMV BLD AUTO: 11 FL (ref 9–12.9)
POTASSIUM SERPL-SCNC: 4 MMOL/L (ref 3.6–5.5)
PROTHROMBIN TIME: 14.5 SEC (ref 12–14.6)
RBC # BLD AUTO: 4.27 M/UL (ref 4.2–5.4)
SODIUM SERPL-SCNC: 134 MMOL/L (ref 135–145)
WBC # BLD AUTO: 13.2 K/UL (ref 4.8–10.8)

## 2018-10-01 PROCEDURE — 700102 HCHG RX REV CODE 250 W/ 637 OVERRIDE(OP): Performed by: INTERNAL MEDICINE

## 2018-10-01 PROCEDURE — 700111 HCHG RX REV CODE 636 W/ 250 OVERRIDE (IP): Performed by: NEUROLOGICAL SURGERY

## 2018-10-01 PROCEDURE — 85610 PROTHROMBIN TIME: CPT

## 2018-10-01 PROCEDURE — A9270 NON-COVERED ITEM OR SERVICE: HCPCS | Performed by: HOSPITALIST

## 2018-10-01 PROCEDURE — 770022 HCHG ROOM/CARE - ICU (200)

## 2018-10-01 PROCEDURE — A9270 NON-COVERED ITEM OR SERVICE: HCPCS | Performed by: INTERNAL MEDICINE

## 2018-10-01 PROCEDURE — 99233 SBSQ HOSP IP/OBS HIGH 50: CPT | Performed by: HOSPITALIST

## 2018-10-01 PROCEDURE — 85025 COMPLETE CBC W/AUTO DIFF WBC: CPT

## 2018-10-01 PROCEDURE — 700102 HCHG RX REV CODE 250 W/ 637 OVERRIDE(OP): Performed by: HOSPITALIST

## 2018-10-01 PROCEDURE — 83735 ASSAY OF MAGNESIUM: CPT

## 2018-10-01 PROCEDURE — 99291 CRITICAL CARE FIRST HOUR: CPT | Performed by: INTERNAL MEDICINE

## 2018-10-01 PROCEDURE — 80048 BASIC METABOLIC PNL TOTAL CA: CPT

## 2018-10-01 RX ADMIN — AMLODIPINE BESYLATE 5 MG: 5 TABLET ORAL at 06:35

## 2018-10-01 RX ADMIN — CEFAZOLIN SODIUM 1 G: 1 INJECTION, SOLUTION INTRAVENOUS at 06:34

## 2018-10-01 RX ADMIN — CEFAZOLIN SODIUM 1 G: 1 INJECTION, SOLUTION INTRAVENOUS at 21:45

## 2018-10-01 RX ADMIN — SENNOSIDES AND DOCUSATE SODIUM 2 TABLET: 8.6; 5 TABLET ORAL at 06:35

## 2018-10-01 RX ADMIN — POLYETHYLENE GLYCOL 3350 1 PACKET: 17 POWDER, FOR SOLUTION ORAL at 17:52

## 2018-10-01 RX ADMIN — ACETAMINOPHEN 650 MG: 325 TABLET, FILM COATED ORAL at 22:00

## 2018-10-01 RX ADMIN — METOPROLOL TARTRATE 50 MG: 50 TABLET ORAL at 06:34

## 2018-10-01 RX ADMIN — METOPROLOL TARTRATE 50 MG: 50 TABLET ORAL at 14:53

## 2018-10-01 RX ADMIN — SENNOSIDES AND DOCUSATE SODIUM 2 TABLET: 8.6; 5 TABLET ORAL at 17:53

## 2018-10-01 RX ADMIN — DIGOXIN 125 MCG: 125 TABLET ORAL at 17:52

## 2018-10-01 RX ADMIN — CEFAZOLIN SODIUM 1 G: 1 INJECTION, SOLUTION INTRAVENOUS at 14:55

## 2018-10-01 RX ADMIN — METOPROLOL TARTRATE 50 MG: 50 TABLET ORAL at 21:46

## 2018-10-01 ASSESSMENT — ENCOUNTER SYMPTOMS
ARTHRALGIAS: 0
NUMBNESS: 0
WEAKNESS: 1
CONFUSION: 1
VOMITING: 0
FLANK PAIN: 0
COUGH: 0
SHORTNESS OF BREATH: 0
BRUISES/BLEEDS EASILY: 0
SLEEP DISTURBANCE: 1
NAUSEA: 0
HEADACHES: 1
SPEECH DIFFICULTY: 0
ABDOMINAL DISTENTION: 0
VOICE CHANGE: 0
PALPITATIONS: 0
ABDOMINAL PAIN: 0
MYALGIAS: 0
WHEEZING: 0
FEVER: 0

## 2018-10-01 ASSESSMENT — PAIN SCALES - GENERAL
PAINLEVEL_OUTOF10: 0
PAINLEVEL_OUTOF10: 5
PAINLEVEL_OUTOF10: 0

## 2018-10-01 NOTE — PROGRESS NOTES
[facility] Hospitalist Progress Note    Date of Service: 10/1/2018    Chief Complaint  78 y.o. female admitted 2018 with HA to [facility].  Had had a GLF with neg CT one week prior.  CT on this admit showing SDH and SAH  Bilateral EVDs placed Dr Santizo       Interval Problem Update  EVD came out, is replaced and draining, right put out 20, left put out 130, Dr. Santizo following for possible drain challenge  ICPs were 1-5  Blood pressures stable  Still alert, oriented 1-4 waxing/waning  Lower drift    Consultants/Specialty  Neurosurgery  Pulmonology    Disposition  Cont in ICU for close monitoring of Neuro status          Review of Systems   Unable to perform ROS: Acuity of condition      Physical Exam  Laboratory/Imaging   Hemodynamics  Temp (24hrs), Av.3 °C (97.4 °F), Min:36 °C (96.8 °F), Max:36.7 °C (98 °F)   Temperature: 36.4 °C (97.5 °F)  Pulse  Av.4  Min: 60  Max: 131 Heart Rate (Monitored): 75  Blood Pressure : 145/61, NIBP: 121/64      Respiratory      Respiration: (!) 24, Pulse Oximetry: 97 %     Work Of Breathing / Effort: Mild  RUL Breath Sounds: Clear, RML Breath Sounds: Diminished, RLL Breath Sounds: Diminished, JUAN Breath Sounds: Clear, LLL Breath Sounds: Diminished    Fluids    Intake/Output Summary (Last 24 hours) at 10/01/18 0847  Last data filed at 10/01/18 0800   Gross per 24 hour   Intake             1010 ml   Output              811 ml   Net              199 ml       Nutrition  Orders Placed This Encounter   Procedures   • Diet NPO     Standing Status:   Standing     Number of Occurrences:   1     Order Specific Question:   Restrict to:     Answer:   Strict [1]     Physical Exam   Constitutional: She appears well-developed and well-nourished. No distress.   Patient seen and examined by me.   HENT:   Nose: Nose normal.   Mouth/Throat: Oropharynx is clear and moist. No oropharyngeal exudate.   Bruising left side of face no bony step off   Eyes: Conjunctivae are normal. Right eye exhibits no  discharge. Left eye exhibits no discharge. No scleral icterus.   Neck: No JVD present. No tracheal deviation present.   Cardiovascular: Normal rate, regular rhythm and normal heart sounds.    Pulmonary/Chest: Effort normal and breath sounds normal. No stridor. No respiratory distress. She has no wheezes. She has no rales. She exhibits no tenderness.   Abdominal: Soft. Bowel sounds are normal. She exhibits no distension. There is no tenderness. There is no guarding.   Musculoskeletal: She exhibits no edema or tenderness.   Neurological: She is alert. No cranial nerve deficit. She exhibits normal muscle tone. Coordination (left leg drift) abnormal.   Orientation is variable  Alert talking but not as alert as the day prior  Following all commands   Skin: Skin is warm and dry. She is not diaphoretic. No pallor.   Psychiatric: Her affect is blunt. Her speech is delayed. She is slowed.   Nursing note and vitals reviewed.      Recent Labs      09/29/18 0530 09/30/18 0400  10/01/18   0514   WBC  14.3*  15.2*  13.2*   RBC  4.08*  4.29  4.27   HEMOGLOBIN  12.3  12.6  12.8   HEMATOCRIT  35.1*  37.2  37.5   MCV  86.0  86.7  87.8   MCH  30.1  29.4  30.0   MCHC  35.0  33.9  34.1   RDW  41.9  40.7  41.3   PLATELETCT  238  232  258   MPV  10.5  10.8  11.0     Recent Labs      09/29/18 0530  09/30/18   0400  10/01/18   0514   SODIUM  136  135  134*   POTASSIUM  4.0  4.3  4.0   CHLORIDE  99  98  95*   CO2  27  29  30   GLUCOSE  142*  116*  151*   BUN  31*  27*  32*   CREATININE  0.55  0.52  0.59   CALCIUM  9.2  9.3  9.8     Recent Labs      09/29/18 0530  09/30/18   0400  10/01/18   0514   INR  1.17*  1.12  1.12                  Assessment/Plan     Acute intracranial hemorrhage (HCC)- (present on admission)   Assessment & Plan    Anticoagulation reversed at admission  S/P bilateral EVDs: at zero, plan challenge per Dr Santizo on Monday  Repeat CT stable  Monitor ICP continuously  Exam improving, working with PT and  OT  Neurosurgery following  Close BP control          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)- (present on admission)   Assessment & Plan    Anticoagulation reversed at the time of admission, was on eliquis outpatient        Paroxysmal atrial fibrillation (HCC)- (present on admission)   Assessment & Plan    On metoprolol 50mg Q8, Dig 125mcg QD  Prn Dilt for breakthrough rate control  No anticoagulation due to ICH        Hypomagnesemia- (present on admission)   Assessment & Plan    Replace  Follow daily        Hypokalemia   Assessment & Plan    Replaced  Follow daily          Quality-Core Measures   Reviewed items::  EKG reviewed, Labs reviewed, Medications reviewed and Radiology images reviewed  Mack catheter::  Critically Ill - Requiring Accurate Measurement of Urinary Output  DVT prophylaxis pharmacological::  Contraindicated - High bleeding risk  DVT prophylaxis - mechanical:  SCDs  Ulcer Prophylaxis::  Not indicated

## 2018-10-01 NOTE — PROGRESS NOTES
Son comes to alert this RN that there is blood on the floor to the L side of pt's bed. Right EVD found disconnected at third stop cock connection and draining serosanguineous drainage (approximately 5 cc) to the floor at 0550. This RN scrubs both hubs thoroughly for 15 seconds with alcohol. This RN performs neuro check, no change from baseline, AAO x 2, follows commands. Charge RN notified. ICP line checked to ensure laser is calibrated to the level of the tragus, ICP line zeroed. Charge RN comes to bedside to check line and neuro status. Neurosurgery paged at [phone].

## 2018-10-01 NOTE — PROGRESS NOTES
Received call back from Neurosurgery Viraj ESCALANTE at 0639. This RN updates Viraj regarding Right EVD. Viraj states he will call back after reporting to Dr. Santizo.

## 2018-10-01 NOTE — DIETARY
Nutrition support weekly update:  Day 9 of admit.  Yessi Hess is a 78 y.o. female with admitting DX of ICH.  Tube feeding initiated on . Current TF via gastric Cortrak is Replete with Fiber @ 60 mL/hr, providing 1440 kcal, 92 gm protein, and 1198 mL of free water per day.    Assessment:  Weight today is 72.5 kg via bed scale, which is up 7.8 kg from last weeks wt of 64.7 kg via bed scale taken on .  Question accuracy of bed scale wt's.  Pt's fluid status is likely affecting wt as well.    Calculation/Equation: MSJ X 1.2= 1475 kcal  Total Calories / day: 1475 -1675 (Calories / k-23)  Total Grams Protein / day: 86 - 101 (Grams Protein / k.2 - 1.4)    Evaluation:   1. Pt has been tolerating TF @ goal.  2. Per rounds, pt is A&O 1-3 to self only and waxes and wanes.   3. Pt was last seen by SLP on  - SLP recommends NPO/TF.  4. Per MD note, pt's abdomen exhibits no distension and there is no tenderness.  5. Pt exhibits edema in hands and legs per MD note.  6. Labs: Sodium: 134, Glucose: 151, BUN: 32.  Meds reviewed.  7. Last BM: .  8. Current feeding remains appropriate.    Recommendations/Plan:  1. Continue current TF formula and goal rate.  2. Fluids per MD.  3. PO diet determined by SLP when appropriate.    RD following.

## 2018-10-01 NOTE — PROGRESS NOTES
2 RN skin check complete with Rajni, telemetry RN. Pt has bruising to L side of face and bilat arms, bilat EVD drains in place, CDI with dry gauze/roll gauze wrapped around head, sacrum red and blanching, and bilat heels red and blanching. Q2 turning in place, heels floated on pillow, and mepilex in place.

## 2018-10-01 NOTE — PROGRESS NOTES
Received second call back from Viraj at 0655. Viraj states Dr. Santizo is aware of Right EVD disconnection and re-connection. Per Dr. Santizo, if neuro status is unchanged and hubs were cleaned there are no further interventions at this time. Dr. Santizo to follow up during rounds.

## 2018-10-01 NOTE — PROGRESS NOTES
Skin assessment completed - EVD sites b/l scalp with hematoma left forehead and bruising to left cheek and neck. Minimal scattered bruising throughout extremities, otherwise intact.

## 2018-10-01 NOTE — PROGRESS NOTES
Neurosurgery Progress Note    Subjective:  Pt with IVH / SAH / SDH with Bilateral EVD day #9  Raised to 10mm Friday   Had lost ICP readings this am at 0500. ICPs have been stable. Drains were checked and a connection had become lose. It was corrected. ICPs are functioning now, but no ICP readings.   No neuro changes.     Exam:  Awake. Eyes open.  PERRL  Will follow simple commands.  No visble Drainage around EVD insertion site     BP  Min: 127/83  Max: 145/61  Pulse  Av.3  Min: 71  Max: 87  Resp  Av.9  Min: 18  Max: 43  Temp  Av.2 °C (97.2 °F)  Min: 36 °C (96.8 °F)  Max: 36.5 °C (97.7 °F)  SpO2  Av.1 %  Min: 98 %  Max: 100 %    ICP  Av.9 MM HG  Min: 1 MM HG  Max: 6 MM HG    Recent Labs      18   0530  18   0400  10/01/18   0514   WBC  14.3*  15.2*  13.2*   RBC  4.08*  4.29  4.27   HEMOGLOBIN  12.3  12.6  12.8   HEMATOCRIT  35.1*  37.2  37.5   MCV  86.0  86.7  87.8   MCH  30.1  29.4  30.0   MCHC  35.0  33.9  34.1   RDW  41.9  40.7  41.3   PLATELETCT  238  232  258   MPV  10.5  10.8  11.0     Recent Labs      18   0530  18   0400  10/01/18   0514   SODIUM  136  135  134*   POTASSIUM  4.0  4.3  4.0   CHLORIDE  99  98  95*   CO2  27  29  30   GLUCOSE  142*  116*  151*   BUN  31*  27*  32*   CREATININE  0.55  0.52  0.59   CALCIUM  9.2  9.3  9.8     Recent Labs      18   0530  18   0400  10/01/18   0514   INR  1.17*  1.12  1.12           Intake/Output       18 07 - 10/01/18 0659 10/01/18 0700 - 10/02/18 0659       Total [id number] Total       Intake    I.V.  --  20 20  --  -- --    IV Volume (NS at TKO) -- 20 20 -- -- --    Other  90  -- 90  --  -- --    Medications (PO/Enteral Liquids) 90 -- 90 -- -- --    NG/GT  720  -- 720  --  -- --    Intake (mL) (Enteral Tube 18 Cortrak - Gastric 10 Fr. Right nare) 720 -- 720 -- -- --    IV Piggyback  100  110 210  --  -- --    Volume (mL) (ceFAZolin in dextrose (ANCEF) IVPB premix  1 g) 100 110 210 -- -- --    Total Intake  -- -- --       Output    Urine  375  100 475  --  -- --    Output (mL) (Urethral Catheter Latex 16 Fr.) 375 100 475 -- -- --    Drains  127  165 292  --  -- --    Output (mL) (ICP/Ventriculostomy Right) 28 27.5 55.5 -- -- --    Output (mL) (ICP/Ventriculostomy Left) 99 137.5 236.5 -- -- --    Total Output 502 265 767 -- -- --       Net I/O     408 -135 273 -- -- --            Intake/Output Summary (Last 24 hours) at 10/01/18 0819  Last data filed at 10/01/18 0500   Gross per 24 hour   Intake              890 ml   Output              675 ml   Net              215 ml            • acetaminophen  650 mg Q4HRS PRN    Or   • acetaminophen  650 mg Q4HRS PRN   • amLODIPine  5 mg Q DAY   • metoprolol  50 mg Q8HRS   • Pharmacy   PRN   • labetalol  5-10 mg Q4HRS PRN   • digoxin  125 mcg DAILY AT 1800   • ondansetron  4 mg Q4HRS PRN    Or   • ondansetron  4 mg Q4HRS PRN   • Pharmacy Consult Request   PRN    And   • oxyCODONE immediate-release  2.5 mg Q3HRS PRN    And   • oxyCODONE immediate-release  5 mg Q3HRS PRN    And   • HYDROmorphone  0.25-1 mg Q2HRS PRN   • polyethylene glycol/lytes  1 Packet QDAY PRN    And   • senna-docusate  2 Tab BID    And   • magnesium hydroxide  30 mL QDAY PRN    And   • bisacodyl  10 mg QDAY PRN   • Respiratory Care per Protocol   Continuous RT   • MD Alert...Adult ICU Electrolyte Replacement per Pharmacy   pharmacy to dose   • LORazepam  2 mg Q5 MIN PRN   • hydrALAZINE  10 mg Q4HRS PRN   • enalaprilat  1.25 mg Q6HRS PRN   • ceFAZolin  1 g Q8HRS       Assessment and Plan:  Hospital day #10 EVD #9  Both drains working well L>R  Will speak with Dr. Santizo today in regards to challenge of drain.

## 2018-10-01 NOTE — DISCHARGE PLANNING
Care Transition Team Assessment  In the case of an emergency, pt's legal NOK is Nan Meyer , daughter    RN CM met with pt at bedside and obtained the information used in this assessment. Pt verified accuracy of facesheet. Pt lives in a mobile home alone.  Pt uses [facility] on [address]. Prior to current hospitalization, pt was completely independent in ADLS/IADLS. Pt does not drive but is able to attend necessary MD appointments.  Pt has no financial concerns. Pt has a good support system. Pt denies/admits any hx of substance use and denies any dx of mh.      Information Source Nan castro  Orientation : Disoriented to Event, Disoriented to Place, Disoriented to Time  Informant's Name:  (Nan castro )  Who is responsible for making decisions for patient? : Patient         Elopement Risk  Legal Hold: No  Ambulatory or Self Mobile in Wheelchair: No-Not an Elopement Risk  Elopement Risk: Not at Risk for Elopement    Interdisciplinary Discharge Planning  Does Admitting Nurse Feel This Could be a Complex Discharge?: Yes  Primary Care Physician:  (Dr. Lew in [place])  Lives with - Patient's Self Care Capacity: Alone and Able to Care For Self  Patient or legal guardian wants to designate a caregiver (see row info): No  Support Systems: Children, Family Member(s), Friends / Neighbors  Housing / Facility: [address]  Do You Take your Prescribed Medications Regularly: Yes  Mobility Issues: No  Prior Services: None    Discharge Preparedness  What is your plan after discharge?: Uncertain - pending medical team collaboration  What are your discharge supports?: Child  Prior Functional Level: Ambulatory, Independent with Activities of Daily Living, Independent with Medication Management  Difficulity with ADLs: None  Difficulity with IADLs: None    Functional Assesment  Prior Functional Level: Ambulatory, Independent with Activities of Daily Living, Independent with Medication  Management    Finances  Financial Barriers to Discharge: No  Prescription Coverage: Yes    Vision / Hearing Impairment  Right Eye Vision: Wears Glasses, Other (Comments) (reading glasses, denies visual changes B/L)  Left Eye Vision: Wears Glasses, Other (Comments) (reading glasses, denies visual changes B/L)                             Anticipated Discharge Information  Anticipated discharge disposition: Discharge needs currently unknown      Anticipated Discharge Disposition: undetermined    Action:LSW/CM will continue to follow pt    Barriers to Discharge: none    Plan: undetermined at this time.

## 2018-10-01 NOTE — PROGRESS NOTES
[facility] Hospitalist Progress Note    Date of Service: 2018    Chief Complaint  78 y.o. female admitted 2018 with HA to [facility].  Had had a GLF with neg CT one week prior.  CT on this admit showing SDH and SAH  B EVDs placed Dr Santizo     PMHx PAFIb, AC on apixaban    Interval Problem Update  ROS limited by mental status but much improved; denies headache, awake and alert and following commands, using IS.    Consultants/Specialty  Neurosurgery  Pulmonology    Disposition  Cont in ICU for close monitoring of Neuro status          Review of Systems   Unable to perform ROS: Acuity of condition      Physical Exam  Laboratory/Imaging   Hemodynamics  Temp (24hrs), Av.6 °C (97.8 °F), Min:36 °C (96.8 °F), Max:37.3 °C (99.1 °F)   Temperature: 36.2 °C (97.1 °F)  Pulse  Av.8  Min: 60  Max: 131 Heart Rate (Monitored): 80  Blood Pressure : 127/83, NIBP: 140/65      Respiratory      Respiration: (!) 26, Pulse Oximetry: 98 %     Work Of Breathing / Effort: Mild  RUL Breath Sounds: Clear, RML Breath Sounds: Diminished, RLL Breath Sounds: Diminished, JUAN Breath Sounds: Clear, LLL Breath Sounds: Diminished    Fluids    Intake/Output Summary (Last 24 hours) at 18 1938  Last data filed at 18 1800   Gross per 24 hour   Intake             1830 ml   Output             1433 ml   Net              397 ml       Nutrition  Orders Placed This Encounter   Procedures   • Diet NPO     Standing Status:   Standing     Number of Occurrences:   1     Order Specific Question:   Restrict to:     Answer:   Strict [1]     Physical Exam   Constitutional: She is oriented to person, place, and time. She appears well-developed and well-nourished. No distress.   Patient seen and examined by me.   HENT:   Nose: Nose normal.   Mouth/Throat: Oropharynx is clear and moist. No oropharyngeal exudate.   EV drain in place  No oozing   Eyes: Conjunctivae are normal. Right eye exhibits no discharge. Left eye exhibits no discharge. No scleral  icterus.   Neck: No JVD present. No tracheal deviation present.   Cardiovascular: Normal rate, regular rhythm and normal heart sounds.    Pulmonary/Chest: Effort normal and breath sounds normal. No stridor. No respiratory distress. She has no wheezes. She has no rales. She exhibits no tenderness.   Abdominal: Soft. Bowel sounds are normal. She exhibits no distension. There is no tenderness.   Musculoskeletal: She exhibits edema (hands, legs). She exhibits no tenderness.   Neurological: She is alert and oriented to person, place, and time. No cranial nerve deficit. She exhibits normal muscle tone. Coordination normal.   Skin: Skin is warm and dry. She is not diaphoretic. No pallor.   Psychiatric: She has a normal mood and affect. Her behavior is normal.   Nursing note and vitals reviewed.      Recent Labs      09/28/18   0506  09/29/18   0530  09/30/18   0400   WBC  13.9*  14.3*  15.2*   RBC  4.10*  4.08*  4.29   HEMOGLOBIN  12.0  12.3  12.6   HEMATOCRIT  35.5*  35.1*  37.2   MCV  86.6  86.0  86.7   MCH  29.3  30.1  29.4   MCHC  33.8  35.0  33.9   RDW  42.7  41.9  40.7   PLATELETCT  215  238  232   MPV  10.7  10.5  10.8     Recent Labs      09/28/18   0506  09/29/18   0530  09/30/18   0400   SODIUM  135  136  135   POTASSIUM  4.0  4.0  4.3   CHLORIDE  98  99  98   CO2  29  27  29   GLUCOSE  145*  142*  116*   BUN  28*  31*  27*   CREATININE  0.60  0.55  0.52   CALCIUM  9.5  9.2  9.3     Recent Labs      09/29/18   0530  09/30/18   0400   INR  1.17*  1.12                  Assessment/Plan     Acute intracranial hemorrhage (HCC)- (present on admission)   Assessment & Plan    Anticoagulation reveresed  S/P bilateral EVDs: at zero, plan challenge per Dr Santizo on Monday  Repeat CT stable  Monitor ICP continuously  Exam improving, far more alert  Neurosurgery following  Close BP control          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)- (present on admission)   Assessment & Plan     Anticoagulation reversed at the time of admission, was on eliquis outpatient        Paroxysmal atrial fibrillation (HCC)- (present on admission)   Assessment & Plan    On metoprolol 50mg Q8, Dig 125mcg QD  Prn Dilt for breakthrough rate control  No anticoagulation due to ICH        Hypomagnesemia- (present on admission)   Assessment & Plan    Replace  Follow daily        Hypokalemia   Assessment & Plan    Replaced  Follow daily          Quality-Core Measures   Reviewed items::  EKG reviewed, Labs reviewed, Medications reviewed and Radiology images reviewed  DVT prophylaxis pharmacological::  Contraindicated - High bleeding risk  DVT prophylaxis - mechanical:  SCDs  Ulcer Prophylaxis::  Not indicated

## 2018-10-01 NOTE — CARE PLAN
Problem: Bowel/Gastric:  Goal: Will not experience complications related to bowel motility  Outcome: PROGRESSING AS EXPECTED  No complications r/t bowel motility - abd SNT, active BS x4 quads, cortrak in place with TF infusing at goal, bowel protocol in place    Problem: Urinary Elimination:  Goal: Ability to reestablish a normal urinary elimination pattern will improve  Outcome: PROGRESSING SLOWER THAN EXPECTED  Pt critically ill, godwin remains in place at this time.

## 2018-10-01 NOTE — CARE PLAN
Problem: Skin Integrity  Goal: Risk for impaired skin integrity will decrease  Outcome: PROGRESSING AS EXPECTED  Q2 hr turns, Q shift skin assessments, skin intact save for as charted in flowsheets.     Problem: Safety - Medical Restraint  Goal: Remains free of injury from restraints (Restraint for Interference with Medical Device)  INTERVENTIONS:  1. Determine that other, less restrictive measures have been tried or would not be effective before applying the restraint  2. Evaluate the patient's condition at the time of restraint application  3. Inform patient/family regarding the reason for restraint  4. Q2H: Monitor safety, psychosocial status, comfort, nutrition and hydration     Outcome: PROGRESSING AS EXPECTED  Q2 hr restraint assessment and documentation. Reinforcement of education needed.

## 2018-10-01 NOTE — PROGRESS NOTES
Critical Care Progress Note    Date of admission  9/22/2018    Chief Complaint  78 y.o. female admitted 9/22/2018 with intracranial hemorrhage.    Hospital Course  This lady was admitted with an acute intracranial hemorrhage.  She is undergone bilateral EVDs for hydrocephalus.    Interval Problem Update  Reviewed last 24 hour events:    EVD issue this AM?  Still draining  A&O x1-3  NFE - moves all to command - weaker on L  Low output from EVD  NIHSS 9-10  SR 80s  SBp 120-150s - goal < 160  TF goal  Mack  UO adequate?  Restrained  CXR 9/29 - SSLLLA, CM  Room air  Holding Eliquis  Ancef for drain    YESTERDAY   - No acute events overnight   - Neuro: conversing, follows. ICP 1-4, L , R EVD 42   - HR: 70s-80s   - BP: 120s-130s   - GI: NPO, TF   - UOP: 805 overnight   - Mack: yes   - Tm: afeb   - Lines: PIV   - PPx: GI TF, DVT SCDs   - 2L NC    Review of Systems  Review of Systems   Unable to perform ROS: Acuity of condition   Constitutional: Negative for fever.   HENT: Negative for congestion and voice change.    Eyes: Negative for visual disturbance.   Respiratory: Negative for cough, shortness of breath and wheezing.    Cardiovascular: Negative for chest pain and palpitations.   Gastrointestinal: Negative for abdominal distention, abdominal pain, nausea and vomiting.   Genitourinary: Negative for difficulty urinating and flank pain.   Musculoskeletal: Negative for arthralgias and myalgias.   Skin: Negative for rash.   Neurological: Positive for weakness (No change) and headaches (Persisting). Negative for speech difficulty and numbness.   Hematological: Does not bruise/bleed easily.   Psychiatric/Behavioral: Positive for confusion (No change by report) and sleep disturbance (Insomnia per patient).        Vital Signs for last 24 hours   Temp:  [36 °C (96.8 °F)-36.9 °C (98.4 °F)] 36.5 °C (97.7 °F)  Pulse:  [71-87] 87  Resp:  [18-43] 20  BP: (127-145)/(61-83) 145/61    Hemodynamic parameters for last 24 hours        Vent Settings for last 24 hours, NA       Physical Exam   Physical Exam   Constitutional: She is oriented to person, place, and time. She appears well-developed and well-nourished. She is cooperative. She appears ill. No distress.   HENT:   Head: Normocephalic.   Right Ear: External ear normal.   Left Ear: External ear normal.   Mouth/Throat: Oropharynx is clear and moist.   Bruising along her left forehead and left face - improving per staff.  Bilateral EVDs in place, draining blood-tinged CSF.   Eyes: Pupils are equal, round, and reactive to light. Conjunctivae are normal. Right eye exhibits no discharge. Left eye exhibits no discharge. No scleral icterus.   Neck: Neck supple. No JVD present. No tracheal deviation present.   Cardiovascular: Normal rate, regular rhythm, normal heart sounds and intact distal pulses.   No extrasystoles are present. Exam reveals no gallop and no friction rub.    No murmur heard.  Pulmonary/Chest: No accessory muscle usage or stridor. No respiratory distress. She has no decreased breath sounds. She has no wheezes. She has no rales.   Abdominal: Soft. Bowel sounds are normal. She exhibits no distension. There is no tenderness. There is no rebound, no guarding and no CVA tenderness.   Musculoskeletal: She exhibits no edema, tenderness or deformity.   No clubbing or cyanosis   Neurological: She is alert and oriented to person, place, and time. She exhibits abnormal muscle tone (generally weak, not focal).   Continues to be interactive today, following commands in all 4 ext, slow but appropriate   Skin: Skin is warm and dry. No rash noted. She is not diaphoretic. No cyanosis. Nails show no clubbing.   Psychiatric: Her mood appears not anxious. Her speech is not delayed. She is not slowed. Cognition and memory are impaired.   Nursing note and vitals reviewed.      Medications  Current Facility-Administered Medications   Medication Dose Route Frequency Provider Last Rate Last Dose   •  acetaminophen (TYLENOL) tablet 650 mg  650 mg Feeding Tube Q4HRS PRN Gil Leone Jr. D.O.   650 mg at 09/30/18 2122    Or   • acetaminophen (TYLENOL) suppository 650 mg  650 mg Rectal Q4HRS PRN Gil Leone Jr., D.O.       • amLODIPine (NORVASC) tablet 5 mg  5 mg Per NG Tube Q DAY Andres Wells D.O.   5 mg at 09/30/18 0542   • metoprolol (LOPRESSOR) tablet 50 mg  50 mg Feeding Tube Q8HRS Andres Wells D.O.   50 mg at 09/30/18 2122   • Pharmacy Consult: Enteral tube feeding - review meds/change route/product selection   Other PRN Gil Leone Jr., D.O.       • labetalol (NORMODYNE,TRANDATE) injection 5-10 mg  5-10 mg Intravenous Q4HRS PRN Gil Leone Jr. D.O.   5 mg at 09/26/18 0303   • digoxin (LANOXIN) tablet 125 mcg  125 mcg Feeding Tube DAILY AT 1800 William Medina M.D.   125 mcg at 09/30/18 1852   • ondansetron (ZOFRAN ODT) dispertab 4 mg  4 mg Feeding Tube Q4HRS PRMO Medina M.D.        Or   • ondansetron (ZOFRAN) syringe/vial injection 4 mg  4 mg Intravenous Q4HRS PRMO Medina M.D.       • Pharmacy Consult Request ...Pain Management Review   Other PRMO Medina M.D.        And   • oxyCODONE immediate-release (ROXICODONE) tablet 2.5 mg  2.5 mg Feeding Tube Q3HRS PRMO Medina M.D.   2.5 mg at 09/30/18 0203    And   • oxyCODONE immediate-release (ROXICODONE) tablet 5 mg  5 mg Feeding Tube Q3HRS PRMO Medina M.D.   5 mg at 09/30/18 2231    And   • HYDROmorphone (DILAUDID) injection 0.25-1 mg  0.25-1 mg Intravenous Q2HRS PRMO eMdina M.D.   1 mg at 09/25/18 0445   • polyethylene glycol/lytes (MIRALAX) PACKET 1 Packet  1 Packet Feeding Tube QDAY PRMO Medina M.D.   1 Packet at 09/26/18 1725    And   • senna-docusate (PERICOLACE or SENOKOT S) 8.6-50 MG per tablet 2 Tab  2 Tab Feeding Tube BID William Medina M.D.   2 Tab at 09/30/18 1852    And   • magnesium hydroxide (MILK OF MAGNESIA) suspension 30 mL  30 mL Feeding Tube QDAY PRN William Medina M.D.         And   • bisacodyl (DULCOLAX) suppository 10 mg  10 mg Rectal QDAY PRN William Medina M.D.       • Respiratory Care per Protocol   Nebulization Continuous RT Rowena Calero M.D.       • MD Alert...ICU Electrolyte Replacement per Pharmacy   Other pharmacy to dose Rowena Calero M.D.       • LORazepam (ATIVAN) injection 2 mg  2 mg Intravenous Q5 MIN PRN Rowena Calero M.D.       • hydrALAZINE (APRESOLINE) injection 10 mg  10 mg Intravenous Q4HRS PRN Rowena Calero M.D.       • enalaprilat (VASOTEC) injection 1.25 mg  1.25 mg Intravenous Q6HRS PRN Rowena Calero M.D.       • ceFAZolin in dextrose (ANCEF) IVPB premix 1 g  1 g Intravenous Q8HRS Juan Alberto Santizo III, M.D.   Stopped at 09/30/18 2210       Fluids    Intake/Output Summary (Last 24 hours) at 10/01/18 0537  Last data filed at 09/30/18 2000   Gross per 24 hour   Intake             1130 ml   Output              705 ml   Net              425 ml       Laboratory  Recent Results (from the past 48 hour(s))   CBC WITH DIFFERENTIAL    Collection Time: 09/30/18  4:00 AM   Result Value Ref Range    WBC 15.2 (H) 4.8 - 10.8 K/uL    RBC 4.29 4.20 - 5.40 M/uL    Hemoglobin 12.6 12.0 - 16.0 g/dL    Hematocrit 37.2 37.0 - 47.0 %    MCV 86.7 81.4 - 97.8 fL    MCH 29.4 27.0 - 33.0 pg    MCHC 33.9 33.6 - 35.0 g/dL    RDW 40.7 35.9 - 50.0 fL    Platelet Count 232 164 - 446 K/uL    MPV 10.8 9.0 - 12.9 fL    Neutrophils-Polys 75.70 (H) 44.00 - 72.00 %    Lymphocytes 12.40 (L) 22.00 - 41.00 %    Monocytes 9.40 0.00 - 13.40 %    Eosinophils 1.30 0.00 - 6.90 %    Basophils 0.40 0.00 - 1.80 %    Immature Granulocytes 0.80 0.00 - 0.90 %    Nucleated RBC 0.00 /100 WBC    Neutrophils (Absolute) 11.52 (H) 2.00 - 7.15 K/uL    Lymphs (Absolute) 1.88 1.00 - 4.80 K/uL    Monos (Absolute) 1.43 (H) 0.00 - 0.85 K/uL    Eos (Absolute) 0.19 0.00 - 0.51 K/uL    Baso (Absolute) 0.06 0.00 - 0.12 K/uL    Immature Granulocytes (abs) 0.12 (H) 0.00 - 0.11 K/uL    NRBC (Absolute) 0.00 K/uL    BASIC METABOLIC PANEL    Collection Time: 09/30/18  4:00 AM   Result Value Ref Range    Sodium 135 135 - 145 mmol/L    Potassium 4.3 3.6 - 5.5 mmol/L    Chloride 98 96 - 112 mmol/L    Co2 29 20 - 33 mmol/L    Glucose 116 (H) 65 - 99 mg/dL    Bun 27 (H) 8 - 22 mg/dL    Creatinine 0.52 0.50 - 1.40 mg/dL    Calcium 9.3 8.5 - 10.5 mg/dL    Anion Gap 8.0 0.0 - 11.9   MAGNESIUM    Collection Time: 09/30/18  4:00 AM   Result Value Ref Range    Magnesium 2.4 1.5 - 2.5 mg/dL   PROTHROMBIN TIME    Collection Time: 09/30/18  4:00 AM   Result Value Ref Range    PT 14.5 12.0 - 14.6 sec    INR 1.12 0.87 - 1.13   ESTIMATED GFR    Collection Time: 09/30/18  4:00 AM   Result Value Ref Range    GFR If African American >60 >60 mL/min/1.73 m 2    GFR If Non African American >60 >60 mL/min/1.73 m 2   CBC WITH DIFFERENTIAL    Collection Time: 10/01/18  5:14 AM   Result Value Ref Range    WBC 13.2 (H) 4.8 - 10.8 K/uL    RBC 4.27 4.20 - 5.40 M/uL    Hemoglobin 12.8 12.0 - 16.0 g/dL    Hematocrit 37.5 37.0 - 47.0 %    MCV 87.8 81.4 - 97.8 fL    MCH 30.0 27.0 - 33.0 pg    MCHC 34.1 33.6 - 35.0 g/dL    RDW 41.3 35.9 - 50.0 fL    Platelet Count 258 164 - 446 K/uL    MPV 11.0 9.0 - 12.9 fL    Neutrophils-Polys 79.20 (H) 44.00 - 72.00 %    Lymphocytes 9.70 (L) 22.00 - 41.00 %    Monocytes 7.90 0.00 - 13.40 %    Eosinophils 1.60 0.00 - 6.90 %    Basophils 0.50 0.00 - 1.80 %    Immature Granulocytes 1.10 (H) 0.00 - 0.90 %    Nucleated RBC 0.00 /100 WBC    Neutrophils (Absolute) 10.42 (H) 2.00 - 7.15 K/uL    Lymphs (Absolute) 1.28 1.00 - 4.80 K/uL    Monos (Absolute) 1.04 (H) 0.00 - 0.85 K/uL    Eos (Absolute) 0.21 0.00 - 0.51 K/uL    Baso (Absolute) 0.06 0.00 - 0.12 K/uL    Immature Granulocytes (abs) 0.14 (H) 0.00 - 0.11 K/uL    NRBC (Absolute) 0.00 K/uL   PROTHROMBIN TIME    Collection Time: 10/01/18  5:14 AM   Result Value Ref Range    PT 14.5 12.0 - 14.6 sec    INR 1.12 0.87 - 1.13       Imaging  X-Ray:  I have personally reviewed the images  and compared with prior images.  CT:    Reviewed    Assessment/Plan  Acute intracranial hemorrhage (HCC)- (present on admission)   Assessment & Plan    S/P fall 1 week prior- CT head on 9/14 negative for acute intracranial hemorrhage  CT scan on 9/22 with intraventricular hemorrhage, right greater than left and intraparenchymal hemorrhage  Noncommunicating hydrocephalus - s/p emergent bilateral EVD placement by Dr. Santizo on 9/22.  Intraventricular TPA given by NeuroSx with improvement in EVD output  Continue Keppra 500 mg IV twice daily for seizure prophylaxis  Strict blood pressure control - goal SBP <160 mmHg  Continue close neuro checks  Repeat CT on 9/28 with improved shift from 1 cm to 7.1 mm, continue EVD drainage this weekend, and increase next week  Clamp EVD when clinically appropriate, 2 catheters noted on exam, confer with neurosurgery        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)- (present on admission)   Assessment & Plan    Reversed on hospital day 1  Monitor        Paroxysmal atrial fibrillation (HCC)- (present on admission)   Assessment & Plan    Previously on apixaban, 5 mg twice daily - now strictly contraindicated due to ICH  Continue digoxin and metoprolol as this regimen has been working well  Continue to monitor heart rate  Monitor potassium and magnesium and supplement to keep levels greater than 2 and 4 respectively        Encephalopathy acute- (present on admission)   Assessment & Plan    Improves every day?  Structural, secondary to intracranial hemorrhage  Re-orientation, family, glasses, hearing aids, sleep, lights on during day, treat pain, ambulate, minimize benzos/anticholinergic agents.  Mobilize          Hypomagnesemia- (present on admission)   Assessment & Plan    Electrolyte replacement protocol to maintain >2        Hypokalemia   Assessment & Plan    Electrolyte replacement protocol to maintain >4             VTE:  Contraindicated  Ulcer: Not  Indicated  Lines: Mack Catheter  Ongoing indication addressed    I have performed a physical exam and reviewed and updated ROS and Plan today (10/1/2018). In review of yesterday's note (9/30/2018), there are no changes except as documented above.     EVDs in place at high risk for sudden neurologic compensation. This patient is critically ill, at high risk for decompensation leading to worsening vital organ dysfunction and death without critical care interventions.    Discussed patient condition and risk of morbidity and/or mortality with Hospitalist, RN, RT, Pharmacy, , , Charge nurse / hot rounds, Patient and neurosurgery.      The patient remains critically ill.  Critical care time = 35 minutes in directly providing and coordinating critical care and extensive data review.  No time overlap and excludes procedures

## 2018-10-02 LAB
ANION GAP SERPL CALC-SCNC: 9 MMOL/L (ref 0–11.9)
BASOPHILS # BLD AUTO: 0.5 % (ref 0–1.8)
BASOPHILS # BLD: 0.07 K/UL (ref 0–0.12)
BUN SERPL-MCNC: 30 MG/DL (ref 8–22)
CALCIUM SERPL-MCNC: 9.3 MG/DL (ref 8.5–10.5)
CHLORIDE SERPL-SCNC: 97 MMOL/L (ref 96–112)
CO2 SERPL-SCNC: 28 MMOL/L (ref 20–33)
CREAT SERPL-MCNC: 0.52 MG/DL (ref 0.5–1.4)
DIGOXIN SERPL-MCNC: 0.6 NG/ML (ref 0.8–2)
EOSINOPHIL # BLD AUTO: 0.16 K/UL (ref 0–0.51)
EOSINOPHIL NFR BLD: 1.2 % (ref 0–6.9)
ERYTHROCYTE [DISTWIDTH] IN BLOOD BY AUTOMATED COUNT: 40.8 FL (ref 35.9–50)
GLUCOSE SERPL-MCNC: 136 MG/DL (ref 65–99)
HCT VFR BLD AUTO: 36.9 % (ref 37–47)
HGB BLD-MCNC: 12.8 G/DL (ref 12–16)
IMM GRANULOCYTES # BLD AUTO: 0.16 K/UL (ref 0–0.11)
IMM GRANULOCYTES NFR BLD AUTO: 1.2 % (ref 0–0.9)
INR PPP: 1.11 (ref 0.87–1.13)
LYMPHOCYTES # BLD AUTO: 1.34 K/UL (ref 1–4.8)
LYMPHOCYTES NFR BLD: 9.8 % (ref 22–41)
MAGNESIUM SERPL-MCNC: 2.4 MG/DL (ref 1.5–2.5)
MCH RBC QN AUTO: 30.5 PG (ref 27–33)
MCHC RBC AUTO-ENTMCNC: 34.7 G/DL (ref 33.6–35)
MCV RBC AUTO: 87.9 FL (ref 81.4–97.8)
MONOCYTES # BLD AUTO: 1.19 K/UL (ref 0–0.85)
MONOCYTES NFR BLD AUTO: 8.7 % (ref 0–13.4)
NEUTROPHILS # BLD AUTO: 10.81 K/UL (ref 2–7.15)
NEUTROPHILS NFR BLD: 78.6 % (ref 44–72)
NRBC # BLD AUTO: 0 K/UL
NRBC BLD-RTO: 0 /100 WBC
PLATELET # BLD AUTO: 276 K/UL (ref 164–446)
PMV BLD AUTO: 11.1 FL (ref 9–12.9)
POTASSIUM SERPL-SCNC: 4.1 MMOL/L (ref 3.6–5.5)
PROTHROMBIN TIME: 14.5 SEC (ref 12–14.6)
RBC # BLD AUTO: 4.2 M/UL (ref 4.2–5.4)
SODIUM SERPL-SCNC: 134 MMOL/L (ref 135–145)
WBC # BLD AUTO: 13.7 K/UL (ref 4.8–10.8)

## 2018-10-02 PROCEDURE — 83735 ASSAY OF MAGNESIUM: CPT

## 2018-10-02 PROCEDURE — 80048 BASIC METABOLIC PNL TOTAL CA: CPT

## 2018-10-02 PROCEDURE — 85610 PROTHROMBIN TIME: CPT

## 2018-10-02 PROCEDURE — A9270 NON-COVERED ITEM OR SERVICE: HCPCS | Performed by: HOSPITALIST

## 2018-10-02 PROCEDURE — 99232 SBSQ HOSP IP/OBS MODERATE 35: CPT | Performed by: HOSPITALIST

## 2018-10-02 PROCEDURE — 700102 HCHG RX REV CODE 250 W/ 637 OVERRIDE(OP): Performed by: HOSPITALIST

## 2018-10-02 PROCEDURE — A9270 NON-COVERED ITEM OR SERVICE: HCPCS | Performed by: INTERNAL MEDICINE

## 2018-10-02 PROCEDURE — 99233 SBSQ HOSP IP/OBS HIGH 50: CPT | Performed by: INTERNAL MEDICINE

## 2018-10-02 PROCEDURE — 770022 HCHG ROOM/CARE - ICU (200)

## 2018-10-02 PROCEDURE — 700111 HCHG RX REV CODE 636 W/ 250 OVERRIDE (IP): Performed by: NEUROLOGICAL SURGERY

## 2018-10-02 PROCEDURE — 85025 COMPLETE CBC W/AUTO DIFF WBC: CPT

## 2018-10-02 PROCEDURE — 700102 HCHG RX REV CODE 250 W/ 637 OVERRIDE(OP): Performed by: INTERNAL MEDICINE

## 2018-10-02 PROCEDURE — 80162 ASSAY OF DIGOXIN TOTAL: CPT

## 2018-10-02 PROCEDURE — 92526 ORAL FUNCTION THERAPY: CPT

## 2018-10-02 RX ADMIN — ACETAMINOPHEN 650 MG: 325 TABLET, FILM COATED ORAL at 14:57

## 2018-10-02 RX ADMIN — CEFAZOLIN SODIUM 1 G: 1 INJECTION, SOLUTION INTRAVENOUS at 05:16

## 2018-10-02 RX ADMIN — MAGNESIUM HYDROXIDE 30 ML: 400 SUSPENSION ORAL at 05:16

## 2018-10-02 RX ADMIN — METOPROLOL TARTRATE 50 MG: 50 TABLET ORAL at 21:54

## 2018-10-02 RX ADMIN — DIGOXIN 125 MCG: 125 TABLET ORAL at 18:50

## 2018-10-02 RX ADMIN — CEFAZOLIN SODIUM 1 G: 1 INJECTION, SOLUTION INTRAVENOUS at 14:55

## 2018-10-02 RX ADMIN — METOPROLOL TARTRATE 50 MG: 50 TABLET ORAL at 05:14

## 2018-10-02 RX ADMIN — AMLODIPINE BESYLATE 5 MG: 5 TABLET ORAL at 05:13

## 2018-10-02 RX ADMIN — METOPROLOL TARTRATE 50 MG: 50 TABLET ORAL at 14:55

## 2018-10-02 RX ADMIN — SENNOSIDES AND DOCUSATE SODIUM 2 TABLET: 8.6; 5 TABLET ORAL at 05:14

## 2018-10-02 RX ADMIN — CEFAZOLIN SODIUM 1 G: 1 INJECTION, SOLUTION INTRAVENOUS at 21:56

## 2018-10-02 ASSESSMENT — PAIN SCALES - GENERAL
PAINLEVEL_OUTOF10: 0
PAINLEVEL_OUTOF10: 3
PAINLEVEL_OUTOF10: 0

## 2018-10-02 ASSESSMENT — ENCOUNTER SYMPTOMS
VOMITING: 0
COUGH: 0
SHORTNESS OF BREATH: 0
PALPITATIONS: 0
FEVER: 0
NAUSEA: 0
FLANK PAIN: 0
ABDOMINAL PAIN: 0
SPEECH DIFFICULTY: 0

## 2018-10-02 NOTE — PROGRESS NOTES
Neurosurgery PA calls back and orders this RN to recheck neuro status at 0200, if unchanged or worse pt to go to head CT and report back to Neurosurgery with findings.

## 2018-10-02 NOTE — PROGRESS NOTES
Pt's 0100 neuro check changed from previous neuro assessments. Pt is nonverbal, still following basic commands, CLEMENT 3 mm, MOHAN x 4. This RN pages Neurosurgery for update.

## 2018-10-02 NOTE — THERAPY
"Speech Language Therapy dysphagia treatment completed.   Functional Status:  Patient seen for dysphagia therapy on this date.  She was pleasantly confused but agreeable to SLP and c/o \"dry throat.\"  RN reported the patient had been coughing with ice chips and so she discontinued.  Presentation of PO included ice chips, nectars, and purees.  The patient presented with timely to delayed initiation of swallow trigger up to 3 seconds and complete laryngeal elevation upon palpation.  The patient maintained clear vocal quality throughout the session and had no overt s/sx of aspiration with any consistency consumed.  Provided instruction and training to dysphagia exercises.  The patient completed laryngeal elevation (high pitch \"ee\"), lingual lateralization to the buccal cavity bilaterally, and lip protrusion and retraction.  She completed each exercises x5 each with \"fair\" accuracy, given a 1:1 model.   Recommendations: While the patient had no overt s/sx of aspiration, given fluctuating mentation, she is at risk for aspiration.  Discussed case with MD and received orders for a FEES to further assess oropharyngeal swallowing function.      Plan of Care: Will benefit from Speech Therapy 5 times per week  Post-Acute Therapy: Discharge to a transitional care facility for continued skilled therapy services.    See \"Rehab Therapy-Acute\" Patient Summary Report for complete documentation.     "

## 2018-10-02 NOTE — PROGRESS NOTES
[facility] Hospitalist Progress Note    Date of Service: 10/2/2018    Chief Complaint  78 y.o. female admitted 2018 with HA to [facility].  Had had a GLF with neg CT one week prior.  CT on this admit showing SDH and SAH  Bilateral EVDs placed Dr Santizo       Interval Problem Update  Patient seen and examined today. ICU Care  Care and plan discussed in IDT/Hot rounds.  Lines and assistive devices reviewed.    Patient tolerating treatment and therapies.  All Data, Medication data reviewed.  Case discussed with nursing as available.  Plan of Care reviewed with patient and notified of changes.  10/2 the patient seems improved, alert and oriented times 1-3, at times confused, remains hemodynamically stable, on room air, remains with feeding tube, ICP between 3 and 8, left EVD is at 20 cm with slow output, neurosurgical follow-up noted, plan for continuing to watch ICPs, follow-up CT planned for Wednesday and Thursday  Consultants/Specialty  Neurosurgery  Pulmonology    Disposition  Cont in ICU for close monitoring of Neuro status          Review of Systems   Unable to perform ROS: Acuity of condition      Physical Exam  Laboratory/Imaging   Hemodynamics  Temp (24hrs), Av.9 °C (98.4 °F), Min:36.3 °C (97.3 °F), Max:37.4 °C (99.3 °F)   Temperature: 36.3 °C (97.3 °F)  Pulse  Av.8  Min: 60  Max: 131 Heart Rate (Monitored): 71  NIBP: 148/68      Respiratory      Respiration: 20, Pulse Oximetry: 96 %     Work Of Breathing / Effort: Shallow  RUL Breath Sounds: Diminished, RML Breath Sounds: Diminished, RLL Breath Sounds: Diminished, JUAN Breath Sounds: Diminished, LLL Breath Sounds: Diminished    Fluids    Intake/Output Summary (Last 24 hours) at 10/02/18 0752  Last data filed at 10/02/18 0000   Gross per 24 hour   Intake             1437 ml   Output              865 ml   Net              572 ml       Nutrition  Orders Placed This Encounter   Procedures   • Diet NPO     Standing Status:   Standing     Number of Occurrences:    1     Order Specific Question:   Restrict to:     Answer:   Strict [1]     Physical Exam   Constitutional: She appears well-developed and well-nourished. No distress.   Patient seen and examined by me.   HENT:   Nose: Nose normal.   Mouth/Throat: Oropharynx is clear and moist. No oropharyngeal exudate.   Bruising left side of face no bony step off   Eyes: Conjunctivae are normal. Right eye exhibits no discharge. Left eye exhibits no discharge. No scleral icterus.   Neck: No JVD present. No tracheal deviation present.   Cardiovascular: Normal rate, regular rhythm and normal heart sounds.    Pulmonary/Chest: Effort normal and breath sounds normal. No stridor. No respiratory distress. She has no wheezes. She has no rales. She exhibits no tenderness.   Abdominal: Soft. Bowel sounds are normal. She exhibits no distension. There is no tenderness. There is no guarding.   Musculoskeletal: She exhibits no edema or tenderness.   Neurological: She is alert. No cranial nerve deficit. She exhibits normal muscle tone. Coordination (left leg drift) abnormal.   Orientation is variable  Alert talking but not as alert as the day prior  Following all commands   Skin: Skin is warm and dry. She is not diaphoretic. No pallor.   Psychiatric: Her affect is blunt. Her speech is delayed. She is slowed.   Nursing note and vitals reviewed.      Recent Labs      09/30/18   0400  10/01/18   0514  10/02/18   0425   WBC  15.2*  13.2*  13.7*   RBC  4.29  4.27  4.20   HEMOGLOBIN  12.6  12.8  12.8   HEMATOCRIT  37.2  37.5  36.9*   MCV  86.7  87.8  87.9   MCH  29.4  30.0  30.5   MCHC  33.9  34.1  34.7   RDW  40.7  41.3  40.8   PLATELETCT  232  258  276   MPV  10.8  11.0  11.1     Recent Labs      09/30/18   0400  10/01/18   0514  10/02/18   0425   SODIUM  135  134*  134*   POTASSIUM  4.3  4.0  4.1   CHLORIDE  98  95*  97   CO2  29  30  28   GLUCOSE  116*  151*  136*   BUN  27*  32*  30*   CREATININE  0.52  0.59  0.52   CALCIUM  9.3  9.8  9.3      Recent Labs      09/30/18   0400  10/01/18   0514  10/02/18   0425   INR  1.12  1.12  1.11                  Assessment/Plan     Acute intracranial hemorrhage (HCC)- (present on admission)   Assessment & Plan    Anticoagulation reversed at admission  S/P bilateral EVDs: at zero, plan challenge per Dr Santizo on Monday  Repeat CT stable  Monitor ICP continuously  Exam improving, working with PT and OT  Neurosurgery following  Close BP control          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)- (present on admission)   Assessment & Plan    Anticoagulation reversed at the time of admission, was on eliquis outpatient        Paroxysmal atrial fibrillation (HCC)- (present on admission)   Assessment & Plan    On metoprolol 50mg Q8, Dig 125mcg QD  Prn Dilt for breakthrough rate control  No anticoagulation due to ICH        Hypomagnesemia- (present on admission)   Assessment & Plan    Replace  Follow daily        Hypokalemia   Assessment & Plan    Replaced  Follow daily          Quality-Core Measures   Reviewed items::  EKG reviewed, Labs reviewed, Medications reviewed and Radiology images reviewed  Mack catheter::  Critically Ill - Requiring Accurate Measurement of Urinary Output  DVT prophylaxis pharmacological::  Contraindicated - High bleeding risk  DVT prophylaxis - mechanical:  SCDs  Ulcer Prophylaxis::  Not indicated      Plan  Continue with neurologic monitoring  Continue with bilateral EVD care  Follow-up CT  Continue with supportive care  Core track feeding  See orders  Critically ill

## 2018-10-02 NOTE — PROGRESS NOTES
Neurosurgery Progress Note    Subjective:  Pt with IVH / SAH / SDH with Bilateral EVD day #10  EVD at 20 and now clamped    ICP is low   No neuro changes.     Exam:  Awake. Eyes open.  PERRL  Will follow simple commands.  Moves ext x 4   Overall appropriate   Slight drainage to evd site        Pulse  Av.8  Min: 71  Max: 99  Resp  Av.8  Min: 16  Max: 43  Temp  Av.9 °C (98.4 °F)  Min: 36.3 °C (97.3 °F)  Max: 37.4 °C (99.3 °F)  SpO2  Av.1 %  Min: 96 %  Max: 99 %    ICP  Av MM HG  Min: 3 MM HG  Max: 12 MM HG  CPP   Av.5  Min: 69  Max: 103    Recent Labs      18   0400  10/01/18   0514  10/02/18   0425   WBC  15.2*  13.2*  13.7*   RBC  4.29  4.27  4.20   HEMOGLOBIN  12.6  12.8  12.8   HEMATOCRIT  37.2  37.5  36.9*   MCV  86.7  87.8  87.9   MCH  29.4  30.0  30.5   MCHC  33.9  34.1  34.7   RDW  40.7  41.3  40.8   PLATELETCT  232  258  276   MPV  10.8  11.0  11.1     Recent Labs      18   0400  10/01/18   0514  10/02/18   0425   SODIUM  135  134*  134*   POTASSIUM  4.3  4.0  4.1   CHLORIDE  98  95*  97   CO2  29  30  28   GLUCOSE  116*  151*  136*   BUN  27*  32*  30*   CREATININE  0.52  0.59  0.52   CALCIUM  9.3  9.8  9.3     Recent Labs      18   0400  10/01/18   0514  10/02/18   0425   INR  1.12  1.12  1.11           Intake/Output       10/01/18 0700 - 10/02/18 0659 10/02/18 0700 - 10/03/18 0659       Total  Total       Intake    I.V.  --  90 90  --  -- --    IV Volume (NS at TKO) --  90 -- -- --    Other  120  420 540  --  -- --    Medications (PO/Enteral Liquids) 120 210 330 -- -- --    Free water flush per NG -- 210 210 -- -- --    NG/GT  612  590 1332  --  -- --    Intake (mL) (Enteral Tube 18 Cortrak - Gastric 10 Fr. Right nare)  -- -- --    IV Piggyback  100  120 220  --  -- --    Volume (mL) (ceFAZolin in dextrose (ANCEF) IVPB premix 1 g) 100 120 220 -- -- --    Total Intake 532 6600 2182 -- -- --       Output     Urine  600  455 1055  --  -- --    Output (mL) (Urethral Catheter Latex 16 Fr.)  -- -- --    Drains  65  28.5 93.5  3  -- 3    Output (mL) (ICP/Ventriculostomy Right) 18 0 18 0 -- 0    Output (mL) (ICP/Ventriculostomy Left) 47 28.5 75.5 3 -- 3    Stool  --  -- --  --  -- --    Number of Times Stooled 0 x 1 x 1 x -- -- --    Total Output 665 483.5 1148.5 3 -- 3       Net I/O     167 866.5 1033.5 -3 -- -3            Intake/Output Summary (Last 24 hours) at 10/02/18 0819  Last data filed at 10/02/18 0700   Gross per 24 hour   Intake             2062 ml   Output           1030.5 ml   Net           1031.5 ml            • acetaminophen  650 mg Q4HRS PRN    Or   • acetaminophen  650 mg Q4HRS PRN   • amLODIPine  5 mg Q DAY   • metoprolol  50 mg Q8HRS   • Pharmacy   PRN   • labetalol  5-10 mg Q4HRS PRN   • digoxin  125 mcg DAILY AT 1800   • ondansetron  4 mg Q4HRS PRN    Or   • ondansetron  4 mg Q4HRS PRN   • Pharmacy Consult Request   PRN    And   • oxyCODONE immediate-release  2.5 mg Q3HRS PRN    And   • oxyCODONE immediate-release  5 mg Q3HRS PRN    And   • HYDROmorphone  0.25-1 mg Q2HRS PRN   • polyethylene glycol/lytes  1 Packet QDAY PRN    And   • senna-docusate  2 Tab BID    And   • magnesium hydroxide  30 mL QDAY PRN    And   • bisacodyl  10 mg QDAY PRN   • Respiratory Care per Protocol   Continuous RT   • MD Alert...Adult ICU Electrolyte Replacement per Pharmacy   pharmacy to dose   • LORazepam  2 mg Q5 MIN PRN   • hydrALAZINE  10 mg Q4HRS PRN   • enalaprilat  1.25 mg Q6HRS PRN   • ceFAZolin  1 g Q8HRS       Assessment and Plan:  Hospital day #11 EVD #10  Keep EVD drains clamped  Watch ICP   Will order CT for Wednesday and Thursday  If ICP low, neuro stable and no hydrocephalus then may pull drains later this week

## 2018-10-02 NOTE — PROGRESS NOTES
Critical Care Progress Note    Date of admission  9/22/2018    Chief Complaint  78 y.o. female admitted 9/22/2018 with intracranial hemorrhage.    Hospital Course  This lady was admitted with an acute intracranial hemorrhage.  She is undergone bilateral EVDs for hydrocephalus.    Interval Problem Update  Reviewed last 24 hour events:    A&O x1-3, with labile  No change neuro - aphasia at times  SR 80s  SBp 120s  No PRn Bp meds  Room air  Cortrak tolerated TF  Strict NPO - swallow eval pending  UO adequate  Afebrile  ICp 3-8  R EVD no output  L EVD at 20cm - 1-4/hr  PIV  Na 134  SCDs  BB, CCB, DIG    F/u CT in AM  EVD out friday     YESTERDAY  EVD issue this AM?  Still draining  A&O x1-3  NFE - moves all to command - weaker on L  Low output from EVD  NIHSS 9-10  SR 80s  SBp 120-150s - goal < 160  TF goal  Mack  UO adequate?  Restrained  CXR 9/29 - SSLLLA, CM  Room air  Holding Eliquis  Ancef for drain    Review of Systems  Review of Systems   Constitutional: Positive for fatigue. Negative for chills, diaphoresis and fever.   HENT: Negative for congestion and sinus pressure.    Eyes: Negative for visual disturbance.   Respiratory: Negative for cough and shortness of breath.    Cardiovascular: Negative for chest pain, palpitations and leg swelling.   Gastrointestinal: Negative for abdominal pain, nausea and vomiting.   Genitourinary: Negative for flank pain and hematuria.   Musculoskeletal: Positive for back pain.   Skin: Negative for rash.   Neurological: Positive for weakness and headaches. Negative for speech difficulty.   Psychiatric/Behavioral: Positive for confusion and sleep disturbance. The patient is not nervous/anxious.         Vital Signs for last 24 hours   Temp:  [36.3 °C (97.3 °F)-37.4 °C (99.3 °F)] 36.9 °C (98.5 °F)  Pulse:  [71-99] 82  Resp:  [16-44] 44    Hemodynamic parameters for last 24 hours       Vent Settings for last 24 hours, NA       Physical Exam   Physical Exam   Constitutional: She is  oriented to person, place, and time. She appears well-developed and well-nourished. She is cooperative. She appears ill. No distress.   HENT:   Head: Normocephalic.   Right Ear: External ear normal.   Left Ear: External ear normal.   Mouth/Throat: Oropharynx is clear and moist.   Bruising along her left forehead and left face - improving per staff.  Bilateral EVDs in place, draining blood-tinged CSF.   Eyes: Pupils are equal, round, and reactive to light. Conjunctivae are normal. Right eye exhibits no discharge. Left eye exhibits no discharge. No scleral icterus.   Neck: Neck supple. No JVD present. No tracheal deviation present.   Cardiovascular: Normal rate, regular rhythm, normal heart sounds and intact distal pulses.   No extrasystoles are present. Exam reveals no gallop and no friction rub.    No murmur heard.  Pulmonary/Chest: No accessory muscle usage or stridor. No respiratory distress. She has no decreased breath sounds. She has no wheezes. She has no rales.   Abdominal: Soft. Bowel sounds are normal. She exhibits no distension. There is no tenderness. There is no rebound, no guarding and no CVA tenderness.   Musculoskeletal: She exhibits no edema, tenderness or deformity.   No clubbing or cyanosis   Neurological: She is alert and oriented to person, place, and time. She exhibits abnormal muscle tone (generally weak, not focal).   Continues to be interactive today, following commands in all 4 ext, slow but appropriate   Skin: Skin is warm and dry. No rash noted. She is not diaphoretic. No cyanosis. Nails show no clubbing.   Psychiatric: Her mood appears not anxious. Her speech is not delayed. She is not slowed. Cognition and memory are impaired.   Nursing note and vitals reviewed.  No change today on complete    Medications  Current Facility-Administered Medications   Medication Dose Route Frequency Provider Last Rate Last Dose   • acetaminophen (TYLENOL) tablet 650 mg  650 mg Feeding Tube Q4HRS PRN Gil  QUAN Leone Jr., D.O.   650 mg at 10/01/18 2200    Or   • acetaminophen (TYLENOL) suppository 650 mg  650 mg Rectal Q4HRS PRN Gil Leone Jr., D.O.       • amLODIPine (NORVASC) tablet 5 mg  5 mg Per NG Tube Q DAY Andres Wells D.O.   5 mg at 10/02/18 0513   • metoprolol (LOPRESSOR) tablet 50 mg  50 mg Feeding Tube Q8HRS Andres Wells D.O.   50 mg at 10/02/18 0514   • Pharmacy Consult: Enteral tube feeding - review meds/change route/product selection   Other PRN Gil Leone Jr., D.O.       • labetalol (NORMODYNE,TRANDATE) injection 5-10 mg  5-10 mg Intravenous Q4HRS PRN Gil Leone Jr. D.O.   5 mg at 09/26/18 0303   • digoxin (LANOXIN) tablet 125 mcg  125 mcg Feeding Tube DAILY AT 1800 William Medina M.D.   125 mcg at 10/01/18 1752   • ondansetron (ZOFRAN ODT) dispertab 4 mg  4 mg Feeding Tube Q4HRS PRMO Medina M.D.        Or   • ondansetron (ZOFRAN) syringe/vial injection 4 mg  4 mg Intravenous Q4HRS PRMO Medina M.D.       • Pharmacy Consult Request ...Pain Management Review   Other PRN William Medina M.D.        And   • oxyCODONE immediate-release (ROXICODONE) tablet 2.5 mg  2.5 mg Feeding Tube Q3HRS PRMO Medina M.D.   2.5 mg at 09/30/18 0203    And   • oxyCODONE immediate-release (ROXICODONE) tablet 5 mg  5 mg Feeding Tube Q3HRS PRMO Medina M.D.   5 mg at 09/30/18 2231    And   • HYDROmorphone (DILAUDID) injection 0.25-1 mg  0.25-1 mg Intravenous Q2HRS PRMO Medina M.D.   1 mg at 09/25/18 0445   • polyethylene glycol/lytes (MIRALAX) PACKET 1 Packet  1 Packet Feeding Tube QDAY PAPITO Medina M.D.   1 Packet at 10/01/18 1752    And   • senna-docusate (PERICOLACE or SENOKOT S) 8.6-50 MG per tablet 2 Tab  2 Tab Feeding Tube BID William Medina M.D.   2 Tab at 10/02/18 0514    And   • magnesium hydroxide (MILK OF MAGNESIA) suspension 30 mL  30 mL Feeding Tube QDAY PRMO Medina M.D.   30 mL at 10/02/18 0516    And   • bisacodyl (DULCOLAX) suppository 10 mg   10 mg Rectal QDAY PRN William Medina M.D.       • Respiratory Care per Protocol   Nebulization Continuous RT Rowena Calero M.D.       • MD Alert...ICU Electrolyte Replacement per Pharmacy   Other pharmacy to dose Rowena Calero M.D.       • LORazepam (ATIVAN) injection 2 mg  2 mg Intravenous Q5 MIN PRN Rowena Calero M.D.       • hydrALAZINE (APRESOLINE) injection 10 mg  10 mg Intravenous Q4HRS PRN Rowena Calero M.D.       • enalaprilat (VASOTEC) injection 1.25 mg  1.25 mg Intravenous Q6HRS PRN Rowena Calero M.D.       • ceFAZolin in dextrose (ANCEF) IVPB premix 1 g  1 g Intravenous Q8HRS Juan Alberto Santizo III, M.D.   Stopped at 10/02/18 0546       Fluids    Intake/Output Summary (Last 24 hours) at 10/02/18 0852  Last data filed at 10/02/18 0700   Gross per 24 hour   Intake             2062 ml   Output           1030.5 ml   Net           1031.5 ml       Laboratory  Recent Results (from the past 48 hour(s))   CBC WITH DIFFERENTIAL    Collection Time: 10/01/18  5:14 AM   Result Value Ref Range    WBC 13.2 (H) 4.8 - 10.8 K/uL    RBC 4.27 4.20 - 5.40 M/uL    Hemoglobin 12.8 12.0 - 16.0 g/dL    Hematocrit 37.5 37.0 - 47.0 %    MCV 87.8 81.4 - 97.8 fL    MCH 30.0 27.0 - 33.0 pg    MCHC 34.1 33.6 - 35.0 g/dL    RDW 41.3 35.9 - 50.0 fL    Platelet Count 258 164 - 446 K/uL    MPV 11.0 9.0 - 12.9 fL    Neutrophils-Polys 79.20 (H) 44.00 - 72.00 %    Lymphocytes 9.70 (L) 22.00 - 41.00 %    Monocytes 7.90 0.00 - 13.40 %    Eosinophils 1.60 0.00 - 6.90 %    Basophils 0.50 0.00 - 1.80 %    Immature Granulocytes 1.10 (H) 0.00 - 0.90 %    Nucleated RBC 0.00 /100 WBC    Neutrophils (Absolute) 10.42 (H) 2.00 - 7.15 K/uL    Lymphs (Absolute) 1.28 1.00 - 4.80 K/uL    Monos (Absolute) 1.04 (H) 0.00 - 0.85 K/uL    Eos (Absolute) 0.21 0.00 - 0.51 K/uL    Baso (Absolute) 0.06 0.00 - 0.12 K/uL    Immature Granulocytes (abs) 0.14 (H) 0.00 - 0.11 K/uL    NRBC (Absolute) 0.00 K/uL   BASIC METABOLIC PANEL    Collection Time: 10/01/18   5:14 AM   Result Value Ref Range    Sodium 134 (L) 135 - 145 mmol/L    Potassium 4.0 3.6 - 5.5 mmol/L    Chloride 95 (L) 96 - 112 mmol/L    Co2 30 20 - 33 mmol/L    Glucose 151 (H) 65 - 99 mg/dL    Bun 32 (H) 8 - 22 mg/dL    Creatinine 0.59 0.50 - 1.40 mg/dL    Calcium 9.8 8.5 - 10.5 mg/dL    Anion Gap 9.0 0.0 - 11.9   MAGNESIUM    Collection Time: 10/01/18  5:14 AM   Result Value Ref Range    Magnesium 2.5 1.5 - 2.5 mg/dL   PROTHROMBIN TIME    Collection Time: 10/01/18  5:14 AM   Result Value Ref Range    PT 14.5 12.0 - 14.6 sec    INR 1.12 0.87 - 1.13   ESTIMATED GFR    Collection Time: 10/01/18  5:14 AM   Result Value Ref Range    GFR If African American >60 >60 mL/min/1.73 m 2    GFR If Non African American >60 >60 mL/min/1.73 m 2   CBC WITH DIFFERENTIAL    Collection Time: 10/02/18  4:25 AM   Result Value Ref Range    WBC 13.7 (H) 4.8 - 10.8 K/uL    RBC 4.20 4.20 - 5.40 M/uL    Hemoglobin 12.8 12.0 - 16.0 g/dL    Hematocrit 36.9 (L) 37.0 - 47.0 %    MCV 87.9 81.4 - 97.8 fL    MCH 30.5 27.0 - 33.0 pg    MCHC 34.7 33.6 - 35.0 g/dL    RDW 40.8 35.9 - 50.0 fL    Platelet Count 276 164 - 446 K/uL    MPV 11.1 9.0 - 12.9 fL    Neutrophils-Polys 78.60 (H) 44.00 - 72.00 %    Lymphocytes 9.80 (L) 22.00 - 41.00 %    Monocytes 8.70 0.00 - 13.40 %    Eosinophils 1.20 0.00 - 6.90 %    Basophils 0.50 0.00 - 1.80 %    Immature Granulocytes 1.20 (H) 0.00 - 0.90 %    Nucleated RBC 0.00 /100 WBC    Neutrophils (Absolute) 10.81 (H) 2.00 - 7.15 K/uL    Lymphs (Absolute) 1.34 1.00 - 4.80 K/uL    Monos (Absolute) 1.19 (H) 0.00 - 0.85 K/uL    Eos (Absolute) 0.16 0.00 - 0.51 K/uL    Baso (Absolute) 0.07 0.00 - 0.12 K/uL    Immature Granulocytes (abs) 0.16 (H) 0.00 - 0.11 K/uL    NRBC (Absolute) 0.00 K/uL   BASIC METABOLIC PANEL    Collection Time: 10/02/18  4:25 AM   Result Value Ref Range    Sodium 134 (L) 135 - 145 mmol/L    Potassium 4.1 3.6 - 5.5 mmol/L    Chloride 97 96 - 112 mmol/L    Co2 28 20 - 33 mmol/L    Glucose 136 (H) 65 -  99 mg/dL    Bun 30 (H) 8 - 22 mg/dL    Creatinine 0.52 0.50 - 1.40 mg/dL    Calcium 9.3 8.5 - 10.5 mg/dL    Anion Gap 9.0 0.0 - 11.9   MAGNESIUM    Collection Time: 10/02/18  4:25 AM   Result Value Ref Range    Magnesium 2.4 1.5 - 2.5 mg/dL   PROTHROMBIN TIME    Collection Time: 10/02/18  4:25 AM   Result Value Ref Range    PT 14.5 12.0 - 14.6 sec    INR 1.11 0.87 - 1.13   ESTIMATED GFR    Collection Time: 10/02/18  4:25 AM   Result Value Ref Range    GFR If African American >60 >60 mL/min/1.73 m 2    GFR If Non African American >60 >60 mL/min/1.73 m 2       Imaging  X-Ray:  I have personally reviewed the images and compared with prior images.  CT:    Reviewed    Assessment/Plan  Acute intracranial hemorrhage (HCC)- (present on admission)   Assessment & Plan    S/P fall 1 week prior- CT head on 9/14 negative for acute intracranial hemorrhage  CT scan on 9/22 with intraventricular hemorrhage, right greater than left and intraparenchymal hemorrhage  Noncommunicating hydrocephalus - s/p emergent bilateral EVD placement by Dr. Santizo on 9/22.  Intraventricular TPA given by NeuroSx with improvement in EVD output  Continue Keppra 500 mg IV twice daily for seizure prophylaxis  Strict blood pressure control - goal SBP <160 mmHg  Continue close neuro checks every 2 hours  Repeat CT on 9/28 with improved shift from 1 cm to 7.1 mm, continue EVD drainage this weekend, and increase next week  Clamp EVD when clinically appropriate, 2 catheters noted on exam, confer with neurosurgery daily  Hopefully when the EVD catheter next few days        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)- (present on admission)   Assessment & Plan    Reversed on hospital day 1  Holding anticoagulants until okay with neurosurgery, will ease back in with chest prophylactic heparin first  Monitor        Paroxysmal atrial fibrillation (HCC)- (present on admission)   Assessment & Plan    Previously on apixaban, 5 mg twice daily  - now strictly contraindicated due to ICH  Continue digoxin and metoprolol as this regimen has been working well  Continue to monitor heart rate  Monitor potassium and magnesium and supplement to keep levels greater than 2 and 4 respectively  Prophylactic subcu heparin when okay with neurosurgery        Encephalopathy acute- (present on admission)   Assessment & Plan    Still improving a little bit every day  Structural, secondary to intracranial hemorrhage  Re-orientation, family, glasses, hearing aids, sleep, lights on during day, treat pain, ambulate, minimize benzos/anticholinergic agents.  Mobilize          Hypomagnesemia- (present on admission)   Assessment & Plan    Electrolyte replacement protocol to maintain >2        Hypokalemia   Assessment & Plan    Electrolyte replacement protocol to maintain >4             VTE:  Contraindicated  Ulcer: Not Indicated  Lines: Mack Catheter  Ongoing indication addressed    I have performed a physical exam and reviewed and updated ROS and Plan today (10/2/2018). In review of yesterday's note (10/1/2018), there are no changes except as documented above.     EVDs in place at high risk for sudden neurologic compensation. This patient is critically ill, at high risk for decompensation leading to worsening vital organ dysfunction and death without critical care interventions.    Discussed patient condition and risk of morbidity and/or mortality with Hospitalist, RN, RT, Pharmacy, , , Charge nurse / hot rounds, Patient and neurosurgery.

## 2018-10-02 NOTE — CARE PLAN
Problem: Bowel/Gastric:  Goal: Normal bowel function is maintained or improved  Outcome: PROGRESSING AS EXPECTED  Soft stools, patient incontinent     Problem: Skin Integrity  Goal: Risk for impaired skin integrity will decrease  Outcome: PROGRESSING AS EXPECTED  Q shift skin assessments, Q2 hr turns, patient helping to initiate turns.

## 2018-10-03 ENCOUNTER — APPOINTMENT (OUTPATIENT)
Dept: RADIOLOGY | Facility: MEDICAL CENTER | Age: 78
DRG: 023 | End: 2018-10-03
Attending: PHYSICIAN ASSISTANT
Payer: MEDICARE

## 2018-10-03 PROBLEM — E87.1 HYPONATREMIA: Status: ACTIVE | Noted: 2018-10-03

## 2018-10-03 LAB
ANION GAP SERPL CALC-SCNC: 7 MMOL/L (ref 0–11.9)
BASOPHILS # BLD AUTO: 0.6 % (ref 0–1.8)
BASOPHILS # BLD: 0.08 K/UL (ref 0–0.12)
BUN SERPL-MCNC: 27 MG/DL (ref 8–22)
CALCIUM SERPL-MCNC: 9.6 MG/DL (ref 8.5–10.5)
CHLORIDE SERPL-SCNC: 94 MMOL/L (ref 96–112)
CO2 SERPL-SCNC: 30 MMOL/L (ref 20–33)
CREAT SERPL-MCNC: 0.56 MG/DL (ref 0.5–1.4)
EOSINOPHIL # BLD AUTO: 0.2 K/UL (ref 0–0.51)
EOSINOPHIL NFR BLD: 1.4 % (ref 0–6.9)
ERYTHROCYTE [DISTWIDTH] IN BLOOD BY AUTOMATED COUNT: 40.3 FL (ref 35.9–50)
GLUCOSE SERPL-MCNC: 140 MG/DL (ref 65–99)
HCT VFR BLD AUTO: 37.9 % (ref 37–47)
HGB BLD-MCNC: 12.8 G/DL (ref 12–16)
IMM GRANULOCYTES # BLD AUTO: 0.24 K/UL (ref 0–0.11)
IMM GRANULOCYTES NFR BLD AUTO: 1.7 % (ref 0–0.9)
INR PPP: 1.11 (ref 0.87–1.13)
LYMPHOCYTES # BLD AUTO: 1.35 K/UL (ref 1–4.8)
LYMPHOCYTES NFR BLD: 9.7 % (ref 22–41)
MAGNESIUM SERPL-MCNC: 2.4 MG/DL (ref 1.5–2.5)
MCH RBC QN AUTO: 29.4 PG (ref 27–33)
MCHC RBC AUTO-ENTMCNC: 33.8 G/DL (ref 33.6–35)
MCV RBC AUTO: 87.1 FL (ref 81.4–97.8)
MONOCYTES # BLD AUTO: 1.12 K/UL (ref 0–0.85)
MONOCYTES NFR BLD AUTO: 8.1 % (ref 0–13.4)
NEUTROPHILS # BLD AUTO: 10.89 K/UL (ref 2–7.15)
NEUTROPHILS NFR BLD: 78.5 % (ref 44–72)
NRBC # BLD AUTO: 0 K/UL
NRBC BLD-RTO: 0 /100 WBC
PHOSPHATE SERPL-MCNC: 3.2 MG/DL (ref 2.5–4.5)
PLATELET # BLD AUTO: 302 K/UL (ref 164–446)
PMV BLD AUTO: 10.7 FL (ref 9–12.9)
POTASSIUM SERPL-SCNC: 4.7 MMOL/L (ref 3.6–5.5)
PROTHROMBIN TIME: 14.4 SEC (ref 12–14.6)
RBC # BLD AUTO: 4.35 M/UL (ref 4.2–5.4)
SODIUM SERPL-SCNC: 131 MMOL/L (ref 135–145)
WBC # BLD AUTO: 13.9 K/UL (ref 4.8–10.8)

## 2018-10-03 PROCEDURE — 70450 CT HEAD/BRAIN W/O DYE: CPT

## 2018-10-03 PROCEDURE — 700111 HCHG RX REV CODE 636 W/ 250 OVERRIDE (IP): Performed by: HOSPITALIST

## 2018-10-03 PROCEDURE — 83735 ASSAY OF MAGNESIUM: CPT

## 2018-10-03 PROCEDURE — 84100 ASSAY OF PHOSPHORUS: CPT

## 2018-10-03 PROCEDURE — A9270 NON-COVERED ITEM OR SERVICE: HCPCS | Performed by: HOSPITALIST

## 2018-10-03 PROCEDURE — A9270 NON-COVERED ITEM OR SERVICE: HCPCS | Performed by: INTERNAL MEDICINE

## 2018-10-03 PROCEDURE — 700102 HCHG RX REV CODE 250 W/ 637 OVERRIDE(OP): Performed by: INTERNAL MEDICINE

## 2018-10-03 PROCEDURE — 700111 HCHG RX REV CODE 636 W/ 250 OVERRIDE (IP): Performed by: NEUROLOGICAL SURGERY

## 2018-10-03 PROCEDURE — 85610 PROTHROMBIN TIME: CPT

## 2018-10-03 PROCEDURE — 700102 HCHG RX REV CODE 250 W/ 637 OVERRIDE(OP): Performed by: HOSPITALIST

## 2018-10-03 PROCEDURE — 80048 BASIC METABOLIC PNL TOTAL CA: CPT

## 2018-10-03 PROCEDURE — 99233 SBSQ HOSP IP/OBS HIGH 50: CPT | Performed by: INTERNAL MEDICINE

## 2018-10-03 PROCEDURE — 770022 HCHG ROOM/CARE - ICU (200)

## 2018-10-03 PROCEDURE — 99232 SBSQ HOSP IP/OBS MODERATE 35: CPT | Performed by: HOSPITALIST

## 2018-10-03 PROCEDURE — 85025 COMPLETE CBC W/AUTO DIFF WBC: CPT

## 2018-10-03 RX ORDER — SODIUM CHLORIDE 1 G/1
1 TABLET ORAL
Status: DISCONTINUED | OUTPATIENT
Start: 2018-10-03 | End: 2018-10-04

## 2018-10-03 RX ADMIN — HYDROMORPHONE HYDROCHLORIDE 0.5 MG: 2 INJECTION INTRAMUSCULAR; INTRAVENOUS; SUBCUTANEOUS at 04:10

## 2018-10-03 RX ADMIN — METOPROLOL TARTRATE 50 MG: 50 TABLET ORAL at 21:55

## 2018-10-03 RX ADMIN — SODIUM CHLORIDE TAB 1 GM 1 G: 1 TAB at 17:40

## 2018-10-03 RX ADMIN — AMLODIPINE BESYLATE 5 MG: 5 TABLET ORAL at 05:44

## 2018-10-03 RX ADMIN — CEFAZOLIN SODIUM 1 G: 1 INJECTION, SOLUTION INTRAVENOUS at 05:46

## 2018-10-03 RX ADMIN — CEFAZOLIN SODIUM 1 G: 1 INJECTION, SOLUTION INTRAVENOUS at 21:57

## 2018-10-03 RX ADMIN — CEFAZOLIN SODIUM 1 G: 1 INJECTION, SOLUTION INTRAVENOUS at 15:02

## 2018-10-03 RX ADMIN — METOPROLOL TARTRATE 50 MG: 50 TABLET ORAL at 15:02

## 2018-10-03 RX ADMIN — SODIUM CHLORIDE TAB 1 GM 1 G: 1 TAB at 11:31

## 2018-10-03 RX ADMIN — DIGOXIN 125 MCG: 125 TABLET ORAL at 17:40

## 2018-10-03 RX ADMIN — METOPROLOL TARTRATE 50 MG: 50 TABLET ORAL at 05:43

## 2018-10-03 ASSESSMENT — ENCOUNTER SYMPTOMS
SHORTNESS OF BREATH: 0
CHILLS: 0
FLANK PAIN: 0
HEADACHES: 1
WEAKNESS: 1
SPEECH DIFFICULTY: 0
COUGH: 0
FEVER: 0
VOMITING: 0
DIAPHORESIS: 0
FATIGUE: 1
CONFUSION: 1
NERVOUS/ANXIOUS: 0
ABDOMINAL PAIN: 0
PALPITATIONS: 0
NAUSEA: 0
SLEEP DISTURBANCE: 1
BACK PAIN: 1
SINUS PRESSURE: 0

## 2018-10-03 ASSESSMENT — PAIN SCALES - GENERAL
PAINLEVEL_OUTOF10: 0

## 2018-10-03 ASSESSMENT — PATIENT HEALTH QUESTIONNAIRE - PHQ9
1. LITTLE INTEREST OR PLEASURE IN DOING THINGS: NOT AT ALL
2. FEELING DOWN, DEPRESSED, IRRITABLE, OR HOPELESS: NOT AT ALL
SUM OF ALL RESPONSES TO PHQ9 QUESTIONS 1 AND 2: 0

## 2018-10-03 NOTE — PROGRESS NOTES
[facility] Hospitalist Progress Note    Date of Service: 10/3/2018    Chief Complaint  78 y.o. female admitted 2018 with HA to [facility].  Had had a GLF with neg CT one week prior.  CT on this admit showing SDH and SAH  Bilateral EVDs placed Dr Santizo       Interval Problem Update  Patient seen and examined today. ICU Care  Care and plan discussed in IDT/Hot rounds.  Lines and assistive devices reviewed.    Patient tolerating treatment and therapies.  All Data, Medication data reviewed.  Case discussed with nursing as available.  Plan of Care reviewed with patient and notified of changes.  10/2 the patient seems improved, alert and oriented times 1-3, at times confused, remains hemodynamically stable, on room air, remains with feeding tube, ICP between 3 and 8, left EVD is at 20 cm with slow output, neurosurgical follow-up noted, plan for continuing to watch ICPs, follow-up CT planned for Wednesday and Thursday  10/3 the patient feels better, her EVD's are clamped, she is tired, did not sleep well last night, hemodynamically stable, tolerates core track feeding,  Consultants/Specialty  Neurosurgery  Pulmonology    Disposition  Cont in ICU for close monitoring of Neuro status          Review of Systems   Unable to perform ROS: Acuity of condition      Physical Exam  Laboratory/Imaging   Hemodynamics  Temp (24hrs), Av.6 °C (97.8 °F), Min:36.1 °C (97 °F), Max:37 °C (98.6 °F)   Temperature: 36.1 °C (97 °F)  Pulse  Av.1  Min: 60  Max: 131 Heart Rate (Monitored): 75  Blood Pressure : 143/74, NIBP: 120/63      Respiratory      Respiration: (!) 21, Pulse Oximetry: 98 %     Work Of Breathing / Effort: Shallow  RUL Breath Sounds: Diminished, RML Breath Sounds: Diminished, RLL Breath Sounds: Diminished, JUAN Breath Sounds: Diminished, LLL Breath Sounds: Diminished    Fluids    Intake/Output Summary (Last 24 hours) at 10/03/18 0756  Last data filed at 10/02/18 2156   Gross per 24 hour   Intake              830 ml   Output               631 ml   Net              199 ml       Nutrition  Orders Placed This Encounter   Procedures   • Diet NPO     Standing Status:   Standing     Number of Occurrences:   1     Order Specific Question:   Restrict to:     Answer:   Strict [1]     Physical Exam   Constitutional: She appears well-developed and well-nourished. No distress.   Patient seen and examined by me.   HENT:   Nose: Nose normal.   Mouth/Throat: Oropharynx is clear and moist. No oropharyngeal exudate.   Bruising left side of face no bony step off   Eyes: Conjunctivae are normal. Right eye exhibits no discharge. Left eye exhibits no discharge. No scleral icterus.   Neck: No JVD present. No tracheal deviation present.   Cardiovascular: Normal rate, regular rhythm and normal heart sounds.    Pulmonary/Chest: Effort normal and breath sounds normal. No stridor. No respiratory distress. She has no wheezes. She has no rales. She exhibits no tenderness.   Abdominal: Soft. Bowel sounds are normal. She exhibits no distension. There is no tenderness. There is no guarding.   Musculoskeletal: She exhibits no edema or tenderness.   Neurological: She is alert. No cranial nerve deficit. She exhibits normal muscle tone. Coordination (left leg drift) abnormal.   Orientation is variable  Alert talking but not as alert as the day prior  Following all commands   Skin: Skin is warm and dry. She is not diaphoretic. No pallor.   Psychiatric: Her affect is blunt. Her speech is delayed. She is slowed.   Nursing note and vitals reviewed.      Recent Labs      10/01/18   0514  10/02/18   0425  10/03/18   0552   WBC  13.2*  13.7*  13.9*   RBC  4.27  4.20  4.35   HEMOGLOBIN  12.8  12.8  12.8   HEMATOCRIT  37.5  36.9*  37.9   MCV  87.8  87.9  87.1   MCH  30.0  30.5  29.4   MCHC  34.1  34.7  33.8   RDW  41.3  40.8  40.3   PLATELETCT  258  276  302   MPV  11.0  11.1  10.7     Recent Labs      10/01/18   0514  10/02/18   0425  10/03/18   0552   SODIUM  134*  134*  131*    POTASSIUM  4.0  4.1  4.7   CHLORIDE  95*  97  94*   CO2  30  28  30   GLUCOSE  151*  136*  140*   BUN  32*  30*  27*   CREATININE  0.59  0.52  0.56   CALCIUM  9.8  9.3  9.6     Recent Labs      10/01/18   0514  10/02/18   0425  10/03/18   0552   INR  1.12  1.11  1.11                  Assessment/Plan     Acute intracranial hemorrhage (HCC)- (present on admission)   Assessment & Plan    Anticoagulation reversed at admission  S/P bilateral EVDs  Repeat CT stable  Monitor ICP   Exam improving, working with PT and OT  Neurosurgery following  Close BP control          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)- (present on admission)   Assessment & Plan    Anticoagulation reversed at the time of admission, was on eliquis outpatient        Paroxysmal atrial fibrillation (HCC)- (present on admission)   Assessment & Plan    On metoprolol 50mg Q8, Dig 125mcg QD  Prn Dilt for breakthrough rate control  No anticoagulation due to ICH        Encephalopathy acute- (present on admission)   Assessment & Plan    Secondary to cerebral compromise from subdural hematoma        Hypomagnesemia- (present on admission)   Assessment & Plan    Replace  Follow daily        Hypokalemia   Assessment & Plan    Replaced  Follow daily          Quality-Core Measures   Reviewed items::  EKG reviewed, Labs reviewed, Medications reviewed and Radiology images reviewed  Mack catheter::  Critically Ill - Requiring Accurate Measurement of Urinary Output  DVT prophylaxis pharmacological::  Contraindicated - High bleeding risk  DVT prophylaxis - mechanical:  SCDs  Ulcer Prophylaxis::  Not indicated      Plan  Continue with neurologic monitoring  Continue with bilateral EVD care as per neurosurgery  Follow-up CT as ordered  Continue with supportive care  Core track feeding  See orders  Critically ill

## 2018-10-03 NOTE — PROGRESS NOTES
Neurosurg PA, Klinton calls back and asks this RN to clamp bilat EVD drains and reschedule CT of head to 10/3 0400.

## 2018-10-03 NOTE — PROGRESS NOTES
Neurosurgery Progress Note    Subjective:  Pt with IVH / SAH / SDH with Bilateral EVD day #10  EVD clamped since last night  ICP is low.  No neuro changes.   CT this AM stable to improving some.  Patient received some Dilaudid prior to going to CT and has been quite somnolent since that.  Dressing changed to head yesterday as we getting some drainage from drain sites.    Exam:  Awake. Eyes open.  PERRL  Will follow simple commands.  Moves ext x 4   Overall appropriate   Dressing c/d/i.        BP  Min: 143/74  Max: 155/102  Pulse  Av.2  Min: 73  Max: 106  Resp  Av.7  Min: 18  Max: 31  Temp  Av.5 °C (97.7 °F)  Min: 36.1 °C (97 °F)  Max: 37 °C (98.6 °F)  SpO2  Av.7 %  Min: 97 %  Max: 99 %    ICP  Av.1 MM HG  Min: 2 MM HG  Max: 11 MM HG  CPP   Av.2  Min: 68  Max: 94    Recent Labs      10/01/18   0514  10/02/18   0425  10/03/18   0552   WBC  13.2*  13.7*  13.9*   RBC  4.27  4.20  4.35   HEMOGLOBIN  12.8  12.8  12.8   HEMATOCRIT  37.5  36.9*  37.9   MCV  87.8  87.9  87.1   MCH  30.0  30.5  29.4   MCHC  34.1  34.7  33.8   RDW  41.3  40.8  40.3   PLATELETCT  258  276  302   MPV  11.0  11.1  10.7     Recent Labs      10/01/18   0514  10/02/18   0425  10/03/18   0552   SODIUM  134*  134*  131*   POTASSIUM  4.0  4.1  4.7   CHLORIDE  95*  97  94*   CO2  30  28  30   GLUCOSE  151*  136*  140*   BUN  32*  30*  27*   CREATININE  0.59  0.52  0.56   CALCIUM  9.8  9.3  9.6     Recent Labs      10/01/18   0514  10/02/18   0425  10/03/18   0552   INR  1.12  1.11  1.11           Intake/Output       10/02/18 0700 - 10/03/18 0659 10/03/18 0700 - 10/04/18 0659       Total  Total       Intake    I.V.  --  115 115  --  -- --    IV Volume (NS at TKO) -- 115 115 -- -- --    Other  90  330 420  --  -- --    Medications (PO/Enteral Liquids) 90 120 210 -- -- --    Free water flush per NG -- 210 210 -- -- --    NG/GT  600  720 1320  --  -- --    Intake (mL) (Enteral Tube 18  Cortrak - Gastric 10 Fr. Right nare)  -- -- --    IV Piggyback  50  60 110  --  -- --    Volume (mL) (ceFAZolin in dextrose (ANCEF) IVPB premix 1 g) 50 60 110 -- -- --    Total Intake 740 1225 1965 -- -- --       Output    Urine  625  675 1300  --  -- --    Output (mL) (Urethral Catheter Latex 16 Fr.)  -- -- --    Drains  9  2.5 11.5  --  -- --    Output (mL) (ICP/Ventriculostomy Right) 0 0 0 -- -- --    Output (mL) (ICP/Ventriculostomy Left) 9 2.5 11.5 -- -- --    Stool  --  -- --  --  -- --    Number of Times Stooled 3 x 1 x 4 x -- -- --    Total Output 634 677.5 1311.5 -- -- --       Net I/O     106 547.5 653.5 -- -- --            Intake/Output Summary (Last 24 hours) at 10/03/18 0833  Last data filed at 10/03/18 0600   Gross per 24 hour   Intake             1815 ml   Output           1158.5 ml   Net            656.5 ml            • sodium chloride  1 g TID WITH MEALS   • acetaminophen  650 mg Q4HRS PRN    Or   • acetaminophen  650 mg Q4HRS PRN   • amLODIPine  5 mg Q DAY   • metoprolol  50 mg Q8HRS   • Pharmacy   PRN   • labetalol  5-10 mg Q4HRS PRN   • digoxin  125 mcg DAILY AT 1800   • ondansetron  4 mg Q4HRS PRN    Or   • ondansetron  4 mg Q4HRS PRN   • Pharmacy Consult Request   PRN    And   • oxyCODONE immediate-release  2.5 mg Q3HRS PRN    And   • oxyCODONE immediate-release  5 mg Q3HRS PRN    And   • HYDROmorphone  0.25-1 mg Q2HRS PRN   • polyethylene glycol/lytes  1 Packet QDAY PRN    And   • senna-docusate  2 Tab BID    And   • magnesium hydroxide  30 mL QDAY PRN    And   • bisacodyl  10 mg QDAY PRN   • Respiratory Care per Protocol   Continuous RT   • MD Alert...Adult ICU Electrolyte Replacement per Pharmacy   pharmacy to dose   • LORazepam  2 mg Q5 MIN PRN   • hydrALAZINE  10 mg Q4HRS PRN   • enalaprilat  1.25 mg Q6HRS PRN   • ceFAZolin  1 g Q8HRS       Assessment and Plan:  Hospital day #12 EVD #11  Keep EVD drains clamped  Watch ICP   CT ordered for Thursday again.  If that  remains stable then may plan to d/c drains soon.  If ICP low, neuro stable and no hydrocephalus then may pull drains later this week.  Ok for q2 hour neuro checks and ok to sit at EOB as tolerated.

## 2018-10-03 NOTE — PROGRESS NOTES
Critical Care Progress Note    Date of admission  9/22/2018    Chief Complaint  78 y.o. female admitted 9/22/2018 with intracranial hemorrhage.    Hospital Course  This lady was admitted with an acute intracranial hemorrhage.  She is undergone bilateral EVDs for hydrocephalus.    Interval Problem Update  Reviewed last 24 hour events:    A&O x1-3  More somnolent initially after receiving some narcotics in the early a.m. prior to the CT  F/u CT better SDH/shift  EVDs clamped last night  No change neuro exam  Room air  Cortrak TF  UO adequate  Afebrile  PIV  Ancef - WBC 13.9  Glucose levels good       YESTERDAY  A&O x1-3, with labile  No change neuro - aphasia at times  SR 80s  SBp 120s  No PRn Bp meds  Room air  Cortrak tolerated TF  Strict NPO - swallow eval pending  UO adequate  Afebrile  ICp 3-8  R EVD no output  L EVD at 20cm - 1-4/hr  PIV  Na 134  SCDs  BB, CCB, DIG    F/u CT in AM  EVD out friday    Review of Systems  Review of Systems   Constitutional: Positive for fatigue (Unchanged). Negative for chills, diaphoresis and fever.   HENT: Negative for congestion and sinus pressure.    Eyes: Negative for visual disturbance.   Respiratory: Negative for cough and shortness of breath.    Cardiovascular: Negative for chest pain, palpitations and leg swelling.   Gastrointestinal: Negative for abdominal pain, nausea and vomiting.   Genitourinary: Negative for flank pain and hematuria.   Musculoskeletal: Positive for back pain.   Skin: Negative for rash.   Neurological: Positive for weakness and headaches (Better). Negative for speech difficulty.   Psychiatric/Behavioral: Positive for confusion and sleep disturbance. The patient is not nervous/anxious.         Vital Signs for last 24 hours   Temp:  [36.2 °C (97.1 °F)-37 °C (98.6 °F)] 36.2 °C (97.2 °F)  Pulse:  [] 86  Resp:  [18-44] 18  BP: (143-155)/() 143/74    Hemodynamic parameters for last 24 hours  EHR flow sheets are reviewed       Vent Settings for  last 24 hours, NA       Physical Exam   Physical Exam   Constitutional: She appears well-developed and well-nourished. She appears lethargic. She is easily aroused. No distress.   HENT:   Head: Normocephalic.   Right Ear: External ear normal.   Left Ear: External ear normal.   Mouth/Throat: Oropharynx is clear and moist.   Bruising along her left forehead and left face - improving per staff.  Bilateral EVDs in place, draining blood-tinged CSF.   Eyes: Pupils are equal, round, and reactive to light. Conjunctivae are normal. Right eye exhibits no discharge. Left eye exhibits no discharge. No scleral icterus.   Neck: Neck supple. No JVD present. No tracheal deviation present.   Cardiovascular: Normal rate, regular rhythm, normal heart sounds and intact distal pulses.   No extrasystoles are present. Exam reveals no gallop and no friction rub.    No murmur heard.  Pulmonary/Chest: Effort normal. No accessory muscle usage or stridor. No tachypnea. No respiratory distress. She has decreased breath sounds in the right lower field and the left lower field. She has no wheezes. She has no rales.   Abdominal: Soft. Bowel sounds are normal. She exhibits no distension. There is no hepatosplenomegaly. There is no tenderness. There is no rebound, no guarding and no CVA tenderness.   Musculoskeletal: She exhibits no edema, tenderness or deformity.   No clubbing or cyanosis   Neurological: She is easily aroused. She appears lethargic. She exhibits abnormal muscle tone (generally weak, not focal). She displays no seizure activity.   More somnolent in the a.m. after receiving narcotics.  CT scan, does arouse and follow simple commands, no new focal neurological findings   Skin: Skin is warm and dry. No rash noted. She is not diaphoretic. No cyanosis. Nails show no clubbing.   Psychiatric: Her speech is not delayed. She is not slowed.   Nursing note and vitals reviewed.  No change today on complete    Medications  Current  Facility-Administered Medications   Medication Dose Route Frequency Provider Last Rate Last Dose   • acetaminophen (TYLENOL) tablet 650 mg  650 mg Feeding Tube Q4HRS PRN Gil Leone Jr., D.O.   650 mg at 10/02/18 1457    Or   • acetaminophen (TYLENOL) suppository 650 mg  650 mg Rectal Q4HRS PRN Gil Leone Jr., D.O.       • amLODIPine (NORVASC) tablet 5 mg  5 mg Per NG Tube Q DAY Andres Wells D.O.   5 mg at 10/03/18 0544   • metoprolol (LOPRESSOR) tablet 50 mg  50 mg Feeding Tube Q8HRS Andres Wells D.O.   50 mg at 10/03/18 0543   • Pharmacy Consult: Enteral tube feeding - review meds/change route/product selection   Other PRN Gil Leone Jr., D.O.       • labetalol (NORMODYNE,TRANDATE) injection 5-10 mg  5-10 mg Intravenous Q4HRS PRN Gil Leone Jr., D.O.   5 mg at 09/26/18 0303   • digoxin (LANOXIN) tablet 125 mcg  125 mcg Feeding Tube DAILY AT 1800 William Medina M.D.   125 mcg at 10/02/18 1850   • ondansetron (ZOFRAN ODT) dispertab 4 mg  4 mg Feeding Tube Q4HRS PRN William Medina M.D.        Or   • ondansetron (ZOFRAN) syringe/vial injection 4 mg  4 mg Intravenous Q4HRS PRMO Medina M.D.       • Pharmacy Consult Request ...Pain Management Review   Other PRMO Medina M.D.        And   • oxyCODONE immediate-release (ROXICODONE) tablet 2.5 mg  2.5 mg Feeding Tube Q3HRS PRMO Medina M.D.   2.5 mg at 09/30/18 0203    And   • oxyCODONE immediate-release (ROXICODONE) tablet 5 mg  5 mg Feeding Tube Q3HRS PRMO Medina M.D.   5 mg at 09/30/18 2231    And   • HYDROmorphone (DILAUDID) injection 0.25-1 mg  0.25-1 mg Intravenous Q2HRS PRN William Medina M.D.   1 mg at 09/25/18 0445   • polyethylene glycol/lytes (MIRALAX) PACKET 1 Packet  1 Packet Feeding Tube QDAY PRN William Medina M.D.   1 Packet at 10/01/18 1752    And   • senna-docusate (PERICOLACE or SENOKOT S) 8.6-50 MG per tablet 2 Tab  2 Tab Feeding Tube BID iWlliam Medina M.D.   Stopped at 10/02/18 1800    And   •  magnesium hydroxide (MILK OF MAGNESIA) suspension 30 mL  30 mL Feeding Tube QDAY PRN William Medina M.D.   30 mL at 10/02/18 0516    And   • bisacodyl (DULCOLAX) suppository 10 mg  10 mg Rectal QDAY PRN William Medina M.D.       • Respiratory Care per Protocol   Nebulization Continuous RT Rowena Calero M.D.       • MD Alert...ICU Electrolyte Replacement per Pharmacy   Other pharmacy to dose Rowena Calero M.D.       • LORazepam (ATIVAN) injection 2 mg  2 mg Intravenous Q5 MIN PRN Rowena Calero M.D.       • hydrALAZINE (APRESOLINE) injection 10 mg  10 mg Intravenous Q4HRS PRN Rowena Calero M.D.       • enalaprilat (VASOTEC) injection 1.25 mg  1.25 mg Intravenous Q6HRS PRN Rowena Calero M.D.       • ceFAZolin in dextrose (ANCEF) IVPB premix 1 g  1 g Intravenous Q8HRS Juan Alberto Santizo III, M.D.   Stopped at 10/03/18 0616       Fluids    Intake/Output Summary (Last 24 hours) at 10/03/18 0554  Last data filed at 10/02/18 2156   Gross per 24 hour   Intake              965 ml   Output            710.5 ml   Net            254.5 ml       Laboratory  Recent Results (from the past 48 hour(s))   CBC WITH DIFFERENTIAL    Collection Time: 10/02/18  4:25 AM   Result Value Ref Range    WBC 13.7 (H) 4.8 - 10.8 K/uL    RBC 4.20 4.20 - 5.40 M/uL    Hemoglobin 12.8 12.0 - 16.0 g/dL    Hematocrit 36.9 (L) 37.0 - 47.0 %    MCV 87.9 81.4 - 97.8 fL    MCH 30.5 27.0 - 33.0 pg    MCHC 34.7 33.6 - 35.0 g/dL    RDW 40.8 35.9 - 50.0 fL    Platelet Count 276 164 - 446 K/uL    MPV 11.1 9.0 - 12.9 fL    Neutrophils-Polys 78.60 (H) 44.00 - 72.00 %    Lymphocytes 9.80 (L) 22.00 - 41.00 %    Monocytes 8.70 0.00 - 13.40 %    Eosinophils 1.20 0.00 - 6.90 %    Basophils 0.50 0.00 - 1.80 %    Immature Granulocytes 1.20 (H) 0.00 - 0.90 %    Nucleated RBC 0.00 /100 WBC    Neutrophils (Absolute) 10.81 (H) 2.00 - 7.15 K/uL    Lymphs (Absolute) 1.34 1.00 - 4.80 K/uL    Monos (Absolute) 1.19 (H) 0.00 - 0.85 K/uL    Eos (Absolute) 0.16 0.00 - 0.51  K/uL    Baso (Absolute) 0.07 0.00 - 0.12 K/uL    Immature Granulocytes (abs) 0.16 (H) 0.00 - 0.11 K/uL    NRBC (Absolute) 0.00 K/uL   BASIC METABOLIC PANEL    Collection Time: 10/02/18  4:25 AM   Result Value Ref Range    Sodium 134 (L) 135 - 145 mmol/L    Potassium 4.1 3.6 - 5.5 mmol/L    Chloride 97 96 - 112 mmol/L    Co2 28 20 - 33 mmol/L    Glucose 136 (H) 65 - 99 mg/dL    Bun 30 (H) 8 - 22 mg/dL    Creatinine 0.52 0.50 - 1.40 mg/dL    Calcium 9.3 8.5 - 10.5 mg/dL    Anion Gap 9.0 0.0 - 11.9   MAGNESIUM    Collection Time: 10/02/18  4:25 AM   Result Value Ref Range    Magnesium 2.4 1.5 - 2.5 mg/dL   PROTHROMBIN TIME    Collection Time: 10/02/18  4:25 AM   Result Value Ref Range    PT 14.5 12.0 - 14.6 sec    INR 1.11 0.87 - 1.13   ESTIMATED GFR    Collection Time: 10/02/18  4:25 AM   Result Value Ref Range    GFR If African American >60 >60 mL/min/1.73 m 2    GFR If Non African American >60 >60 mL/min/1.73 m 2   DIGOXIN    Collection Time: 10/02/18  3:52 PM   Result Value Ref Range    Digoxin 0.6 (L) 0.8 - 2.0 ng/mL       Imaging  X-Ray:  I have personally reviewed the images and compared with prior images.  CT:    Reviewed    Assessment/Plan  Acute intracranial hemorrhage (HCC)- (present on admission)   Assessment & Plan    S/P fall 1 week prior- CT head on 9/14 negative for acute intracranial hemorrhage  CT scan on 9/22 with intraventricular hemorrhage, right greater than left and intraparenchymal hemorrhage  Noncommunicating hydrocephalus - s/p emergent bilateral EVD placement by Dr. Santizo on 9/22.  Intraventricular TPA given by NeuroSx with improvement in EVD output  Continue Keppra 500 mg IV twice daily for seizure prophylaxis  Strict blood pressure control - goal SBP <160 mmHg  Continue close neuro checks every 2 hours, consider dropping to every 4 hours  Repeat CT on 9/28 with improved shift from 1 cm to 7.1 mm, continue EVD drainage this weekend, and increase next week  Clamp EVD when clinically  appropriate, 2 catheters noted on exam, confer with neurosurgery daily  Repeat CT scan head in a.m. 10/4, EVD will be clamped for almost 48 hours of the time, neurosurgery to decide on removal          Hyponatremia   Assessment & Plan    Secondary to ICH  Start salt tabs 1 g 3 times per day  Serial BMP, if serum sodium continues to drop will increase frequency of lab consider starting 3% saline  CT scan head improving 10/3, goal probably just to normalize sodium 135-145  Monitor        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)- (present on admission)   Assessment & Plan    Reversed on hospital day 1  Holding anticoagulants until okay with neurosurgery, will ease back in with chest prophylactic heparin first  Monitor        Paroxysmal atrial fibrillation (HCC)- (present on admission)   Assessment & Plan    Previously on apixaban, 5 mg twice daily - now strictly contraindicated due to ICH  Continue digoxin and metoprolol as this regimen has been working well, no recurrence of any significant tachycardia  Continue to monitor heart rate in ICU  Monitor potassium and magnesium and supplement to keep levels greater than 2 and 4 respectively  Prophylactic subcu heparin when okay with neurosurgery, still pending        Encephalopathy acute- (present on admission)   Assessment & Plan    Still improving a little bit every day  Structural, secondary to intracranial hemorrhage  Re-orientation, family, glasses, hearing aids, sleep, lights on during day, treat pain, ambulate, minimize benzos/anticholinergic agents.  Mobilize          Hypomagnesemia- (present on admission)   Assessment & Plan    Electrolyte replacement protocol to maintain >2        Hypokalemia   Assessment & Plan    Electrolyte replacement protocol to maintain >4           Discussed narcotic use with nursing staff the bedside, caution/limit as necessary    VTE:  Contraindicated  Ulcer: Not Indicated  Lines: Mack Catheter  Ongoing  indication addressed    I have performed a physical exam and reviewed and updated ROS and Plan today (10/3/2018). In review of yesterday's note (10/2/2018), there are no changes except as documented above.     EVDs in place at high risk for sudden neurologic compensation. This patient is critically ill, at high risk for decompensation leading to worsening vital organ dysfunction and death without critical care interventions.    Discussed patient condition and risk of morbidity and/or mortality with Hospitalist, RN, RT, Pharmacy, , , Charge nurse / hot rounds, Patient and neurosurgery.

## 2018-10-03 NOTE — PROGRESS NOTES
Per Neurosurg note, EVDs are to be clamped. Bilat EVDs are currently draining. This RN pages Neurosurg for update and clarification.

## 2018-10-04 ENCOUNTER — APPOINTMENT (OUTPATIENT)
Dept: RADIOLOGY | Facility: MEDICAL CENTER | Age: 78
DRG: 023 | End: 2018-10-04
Attending: PHYSICIAN ASSISTANT
Payer: MEDICARE

## 2018-10-04 LAB
ANION GAP SERPL CALC-SCNC: 8 MMOL/L (ref 0–11.9)
BASOPHILS # BLD AUTO: 0.6 % (ref 0–1.8)
BASOPHILS # BLD: 0.09 K/UL (ref 0–0.12)
BUN SERPL-MCNC: 30 MG/DL (ref 8–22)
CALCIUM SERPL-MCNC: 9.4 MG/DL (ref 8.5–10.5)
CHLORIDE SERPL-SCNC: 96 MMOL/L (ref 96–112)
CO2 SERPL-SCNC: 28 MMOL/L (ref 20–33)
CREAT SERPL-MCNC: 0.56 MG/DL (ref 0.5–1.4)
EOSINOPHIL # BLD AUTO: 0.38 K/UL (ref 0–0.51)
EOSINOPHIL NFR BLD: 2.6 % (ref 0–6.9)
ERYTHROCYTE [DISTWIDTH] IN BLOOD BY AUTOMATED COUNT: 40.5 FL (ref 35.9–50)
GLUCOSE SERPL-MCNC: 105 MG/DL (ref 65–99)
HCT VFR BLD AUTO: 34.8 % (ref 37–47)
HGB BLD-MCNC: 11.9 G/DL (ref 12–16)
IMM GRANULOCYTES # BLD AUTO: 0.38 K/UL (ref 0–0.11)
IMM GRANULOCYTES NFR BLD AUTO: 2.6 % (ref 0–0.9)
INR PPP: 1.1 (ref 0.87–1.13)
LYMPHOCYTES # BLD AUTO: 1.8 K/UL (ref 1–4.8)
LYMPHOCYTES NFR BLD: 12.2 % (ref 22–41)
MAGNESIUM SERPL-MCNC: 2.5 MG/DL (ref 1.5–2.5)
MCH RBC QN AUTO: 30.2 PG (ref 27–33)
MCHC RBC AUTO-ENTMCNC: 34.2 G/DL (ref 33.6–35)
MCV RBC AUTO: 88.3 FL (ref 81.4–97.8)
MONOCYTES # BLD AUTO: 1.53 K/UL (ref 0–0.85)
MONOCYTES NFR BLD AUTO: 10.4 % (ref 0–13.4)
NEUTROPHILS # BLD AUTO: 10.6 K/UL (ref 2–7.15)
NEUTROPHILS NFR BLD: 71.6 % (ref 44–72)
NRBC # BLD AUTO: 0 K/UL
NRBC BLD-RTO: 0 /100 WBC
PLATELET # BLD AUTO: 320 K/UL (ref 164–446)
PMV BLD AUTO: 11.1 FL (ref 9–12.9)
POTASSIUM SERPL-SCNC: 4.4 MMOL/L (ref 3.6–5.5)
PROTHROMBIN TIME: 14.3 SEC (ref 12–14.6)
RBC # BLD AUTO: 3.94 M/UL (ref 4.2–5.4)
SODIUM SERPL-SCNC: 132 MMOL/L (ref 135–145)
WBC # BLD AUTO: 14.8 K/UL (ref 4.8–10.8)

## 2018-10-04 PROCEDURE — 700111 HCHG RX REV CODE 636 W/ 250 OVERRIDE (IP): Performed by: PHYSICIAN ASSISTANT

## 2018-10-04 PROCEDURE — 700101 HCHG RX REV CODE 250

## 2018-10-04 PROCEDURE — 92612 ENDOSCOPY SWALLOW (FEES) VID: CPT

## 2018-10-04 PROCEDURE — G8997 SWALLOW GOAL STATUS: HCPCS | Mod: CJ

## 2018-10-04 PROCEDURE — 83735 ASSAY OF MAGNESIUM: CPT

## 2018-10-04 PROCEDURE — 700111 HCHG RX REV CODE 636 W/ 250 OVERRIDE (IP): Performed by: HOSPITALIST

## 2018-10-04 PROCEDURE — G8996 SWALLOW CURRENT STATUS: HCPCS | Mod: CL

## 2018-10-04 PROCEDURE — 700102 HCHG RX REV CODE 250 W/ 637 OVERRIDE(OP): Performed by: HOSPITALIST

## 2018-10-04 PROCEDURE — A9270 NON-COVERED ITEM OR SERVICE: HCPCS | Performed by: HOSPITALIST

## 2018-10-04 PROCEDURE — 700111 HCHG RX REV CODE 636 W/ 250 OVERRIDE (IP): Performed by: NEUROLOGICAL SURGERY

## 2018-10-04 PROCEDURE — 99233 SBSQ HOSP IP/OBS HIGH 50: CPT | Performed by: INTERNAL MEDICINE

## 2018-10-04 PROCEDURE — 700102 HCHG RX REV CODE 250 W/ 637 OVERRIDE(OP): Performed by: INTERNAL MEDICINE

## 2018-10-04 PROCEDURE — 70450 CT HEAD/BRAIN W/O DYE: CPT

## 2018-10-04 PROCEDURE — 85610 PROTHROMBIN TIME: CPT

## 2018-10-04 PROCEDURE — A9270 NON-COVERED ITEM OR SERVICE: HCPCS | Performed by: INTERNAL MEDICINE

## 2018-10-04 PROCEDURE — 99232 SBSQ HOSP IP/OBS MODERATE 35: CPT | Performed by: HOSPITALIST

## 2018-10-04 PROCEDURE — 85025 COMPLETE CBC W/AUTO DIFF WBC: CPT

## 2018-10-04 PROCEDURE — 80048 BASIC METABOLIC PNL TOTAL CA: CPT

## 2018-10-04 PROCEDURE — 770022 HCHG ROOM/CARE - ICU (200)

## 2018-10-04 RX ORDER — SODIUM CHLORIDE 1 G/1
2 TABLET ORAL
Status: DISCONTINUED | OUTPATIENT
Start: 2018-10-04 | End: 2018-10-09

## 2018-10-04 RX ORDER — CEFAZOLIN SODIUM 2 G/100ML
2 INJECTION, SOLUTION INTRAVENOUS EVERY 8 HOURS
Status: DISCONTINUED | OUTPATIENT
Start: 2018-10-04 | End: 2018-10-05

## 2018-10-04 RX ORDER — LIDOCAINE HYDROCHLORIDE 10 MG/ML
INJECTION, SOLUTION INFILTRATION; PERINEURAL
Status: COMPLETED
Start: 2018-10-04 | End: 2018-10-04

## 2018-10-04 RX ADMIN — METOPROLOL TARTRATE 50 MG: 50 TABLET ORAL at 22:10

## 2018-10-04 RX ADMIN — SODIUM CHLORIDE TAB 1 GM 1 G: 1 TAB at 08:22

## 2018-10-04 RX ADMIN — CEFAZOLIN SODIUM 1 G: 1 INJECTION, SOLUTION INTRAVENOUS at 05:00

## 2018-10-04 RX ADMIN — HYDROMORPHONE HYDROCHLORIDE 0.5 MG: 2 INJECTION INTRAMUSCULAR; INTRAVENOUS; SUBCUTANEOUS at 02:00

## 2018-10-04 RX ADMIN — CEFAZOLIN SODIUM 2 G: 2 INJECTION, SOLUTION INTRAVENOUS at 22:10

## 2018-10-04 RX ADMIN — METOPROLOL TARTRATE 50 MG: 50 TABLET ORAL at 14:14

## 2018-10-04 RX ADMIN — OXYCODONE HYDROCHLORIDE 2.5 MG: 5 TABLET ORAL at 19:28

## 2018-10-04 RX ADMIN — SODIUM CHLORIDE TAB 1 GM 2 G: 1 TAB at 17:12

## 2018-10-04 RX ADMIN — ACETAMINOPHEN 650 MG: 325 TABLET, FILM COATED ORAL at 23:58

## 2018-10-04 RX ADMIN — METOPROLOL TARTRATE 50 MG: 50 TABLET ORAL at 05:00

## 2018-10-04 RX ADMIN — AMLODIPINE BESYLATE 5 MG: 5 TABLET ORAL at 06:00

## 2018-10-04 RX ADMIN — DIGOXIN 125 MCG: 125 TABLET ORAL at 17:12

## 2018-10-04 RX ADMIN — SODIUM CHLORIDE TAB 1 GM 2 G: 1 TAB at 12:09

## 2018-10-04 RX ADMIN — ACETAMINOPHEN 650 MG: 325 TABLET, FILM COATED ORAL at 17:12

## 2018-10-04 RX ADMIN — CEFAZOLIN SODIUM 2 G: 2 INJECTION, SOLUTION INTRAVENOUS at 14:14

## 2018-10-04 RX ADMIN — LIDOCAINE HYDROCHLORIDE: 10 INJECTION, SOLUTION INFILTRATION; PERINEURAL at 10:15

## 2018-10-04 ASSESSMENT — PAIN SCALES - GENERAL
PAINLEVEL_OUTOF10: 0
PAINLEVEL_OUTOF10: 5
PAINLEVEL_OUTOF10: 0
PAINLEVEL_OUTOF10: 0
PAINLEVEL_OUTOF10: 5
PAINLEVEL_OUTOF10: 0
PAINLEVEL_OUTOF10: 4
PAINLEVEL_OUTOF10: 2
PAINLEVEL_OUTOF10: 0
PAINLEVEL_OUTOF10: 0
PAINLEVEL_OUTOF10: 5

## 2018-10-04 ASSESSMENT — ENCOUNTER SYMPTOMS
CONFUSION: 1
BACK PAIN: 1
FLANK PAIN: 0
NERVOUS/ANXIOUS: 0
COUGH: 0
SINUS PRESSURE: 0
DIAPHORESIS: 0
SPEECH DIFFICULTY: 0
WEAKNESS: 1
FATIGUE: 1
FEVER: 0
NAUSEA: 0
ABDOMINAL PAIN: 0
CHILLS: 0
PALPITATIONS: 0
HEADACHES: 1
VOMITING: 0
SHORTNESS OF BREATH: 0

## 2018-10-04 NOTE — PROGRESS NOTES
2 RN skin assessment.    Bilateral frontal EVD sites with dressing in place, clean dry and intact.

## 2018-10-04 NOTE — CARE PLAN
Problem: Safety  Goal: Will remain free from injury    Intervention: Provide assistance with mobility  2 RN assist, call for help, EOB mobilization, restraints for safety

## 2018-10-04 NOTE — PROGRESS NOTES
Critical Care Progress Note    Date of admission  9/22/2018    Chief Complaint  78 y.o. female admitted 9/22/2018 with intracranial hemorrhage.    Hospital Course  This lady was admitted with an acute intracranial hemorrhage.  She is undergone bilateral EVDs for hydrocephalus.    Interval Problem Update  Reviewed last 24 hour events:    F/u CT head - shift increased 4.7 -> 8.6 no change per Neuro  EVDs clamped 48 hrs - good leak around site  A&O x2 - no change  Follows all 4  ICP 1-9  Hemodynamics noted  No PRn Bp meds - no ectopy  Room air  Cortrak TF  UO adequate  Mack  Tm 99.5  No CXR  No VTE  Ancef for CNS drain  Na    Reviewed at bedside with Dr. Santizo  He is aware of serial CT findings and is alarmed by minor changes  Clinical neuro exam has not changed with clamping of EVDs for 48+ hours  EVDs are pulled by his nurse practitioner  Continue current blood pressure and sodium goals     YESTERDAY  A&O x1-3  More somnolent initially after receiving some narcotics in the early a.m. prior to the CT  F/u CT better SDH/shift  EVDs clamped last night  No change neuro exam  Room air  Cortrak TF  UO adequate  Afebrile  PIV  Ancef - WBC 13.9  Glucose levels good    Review of Systems  Review of Systems   Constitutional: Positive for fatigue (Unchanged). Negative for chills, diaphoresis and fever.        Thirsty   HENT: Negative for congestion and sinus pressure.    Eyes: Negative for visual disturbance.   Respiratory: Negative for cough and shortness of breath.    Cardiovascular: Negative for chest pain, palpitations and leg swelling.   Gastrointestinal: Negative for abdominal pain, nausea and vomiting.   Genitourinary: Negative for flank pain and hematuria.   Musculoskeletal: Positive for back pain (Chronic).   Skin: Negative for rash.   Neurological: Positive for weakness (Unchanged) and headaches (No delta). Negative for speech difficulty.   Psychiatric/Behavioral: Positive for confusion (Unchanged on review with  team). The patient is not nervous/anxious.         Vital Signs for last 24 hours   Temp:  [36.1 °C (97 °F)-37.3 °C (99.2 °F)] 36.8 °C (98.2 °F)  Pulse:  [] 79  Resp:  [15-57] 19    Hemodynamic parameters for last 24 hours  EHR flow sheets are reviewed       Vent Settings for last 24 hours, NA       Physical Exam   Physical Exam   Constitutional: She appears well-developed and well-nourished. She appears lethargic. She is easily aroused. No distress.   HENT:   Head: Normocephalic.   Right Ear: External ear normal.   Left Ear: External ear normal.   Mouth/Throat: Oropharynx is clear and moist.   Bruising along her left forehead and left face - improving per staff.  Bilateral EVDs in place, draining blood-tinged CSF.   Eyes: Pupils are equal, round, and reactive to light. Conjunctivae are normal. Right eye exhibits no discharge. Left eye exhibits no discharge. No scleral icterus.   Neck: Neck supple. No JVD present. No tracheal deviation present.   Cardiovascular: Normal rate, regular rhythm, normal heart sounds and intact distal pulses.   No extrasystoles are present. Exam reveals no gallop and no friction rub.    No murmur heard.  Pulmonary/Chest: Effort normal. No accessory muscle usage or stridor. No tachypnea. No respiratory distress. She has decreased breath sounds in the right lower field and the left lower field. She has no wheezes. She has no rales.   Abdominal: Soft. Bowel sounds are normal. She exhibits no distension. There is no hepatosplenomegaly. There is no tenderness. There is no rebound, no guarding and no CVA tenderness.   Musculoskeletal: She exhibits no edema, tenderness or deformity.   No clubbing or cyanosis   Neurological: She is easily aroused. She appears lethargic. She exhibits abnormal muscle tone (generally weak, not focal). She displays no seizure activity.   More alert today, following better, interacting better, no new focal neurological findings, EVD drains clamped, small amount of  fluid leakage around them, no signs of infection   Skin: Skin is warm and dry. No rash noted. She is not diaphoretic. No cyanosis. Nails show no clubbing.   Psychiatric: Her mood appears not anxious. Her speech is not delayed. She is not aggressive and not slowed. Cognition and memory are impaired. She is attentive.   Nursing note and vitals reviewed.  No change today on complete    Medications  Current Facility-Administered Medications   Medication Dose Route Frequency Provider Last Rate Last Dose   • sodium chloride (SALT) tablet 1 g  1 g Feeding Tube TID WITH MEALS Montana Peña M.D.   1 g at 10/03/18 1740   • acetaminophen (TYLENOL) tablet 650 mg  650 mg Feeding Tube Q4HRS PRN Gil Leone Jr., D.O.   650 mg at 10/02/18 1457    Or   • acetaminophen (TYLENOL) suppository 650 mg  650 mg Rectal Q4HRS PRN Gil Leone Jr., D.O.       • amLODIPine (NORVASC) tablet 5 mg  5 mg Per NG Tube Q DAY Andres Wells D.O.   5 mg at 10/04/18 0600   • metoprolol (LOPRESSOR) tablet 50 mg  50 mg Feeding Tube Q8HRS Andres Wells D.O.   50 mg at 10/04/18 0500   • Pharmacy Consult: Enteral tube feeding - review meds/change route/product selection   Other PRN Gil Leone Jr., D.O.       • labetalol (NORMODYNE,TRANDATE) injection 5-10 mg  5-10 mg Intravenous Q4HRS PRN Gil Leone Jr., D.O.   5 mg at 09/26/18 0303   • digoxin (LANOXIN) tablet 125 mcg  125 mcg Feeding Tube DAILY AT 1800 William Medina M.D.   125 mcg at 10/03/18 1740   • ondansetron (ZOFRAN ODT) dispertab 4 mg  4 mg Feeding Tube Q4HRS PRN William Medina M.D.        Or   • ondansetron (ZOFRAN) syringe/vial injection 4 mg  4 mg Intravenous Q4HRS PRN William Medina M.D.       • Pharmacy Consult Request ...Pain Management Review   Other PRN William Medina M.D.        And   • oxyCODONE immediate-release (ROXICODONE) tablet 2.5 mg  2.5 mg Feeding Tube Q3HRS PRN William Medina M.D.   2.5 mg at 09/30/18 0203    And   • oxyCODONE immediate-release  (ROXICODONE) tablet 5 mg  5 mg Feeding Tube Q3HRS PRN William Medina M.D.   5 mg at 09/30/18 2231    And   • HYDROmorphone (DILAUDID) injection 0.25-1 mg  0.25-1 mg Intravenous Q2HRS PRN William Medina M.D.   0.5 mg at 10/04/18 0200   • polyethylene glycol/lytes (MIRALAX) PACKET 1 Packet  1 Packet Feeding Tube QDAY PRN William Medina M.D.   1 Packet at 10/01/18 1752    And   • senna-docusate (PERICOLACE or SENOKOT S) 8.6-50 MG per tablet 2 Tab  2 Tab Feeding Tube BID William Medina M.D.   Stopped at 10/02/18 1800    And   • magnesium hydroxide (MILK OF MAGNESIA) suspension 30 mL  30 mL Feeding Tube QDAY PRN William Medina M.D.   30 mL at 10/02/18 0516    And   • bisacodyl (DULCOLAX) suppository 10 mg  10 mg Rectal QDAY PRN William Medina M.D.       • Respiratory Care per Protocol   Nebulization Continuous RT Rowena Calero M.D.       • MD Alert...ICU Electrolyte Replacement per Pharmacy   Other pharmacy to dose Rowena Calero M.D.       • LORazepam (ATIVAN) injection 2 mg  2 mg Intravenous Q5 MIN PRN Rowena Calero M.D.       • hydrALAZINE (APRESOLINE) injection 10 mg  10 mg Intravenous Q4HRS PRN Rowena Calero M.D.       • enalaprilat (VASOTEC) injection 1.25 mg  1.25 mg Intravenous Q6HRS PRN Rowena Calero M.D.       • ceFAZolin in dextrose (ANCEF) IVPB premix 1 g  1 g Intravenous Q8HRS Juan Alberto Santizo III, M.D. 100 mL/hr at 10/04/18 0500 1 g at 10/04/18 0500       Fluids    Intake/Output Summary (Last 24 hours) at 10/04/18 0556  Last data filed at 10/04/18 0200   Gross per 24 hour   Intake             1200 ml   Output             1220 ml   Net              -20 ml       Laboratory  Recent Results (from the past 48 hour(s))   DIGOXIN    Collection Time: 10/02/18  3:52 PM   Result Value Ref Range    Digoxin 0.6 (L) 0.8 - 2.0 ng/mL   BASIC METABOLIC PANEL    Collection Time: 10/03/18  5:52 AM   Result Value Ref Range    Sodium 131 (L) 135 - 145 mmol/L    Potassium 4.7 3.6 - 5.5 mmol/L    Chloride 94 (L) 96 - 112  mmol/L    Co2 30 20 - 33 mmol/L    Glucose 140 (H) 65 - 99 mg/dL    Bun 27 (H) 8 - 22 mg/dL    Creatinine 0.56 0.50 - 1.40 mg/dL    Calcium 9.6 8.5 - 10.5 mg/dL    Anion Gap 7.0 0.0 - 11.9   MAGNESIUM    Collection Time: 10/03/18  5:52 AM   Result Value Ref Range    Magnesium 2.4 1.5 - 2.5 mg/dL   PHOSPHORUS    Collection Time: 10/03/18  5:52 AM   Result Value Ref Range    Phosphorus 3.2 2.5 - 4.5 mg/dL   CBC WITH DIFFERENTIAL    Collection Time: 10/03/18  5:52 AM   Result Value Ref Range    WBC 13.9 (H) 4.8 - 10.8 K/uL    RBC 4.35 4.20 - 5.40 M/uL    Hemoglobin 12.8 12.0 - 16.0 g/dL    Hematocrit 37.9 37.0 - 47.0 %    MCV 87.1 81.4 - 97.8 fL    MCH 29.4 27.0 - 33.0 pg    MCHC 33.8 33.6 - 35.0 g/dL    RDW 40.3 35.9 - 50.0 fL    Platelet Count 302 164 - 446 K/uL    MPV 10.7 9.0 - 12.9 fL    Neutrophils-Polys 78.50 (H) 44.00 - 72.00 %    Lymphocytes 9.70 (L) 22.00 - 41.00 %    Monocytes 8.10 0.00 - 13.40 %    Eosinophils 1.40 0.00 - 6.90 %    Basophils 0.60 0.00 - 1.80 %    Immature Granulocytes 1.70 (H) 0.00 - 0.90 %    Nucleated RBC 0.00 /100 WBC    Neutrophils (Absolute) 10.89 (H) 2.00 - 7.15 K/uL    Lymphs (Absolute) 1.35 1.00 - 4.80 K/uL    Monos (Absolute) 1.12 (H) 0.00 - 0.85 K/uL    Eos (Absolute) 0.20 0.00 - 0.51 K/uL    Baso (Absolute) 0.08 0.00 - 0.12 K/uL    Immature Granulocytes (abs) 0.24 (H) 0.00 - 0.11 K/uL    NRBC (Absolute) 0.00 K/uL   PROTHROMBIN TIME    Collection Time: 10/03/18  5:52 AM   Result Value Ref Range    PT 14.4 12.0 - 14.6 sec    INR 1.11 0.87 - 1.13   ESTIMATED GFR    Collection Time: 10/03/18  5:52 AM   Result Value Ref Range    GFR If African American >60 >60 mL/min/1.73 m 2    GFR If Non African American >60 >60 mL/min/1.73 m 2       Imaging  X-Ray:  I have personally reviewed the images and compared with prior images.  CT:    Reviewed    Assessment/Plan  Acute intracranial hemorrhage (HCC)- (present on admission)   Assessment & Plan    S/P fall 1 week prior- CT head on 9/14  negative for acute intracranial hemorrhage  CT scan on 9/22 with intraventricular hemorrhage, right greater than left and intraparenchymal hemorrhage  Noncommunicating hydrocephalus - s/p emergent bilateral EVD placement by Dr. Santizo on 9/22.  Intraventricular TPA given by NeuroSx with improvement in EVD output  Continue Keppra 500 mg IV twice daily for seizure prophylaxis  Strict blood pressure control - goal SBP <160 mmHg, long-term use less than 130 in school  Continue close neuro checks every 2 hours, consider dropping to every 4 hours tomorrow  Repeat CT on 9/28 with improved shift from 1 cm to 7.1 mm, continue EVD drainage this weekend, and increase next week  EVDs clamped 10/2  Follow-up CT 10/3 with no change in bleed with midline shift noted to be 4.7 mm  Follow-up CT 10/4 with midline shift 8.2 mm but in comparing to prior film problem no change per both radiology and neurosurgery  EVDs pulled mid day 10/4 by neurosurgery  Continue current blood pressure and sodium goals, discussed with Dr. Santizo  CT head in a.m. 10/6          Hyponatremia   Assessment & Plan    Secondary to ICH  Start salt tabs 1 g 3 times per day  Serial BMP, if serum sodium continues to drop will increase frequency of lab consider starting 3% saline  CT scan head improving 10/3, goal probably just to normalize sodium 135-145  Monitor        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)- (present on admission)   Assessment & Plan    Reversed on hospital day 1  Holding anticoagulants until okay with neurosurgery, will ease back in with chest prophylactic heparin first  Possibly 2 weeks post initial event neurosurgery, await their okay, query 10/6 if follow-up CT head okay  Monitor        Paroxysmal atrial fibrillation (HCC)- (present on admission)   Assessment & Plan    Previously on apixaban, 5 mg twice daily - now strictly contraindicated due to ICH  Continue digoxin and metoprolol as this regimen has been  working well, no recurrence of any significant tachycardia  Continue to monitor heart rate in ICU  Monitor potassium and magnesium and supplement to keep levels greater than 2 and 4 respectively  Prophylactic subcu heparin when okay with neurosurgery, still pending, possibly 2 weeks post initial event, query 10/6 if CT head okay        Encephalopathy acute- (present on admission)   Assessment & Plan    Still improving a little bit every day  Structural, secondary to intracranial hemorrhage  Re-orientation, family, glasses, hearing aids, sleep, lights on during day, treat pain, ambulate, minimize benzos/anticholinergic agents.  Mobilize          Hypomagnesemia- (present on admission)   Assessment & Plan    Electrolyte replacement protocol to maintain >2        Hypokalemia   Assessment & Plan    Electrolyte replacement protocol to maintain >4           Discussed narcotic use with nursing staff the bedside, caution/limit as necessary    VTE:  Contraindicated  Ulcer: Not Indicated  Lines: Mack Catheter  Ongoing indication addressed    I have performed a physical exam and reviewed and updated ROS and Plan today (10/4/2018). In review of yesterday's note (10/3/2018), there are no changes except as documented above.     EVDs in place at high risk for sudden neurologic compensation. This patient is critically ill, at high risk for decompensation leading to worsening vital organ dysfunction and death without critical care interventions.    Discussed patient condition and risk of morbidity and/or mortality with Hospitalist, RN, RT, Pharmacy, , , Charge nurse / hot rounds, Patient and neurosurgery.

## 2018-10-04 NOTE — PROGRESS NOTES
Neurosurgery Progress Note    Subjective:  Pt with IVH / SAH / SDH with Bilateral EVD day #13  EVD clamped   CT stable - improving   ICP is low.  No neuro changes.     Pt seen and evaluated by Dr. Sukhdev Gallardo out       Exam:  Awake. Eyes open.  PERRL  Will follow simple commands.  Moves ext x 4   Overall appropriate, pleasantly confused   Dressing c/d/i.        Pulse  Av.7  Min: 68  Max: 103  Resp  Av.8  Min: 15  Max: 57  Temp  Av.9 °C (98.5 °F)  Min: 36.4 °C (97.5 °F)  Max: 37.3 °C (99.2 °F)  SpO2  Av.9 %  Min: 94 %  Max: 99 %    ICP  Av.3 MM HG  Min: 0 MM HG  Max: 11 MM HG    Recent Labs      10/02/18   0425  10/03/18   0552  10/04/18   0623   WBC  13.7*  13.9*  14.8*   RBC  4.20  4.35  3.94*   HEMOGLOBIN  12.8  12.8  11.9*   HEMATOCRIT  36.9*  37.9  34.8*   MCV  87.9  87.1  88.3   MCH  30.5  29.4  30.2   MCHC  34.7  33.8  34.2   RDW  40.8  40.3  40.5   PLATELETCT  276  302  320   MPV  11.1  10.7  11.1     Recent Labs      10/02/18   0425  10/03/18   0552  10/04/18   0623   SODIUM  134*  131*  132*   POTASSIUM  4.1  4.7  4.4   CHLORIDE  97  94*  96   CO2  28  30  28   GLUCOSE  136*  140*  105*   BUN  30*  27*  30*   CREATININE  0.52  0.56  0.56   CALCIUM  9.3  9.6  9.4     Recent Labs      10/02/18   0425  10/03/18   0552  10/04/18   0623   INR  1.11  1.11  1.10           Intake/Output       10/03/18 0700 - 10/04/18 0659 10/04/18 07 - 10/05/18 0659      [id number] 1932-0829 Total [id number] [id number] Total       Intake    I.V.  120  -- 120  --  -- --    IV Volume (NS at TKO) 120 -- 120 -- -- --    Other  135  150 285  30  -- 30    Medications (PO/Enteral Liquids) 135 120 255 -- -- --    Free water flush per NG -- 30 30 30 -- 30    NG/GT  --  720 720  --  -- --    Intake (mL) (Enteral Tube 18 Cortrak - Gastric 10 Fr. Right nare) -- 720 720 -- -- --    IV Piggyback  --    --  -- --    Volume (mL) (ceFAZolin in dextrose (ANCEF) IVPB premix 1 g) --  -- -- --    Total  Intake 255 1065 1320 30 -- 30       Output    Urine  570  750 1320  175  -- 175    Urine Void (mL) 570 -- 570 -- -- --    Output (mL) (Urethral Catheter Latex 16 Fr.) -- 750 750 175 -- 175    Stool  --  -- --  --  -- --    Number of Times Stooled 2 x 2 x 4 x -- -- --    Total Output  175 -- 175       Net I/O     -315 315 0 -145 -- -145            Intake/Output Summary (Last 24 hours) at 10/04/18 1033  Last data filed at 10/04/18 1000   Gross per 24 hour   Intake             1265 ml   Output             1265 ml   Net                0 ml            • lidocaine      • sodium chloride  2 g TID WITH MEALS   • acetaminophen  650 mg Q4HRS PRN    Or   • acetaminophen  650 mg Q4HRS PRN   • amLODIPine  5 mg Q DAY   • metoprolol  50 mg Q8HRS   • Pharmacy   PRN   • labetalol  5-10 mg Q4HRS PRN   • digoxin  125 mcg DAILY AT 1800   • ondansetron  4 mg Q4HRS PRN    Or   • ondansetron  4 mg Q4HRS PRN   • Pharmacy Consult Request   PRN    And   • oxyCODONE immediate-release  2.5 mg Q3HRS PRN    And   • oxyCODONE immediate-release  5 mg Q3HRS PRN    And   • HYDROmorphone  0.25-1 mg Q2HRS PRN   • polyethylene glycol/lytes  1 Packet QDAY PRN    And   • senna-docusate  2 Tab BID    And   • magnesium hydroxide  30 mL QDAY PRN    And   • bisacodyl  10 mg QDAY PRN   • Respiratory Care per Protocol   Continuous RT   • MD Alert...Adult ICU Electrolyte Replacement per Pharmacy   pharmacy to dose   • LORazepam  2 mg Q5 MIN PRN   • hydrALAZINE  10 mg Q4HRS PRN   • enalaprilat  1.25 mg Q6HRS PRN   • ceFAZolin  1 g Q8HRS       Assessment and Plan:  Hospital day #13 Keep EVD drains clamped  EVDs removed without complication  Head CT for saturday   Keep in icu with q2 neuro checks

## 2018-10-04 NOTE — CARE PLAN
Problem: Safety  Goal: Will remain free from injury  Q2 hour neuro checks. Bilateral front EVD's in place, clamped per neurosurgery. Bilateral wrist restraints present, education reinforced regarding pulling at lines, reinforcement needed. Patient in room near nursing station. Bed in lowest locked position.     Problem: Skin Integrity  Goal: Risk for impaired skin integrity will decrease  Q2 hour turns in place. Pillows in use to float heels.

## 2018-10-04 NOTE — CARE PLAN
Problem: Pain Management  Goal: Pain level will decrease to patient's comfort goal    Intervention: Follow pain managment plan developed in collaboration with patient and Interdisciplinary Team  Assess pain q2h and prn, give meds if needed, non pharmacologic comfort measures

## 2018-10-04 NOTE — PROGRESS NOTES
Spoke with Viraj MURILLO regarding most recent head CT. Patient currently has no neurological changes, following commands, PERRLA, and has stable ICP's. No new orders received at this time.

## 2018-10-04 NOTE — PROGRESS NOTES
[facility] Hospitalist Progress Note    Date of Service: 10/4/2018    Chief Complaint  78 y.o. female admitted 2018 with HA to [facility].  Had had a GLF with neg CT one week prior.  CT on this admit showing SDH and SAH  Bilateral EVDs placed Dr Santizo       Interval Problem Update  Patient seen and examined today. ICU Care  Care and plan discussed in IDT/Hot rounds.  Lines and assistive devices reviewed.    Patient tolerating treatment and therapies.  All Data, Medication data reviewed.  Case discussed with nursing as available.  Plan of Care reviewed with patient and notified of changes.  10/2 the patient seems improved, alert and oriented times 1-3, at times confused, remains hemodynamically stable, on room air, remains with feeding tube, ICP between 3 and 8, left EVD is at 20 cm with slow output, neurosurgical follow-up noted, plan for continuing to watch ICPs, follow-up CT planned for Wednesday and Thursday  10/3 the patient feels better, her EVD's are clamped, she is tired, did not sleep well last night, hemodynamically stable, tolerates core track feeding,  10/4 the patient appears clinically fairly unchanged, her follow-up CT shows an increase in shift from 4.7-8.6 mm, EVD's were clamped and are now removed, patient's mentation waxes and wanes, she complains of a mild headache, hemodynamically stable, tolerating core track feeding, aiming for high normal sodium levels  Consultants/Specialty  Neurosurgery  Pulmonology    Disposition  Cont in ICU for close monitoring of Neuro status          Review of Systems   Unable to perform ROS: Mental status change      Physical Exam  Laboratory/Imaging   Hemodynamics  Temp (24hrs), Av °C (98.6 °F), Min:36.4 °C (97.5 °F), Max:37.3 °C (99.2 °F)   Temperature: 36.4 °C (97.5 °F)  Pulse  Av  Min: 60  Max: 131 Heart Rate (Monitored): 68  NIBP: (!) 85/46      Respiratory      Respiration: 17, Pulse Oximetry: 97 %     Work Of Breathing / Effort: Shallow  RUL Breath  Sounds: Diminished, RML Breath Sounds: Diminished, RLL Breath Sounds: Diminished, JUAN Breath Sounds: Clear, LLL Breath Sounds: Diminished    Fluids    Intake/Output Summary (Last 24 hours) at 10/04/18 0755  Last data filed at 10/04/18 0600   Gross per 24 hour   Intake             1320 ml   Output             1320 ml   Net                0 ml       Nutrition  Orders Placed This Encounter   Procedures   • Diet NPO     Standing Status:   Standing     Number of Occurrences:   1     Order Specific Question:   Restrict to:     Answer:   Strict [1]     Physical Exam   Constitutional: She appears well-developed and well-nourished. No distress.   Patient seen and examined by me.   HENT:   Nose: Nose normal.   Mouth/Throat: Oropharynx is clear and moist. No oropharyngeal exudate.   Bruising left side of face no bony step off   Eyes: Conjunctivae are normal. Right eye exhibits no discharge. Left eye exhibits no discharge. No scleral icterus.   Neck: No JVD present. No tracheal deviation present.   Cardiovascular: Normal rate, regular rhythm and normal heart sounds.    Pulmonary/Chest: Effort normal and breath sounds normal. No stridor. No respiratory distress. She has no wheezes. She has no rales. She exhibits no tenderness.   Abdominal: Soft. Bowel sounds are normal. She exhibits no distension. There is no tenderness. There is no guarding.   Musculoskeletal: She exhibits no edema or tenderness.   Neurological: She is alert. No cranial nerve deficit. She exhibits normal muscle tone. Coordination (left leg drift) abnormal.   Orientation is variable  Alert   Following all commands   Skin: Skin is warm and dry. She is not diaphoretic. No pallor.   Psychiatric: Her affect is blunt. Her speech is delayed. She is slowed.   Nursing note and vitals reviewed.      Recent Labs      10/02/18   0425  10/03/18   0552  10/04/18   0623   WBC  13.7*  13.9*  14.8*   RBC  4.20  4.35  3.94*   HEMOGLOBIN  12.8  12.8  11.9*   HEMATOCRIT  36.9*   37.9  34.8*   MCV  87.9  87.1  88.3   MCH  30.5  29.4  30.2   MCHC  34.7  33.8  34.2   RDW  40.8  40.3  40.5   PLATELETCT  276  302  320   MPV  11.1  10.7  11.1     Recent Labs      10/02/18   0425  10/03/18   0552   SODIUM  134*  131*   POTASSIUM  4.1  4.7   CHLORIDE  97  94*   CO2  28  30   GLUCOSE  136*  140*   BUN  30*  27*   CREATININE  0.52  0.56   CALCIUM  9.3  9.6     Recent Labs      10/02/18   0425  10/03/18   0552  10/04/18   0623   INR  1.11  1.11  1.10                  Assessment/Plan     Acute intracranial hemorrhage (HCC)- (present on admission)   Assessment & Plan    Anticoagulation reversed at admission  S/P bilateral EVDs  Repeat CT stable  Monitor ICP   Exam improving, working with PT and OT  Neurosurgery following  Close BP control          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)- (present on admission)   Assessment & Plan    Anticoagulation reversed at the time of admission, was on eliquis outpatient        Paroxysmal atrial fibrillation (HCC)- (present on admission)   Assessment & Plan    On metoprolol 50mg Q8, Dig 125mcg QD  Prn Dilt for breakthrough rate control  No anticoagulation due to ICH        Encephalopathy acute- (present on admission)   Assessment & Plan    Secondary to cerebral compromise from subdural hematoma        Hypomagnesemia- (present on admission)   Assessment & Plan    Replace  Follow daily        Hypokalemia   Assessment & Plan    Replaced  Follow daily          Quality-Core Measures   Reviewed items::  EKG reviewed, Labs reviewed, Medications reviewed and Radiology images reviewed  Mack catheter::  Critically Ill - Requiring Accurate Measurement of Urinary Output  DVT prophylaxis pharmacological::  Contraindicated - High bleeding risk  DVT prophylaxis - mechanical:  SCDs  Ulcer Prophylaxis::  Not indicated      Plan  Continue with neurologic monitoring, follow-up CT is somewhat concerning  Continue with supportive care  Core track  feeding  Sodium replacement and support  See orders  Critically ill

## 2018-10-04 NOTE — THERAPY
"Speech Language Therapy FEES completed.  Functional Status: FEES procedure explained, the patient agreed to proceed and tolerated well.  Upon insertion of the scope, edematous arytenoids were noted.  Vocal cords were symmetrical and complete vocal cord adduction was achieved during phonation but not during breath hold.  Presentation of PO included ice chips, nectars, purees, pudding, soft solids, and thin liquids.  The patient presented with moderate pharyngeal dysphagia as seen by reduced sensation, impaired tongue base retraction, sluggish epiglottic inversion and weak laryngeal elevation.  Subsequent premature spillage to the level of the valleculae and pyriform sinuses was observed with all consistencies as well as trace residue.  Penetration was observed before the swallow to the upper 1/3 of the epiglottis consistently with all consistencies but inconsistently had penetration that filled the laryngeal vestibule with applesauce and nectars with wet vocal cords noted after the swallow. Aspiration was visualized before the swallow with thins with spillage over the backside of epiglottis.  Aspiration noted after the swallow with pudding between the arytenoids.  At this time, the patient is not at the level for a PO diet.  Recommend she remain NPO with tube feeding.  OK for single ice chips x3-4 per hour with nursing only.  SLP following     Recommendations - Diet: Diet / Liquid Recommendation: NPO, Pre-Feeding Trials with SLP Only                          Strategies: To be assessed                          Medication Administration: Medication Administration : via cortrak  Plan of Care: Will benefit from Speech Therapy 5 times per week  Post-Acute Therapy: Discharge to a transitional care facility for continued skilled therapy services.    See \"Rehab Therapy-Acute\" Patient Summary Report for complete documentation.   "

## 2018-10-05 ENCOUNTER — APPOINTMENT (OUTPATIENT)
Dept: RADIOLOGY | Facility: MEDICAL CENTER | Age: 78
DRG: 023 | End: 2018-10-05
Attending: PHYSICIAN ASSISTANT
Payer: MEDICARE

## 2018-10-05 LAB
ANION GAP SERPL CALC-SCNC: 6 MMOL/L (ref 0–11.9)
BASOPHILS # BLD AUTO: 0.8 % (ref 0–1.8)
BASOPHILS # BLD: 0.08 K/UL (ref 0–0.12)
BUN SERPL-MCNC: 28 MG/DL (ref 8–22)
CALCIUM SERPL-MCNC: 9.3 MG/DL (ref 8.5–10.5)
CHLORIDE SERPL-SCNC: 99 MMOL/L (ref 96–112)
CO2 SERPL-SCNC: 27 MMOL/L (ref 20–33)
CREAT SERPL-MCNC: 0.6 MG/DL (ref 0.5–1.4)
EOSINOPHIL # BLD AUTO: 0.26 K/UL (ref 0–0.51)
EOSINOPHIL NFR BLD: 2.5 % (ref 0–6.9)
ERYTHROCYTE [DISTWIDTH] IN BLOOD BY AUTOMATED COUNT: 41.3 FL (ref 35.9–50)
GLUCOSE SERPL-MCNC: 124 MG/DL (ref 65–99)
HCT VFR BLD AUTO: 32.7 % (ref 37–47)
HGB BLD-MCNC: 11.2 G/DL (ref 12–16)
IMM GRANULOCYTES # BLD AUTO: 0.3 K/UL (ref 0–0.11)
IMM GRANULOCYTES NFR BLD AUTO: 2.8 % (ref 0–0.9)
LYMPHOCYTES # BLD AUTO: 1.46 K/UL (ref 1–4.8)
LYMPHOCYTES NFR BLD: 13.8 % (ref 22–41)
MAGNESIUM SERPL-MCNC: 2.4 MG/DL (ref 1.5–2.5)
MCH RBC QN AUTO: 30.4 PG (ref 27–33)
MCHC RBC AUTO-ENTMCNC: 34.3 G/DL (ref 33.6–35)
MCV RBC AUTO: 88.6 FL (ref 81.4–97.8)
MONOCYTES # BLD AUTO: 1.17 K/UL (ref 0–0.85)
MONOCYTES NFR BLD AUTO: 11.1 % (ref 0–13.4)
NEUTROPHILS # BLD AUTO: 7.3 K/UL (ref 2–7.15)
NEUTROPHILS NFR BLD: 69 % (ref 44–72)
NRBC # BLD AUTO: 0 K/UL
NRBC BLD-RTO: 0 /100 WBC
PHOSPHATE SERPL-MCNC: 3.7 MG/DL (ref 2.5–4.5)
PLATELET # BLD AUTO: 281 K/UL (ref 164–446)
PMV BLD AUTO: 10.8 FL (ref 9–12.9)
POTASSIUM SERPL-SCNC: 4.1 MMOL/L (ref 3.6–5.5)
RBC # BLD AUTO: 3.69 M/UL (ref 4.2–5.4)
SODIUM SERPL-SCNC: 132 MMOL/L (ref 135–145)
WBC # BLD AUTO: 10.6 K/UL (ref 4.8–10.8)

## 2018-10-05 PROCEDURE — 700102 HCHG RX REV CODE 250 W/ 637 OVERRIDE(OP): Performed by: INTERNAL MEDICINE

## 2018-10-05 PROCEDURE — A9270 NON-COVERED ITEM OR SERVICE: HCPCS | Performed by: HOSPITALIST

## 2018-10-05 PROCEDURE — 700111 HCHG RX REV CODE 636 W/ 250 OVERRIDE (IP): Performed by: PHYSICIAN ASSISTANT

## 2018-10-05 PROCEDURE — 80048 BASIC METABOLIC PNL TOTAL CA: CPT

## 2018-10-05 PROCEDURE — 700102 HCHG RX REV CODE 250 W/ 637 OVERRIDE(OP): Performed by: HOSPITALIST

## 2018-10-05 PROCEDURE — G8988 SELF CARE GOAL STATUS: HCPCS | Mod: CJ

## 2018-10-05 PROCEDURE — 84100 ASSAY OF PHOSPHORUS: CPT

## 2018-10-05 PROCEDURE — A9270 NON-COVERED ITEM OR SERVICE: HCPCS | Performed by: INTERNAL MEDICINE

## 2018-10-05 PROCEDURE — 83735 ASSAY OF MAGNESIUM: CPT

## 2018-10-05 PROCEDURE — 85025 COMPLETE CBC W/AUTO DIFF WBC: CPT

## 2018-10-05 PROCEDURE — 99233 SBSQ HOSP IP/OBS HIGH 50: CPT | Performed by: INTERNAL MEDICINE

## 2018-10-05 PROCEDURE — 97165 OT EVAL LOW COMPLEX 30 MIN: CPT

## 2018-10-05 PROCEDURE — 770022 HCHG ROOM/CARE - ICU (200)

## 2018-10-05 PROCEDURE — G8987 SELF CARE CURRENT STATUS: HCPCS | Mod: CL

## 2018-10-05 PROCEDURE — 99231 SBSQ HOSP IP/OBS SF/LOW 25: CPT | Performed by: HOSPITALIST

## 2018-10-05 RX ORDER — LABETALOL HYDROCHLORIDE 5 MG/ML
10 INJECTION, SOLUTION INTRAVENOUS
Status: DISCONTINUED | OUTPATIENT
Start: 2018-10-05 | End: 2018-10-09 | Stop reason: HOSPADM

## 2018-10-05 RX ADMIN — SODIUM CHLORIDE TAB 1 GM 2 G: 1 TAB at 12:08

## 2018-10-05 RX ADMIN — SODIUM CHLORIDE TAB 1 GM 2 G: 1 TAB at 09:22

## 2018-10-05 RX ADMIN — ACETAMINOPHEN 650 MG: 325 TABLET, FILM COATED ORAL at 09:21

## 2018-10-05 RX ADMIN — DIGOXIN 125 MCG: 125 TABLET ORAL at 17:34

## 2018-10-05 RX ADMIN — AMLODIPINE BESYLATE 5 MG: 5 TABLET ORAL at 05:31

## 2018-10-05 RX ADMIN — METOPROLOL TARTRATE 50 MG: 50 TABLET ORAL at 21:18

## 2018-10-05 RX ADMIN — METOPROLOL TARTRATE 50 MG: 50 TABLET ORAL at 05:32

## 2018-10-05 RX ADMIN — CEFAZOLIN SODIUM 2 G: 2 INJECTION, SOLUTION INTRAVENOUS at 05:27

## 2018-10-05 RX ADMIN — METOPROLOL TARTRATE 50 MG: 50 TABLET ORAL at 14:34

## 2018-10-05 RX ADMIN — ACETAMINOPHEN 650 MG: 325 TABLET, FILM COATED ORAL at 16:03

## 2018-10-05 RX ADMIN — SODIUM CHLORIDE TAB 1 GM 2 G: 1 TAB at 17:33

## 2018-10-05 ASSESSMENT — ENCOUNTER SYMPTOMS
DIARRHEA: 0
SHORTNESS OF BREATH: 0
WEAKNESS: 1
VOMITING: 0
PALPITATIONS: 0
ABDOMINAL PAIN: 0
VOICE CHANGE: 0
TROUBLE SWALLOWING: 0
CONFUSION: 1
BACK PAIN: 1
COUGH: 0
FATIGUE: 1
NAUSEA: 0
LIGHT-HEADEDNESS: 1

## 2018-10-05 ASSESSMENT — PAIN SCALES - GENERAL
PAINLEVEL_OUTOF10: 5
PAINLEVEL_OUTOF10: 9
PAINLEVEL_OUTOF10: 8
PAINLEVEL_OUTOF10: 1
PAINLEVEL_OUTOF10: 2
PAINLEVEL_OUTOF10: 3
PAINLEVEL_OUTOF10: 1
PAINLEVEL_OUTOF10: 5
PAINLEVEL_OUTOF10: 2
PAINLEVEL_OUTOF10: 1
PAINLEVEL_OUTOF10: 8
PAINLEVEL_OUTOF10: 2

## 2018-10-05 ASSESSMENT — COGNITIVE AND FUNCTIONAL STATUS - GENERAL
DAILY ACTIVITIY SCORE: 12
PERSONAL GROOMING: A LITTLE
DRESSING REGULAR UPPER BODY CLOTHING: A LOT
SUGGESTED CMS G CODE MODIFIER DAILY ACTIVITY: CL
EATING MEALS: TOTAL
TOILETING: A LOT
HELP NEEDED FOR BATHING: A LOT
DRESSING REGULAR LOWER BODY CLOTHING: A LOT

## 2018-10-05 NOTE — PROGRESS NOTES
[facility] Hospitalist Progress Note    Date of Service: 10/5/2018    Chief Complaint  78 y.o. female admitted 2018 with HA to [facility].  Had had a GLF with neg CT one week prior.  CT on this admit showing SDH and SAH  Bilateral EVDs placed Dr Santizo       Interval Problem Update  Patient seen and examined today. ICU Care  Care and plan discussed in IDT/Hot rounds.  Lines and assistive devices reviewed.    Patient tolerating treatment and therapies.  All Data, Medication data reviewed.  Case discussed with nursing as available.  Plan of Care reviewed with patient and notified of changes.  10/2 the patient seems improved, alert and oriented times 1-3, at times confused, remains hemodynamically stable, on room air, remains with feeding tube, ICP between 3 and 8, left EVD is at 20 cm with slow output, neurosurgical follow-up noted, plan for continuing to watch ICPs, follow-up CT planned for Wednesday and Thursday  10/3 the patient feels better, her EVD's are clamped, she is tired, did not sleep well last night, hemodynamically stable, tolerates core track feeding,  10/4 the patient appears clinically fairly unchanged, her follow-up CT shows an increase in shift from 4.7-8.6 mm, EVD's were clamped and are now removed, patient's mentation waxes and wanes, she complains of a mild headache, hemodynamically stable, tolerating core track feeding, aiming for high normal sodium levels  10/5 patient may continue lethargy and confusion, hemodynamically stable, close neuro monitoring and follow-up CT per neurosurgery a stop it  Consultants/Specialty  Neurosurgery  Pulmonology    Disposition  Cont in ICU for close monitoring of Neuro status          Review of Systems   Unable to perform ROS: Mental status change      Physical Exam  Laboratory/Imaging   Hemodynamics  Temp (24hrs), Av.7 °C (98.1 °F), Min:36.6 °C (97.8 °F), Max:37.1 °C (98.7 °F)   Temperature: 36.7 °C (98.1 °F)  Pulse  Av.4  Min: 60  Max: 131 Heart Rate  (Monitored): 71  NIBP: (!) 86/49      Respiratory      Respiration: 18, Pulse Oximetry: 98 %     Work Of Breathing / Effort: Shallow  RUL Breath Sounds: Clear, RML Breath Sounds: Clear, RLL Breath Sounds: Diminished, JUAN Breath Sounds: Clear, LLL Breath Sounds: Diminished    Fluids    Intake/Output Summary (Last 24 hours) at 10/05/18 0753  Last data filed at 10/05/18 0600   Gross per 24 hour   Intake             1730 ml   Output             1455 ml   Net              275 ml       Nutrition  Orders Placed This Encounter   Procedures   • Diet NPO     Standing Status:   Standing     Number of Occurrences:   1     Order Specific Question:   Restrict to:     Answer:   Strict [1]     Physical Exam   Constitutional: She appears well-developed and well-nourished. No distress.   Patient seen and examined by me.   HENT:   Nose: Nose normal.   Mouth/Throat: Oropharynx is clear and moist. No oropharyngeal exudate.   Bruising left side of face no bony step off   Eyes: Conjunctivae are normal. Right eye exhibits no discharge. Left eye exhibits no discharge. No scleral icterus.   Neck: No JVD present. No tracheal deviation present.   Cardiovascular: Normal rate, regular rhythm and normal heart sounds.    Pulmonary/Chest: Effort normal and breath sounds normal. No stridor. No respiratory distress. She has no wheezes. She has no rales. She exhibits no tenderness.   Abdominal: Soft. Bowel sounds are normal. She exhibits no distension. There is no tenderness. There is no guarding.   Musculoskeletal: She exhibits no edema or tenderness.   Neurological: She is alert. No cranial nerve deficit. She exhibits normal muscle tone. Coordination (left leg drift) abnormal.   Orientation is variable  Alert   Following all commands   Skin: Skin is warm and dry. She is not diaphoretic. No pallor.   Psychiatric: Her affect is blunt. Her speech is delayed. She is slowed.   Nursing note and vitals reviewed.      Recent Labs      10/03/18   4117   10/04/18   0623  10/05/18   0430   WBC  13.9*  14.8*  10.6   RBC  4.35  3.94*  3.69*   HEMOGLOBIN  12.8  11.9*  11.2*   HEMATOCRIT  37.9  34.8*  32.7*   MCV  87.1  88.3  88.6   MCH  29.4  30.2  30.4   MCHC  33.8  34.2  34.3   RDW  40.3  40.5  41.3   PLATELETCT  302  320  281   MPV  10.7  11.1  10.8     Recent Labs      10/03/18   0552  10/04/18   0623  10/05/18   0430   SODIUM  131*  132*  132*   POTASSIUM  4.7  4.4  4.1   CHLORIDE  94*  96  99   CO2  30  28  27   GLUCOSE  140*  105*  124*   BUN  27*  30*  28*   CREATININE  0.56  0.56  0.60   CALCIUM  9.6  9.4  9.3     Recent Labs      10/03/18   0552  10/04/18   0623   INR  1.11  1.10                  Assessment/Plan     Acute intracranial hemorrhage (HCC)- (present on admission)   Assessment & Plan    Anticoagulation reversed at admission  S/P bilateral EVDs  Repeat CT stable  Monitor ICP   Exam improving, working with PT and OT  Neurosurgery following  Close BP control          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)- (present on admission)   Assessment & Plan    Anticoagulation reversed at the time of admission, was on eliquis outpatient        Paroxysmal atrial fibrillation (HCC)- (present on admission)   Assessment & Plan    On metoprolol 50mg Q8, Dig 125mcg QD  Prn Dilt for breakthrough rate control  No anticoagulation due to ICH        Encephalopathy acute- (present on admission)   Assessment & Plan    Secondary to cerebral compromise from subdural hematoma        Hypomagnesemia- (present on admission)   Assessment & Plan    Replace  Follow daily        Hypokalemia   Assessment & Plan    Replaced  Follow daily          Quality-Core Measures   Reviewed items::  EKG reviewed, Labs reviewed, Medications reviewed and Radiology images reviewed  Mack catheter::  Critically Ill - Requiring Accurate Measurement of Urinary Output  DVT prophylaxis pharmacological::  Contraindicated - High bleeding risk  DVT prophylaxis - mechanical:   SCDs  Ulcer Prophylaxis::  Not indicated      Plan  Continue with neurologic monitoring, follow-up CT   Continue with supportive care  Core track feeding  Sodium replacement and support  The patient will likely need prolonged rehabilitation  See orders  Critically ill

## 2018-10-05 NOTE — CARE PLAN
Problem: Safety  Goal: Will remain free from falls    Intervention: Assess risk factors for falls  Bed alarm in use, frequent rounding, room near nursing station, bed alarm in use      Problem: Skin Integrity  Goal: Risk for impaired skin integrity will decrease    Intervention: Assess risk factors for impaired skin integrity and/or pressure ulcers  Q2 hour turns, mepilex on coccyx, low airloss mattress, pillows used for positioning

## 2018-10-05 NOTE — PROGRESS NOTES
Critical Care Progress Note    Date of admission  9/22/2018    Chief Complaint  78 y.o. female admitted 9/22/2018 with intracranial hemorrhage.    Hospital Course  This lady was admitted with an acute intracranial hemorrhage.  She is undergone bilateral EVDs for hydrocephalus.    Interval Problem Update  Reviewed last 24 hour events:   -No acute events overnight   - Neuro: Remains awake and interactive, moving all 4 extremities   - HR: Sinus rhythm   - BP: Normotensive   - GI: Tolerating tube feed   - UOP: Adequate   - Mack: Yes   - Tm: Afebrile   - Lines: Peripheral IVs   - NC     YESTERDAY  F/u CT head - shift increased 4.7 -> 8.6 no change per Neuro  EVDs clamped 48 hrs - good leak around site  A&O x2 - no change  Follows all 4  ICP 1-9  Hemodynamics noted  No PRn Bp meds - no ectopy  Room air  Cortrak TF  UO adequate  Mack  Tm 99.5  No CXR  No VTE  Ancef for CNS drain  Na    Reviewed at bedside with Dr. Santizo  He is aware of serial CT findings and is alarmed by minor changes  Clinical neuro exam has not changed with clamping of EVDs for 48+ hours  EVDs are pulled by his nurse practitioner  Continue current blood pressure and sodium goals      Review of Systems  Review of Systems   Constitutional: Positive for fatigue.   HENT: Negative for trouble swallowing and voice change.    Eyes: Negative for visual disturbance.   Respiratory: Negative for cough and shortness of breath.    Cardiovascular: Negative for chest pain and palpitations.   Gastrointestinal: Negative for abdominal pain, diarrhea, nausea and vomiting.   Endocrine: Negative for polyuria.   Genitourinary: Negative for difficulty urinating.   Musculoskeletal: Positive for back pain.   Skin: Negative for rash.   Neurological: Positive for weakness and light-headedness.   Psychiatric/Behavioral: Positive for confusion.        Vital Signs for last 24 hours   Temp:  [36.6 °C (97.8 °F)-37.1 °C (98.7 °F)] 36.7 °C (98.1 °F)  Pulse:  [67-93] 71  Resp:  [16-44]  18    Hemodynamic parameters for last 24 hours  EHR flow sheets are reviewed       Vent Settings for last 24 hours, NA       Physical Exam   Physical Exam   Constitutional: She appears well-developed and well-nourished. She appears lethargic. She is easily aroused. No distress.   HENT:   Head: Normocephalic.   Right Ear: External ear normal.   Left Ear: External ear normal.   Mouth/Throat: Oropharynx is clear and moist.   Bruising along her left forehead and left face -improving   Eyes: Pupils are equal, round, and reactive to light. Conjunctivae are normal. Right eye exhibits no discharge. Left eye exhibits no discharge. No scleral icterus.   Neck: Neck supple. No JVD present. No tracheal deviation present.   Cardiovascular: Normal rate, regular rhythm, normal heart sounds and intact distal pulses.   No extrasystoles are present. Exam reveals no gallop and no friction rub.    No murmur heard.  Pulmonary/Chest: Effort normal. No accessory muscle usage or stridor. No tachypnea. No respiratory distress. She has decreased breath sounds in the right lower field and the left lower field. She has no wheezes. She has no rales.   Abdominal: Soft. Bowel sounds are normal. She exhibits no distension. There is no hepatosplenomegaly. There is no tenderness. There is no rebound, no guarding and no CVA tenderness.   Musculoskeletal: She exhibits no edema, tenderness or deformity.   No clubbing or cyanosis   Neurological: She is easily aroused. She appears lethargic. She exhibits abnormal muscle tone (generally weak, not focal). She displays no seizure activity.   More alert today, following better, interacting better, no new focal neurological findings, EVD drains clamped, small amount of fluid leakage around them, no signs of infection   Skin: Skin is warm and dry. No rash noted. She is not diaphoretic. No cyanosis. Nails show no clubbing.   Psychiatric: Her mood appears not anxious. Her speech is not delayed. She is not  aggressive and not slowed. Cognition and memory are impaired. She is attentive.   Nursing note and vitals reviewed.      Medications  Current Facility-Administered Medications   Medication Dose Route Frequency Provider Last Rate Last Dose   • labetalol (NORMODYNE,TRANDATE) injection 10 mg  10 mg Intravenous Q HOUR PRN Raisa Bartlett P.A.-C.       • sodium chloride (SALT) tablet 2 g  2 g Feeding Tube TID WITH MEALS Montana Peña M.D.   2 g at 10/04/18 1712   • ceFAZolin (ANCEF) IVPB 2 g  2 g Intravenous Q8HRS Raisa Bartlett P.A.-C.   Stopped at 10/05/18 0557   • acetaminophen (TYLENOL) tablet 650 mg  650 mg Feeding Tube Q4HRS PRN Gil Leone Jr., D.O.   650 mg at 10/04/18 2358    Or   • acetaminophen (TYLENOL) suppository 650 mg  650 mg Rectal Q4HRS PRN Gil Leone Jr., D.O.       • amLODIPine (NORVASC) tablet 5 mg  5 mg Per NG Tube Q DAY Andres Wells D.O.   5 mg at 10/05/18 0531   • metoprolol (LOPRESSOR) tablet 50 mg  50 mg Feeding Tube Q8HRS Andres Wells D.O.   50 mg at 10/05/18 0532   • Pharmacy Consult: Enteral tube feeding - review meds/change route/product selection   Other PRN Gil Leone Jr., D.O.       • labetalol (NORMODYNE,TRANDATE) injection 5-10 mg  5-10 mg Intravenous Q4HRS PRN Gil Leone Jr., D.O.   5 mg at 09/26/18 0303   • digoxin (LANOXIN) tablet 125 mcg  125 mcg Feeding Tube DAILY AT 1800 William Medina M.D.   125 mcg at 10/04/18 1712   • ondansetron (ZOFRAN ODT) dispertab 4 mg  4 mg Feeding Tube Q4HRS PRN William Medina M.D.        Or   • ondansetron (ZOFRAN) syringe/vial injection 4 mg  4 mg Intravenous Q4HRS PRN William E Adam, M.D.       • Pharmacy Consult Request ...Pain Management Review   Other PRN William Medina M.D.        And   • oxyCODONE immediate-release (ROXICODONE) tablet 2.5 mg  2.5 mg Feeding Tube Q3HRS PRN William Medina M.D.   2.5 mg at 10/04/18 1928    And   • oxyCODONE immediate-release (ROXICODONE) tablet 5 mg  5 mg Feeding Tube  Q3HRS PRN William Medina M.D.   5 mg at 09/30/18 2231    And   • HYDROmorphone (DILAUDID) injection 0.25-1 mg  0.25-1 mg Intravenous Q2HRS PRN William Medina M.D.   0.5 mg at 10/04/18 0200   • polyethylene glycol/lytes (MIRALAX) PACKET 1 Packet  1 Packet Feeding Tube QDAY PRN William Medina M.D.   1 Packet at 10/01/18 1752    And   • senna-docusate (PERICOLACE or SENOKOT S) 8.6-50 MG per tablet 2 Tab  2 Tab Feeding Tube BID William Medina M.D.   Stopped at 10/02/18 1800    And   • magnesium hydroxide (MILK OF MAGNESIA) suspension 30 mL  30 mL Feeding Tube QDAY PRMO Medina M.D.   30 mL at 10/02/18 0516    And   • bisacodyl (DULCOLAX) suppository 10 mg  10 mg Rectal QDAY PRN William Medina M.D.       • Respiratory Care per Protocol   Nebulization Continuous RT Rowena Calero M.D.       • MD Alert...ICU Electrolyte Replacement per Pharmacy   Other pharmacy to dose Rowena Calero M.D.       • LORazepam (ATIVAN) injection 2 mg  2 mg Intravenous Q5 MIN PRN Rowena Calero M.D.       • hydrALAZINE (APRESOLINE) injection 10 mg  10 mg Intravenous Q4HRS PRN Rowena Calero M.D.       • enalaprilat (VASOTEC) injection 1.25 mg  1.25 mg Intravenous Q6HRS PRN Rowena Calero M.D.           Fluids    Intake/Output Summary (Last 24 hours) at 10/05/18 0905  Last data filed at 10/05/18 0600   Gross per 24 hour   Intake             1580 ml   Output             1365 ml   Net              215 ml       Laboratory  Recent Results (from the past 48 hour(s))   CBC WITH DIFFERENTIAL    Collection Time: 10/04/18  6:23 AM   Result Value Ref Range    WBC 14.8 (H) 4.8 - 10.8 K/uL    RBC 3.94 (L) 4.20 - 5.40 M/uL    Hemoglobin 11.9 (L) 12.0 - 16.0 g/dL    Hematocrit 34.8 (L) 37.0 - 47.0 %    MCV 88.3 81.4 - 97.8 fL    MCH 30.2 27.0 - 33.0 pg    MCHC 34.2 33.6 - 35.0 g/dL    RDW 40.5 35.9 - 50.0 fL    Platelet Count 320 164 - 446 K/uL    MPV 11.1 9.0 - 12.9 fL    Neutrophils-Polys 71.60 44.00 - 72.00 %    Lymphocytes 12.20 (L) 22.00 -  41.00 %    Monocytes 10.40 0.00 - 13.40 %    Eosinophils 2.60 0.00 - 6.90 %    Basophils 0.60 0.00 - 1.80 %    Immature Granulocytes 2.60 (H) 0.00 - 0.90 %    Nucleated RBC 0.00 /100 WBC    Neutrophils (Absolute) 10.60 (H) 2.00 - 7.15 K/uL    Lymphs (Absolute) 1.80 1.00 - 4.80 K/uL    Monos (Absolute) 1.53 (H) 0.00 - 0.85 K/uL    Eos (Absolute) 0.38 0.00 - 0.51 K/uL    Baso (Absolute) 0.09 0.00 - 0.12 K/uL    Immature Granulocytes (abs) 0.38 (H) 0.00 - 0.11 K/uL    NRBC (Absolute) 0.00 K/uL   BASIC METABOLIC PANEL    Collection Time: 10/04/18  6:23 AM   Result Value Ref Range    Sodium 132 (L) 135 - 145 mmol/L    Potassium 4.4 3.6 - 5.5 mmol/L    Chloride 96 96 - 112 mmol/L    Co2 28 20 - 33 mmol/L    Glucose 105 (H) 65 - 99 mg/dL    Bun 30 (H) 8 - 22 mg/dL    Creatinine 0.56 0.50 - 1.40 mg/dL    Calcium 9.4 8.5 - 10.5 mg/dL    Anion Gap 8.0 0.0 - 11.9   MAGNESIUM    Collection Time: 10/04/18  6:23 AM   Result Value Ref Range    Magnesium 2.5 1.5 - 2.5 mg/dL   PROTHROMBIN TIME    Collection Time: 10/04/18  6:23 AM   Result Value Ref Range    PT 14.3 12.0 - 14.6 sec    INR 1.10 0.87 - 1.13   ESTIMATED GFR    Collection Time: 10/04/18  6:23 AM   Result Value Ref Range    GFR If African American >60 >60 mL/min/1.73 m 2    GFR If Non African American >60 >60 mL/min/1.73 m 2   BASIC METABOLIC PANEL    Collection Time: 10/05/18  4:30 AM   Result Value Ref Range    Sodium 132 (L) 135 - 145 mmol/L    Potassium 4.1 3.6 - 5.5 mmol/L    Chloride 99 96 - 112 mmol/L    Co2 27 20 - 33 mmol/L    Glucose 124 (H) 65 - 99 mg/dL    Bun 28 (H) 8 - 22 mg/dL    Creatinine 0.60 0.50 - 1.40 mg/dL    Calcium 9.3 8.5 - 10.5 mg/dL    Anion Gap 6.0 0.0 - 11.9   MAGNESIUM    Collection Time: 10/05/18  4:30 AM   Result Value Ref Range    Magnesium 2.4 1.5 - 2.5 mg/dL   PHOSPHORUS    Collection Time: 10/05/18  4:30 AM   Result Value Ref Range    Phosphorus 3.7 2.5 - 4.5 mg/dL   CBC WITH DIFFERENTIAL    Collection Time: 10/05/18  4:30 AM   Result  Value Ref Range    WBC 10.6 4.8 - 10.8 K/uL    RBC 3.69 (L) 4.20 - 5.40 M/uL    Hemoglobin 11.2 (L) 12.0 - 16.0 g/dL    Hematocrit 32.7 (L) 37.0 - 47.0 %    MCV 88.6 81.4 - 97.8 fL    MCH 30.4 27.0 - 33.0 pg    MCHC 34.3 33.6 - 35.0 g/dL    RDW 41.3 35.9 - 50.0 fL    Platelet Count 281 164 - 446 K/uL    MPV 10.8 9.0 - 12.9 fL    Neutrophils-Polys 69.00 44.00 - 72.00 %    Lymphocytes 13.80 (L) 22.00 - 41.00 %    Monocytes 11.10 0.00 - 13.40 %    Eosinophils 2.50 0.00 - 6.90 %    Basophils 0.80 0.00 - 1.80 %    Immature Granulocytes 2.80 (H) 0.00 - 0.90 %    Nucleated RBC 0.00 /100 WBC    Neutrophils (Absolute) 7.30 (H) 2.00 - 7.15 K/uL    Lymphs (Absolute) 1.46 1.00 - 4.80 K/uL    Monos (Absolute) 1.17 (H) 0.00 - 0.85 K/uL    Eos (Absolute) 0.26 0.00 - 0.51 K/uL    Baso (Absolute) 0.08 0.00 - 0.12 K/uL    Immature Granulocytes (abs) 0.30 (H) 0.00 - 0.11 K/uL    NRBC (Absolute) 0.00 K/uL   ESTIMATED GFR    Collection Time: 10/05/18  4:30 AM   Result Value Ref Range    GFR If African American >60 >60 mL/min/1.73 m 2    GFR If Non African American >60 >60 mL/min/1.73 m 2       Imaging  X-Ray:  I have personally reviewed the images and compared with prior images.  CT:    Reviewed    Assessment/Plan  Acute intracranial hemorrhage (HCC)- (present on admission)   Assessment & Plan    S/P fall 1 week prior- CT head on 9/14 negative for acute intracranial hemorrhage  CT scan on 9/22 with intraventricular hemorrhage, right greater than left and intraparenchymal hemorrhage  Noncommunicating hydrocephalus - s/p emergent bilateral EVD placement by Dr. Santizo on 9/22.  Intraventricular TPA given by NeuroSx with improvement in EVD output  Continue Keppra 500 mg IV twice daily for seizure prophylaxis  Strict blood pressure control - goal SBP <160 mmHg, long-term use less than 130 in school  Continue close neuro checks every 2 hours, consider dropping to every 4 hours tomorrow  Repeat CT on 9/28 with improved shift from 1 cm to 7.1  mm, continue EVD drainage this weekend, and increase next week  EVDs clamped 10/2  Follow-up CT 10/3 with no change in bleed with midline shift noted to be 4.7 mm  Follow-up CT 10/4 with midline shift 8.2 mm but in comparing to prior film problem no change per both radiology and neurosurgery  EVDs pulled mid day 10/4 by neurosurgery  Continue current blood pressure and sodium goals, discussed with Dr. Santizo  CT head in a.m. 10/6          Hyponatremia   Assessment & Plan    Secondary to ICH  Start salt tabs 1 g 3 times per day  Serial BMP, if serum sodium continues to drop will increase frequency of lab consider starting 3% saline  CT scan head improving 10/3, goal probably just to normalize sodium 135-145  Monitor        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)- (present on admission)   Assessment & Plan    Reversed on hospital day 1  Holding anticoagulants until okay with neurosurgery, will ease back in with chest prophylactic heparin first  Possibly 2 weeks post initial event neurosurgery, await their okay, query 10/6 if follow-up CT head okay  Monitor        Paroxysmal atrial fibrillation (HCC)- (present on admission)   Assessment & Plan    Previously on apixaban, 5 mg twice daily - now strictly contraindicated due to ICH  Continue digoxin and metoprolol as this regimen has been working well, no recurrence of any significant tachycardia  Continue to monitor heart rate in ICU  Monitor potassium and magnesium and supplement to keep levels greater than 2 and 4 respectively  Prophylactic subcu heparin when okay with neurosurgery, still pending, possibly 2 weeks post initial event, query 10/6 if CT head okay        Encephalopathy acute- (present on admission)   Assessment & Plan    Still improving a little bit every day  Structural, secondary to intracranial hemorrhage  Re-orientation, family, glasses, hearing aids, sleep, lights on during day, treat pain, ambulate, minimize  benzos/anticholinergic agents.  Mobilize          Hypomagnesemia- (present on admission)   Assessment & Plan    Electrolyte replacement protocol to maintain >2        Hypokalemia   Assessment & Plan    Electrolyte replacement protocol to maintain >4           Discussed narcotic use with nursing staff the bedside, caution/limit as necessary    VTE:  Contraindicated  Ulcer: Not Indicated  Lines: Mack Catheter  Ongoing indication addressed    I have performed a physical exam and reviewed and updated ROS and Plan today (10/5/2018). In review of yesterday's note (10/4/2018), there are no changes except as documented above.     Discussed patient condition and risk of morbidity and/or mortality with Hospitalist, RN, RT, Pharmacy, , , Charge nurse / hot rounds, Patient and neurosurgery.

## 2018-10-05 NOTE — CARE PLAN
Problem: Acute Care of the Stroke Patient  Goal: Optimal Outcome for the Stroke patient    Intervention: PT/OT/SLP needs: LIP must assess the need for Rehab Services. If none needed, LIP must document reason.  Re-ordered PT/OT today as patient is an appropriate candidate for therapy and assessment of need for Rehab Services.       Problem: Mobility  Goal: Risk for activity intolerance will decrease    Intervention: Encourage patient to increase activity level in collaboration with Interdisciplinary Team  Patient encouraged to call prior to needing to toilet in order to facilitate safe transfer to the BS. Patient educated that today's goal will be making it to the [facility] for all bowel movements to increase activity and promote a normalized bowel pattern. Patient verbalized understanding and has been successful to this point.

## 2018-10-05 NOTE — PROGRESS NOTES
2 RN skin check completed. Heels are dry and cracked, lotion applied. Sacrum is not red, Mepilex in place.

## 2018-10-05 NOTE — PROGRESS NOTES
Neurosurgery Progress Note    Subjective:  Pt with IVH / SAH / SDH with Bilateral EVD day #14  EVD removed yesterday  Stable overnight. No changes.           Exam:  Awake. Eyes open.  PERRL  Will follow simple commands.  Moves ext x 4   Overall appropriate  Incisions c/d/i.        Pulse  Av.7  Min: 67  Max: 93  Resp  Av.3  Min: 16  Max: 44  Temp  Av.7 °C (98.1 °F)  Min: 36.6 °C (97.8 °F)  Max: 37.1 °C (98.7 °F)  SpO2  Av.2 %  Min: 93 %  Max: 99 %    ICP  Avg: 3.5 MM HG  Min: 3 MM HG  Max: 4 MM HG    Recent Labs      10/03/18   0552  10/04/18   0623  10/05/18   0430   WBC  13.9*  14.8*  10.6   RBC  4.35  3.94*  3.69*   HEMOGLOBIN  12.8  11.9*  11.2*   HEMATOCRIT  37.9  34.8*  32.7*   MCV  87.1  88.3  88.6   MCH  29.4  30.2  30.4   MCHC  33.8  34.2  34.3   RDW  40.3  40.5  41.3   PLATELETCT  302  320  281   MPV  10.7  11.1  10.8     Recent Labs      10/03/18   0552  10/04/18   0623  10/05/18   0430   SODIUM  131*  132*  132*   POTASSIUM  4.7  4.4  4.1   CHLORIDE  94*  96  99   CO2  30  28  27   GLUCOSE  140*  105*  124*   BUN  27*  30*  28*   CREATININE  0.56  0.56  0.60   CALCIUM  9.6  9.4  9.3     Recent Labs      10/03/18   0552  10/04/18   06   INR  1.11  1.10           Intake/Output       10/04/18 07 - 10/05/18 0659 10/05/18 0700 - 10/06/18 0659      [id number] Total 5026-2686 9936-5319 Total       Intake    Other  190  -- 190  --  -- --    Medications (PO/Enteral Liquids) 160 -- 160 -- -- --    Free water flush per NG 30 -- 30 -- -- --    NG/GT  720  720 1440  --  -- --    Intake (mL) (Enteral Tube 18 Cortrak - Gastric 10 Fr. Right nare)  -- -- --    IV Piggyback  100  -- 100  --  -- --    Volume (mL) (ceFAZolin (ANCEF) IVPB 2 g) 100 -- 100 -- -- --    Volume (mL) (ceFAZolin in dextrose (ANCEF) IVPB premix 1 g) 0 -- 0 -- -- --    Total Intake 4343 069 8636 -- -- --       Output    Urine  625  830 1455  --  -- --    Output (mL) (Urethral Catheter Latex 16 Fr.)   -- -- --    Drains  0  -- 0  --  -- --    Output (mL) ([REMOVED] ICP/Ventriculostomy Left) 0 -- 0 -- -- --    Stool  --  -- --  --  -- --    Number of Times Stooled 3 x 1 x 4 x -- -- --    Total Output  -- -- --       Net I/O     385 -110 275 -- -- --            Intake/Output Summary (Last 24 hours) at 10/05/18 0837  Last data filed at 10/05/18 0600   Gross per 24 hour   Intake             1580 ml   Output             1365 ml   Net              215 ml            • labetalol  10 mg Q HOUR PRN   • sodium chloride  2 g TID WITH MEALS   • ceFAZolin  2 g Q8HRS   • acetaminophen  650 mg Q4HRS PRN    Or   • acetaminophen  650 mg Q4HRS PRN   • amLODIPine  5 mg Q DAY   • metoprolol  50 mg Q8HRS   • Pharmacy   PRN   • labetalol  5-10 mg Q4HRS PRN   • digoxin  125 mcg DAILY AT 1800   • ondansetron  4 mg Q4HRS PRN    Or   • ondansetron  4 mg Q4HRS PRN   • Pharmacy Consult Request   PRN    And   • oxyCODONE immediate-release  2.5 mg Q3HRS PRN    And   • oxyCODONE immediate-release  5 mg Q3HRS PRN    And   • HYDROmorphone  0.25-1 mg Q2HRS PRN   • polyethylene glycol/lytes  1 Packet QDAY PRN    And   • senna-docusate  2 Tab BID    And   • magnesium hydroxide  30 mL QDAY PRN    And   • bisacodyl  10 mg QDAY PRN   • Respiratory Care per Protocol   Continuous RT   • MD Alert...Adult ICU Electrolyte Replacement per Pharmacy   pharmacy to dose   • LORazepam  2 mg Q5 MIN PRN   • hydrALAZINE  10 mg Q4HRS PRN   • enalaprilat  1.25 mg Q6HRS PRN       Assessment and Plan:  Hospital day #14   Head CT for saturday   Keep in icu with q2 neuro checks

## 2018-10-05 NOTE — PROGRESS NOTES
2 RN skin check    Areas of concern:  -Head surgery site  -generalized bruising all over    No pressure areas noted

## 2018-10-06 ENCOUNTER — HOSPITAL ENCOUNTER (OUTPATIENT)
Dept: RADIOLOGY | Facility: MEDICAL CENTER | Age: 78
End: 2018-10-06
Attending: PHYSICIAN ASSISTANT

## 2018-10-06 LAB
ANION GAP SERPL CALC-SCNC: 12 MMOL/L (ref 0–11.9)
BASOPHILS # BLD AUTO: 0.6 % (ref 0–1.8)
BASOPHILS # BLD: 0.06 K/UL (ref 0–0.12)
BUN SERPL-MCNC: 29 MG/DL (ref 8–22)
CALCIUM SERPL-MCNC: 9.6 MG/DL (ref 8.5–10.5)
CHLORIDE SERPL-SCNC: 100 MMOL/L (ref 96–112)
CO2 SERPL-SCNC: 23 MMOL/L (ref 20–33)
CREAT SERPL-MCNC: 0.49 MG/DL (ref 0.5–1.4)
EOSINOPHIL # BLD AUTO: 0.23 K/UL (ref 0–0.51)
EOSINOPHIL NFR BLD: 2.4 % (ref 0–6.9)
ERYTHROCYTE [DISTWIDTH] IN BLOOD BY AUTOMATED COUNT: 41.2 FL (ref 35.9–50)
GLUCOSE SERPL-MCNC: 131 MG/DL (ref 65–99)
HCT VFR BLD AUTO: 32.5 % (ref 37–47)
HGB BLD-MCNC: 10.8 G/DL (ref 12–16)
IMM GRANULOCYTES # BLD AUTO: 0.17 K/UL (ref 0–0.11)
IMM GRANULOCYTES NFR BLD AUTO: 1.8 % (ref 0–0.9)
INR PPP: 1.12 (ref 0.87–1.13)
LYMPHOCYTES # BLD AUTO: 1.05 K/UL (ref 1–4.8)
LYMPHOCYTES NFR BLD: 10.8 % (ref 22–41)
MAGNESIUM SERPL-MCNC: 2.2 MG/DL (ref 1.5–2.5)
MCH RBC QN AUTO: 29.4 PG (ref 27–33)
MCHC RBC AUTO-ENTMCNC: 33.2 G/DL (ref 33.6–35)
MCV RBC AUTO: 88.6 FL (ref 81.4–97.8)
MONOCYTES # BLD AUTO: 0.83 K/UL (ref 0–0.85)
MONOCYTES NFR BLD AUTO: 8.6 % (ref 0–13.4)
NEUTROPHILS # BLD AUTO: 7.35 K/UL (ref 2–7.15)
NEUTROPHILS NFR BLD: 75.8 % (ref 44–72)
NRBC # BLD AUTO: 0 K/UL
NRBC BLD-RTO: 0 /100 WBC
PLATELET # BLD AUTO: 311 K/UL (ref 164–446)
PMV BLD AUTO: 10.2 FL (ref 9–12.9)
POTASSIUM SERPL-SCNC: 4.1 MMOL/L (ref 3.6–5.5)
PROTHROMBIN TIME: 14.5 SEC (ref 12–14.6)
RBC # BLD AUTO: 3.67 M/UL (ref 4.2–5.4)
SODIUM SERPL-SCNC: 135 MMOL/L (ref 135–145)
WBC # BLD AUTO: 9.7 K/UL (ref 4.8–10.8)

## 2018-10-06 PROCEDURE — 700102 HCHG RX REV CODE 250 W/ 637 OVERRIDE(OP): Performed by: INTERNAL MEDICINE

## 2018-10-06 PROCEDURE — 83735 ASSAY OF MAGNESIUM: CPT

## 2018-10-06 PROCEDURE — 70450 CT HEAD/BRAIN W/O DYE: CPT

## 2018-10-06 PROCEDURE — 700102 HCHG RX REV CODE 250 W/ 637 OVERRIDE(OP): Performed by: HOSPITALIST

## 2018-10-06 PROCEDURE — A9270 NON-COVERED ITEM OR SERVICE: HCPCS | Performed by: HOSPITALIST

## 2018-10-06 PROCEDURE — A9270 NON-COVERED ITEM OR SERVICE: HCPCS | Performed by: INTERNAL MEDICINE

## 2018-10-06 PROCEDURE — 770022 HCHG ROOM/CARE - ICU (200)

## 2018-10-06 PROCEDURE — 80048 BASIC METABOLIC PNL TOTAL CA: CPT

## 2018-10-06 PROCEDURE — 99233 SBSQ HOSP IP/OBS HIGH 50: CPT | Performed by: INTERNAL MEDICINE

## 2018-10-06 PROCEDURE — 85025 COMPLETE CBC W/AUTO DIFF WBC: CPT

## 2018-10-06 PROCEDURE — 99232 SBSQ HOSP IP/OBS MODERATE 35: CPT | Performed by: HOSPITALIST

## 2018-10-06 PROCEDURE — 85610 PROTHROMBIN TIME: CPT

## 2018-10-06 RX ADMIN — METOPROLOL TARTRATE 50 MG: 50 TABLET ORAL at 21:48

## 2018-10-06 RX ADMIN — SODIUM CHLORIDE TAB 1 GM 2 G: 1 TAB at 08:20

## 2018-10-06 RX ADMIN — ACETAMINOPHEN 650 MG: 325 TABLET, FILM COATED ORAL at 11:25

## 2018-10-06 RX ADMIN — SODIUM CHLORIDE TAB 1 GM 2 G: 1 TAB at 11:27

## 2018-10-06 RX ADMIN — METOPROLOL TARTRATE 50 MG: 50 TABLET ORAL at 15:03

## 2018-10-06 RX ADMIN — DIGOXIN 125 MCG: 125 TABLET ORAL at 16:59

## 2018-10-06 RX ADMIN — METOPROLOL TARTRATE 50 MG: 50 TABLET ORAL at 05:17

## 2018-10-06 RX ADMIN — AMLODIPINE BESYLATE 5 MG: 5 TABLET ORAL at 05:17

## 2018-10-06 RX ADMIN — SODIUM CHLORIDE TAB 1 GM 2 G: 1 TAB at 17:00

## 2018-10-06 ASSESSMENT — PAIN SCALES - GENERAL
PAINLEVEL_OUTOF10: 6
PAINLEVEL_OUTOF10: 3
PAINLEVEL_OUTOF10: 1
PAINLEVEL_OUTOF10: 3
PAINLEVEL_OUTOF10: 1
PAINLEVEL_OUTOF10: 2
PAINLEVEL_OUTOF10: 2
PAINLEVEL_OUTOF10: 1

## 2018-10-06 ASSESSMENT — ENCOUNTER SYMPTOMS
BACK PAIN: 1
WEAKNESS: 1
CONFUSION: 1
NAUSEA: 0
ABDOMINAL PAIN: 0
VOMITING: 0
COUGH: 0
FATIGUE: 1
LIGHT-HEADEDNESS: 1
TROUBLE SWALLOWING: 0
PALPITATIONS: 0
DIARRHEA: 0
VOICE CHANGE: 0
SHORTNESS OF BREATH: 0

## 2018-10-06 NOTE — PROGRESS NOTES
Neurosurgery Progress Note    Subjective:  Pt with IVH / SAH / SDH with Bilateral EVD day #15  EVD removed yesterday  Stable overnight. No changes.           Exam:  Awake. Eyes open.  PERRL  Will follow simple commands.  Moves ext x 4   Overall appropriate  Incisions c/d/i.   CT with right fluid collection hygroma vs chronic sub dural hematoma. Somewhat increased to 8.6mm shift.    BP  Min: 120/64  Max: 120/64  Pulse  Av.8  Min: 52  Max: 93  Resp  Av.4  Min: 12  Max: 39  Temp  Av.1 °C (98.8 °F)  Min: 36.8 °C (98.3 °F)  Max: 37.2 °C (99 °F)  SpO2  Av.8 %  Min: 96 %  Max: 99 %    No Data Recorded    Recent Labs      10/04/18   0623  10/05/18   0430  10/06/18   0559   WBC  14.8*  10.6  9.7   RBC  3.94*  3.69*  3.67*   HEMOGLOBIN  11.9*  11.2*  10.8*   HEMATOCRIT  34.8*  32.7*  32.5*   MCV  88.3  88.6  88.6   MCH  30.2  30.4  29.4   MCHC  34.2  34.3  33.2*   RDW  40.5  41.3  41.2   PLATELETCT  320  281  311   MPV  11.1  10.8  10.2     Recent Labs      10/04/18   0623  10/05/18   0430  10/06/18   0350   SODIUM  132*  132*  135   POTASSIUM  4.4  4.1  4.1   CHLORIDE  96  99  100   CO2  28  27  23   GLUCOSE  105*  124*  131*   BUN  30*  28*  29*   CREATININE  0.56  0.60  0.49*   CALCIUM  9.4  9.3  9.6     Recent Labs      10/04/18   0623  10/06/18   0559   INR  1.10  1.12           Intake/Output       10/05/18 0700 - 10/06/18 0659 10/06/18 0700 - 10/07/18 0659      3735-8615 9729-7026 Total 7978-5798 1416-0171 Total       Intake    I.V.  40  -- 40  --  -- --    IV Volume (NS at TKO) 40 -- 40 -- -- --    Other  480  180 660  --  -- --    Medications (PO/Enteral Liquids) 360 -- 360 -- -- --    Free water flush per  180 300 -- -- --    NG/GT  720  720 1440  --  -- --    Intake (mL) (Enteral Tube 18 Cortrak - Gastric 10 Fr. Right nare)  -- -- --    Total Intake 1087 089 6367 -- -- --       Output    Urine  875  500 1375  --  -- --    Output (mL) (Urethral Catheter Latex 16 Fr.) 875  500 1375 -- -- --    Stool  --  -- --  --  -- --    Number of Times Stooled 4 x 1 x 5 x -- -- --    Total Output  -- -- --       Net I/O     365 400 765 -- -- --            Intake/Output Summary (Last 24 hours) at 10/06/18 0927  Last data filed at 10/06/18 0600   Gross per 24 hour   Intake             1850 ml   Output             1200 ml   Net              650 ml            • labetalol  10 mg Q HOUR PRN   • sodium chloride  2 g TID WITH MEALS   • acetaminophen  650 mg Q4HRS PRN    Or   • acetaminophen  650 mg Q4HRS PRN   • amLODIPine  5 mg Q DAY   • metoprolol  50 mg Q8HRS   • Pharmacy   PRN   • labetalol  5-10 mg Q4HRS PRN   • digoxin  125 mcg DAILY AT 1800   • ondansetron  4 mg Q4HRS PRN    Or   • ondansetron  4 mg Q4HRS PRN   • Pharmacy Consult Request   PRN    And   • oxyCODONE immediate-release  2.5 mg Q3HRS PRN    And   • oxyCODONE immediate-release  5 mg Q3HRS PRN    And   • HYDROmorphone  0.25-1 mg Q2HRS PRN   • polyethylene glycol/lytes  1 Packet QDAY PRN    And   • senna-docusate  2 Tab BID    And   • magnesium hydroxide  30 mL QDAY PRN    And   • bisacodyl  10 mg QDAY PRN   • Respiratory Care per Protocol   Continuous RT   • MD Alert...Adult ICU Electrolyte Replacement per Pharmacy   pharmacy to dose   • LORazepam  2 mg Q5 MIN PRN   • hydrALAZINE  10 mg Q4HRS PRN   • enalaprilat  1.25 mg Q6HRS PRN       Assessment and Plan:  Hospital day #15   Dr. Lucia to review CT  Keep in icu with q2 neuro checks

## 2018-10-06 NOTE — PROGRESS NOTES
[facility] Hospitalist Progress Note    Date of Service: 10/6/2018    Chief Complaint  78 y.o. female admitted 9/22/2018 with HA to [facility].  Had had a GLF with neg CT one week prior.  CT on this admit showing SDH and SAH  Bilateral EVDs placed Dr Santizo 9/22      Interval Problem Update  Patient seen and examined today. ICU Care  Care and plan discussed in IDT/Hot rounds.  Lines and assistive devices reviewed.    Patient tolerating treatment and therapies.  All Data, Medication data reviewed.  Case discussed with nursing as available.  Plan of Care reviewed with patient and notified of changes.  10/2 the patient seems improved, alert and oriented times 1-3, at times confused, remains hemodynamically stable, on room air, remains with feeding tube, ICP between 3 and 8, left EVD is at 20 cm with slow output, neurosurgical follow-up noted, plan for continuing to watch ICPs, follow-up CT planned for Wednesday and Thursday  10/3 the patient feels better, her EVD's are clamped, she is tired, did not sleep well last night, hemodynamically stable, tolerates core track feeding,  10/4 the patient appears clinically fairly unchanged, her follow-up CT shows an increase in shift from 4.7-8.6 mm, EVD's were clamped and are now removed, patient's mentation waxes and wanes, she complains of a mild headache, hemodynamically stable, tolerating core track feeding, aiming for high normal sodium levels  10/5 patient may continue lethargy and confusion, hemodynamically stable, close neuro monitoring and follow-up CT per neurosurgery  10/6 the patient overall stabilized and slightly improved in terms of mentation, CT scan reviewed by neurosurgeon and no need for further intervention reported.  Slight increase in midline shift, hemodynamically stable, daughter at bedside, suggested referral to acute rehab  Consultants/Specialty  Neurosurgery  Pulmonology  Physiatry  Disposition  Cont in ICU for close monitoring of Neuro status          Review of  Systems   Unable to perform ROS: Mental status change      Physical Exam  Laboratory/Imaging   Hemodynamics  Temp (24hrs), Av.1 °C (98.8 °F), Min:36.8 °C (98.3 °F), Max:37.2 °C (99 °F)   Temperature: 37.2 °C (99 °F)  Pulse  Av.2  Min: 52  Max: 131 Heart Rate (Monitored): 70  Blood Pressure : 120/64, NIBP: 121/59      Respiratory      Respiration: (!) 26, Pulse Oximetry: 99 %     Work Of Breathing / Effort: Shallow  RUL Breath Sounds: Clear, RML Breath Sounds: Clear, RLL Breath Sounds: Clear;Diminished, JUAN Breath Sounds: Clear, LLL Breath Sounds: Clear;Diminished    Fluids    Intake/Output Summary (Last 24 hours) at 10/06/18 0757  Last data filed at 10/06/18 0600   Gross per 24 hour   Intake             2140 ml   Output             1375 ml   Net              765 ml       Nutrition  Orders Placed This Encounter   Procedures   • Diet NPO     Standing Status:   Standing     Number of Occurrences:   1     Order Specific Question:   Restrict to:     Answer:   Strict [1]     Physical Exam   Constitutional: She appears well-developed and well-nourished. No distress.   Patient seen and examined by me.   HENT:   Nose: Nose normal.   Mouth/Throat: Oropharynx is clear and moist. No oropharyngeal exudate.   Bruising left side of face no bony step off   Eyes: Conjunctivae are normal. Right eye exhibits no discharge. Left eye exhibits no discharge. No scleral icterus.   Neck: No JVD present. No tracheal deviation present.   Cardiovascular: Normal rate, regular rhythm and normal heart sounds.    Pulmonary/Chest: Effort normal and breath sounds normal. No stridor. No respiratory distress. She has no wheezes. She has no rales. She exhibits no tenderness.   Abdominal: Soft. Bowel sounds are normal. She exhibits no distension. There is no tenderness. There is no guarding.   Musculoskeletal: She exhibits no edema or tenderness.   Neurological: She is alert. No cranial nerve deficit. She exhibits normal muscle tone.  Coordination (left leg drift) abnormal.   Orientation is variable  Alert   Following all commands   Skin: Skin is warm and dry. She is not diaphoretic. No pallor.   Psychiatric: Her affect is blunt. Her speech is delayed. She is slowed.   Nursing note and vitals reviewed.      Recent Labs      10/04/18   0623  10/05/18   0430  10/06/18   0559   WBC  14.8*  10.6  9.7   RBC  3.94*  3.69*  3.67*   HEMOGLOBIN  11.9*  11.2*  10.8*   HEMATOCRIT  34.8*  32.7*  32.5*   MCV  88.3  88.6  88.6   MCH  30.2  30.4  29.4   MCHC  34.2  34.3  33.2*   RDW  40.5  41.3  41.2   PLATELETCT  320  281  311   MPV  11.1  10.8  10.2     Recent Labs      10/04/18   0623  10/05/18   0430  10/06/18   0350   SODIUM  132*  132*  135   POTASSIUM  4.4  4.1  4.1   CHLORIDE  96  99  100   CO2  28  27  23   GLUCOSE  105*  124*  131*   BUN  30*  28*  29*   CREATININE  0.56  0.60  0.49*   CALCIUM  9.4  9.3  9.6     Recent Labs      10/04/18   0623  10/06/18   0559   INR  1.10  1.12                  Assessment/Plan     Acute intracranial hemorrhage (HCC)- (present on admission)   Assessment & Plan    Anticoagulation reversed at admission  S/P bilateral EVDs  Repeat CT stable  Monitor ICP   Exam improving, working with PT and OT  Neurosurgery following  Close BP control          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)- (present on admission)   Assessment & Plan    Anticoagulation reversed at the time of admission, was on eliquis outpatient        Paroxysmal atrial fibrillation (HCC)- (present on admission)   Assessment & Plan    On metoprolol 50mg Q8, Dig 125mcg QD  Prn Dilt for breakthrough rate control  No anticoagulation due to ICH        Encephalopathy acute- (present on admission)   Assessment & Plan    Secondary to cerebral compromise from subdural hematoma        Hypomagnesemia- (present on admission)   Assessment & Plan    Replace  Follow daily        Hypokalemia   Assessment & Plan    Replaced  Follow daily           Quality-Core Measures   Reviewed items::  EKG reviewed, Labs reviewed, Medications reviewed and Radiology images reviewed  Mack catheter::  Critically Ill - Requiring Accurate Measurement of Urinary Output  DVT prophylaxis pharmacological::  Contraindicated - High bleeding risk  DVT prophylaxis - mechanical:  SCDs  Ulcer Prophylaxis::  Not indicated      Plan  Continue with neurologic monitoring, follow-up CT noted  Continue with supportive care  Core track feeding, hopefully to resolve soon  Sodium replacement and support  The patient will likely need prolonged rehabilitation, rehabilitation consult    See orders  Critically ill

## 2018-10-06 NOTE — CARE PLAN
Problem: Safety  Goal: Will remain free from falls    Intervention: Assess risk factors for falls  Bed in the lowest position, room near nursing station, frequent rounding, call light within reach, reorientation as needed      Problem: Skin Integrity  Goal: Risk for impaired skin integrity will decrease    Intervention: Assess risk factors for impaired skin integrity and/or pressure ulcers  Q2 hour turns, low airloss mattress, pillows used for positioning, range of motion, mobility

## 2018-10-06 NOTE — PROGRESS NOTES
2 RN skin assessment completed. Generalized bruising still noted. Scalp hematoma is smaller than yesterday. Surgical sites remain approximated with sutures, no drainage noted. Mepilex C/D/I, sacrum is not red. Heels floated on pillows, lotion applied.

## 2018-10-06 NOTE — PROGRESS NOTES
Critical Care Progress Note    Date of admission  9/22/2018    Chief Complaint  78 y.o. female admitted 9/22/2018 with intracranial hemorrhage.    Hospital Course  This lady was admitted with an acute intracranial hemorrhage.  She is undergone bilateral EVDs for hydrocephalus.    Interval Problem Update  Reviewed last 24 hour events:   - CT: bleeds stable, shift slightly worse   - Neuro: somnolent, AOx3   - HR: 70s-90s SR   - BP: 100s-120s   - GI: Tf at goal   - UOP: adequate   - Mack: yes   - Tm: 99   - Lines: PIV   - PPx: GI not indicated, DVT SCDs   - NC   - salt tabs    YESTERDAY   -No acute events overnight   - Neuro: Remains awake and interactive, moving all 4 extremities   - HR: Sinus rhythm   - BP: Normotensive   - GI: Tolerating tube feed   - UOP: Adequate   - Mack: Yes   - Tm: Afebrile   - Lines: Peripheral IVs   - NC    Review of Systems  Review of Systems   Constitutional: Positive for fatigue.   HENT: Negative for trouble swallowing and voice change.    Eyes: Negative for visual disturbance.   Respiratory: Negative for cough and shortness of breath.    Cardiovascular: Negative for chest pain and palpitations.   Gastrointestinal: Negative for abdominal pain, diarrhea, nausea and vomiting.   Endocrine: Negative for polyuria.   Genitourinary: Negative for difficulty urinating.   Musculoskeletal: Positive for back pain.   Skin: Negative for rash.   Neurological: Positive for weakness and light-headedness.   Psychiatric/Behavioral: Positive for confusion.        Vital Signs for last 24 hours   Temp:  [36.8 °C (98.3 °F)-37.2 °C (99 °F)] 37.2 °C (99 °F)  Pulse:  [52-93] 70  Resp:  [12-39] 26  BP: (120)/(64) 120/64    Hemodynamic parameters for last 24 hours  EHR flow sheets are reviewed       Vent Settings for last 24 hours, NA       Physical Exam   Physical Exam   Constitutional: She appears well-developed and well-nourished. She appears lethargic. She is easily aroused. No distress.   HENT:   Head:  Normocephalic.   Right Ear: External ear normal.   Left Ear: External ear normal.   Mouth/Throat: Oropharynx is clear and moist.   Bruising along her left forehead and left face -improving   Eyes: Pupils are equal, round, and reactive to light. Conjunctivae are normal. Right eye exhibits no discharge. Left eye exhibits no discharge. No scleral icterus.   Neck: Neck supple. No JVD present. No tracheal deviation present.   Cardiovascular: Normal rate, regular rhythm, normal heart sounds and intact distal pulses.   No extrasystoles are present. Exam reveals no gallop and no friction rub.    No murmur heard.  Pulmonary/Chest: Effort normal. No accessory muscle usage. No tachypnea. No respiratory distress. She has decreased breath sounds in the right lower field and the left lower field. She has no wheezes. She has no rales.   Abdominal: Soft. Bowel sounds are normal. She exhibits no distension. There is no hepatosplenomegaly. There is no tenderness. There is no CVA tenderness.   Musculoskeletal: She exhibits no edema, tenderness or deformity.   No clubbing or cyanosis   Neurological: She is easily aroused. She appears lethargic. She exhibits abnormal muscle tone (generally weak, not focal). She displays no seizure activity.   Pleasantly interactive, discussed her family quite extensively   Skin: Skin is warm and dry. No rash noted. She is not diaphoretic. No cyanosis. Nails show no clubbing.   Psychiatric: Her mood appears not anxious. Her speech is not delayed. She is not aggressive and not slowed. Cognition and memory are impaired. She is attentive.   Nursing note and vitals reviewed.      Medications  Current Facility-Administered Medications   Medication Dose Route Frequency Provider Last Rate Last Dose   • labetalol (NORMODYNE,TRANDATE) injection 10 mg  10 mg Intravenous Q HOUR PRN CHRYSTAL DickAJhoana-C.       • sodium chloride (SALT) tablet 2 g  2 g Feeding Tube TID WITH MEALS Montana Peña M.D.   2 g at  10/05/18 1733   • acetaminophen (TYLENOL) tablet 650 mg  650 mg Feeding Tube Q4HRS PRN Gil Leone Jr. D.O.   650 mg at 10/05/18 1603    Or   • acetaminophen (TYLENOL) suppository 650 mg  650 mg Rectal Q4HRS PRN Gil Leone Jr., D.O.       • amLODIPine (NORVASC) tablet 5 mg  5 mg Per NG Tube Q DAY Andres Wells D.O.   5 mg at 10/06/18 0517   • metoprolol (LOPRESSOR) tablet 50 mg  50 mg Feeding Tube Q8HRS Andres Wells D.O.   50 mg at 10/06/18 0517   • Pharmacy Consult: Enteral tube feeding - review meds/change route/product selection   Other PRN Gil Leone Jr., D.O.       • labetalol (NORMODYNE,TRANDATE) injection 5-10 mg  5-10 mg Intravenous Q4HRS PRN Gil Leone Jr. D.O.   5 mg at 09/26/18 0303   • digoxin (LANOXIN) tablet 125 mcg  125 mcg Feeding Tube DAILY AT 1800 iWlliam Medina M.D.   125 mcg at 10/05/18 1734   • ondansetron (ZOFRAN ODT) dispertab 4 mg  4 mg Feeding Tube Q4HRS PRMO Meidna M.D.        Or   • ondansetron (ZOFRAN) syringe/vial injection 4 mg  4 mg Intravenous Q4HRS PRMO Medina M.D.       • Pharmacy Consult Request ...Pain Management Review   Other PRMO Medina M.D.        And   • oxyCODONE immediate-release (ROXICODONE) tablet 2.5 mg  2.5 mg Feeding Tube Q3HRS PRMO Medina M.D.   2.5 mg at 10/04/18 1928    And   • oxyCODONE immediate-release (ROXICODONE) tablet 5 mg  5 mg Feeding Tube Q3HRS PRMO Medina M.D.   5 mg at 09/30/18 2231    And   • HYDROmorphone (DILAUDID) injection 0.25-1 mg  0.25-1 mg Intravenous Q2HRS PRN William Medina M.D.   0.5 mg at 10/04/18 0200   • polyethylene glycol/lytes (MIRALAX) PACKET 1 Packet  1 Packet Feeding Tube QDAY PRMO Medina M.D.   1 Packet at 10/01/18 1752    And   • senna-docusate (PERICOLACE or SENOKOT S) 8.6-50 MG per tablet 2 Tab  2 Tab Feeding Tube BID William Medina M.D.   Stopped at 10/02/18 1800    And   • magnesium hydroxide (MILK OF MAGNESIA) suspension 30 mL  30 mL Feeding Tube QDAY  PRN William Medina M.D.   30 mL at 10/02/18 0516    And   • bisacodyl (DULCOLAX) suppository 10 mg  10 mg Rectal QDAY PRN William Medina M.D.       • Respiratory Care per Protocol   Nebulization Continuous RT Rowena Calero M.D.       • MD Alert...ICU Electrolyte Replacement per Pharmacy   Other pharmacy to dose Rowena Calero M.D.       • LORazepam (ATIVAN) injection 2 mg  2 mg Intravenous Q5 MIN PRN Rowena Calero M.D.       • hydrALAZINE (APRESOLINE) injection 10 mg  10 mg Intravenous Q4HRS PRN Rowena Calero M.D.       • enalaprilat (VASOTEC) injection 1.25 mg  1.25 mg Intravenous Q6HRS PRN Rowena Calero M.D.           Fluids    Intake/Output Summary (Last 24 hours) at 10/06/18 0710  Last data filed at 10/06/18 0600   Gross per 24 hour   Intake             2140 ml   Output             1375 ml   Net              765 ml       Laboratory  Recent Results (from the past 48 hour(s))   BASIC METABOLIC PANEL    Collection Time: 10/05/18  4:30 AM   Result Value Ref Range    Sodium 132 (L) 135 - 145 mmol/L    Potassium 4.1 3.6 - 5.5 mmol/L    Chloride 99 96 - 112 mmol/L    Co2 27 20 - 33 mmol/L    Glucose 124 (H) 65 - 99 mg/dL    Bun 28 (H) 8 - 22 mg/dL    Creatinine 0.60 0.50 - 1.40 mg/dL    Calcium 9.3 8.5 - 10.5 mg/dL    Anion Gap 6.0 0.0 - 11.9   MAGNESIUM    Collection Time: 10/05/18  4:30 AM   Result Value Ref Range    Magnesium 2.4 1.5 - 2.5 mg/dL   PHOSPHORUS    Collection Time: 10/05/18  4:30 AM   Result Value Ref Range    Phosphorus 3.7 2.5 - 4.5 mg/dL   CBC WITH DIFFERENTIAL    Collection Time: 10/05/18  4:30 AM   Result Value Ref Range    WBC 10.6 4.8 - 10.8 K/uL    RBC 3.69 (L) 4.20 - 5.40 M/uL    Hemoglobin 11.2 (L) 12.0 - 16.0 g/dL    Hematocrit 32.7 (L) 37.0 - 47.0 %    MCV 88.6 81.4 - 97.8 fL    MCH 30.4 27.0 - 33.0 pg    MCHC 34.3 33.6 - 35.0 g/dL    RDW 41.3 35.9 - 50.0 fL    Platelet Count 281 164 - 446 K/uL    MPV 10.8 9.0 - 12.9 fL    Neutrophils-Polys 69.00 44.00 - 72.00 %    Lymphocytes  13.80 (L) 22.00 - 41.00 %    Monocytes 11.10 0.00 - 13.40 %    Eosinophils 2.50 0.00 - 6.90 %    Basophils 0.80 0.00 - 1.80 %    Immature Granulocytes 2.80 (H) 0.00 - 0.90 %    Nucleated RBC 0.00 /100 WBC    Neutrophils (Absolute) 7.30 (H) 2.00 - 7.15 K/uL    Lymphs (Absolute) 1.46 1.00 - 4.80 K/uL    Monos (Absolute) 1.17 (H) 0.00 - 0.85 K/uL    Eos (Absolute) 0.26 0.00 - 0.51 K/uL    Baso (Absolute) 0.08 0.00 - 0.12 K/uL    Immature Granulocytes (abs) 0.30 (H) 0.00 - 0.11 K/uL    NRBC (Absolute) 0.00 K/uL   ESTIMATED GFR    Collection Time: 10/05/18  4:30 AM   Result Value Ref Range    GFR If African American >60 >60 mL/min/1.73 m 2    GFR If Non African American >60 >60 mL/min/1.73 m 2   BASIC METABOLIC PANEL    Collection Time: 10/06/18  3:50 AM   Result Value Ref Range    Sodium 135 135 - 145 mmol/L    Potassium 4.1 3.6 - 5.5 mmol/L    Chloride 100 96 - 112 mmol/L    Co2 23 20 - 33 mmol/L    Glucose 131 (H) 65 - 99 mg/dL    Bun 29 (H) 8 - 22 mg/dL    Creatinine 0.49 (L) 0.50 - 1.40 mg/dL    Calcium 9.6 8.5 - 10.5 mg/dL    Anion Gap 12.0 (H) 0.0 - 11.9   MAGNESIUM    Collection Time: 10/06/18  3:50 AM   Result Value Ref Range    Magnesium 2.2 1.5 - 2.5 mg/dL   ESTIMATED GFR    Collection Time: 10/06/18  3:50 AM   Result Value Ref Range    GFR If African American >60 >60 mL/min/1.73 m 2    GFR If Non African American >60 >60 mL/min/1.73 m 2   PROTHROMBIN TIME    Collection Time: 10/06/18  5:59 AM   Result Value Ref Range    PT 14.5 12.0 - 14.6 sec    INR 1.12 0.87 - 1.13   CBC WITH DIFFERENTIAL    Collection Time: 10/06/18  5:59 AM   Result Value Ref Range    WBC 9.7 4.8 - 10.8 K/uL    RBC 3.67 (L) 4.20 - 5.40 M/uL    Hemoglobin 10.8 (L) 12.0 - 16.0 g/dL    Hematocrit 32.5 (L) 37.0 - 47.0 %    MCV 88.6 81.4 - 97.8 fL    MCH 29.4 27.0 - 33.0 pg    MCHC 33.2 (L) 33.6 - 35.0 g/dL    RDW 41.2 35.9 - 50.0 fL    Platelet Count 311 164 - 446 K/uL    MPV 10.2 9.0 - 12.9 fL    Neutrophils-Polys 75.80 (H) 44.00 - 72.00 %     Lymphocytes 10.80 (L) 22.00 - 41.00 %    Monocytes 8.60 0.00 - 13.40 %    Eosinophils 2.40 0.00 - 6.90 %    Basophils 0.60 0.00 - 1.80 %    Immature Granulocytes 1.80 (H) 0.00 - 0.90 %    Nucleated RBC 0.00 /100 WBC    Neutrophils (Absolute) 7.35 (H) 2.00 - 7.15 K/uL    Lymphs (Absolute) 1.05 1.00 - 4.80 K/uL    Monos (Absolute) 0.83 0.00 - 0.85 K/uL    Eos (Absolute) 0.23 0.00 - 0.51 K/uL    Baso (Absolute) 0.06 0.00 - 0.12 K/uL    Immature Granulocytes (abs) 0.17 (H) 0.00 - 0.11 K/uL    NRBC (Absolute) 0.00 K/uL       Imaging  X-Ray:  I have personally reviewed the images and compared with prior images.  CT:    Reviewed    Assessment/Plan  Acute intracranial hemorrhage (HCC)- (present on admission)   Assessment & Plan    S/P fall 1 week prior- CT head on 9/14 negative for acute intracranial hemorrhage  CT scan on 9/22 with intraventricular hemorrhage, right greater than left and intraparenchymal hemorrhage  Noncommunicating hydrocephalus - s/p emergent bilateral EVD placement by Dr. Santizo on 9/22.  Intraventricular TPA given by NeuroSx with improvement in EVD output  Continue Keppra 500 mg IV twice daily for seizure prophylaxis  Strict blood pressure control - goal SBP <160 mmHg, long-term use less than 130 in school  Continue close neuro checks every 2 hours, consider dropping to every 4 hours tomorrow  Repeat CT on 9/28 with improved shift from 1 cm to 7.1 mm, continue EVD drainage this weekend, and increase next week  EVDs clamped 10/2  Follow-up CT 10/3 with no change in bleed with midline shift noted to be 4.7 mm  Follow-up CT 10/4 with midline shift 8.2 mm but in comparing to prior film problem no change per both radiology and neurosurgery  EVDs pulled mid day 10/4 by neurosurgery  Continue current blood pressure and sodium goals,  CT head with increased shift        Hyponatremia   Assessment & Plan    Secondary to ICH  Start salt tabs 1 g 3 times per day  Serial BMP, if serum sodium continues to drop  will increase frequency of lab consider starting 3% saline  CT scan head improving 10/3, goal probably just to normalize sodium 135-145  Monitor        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)- (present on admission)   Assessment & Plan    Reversed on hospital day 1  Holding anticoagulants until okay with neurosurgery, will ease back in with chest prophylactic heparin first  Possibly 2 weeks post initial event neurosurgery, await their okay, query 10/6 if follow-up CT head okay  Monitor        Paroxysmal atrial fibrillation (HCC)- (present on admission)   Assessment & Plan    Previously on apixaban, 5 mg twice daily - now strictly contraindicated due to ICH  Continue digoxin and metoprolol as this regimen has been working well, no recurrence of any significant tachycardia  Continue to monitor heart rate in ICU  Monitor potassium and magnesium and supplement to keep levels greater than 2 and 4 respectively  Prophylactic subcu heparin when okay with neurosurgery, still pending, possibly 2 weeks post initial event, query 10/6 if CT head okay        Encephalopathy acute- (present on admission)   Assessment & Plan    Improved  Structural, secondary to intracranial hemorrhage  re-orientation, family, glasses, hearing aids, sleep, lights on during day, treat pain, ambulate, minimize benzos/anticholinergic agents  Mobilize          Hypomagnesemia- (present on admission)   Assessment & Plan    Electrolyte replacement protocol to maintain >2        Hypokalemia   Assessment & Plan    Electrolyte replacement protocol to maintain >4           Discussed narcotic use with nursing staff the bedside, caution/limit as necessary    VTE:  Contraindicated  Ulcer: Not Indicated  Lines: Mack Catheter  Ongoing indication addressed    I have performed a physical exam and reviewed and updated ROS and Plan today (10/6/2018). In review of yesterday's note (10/5/2018), there are no changes except as documented  above.     Discussed patient condition and risk of morbidity and/or mortality with Hospitalist, RN, RT, Pharmacy, , , Charge nurse / hot rounds, Patient and neurosurgery.

## 2018-10-06 NOTE — CARE PLAN
Problem: Knowledge Deficit  Goal: Knowledge of the prescribed therapeutic regimen will improve    Intervention: Discuss information regarding therpeutic regimen and document in education  Discussed therapeutic regimen and anticipated next steps of hospitalization with patient and her daughter Nan. All questions at this time answered in a manner that was satisfactory to the patient and her daughter. RN ensured call bell within reach and encouraged patient and daughter to call at any time if more questions regarding therapeutic regimen or next steps arise.       Problem: Respiratory:  Goal: Respiratory status will improve    Intervention: Educate and encourage incentive spirometry usage  Steps to sequence effective breath with IS are still beyond capacity for this patient. Will continue to educate and attempt IS every hour. MD notified in AM rounds

## 2018-10-07 LAB
ANION GAP SERPL CALC-SCNC: 9 MMOL/L (ref 0–11.9)
BASOPHILS # BLD AUTO: 0.4 % (ref 0–1.8)
BASOPHILS # BLD: 0.04 K/UL (ref 0–0.12)
BUN SERPL-MCNC: 31 MG/DL (ref 8–22)
CALCIUM SERPL-MCNC: 9.3 MG/DL (ref 8.5–10.5)
CHLORIDE SERPL-SCNC: 102 MMOL/L (ref 96–112)
CO2 SERPL-SCNC: 24 MMOL/L (ref 20–33)
CREAT SERPL-MCNC: 0.44 MG/DL (ref 0.5–1.4)
EOSINOPHIL # BLD AUTO: 0.22 K/UL (ref 0–0.51)
EOSINOPHIL NFR BLD: 2.5 % (ref 0–6.9)
ERYTHROCYTE [DISTWIDTH] IN BLOOD BY AUTOMATED COUNT: 41 FL (ref 35.9–50)
GLUCOSE SERPL-MCNC: 125 MG/DL (ref 65–99)
HCT VFR BLD AUTO: 32.9 % (ref 37–47)
HGB BLD-MCNC: 11.2 G/DL (ref 12–16)
IMM GRANULOCYTES # BLD AUTO: 0.15 K/UL (ref 0–0.11)
IMM GRANULOCYTES NFR BLD AUTO: 1.7 % (ref 0–0.9)
INR PPP: 1.09 (ref 0.87–1.13)
LYMPHOCYTES # BLD AUTO: 1.27 K/UL (ref 1–4.8)
LYMPHOCYTES NFR BLD: 14.2 % (ref 22–41)
MAGNESIUM SERPL-MCNC: 2.1 MG/DL (ref 1.5–2.5)
MCH RBC QN AUTO: 30.1 PG (ref 27–33)
MCHC RBC AUTO-ENTMCNC: 34 G/DL (ref 33.6–35)
MCV RBC AUTO: 88.4 FL (ref 81.4–97.8)
MONOCYTES # BLD AUTO: 0.88 K/UL (ref 0–0.85)
MONOCYTES NFR BLD AUTO: 9.8 % (ref 0–13.4)
NEUTROPHILS # BLD AUTO: 6.4 K/UL (ref 2–7.15)
NEUTROPHILS NFR BLD: 71.4 % (ref 44–72)
NRBC # BLD AUTO: 0 K/UL
NRBC BLD-RTO: 0 /100 WBC
PHOSPHATE SERPL-MCNC: 2.8 MG/DL (ref 2.5–4.5)
PLATELET # BLD AUTO: 296 K/UL (ref 164–446)
PMV BLD AUTO: 10.2 FL (ref 9–12.9)
POTASSIUM SERPL-SCNC: 4.2 MMOL/L (ref 3.6–5.5)
PROTHROMBIN TIME: 14.2 SEC (ref 12–14.6)
RBC # BLD AUTO: 3.72 M/UL (ref 4.2–5.4)
SODIUM SERPL-SCNC: 135 MMOL/L (ref 135–145)
WBC # BLD AUTO: 9 K/UL (ref 4.8–10.8)

## 2018-10-07 PROCEDURE — 85025 COMPLETE CBC W/AUTO DIFF WBC: CPT

## 2018-10-07 PROCEDURE — 80048 BASIC METABOLIC PNL TOTAL CA: CPT

## 2018-10-07 PROCEDURE — 700102 HCHG RX REV CODE 250 W/ 637 OVERRIDE(OP): Performed by: HOSPITALIST

## 2018-10-07 PROCEDURE — 99233 SBSQ HOSP IP/OBS HIGH 50: CPT | Performed by: INTERNAL MEDICINE

## 2018-10-07 PROCEDURE — 83735 ASSAY OF MAGNESIUM: CPT

## 2018-10-07 PROCEDURE — 770022 HCHG ROOM/CARE - ICU (200)

## 2018-10-07 PROCEDURE — A9270 NON-COVERED ITEM OR SERVICE: HCPCS | Performed by: HOSPITALIST

## 2018-10-07 PROCEDURE — 99232 SBSQ HOSP IP/OBS MODERATE 35: CPT | Performed by: HOSPITALIST

## 2018-10-07 PROCEDURE — 85610 PROTHROMBIN TIME: CPT

## 2018-10-07 PROCEDURE — A9270 NON-COVERED ITEM OR SERVICE: HCPCS | Performed by: INTERNAL MEDICINE

## 2018-10-07 PROCEDURE — 700102 HCHG RX REV CODE 250 W/ 637 OVERRIDE(OP): Performed by: INTERNAL MEDICINE

## 2018-10-07 PROCEDURE — 84100 ASSAY OF PHOSPHORUS: CPT

## 2018-10-07 RX ADMIN — METOPROLOL TARTRATE 50 MG: 50 TABLET ORAL at 15:31

## 2018-10-07 RX ADMIN — AMLODIPINE BESYLATE 5 MG: 5 TABLET ORAL at 05:05

## 2018-10-07 RX ADMIN — SODIUM CHLORIDE TAB 1 GM 2 G: 1 TAB at 10:34

## 2018-10-07 RX ADMIN — OXYCODONE HYDROCHLORIDE 5 MG: 5 TABLET ORAL at 23:00

## 2018-10-07 RX ADMIN — ACETAMINOPHEN 650 MG: 325 TABLET, FILM COATED ORAL at 10:36

## 2018-10-07 RX ADMIN — METOPROLOL TARTRATE 50 MG: 50 TABLET ORAL at 05:05

## 2018-10-07 RX ADMIN — SODIUM CHLORIDE TAB 1 GM 2 G: 1 TAB at 17:32

## 2018-10-07 RX ADMIN — DIGOXIN 125 MCG: 125 TABLET ORAL at 17:32

## 2018-10-07 RX ADMIN — METOPROLOL TARTRATE 50 MG: 50 TABLET ORAL at 22:55

## 2018-10-07 ASSESSMENT — ENCOUNTER SYMPTOMS
PALPITATIONS: 0
FEVER: 0
FATIGUE: 1
BRUISES/BLEEDS EASILY: 0
NERVOUS/ANXIOUS: 1
VOMITING: 0
WEAKNESS: 1
NECK PAIN: 0
SHORTNESS OF BREATH: 0
MUSCULOSKELETAL NEGATIVE: 1
CONFUSION: 1
CHILLS: 0
DEPRESSION: 0
DIZZINESS: 0
ABDOMINAL PAIN: 0
TROUBLE SWALLOWING: 0
POLYDIPSIA: 0
LIGHT-HEADEDNESS: 1
BACK PAIN: 0
BACK PAIN: 1
EYES NEGATIVE: 1
GASTROINTESTINAL NEGATIVE: 1
FOCAL WEAKNESS: 0
HEARTBURN: 0
VOICE CHANGE: 0
DIARRHEA: 0
MEMORY LOSS: 1
HEADACHES: 1
NAUSEA: 0
RESPIRATORY NEGATIVE: 1
COUGH: 0
CARDIOVASCULAR NEGATIVE: 1

## 2018-10-07 ASSESSMENT — PAIN SCALES - GENERAL
PAINLEVEL_OUTOF10: 2
PAINLEVEL_OUTOF10: 1
PAINLEVEL_OUTOF10: 1
PAINLEVEL_OUTOF10: 2
PAINLEVEL_OUTOF10: 1
PAINLEVEL_OUTOF10: 2
PAINLEVEL_OUTOF10: 2
PAINLEVEL_OUTOF10: 1
PAINLEVEL_OUTOF10: 4
PAINLEVEL_OUTOF10: 1
PAINLEVEL_OUTOF10: 1
PAINLEVEL_OUTOF10: 4
PAINLEVEL_OUTOF10: 2
PAINLEVEL_OUTOF10: 6

## 2018-10-07 NOTE — PROGRESS NOTES
Neurosurgery Progress Note    Subjective:  Pt with IVH / SAH / SDH with Bilateral EVD day #16  Stable overnight. No changes.           Exam:  Awake. Eyes open.  PERRL  Will follow simple commands.  Moves ext x 4   Overall appropriate  Incisions c/d/i.   CT with right fluid collection hygroma vs chronic sub dural hematoma. Somewhat increased to 8.6mm shift.    BP  Min: 119/62  Max: 119/62  Pulse  Av  Min: 64  Max: 82  Resp  Av.7  Min: 19  Max: 45  Temp  Av.6 °C (97.9 °F)  Min: 36.2 °C (97.2 °F)  Max: 37.2 °C (99 °F)  SpO2  Av.3 %  Min: 96 %  Max: 100 %    No Data Recorded    Recent Labs      10/05/18   0430  10/06/18   0559  10/07/18   0539   WBC  10.6  9.7  9.0   RBC  3.69*  3.67*  3.72*   HEMOGLOBIN  11.2*  10.8*  11.2*   HEMATOCRIT  32.7*  32.5*  32.9*   MCV  88.6  88.6  88.4   MCH  30.4  29.4  30.1   MCHC  34.3  33.2*  34.0   RDW  41.3  41.2  41.0   PLATELETCT  281  311  296   MPV  10.8  10.2  10.2     Recent Labs      10/05/18   0430  10/06/18   0350  10/07/18   0539   SODIUM  132*  135  135   POTASSIUM  4.1  4.1  4.2   CHLORIDE  99  100  102   CO2  27  23  24   GLUCOSE  124*  131*  125*   BUN  28*  29*  31*   CREATININE  0.60  0.49*  0.44*   CALCIUM  9.3  9.6  9.3     Recent Labs      10/06/18   0559   INR  1.12           Intake/Output       10/06/18 07 - 10/07/18 0659 10/07/18 0700 - 10/08/18 0659       Total [id number] Total       Intake    Other  360  -- 360  --  -- --    Medications (PO/Enteral Liquids) 180 -- 180 -- -- --    Free water flush per  -- 180 -- -- --    NG/GT  720  720 1440  --  -- --    Intake (mL) (Enteral Tube 18 Cortrak - Gastric 10 Fr. Right nare)  -- -- --    Total Intake 1469 231 5861 -- -- --       Output    Urine  480  460 940  --  -- --    Output (mL) (Urethral Catheter Latex 16 Fr.) 480 460 940 -- -- --    Stool  --  -- --  --  -- --    Number of Times Stooled 1 x 0 x 1 x -- -- --    Total Output 480 460 940 --  -- --       Net I/O     600 260 860 -- -- --            Intake/Output Summary (Last 24 hours) at 10/07/18 0730  Last data filed at 10/07/18 0600   Gross per 24 hour   Intake             1800 ml   Output              940 ml   Net              860 ml            • labetalol  10 mg Q HOUR PRN   • sodium chloride  2 g TID WITH MEALS   • acetaminophen  650 mg Q4HRS PRN    Or   • acetaminophen  650 mg Q4HRS PRN   • amLODIPine  5 mg Q DAY   • metoprolol  50 mg Q8HRS   • Pharmacy   PRN   • labetalol  5-10 mg Q4HRS PRN   • digoxin  125 mcg DAILY AT 1800   • ondansetron  4 mg Q4HRS PRN    Or   • ondansetron  4 mg Q4HRS PRN   • Pharmacy Consult Request   PRN    And   • oxyCODONE immediate-release  2.5 mg Q3HRS PRN    And   • oxyCODONE immediate-release  5 mg Q3HRS PRN    And   • HYDROmorphone  0.25-1 mg Q2HRS PRN   • polyethylene glycol/lytes  1 Packet QDAY PRN    And   • senna-docusate  2 Tab BID    And   • magnesium hydroxide  30 mL QDAY PRN    And   • bisacodyl  10 mg QDAY PRN   • Respiratory Care per Protocol   Continuous RT   • MD Alert...Adult ICU Electrolyte Replacement per Pharmacy   pharmacy to dose   • LORazepam  2 mg Q5 MIN PRN   • hydrALAZINE  10 mg Q4HRS PRN   • enalaprilat  1.25 mg Q6HRS PRN       Assessment and Plan:  Hospital day #16   Neuro stable.  OK for q 4 hour neuro checks.  Would be OK for floor from neurosurgery standpoint.   PT/OT/Speech

## 2018-10-07 NOTE — PROGRESS NOTES
[facility] Hospitalist Progress Note    Date of Service: 10/7/2018    Chief Complaint  78 y.o. female admitted 9/22/2018 with HA to [facility].  Had had a GLF with neg CT one week prior.  CT on this admit showing SDH and SAH  Bilateral EVDs placed Dr Santizo 9/22      Interval Problem Update  Patient seen and examined today. ICU Care  Care and plan discussed in IDT/Hot rounds.  Lines and assistive devices reviewed.    Patient tolerating treatment and therapies.  All Data, Medication data reviewed.  Case discussed with nursing as available.  Plan of Care reviewed with patient and notified of changes.  10/2 the patient seems improved, alert and oriented times 1-3, at times confused, remains hemodynamically stable, on room air, remains with feeding tube, ICP between 3 and 8, left EVD is at 20 cm with slow output, neurosurgical follow-up noted, plan for continuing to watch ICPs, follow-up CT planned for Wednesday and Thursday  10/3 the patient feels better, her EVD's are clamped, she is tired, did not sleep well last night, hemodynamically stable, tolerates core track feeding,  10/4 the patient appears clinically fairly unchanged, her follow-up CT shows an increase in shift from 4.7-8.6 mm, EVD's were clamped and are now removed, patient's mentation waxes and wanes, she complains of a mild headache, hemodynamically stable, tolerating core track feeding, aiming for high normal sodium levels  10/5 patient may continue lethargy and confusion, hemodynamically stable, close neuro monitoring and follow-up CT per neurosurgery  10/6 the patient overall stabilized and slightly improved in terms of mentation, CT scan reviewed by neurosurgeon and no need for further intervention reported.  Slight increase in midline shift, hemodynamically stable, daughter at bedside, suggested referral to acute rehab  10/7 the patient feels different today, she complains of heaviness in her head, hemodynamically stable, still on tube feeds, remains on salt  tablets to have sodium high normal, decision to observe in ICU and repeat CT in the morning  Consultants/Specialty  Neurosurgery  Pulmonology  Physiatry  Disposition  Cont in ICU for close monitoring of Neuro status          Review of Systems   Constitutional: Positive for malaise/fatigue. Negative for chills and fever.   HENT: Negative.    Eyes: Negative.    Respiratory: Negative.  Negative for cough.    Cardiovascular: Negative.  Negative for chest pain and palpitations.   Gastrointestinal: Negative.  Negative for heartburn, nausea and vomiting.   Genitourinary: Negative.  Negative for dysuria and frequency.   Musculoskeletal: Negative.  Negative for back pain and neck pain.   Skin: Negative.  Negative for itching and rash.   Neurological: Positive for weakness and headaches. Negative for dizziness and focal weakness.   Endo/Heme/Allergies: Negative.  Negative for polydipsia. Does not bruise/bleed easily.   Psychiatric/Behavioral: Positive for memory loss. Negative for depression. The patient is nervous/anxious.       Physical Exam  Laboratory/Imaging   Hemodynamics  Temp (24hrs), Av.6 °C (97.9 °F), Min:36.2 °C (97.2 °F), Max:37.2 °C (99 °F)   Temperature: 36.7 °C (98 °F)  Pulse  Av.6  Min: 52  Max: 131 Heart Rate (Monitored): 63  Blood Pressure : 119/62, NIBP: 123/61      Respiratory      Respiration: (!) 23, Pulse Oximetry: 98 %     Work Of Breathing / Effort: Shallow  RUL Breath Sounds: Clear, RML Breath Sounds: Clear, RLL Breath Sounds: Clear;Diminished, JUAN Breath Sounds: Clear, LLL Breath Sounds: Clear;Diminished    Fluids    Intake/Output Summary (Last 24 hours) at 10/07/18 0751  Last data filed at 10/07/18 0600   Gross per 24 hour   Intake             1800 ml   Output              940 ml   Net              860 ml       Nutrition  Orders Placed This Encounter   Procedures   • Diet NPO     Standing Status:   Standing     Number of Occurrences:   1     Order Specific Question:   Restrict to:      Answer:   Strict [1]     Physical Exam   Constitutional: She appears well-developed and well-nourished. No distress.   Patient seen and examined by me.   HENT:   Nose: Nose normal.   Mouth/Throat: Oropharynx is clear and moist. No oropharyngeal exudate.   Bruising left side of face no bony step off   Eyes: Conjunctivae are normal. Right eye exhibits no discharge. Left eye exhibits no discharge. No scleral icterus.   Neck: No JVD present. No tracheal deviation present.   Cardiovascular: Normal rate, regular rhythm and normal heart sounds.    Pulmonary/Chest: Effort normal and breath sounds normal. No stridor. No respiratory distress. She has no wheezes. She has no rales. She exhibits no tenderness.   Abdominal: Soft. Bowel sounds are normal. She exhibits no distension. There is no tenderness. There is no guarding.   Musculoskeletal: She exhibits no edema or tenderness.   Neurological: She is alert. No cranial nerve deficit. She exhibits normal muscle tone. Coordination (left leg drift) abnormal.   Orientation is variable  Alert   Following all commands   Skin: Skin is warm and dry. She is not diaphoretic. No pallor.   Psychiatric: Her affect is blunt. Her speech is delayed. She is slowed.   Nursing note and vitals reviewed.      Recent Labs      10/05/18   0430  10/06/18   0559  10/07/18   0539   WBC  10.6  9.7  9.0   RBC  3.69*  3.67*  3.72*   HEMOGLOBIN  11.2*  10.8*  11.2*   HEMATOCRIT  32.7*  32.5*  32.9*   MCV  88.6  88.6  88.4   MCH  30.4  29.4  30.1   MCHC  34.3  33.2*  34.0   RDW  41.3  41.2  41.0   PLATELETCT  281  311  296   MPV  10.8  10.2  10.2     Recent Labs      10/05/18   0430  10/06/18   0350  10/07/18   0539   SODIUM  132*  135  135   POTASSIUM  4.1  4.1  4.2   CHLORIDE  99  100  102   CO2  27  23  24   GLUCOSE  124*  131*  125*   BUN  28*  29*  31*   CREATININE  0.60  0.49*  0.44*   CALCIUM  9.3  9.6  9.3     Recent Labs      10/06/18   0559   INR  1.12                  Assessment/Plan     Acute  intracranial hemorrhage (HCC)- (present on admission)   Assessment & Plan    Anticoagulation reversed at admission  S/P bilateral EVDs  Repeat CT stable  Monitor ICP   Exam improving, working with PT and OT  Neurosurgery following  Close BP control          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)- (present on admission)   Assessment & Plan    Anticoagulation reversed at the time of admission, was on eliquis outpatient        Paroxysmal atrial fibrillation (HCC)- (present on admission)   Assessment & Plan    On metoprolol 50mg Q8, Dig 125mcg QD  Prn Dilt for breakthrough rate control  No anticoagulation due to ICH        Encephalopathy acute- (present on admission)   Assessment & Plan    Secondary to cerebral compromise from subdural hematoma        Hypomagnesemia- (present on admission)   Assessment & Plan    Replace  Follow daily        Hypokalemia   Assessment & Plan    Replaced  Follow daily          Quality-Core Measures   Reviewed items::  EKG reviewed, Labs reviewed, Medications reviewed and Radiology images reviewed  Amck catheter::  Critically Ill - Requiring Accurate Measurement of Urinary Output  DVT prophylaxis pharmacological::  Contraindicated - High bleeding risk  DVT prophylaxis - mechanical:  SCDs  Ulcer Prophylaxis::  Not indicated      Plan  Continue with neurologic monitoring, follow-up CT in a.m.  Follow-up on significant midline shift  Continue with supportive care  Core track feeding, hopefully to resolve soon  Sodium replacement and support  The patient will likely need prolonged rehabilitation, rehabilitation consult    See orders  Critically ill

## 2018-10-07 NOTE — DISCHARGE PLANNING
PMR order received from Dr. Ahumada.  [facility] is shown for her medical provider.  GLF resulting in a SDH & SAH requiring carolyn EVD's.  Dr. Calvillo to consult.  I do appreciate the referral. Head CT is pending.  Would appreciate an updated PT eval once appropriate.  Anticipate Dr. Nguyen to follow up tomorrow once CT is complete. TCC remains following. I do appreciate the referral.

## 2018-10-07 NOTE — PROGRESS NOTES
Critical Care Progress Note    Date of admission  9/22/2018    Chief Complaint  78 y.o. female admitted 9/22/2018 with intracranial hemorrhage.    Hospital Course  This lady was admitted with an acute intracranial hemorrhage.  She is undergone bilateral EVDs for hydrocephalus.    Interval Problem Update  Reviewed last 24 hour events:   - No acute events overnight   - Neuro: q4 hr neuro checks   - HR: SR 70s-80s   - BP: 110s-120s   - GI: TF at goal   - UOP: adequate   - Mack: yes   - Tm: afeb   - Lines: PIV   - PPx: GI not indicated, DVT SCDs   - Salt tabs    YESTERDAY   - CT: bleeds stable, shift slightly worse   - Neuro: somnolent, AOx3   - HR: 70s-90s SR   - BP: 100s-120s   - GI: Tf at goal   - UOP: adequate   - Mack: yes   - Tm: 99   - Lines: PIV   - PPx: GI not indicated, DVT SCDs   - NC   - salt tabs    Review of Systems  Review of Systems   Constitutional: Positive for fatigue.   HENT: Negative for trouble swallowing and voice change.    Eyes: Negative for visual disturbance.   Respiratory: Negative for cough and shortness of breath.    Cardiovascular: Negative for chest pain and palpitations.   Gastrointestinal: Negative for abdominal pain, diarrhea, nausea and vomiting.   Endocrine: Negative for polyuria.   Genitourinary: Negative for difficulty urinating.   Musculoskeletal: Positive for back pain.   Skin: Negative for rash.   Neurological: Positive for weakness and light-headedness.   Psychiatric/Behavioral: Positive for confusion.        Vital Signs for last 24 hours   Temp:  [36.2 °C (97.2 °F)-37.2 °C (99 °F)] 36.7 °C (98 °F)  Pulse:  [64-82] 64  Resp:  [19-45] 23  BP: (119)/(62) 119/62    Hemodynamic parameters for last 24 hours  EHR flow sheets are reviewed       Vent Settings for last 24 hours, NA       Physical Exam   Physical Exam   Constitutional: She appears well-developed and well-nourished. She appears lethargic. She is easily aroused. No distress.   HENT:   Head: Normocephalic.   Right Ear:  External ear normal.   Left Ear: External ear normal.   Mouth/Throat: Oropharynx is clear and moist.   Bruising along her left forehead and left face -improving   Eyes: Pupils are equal, round, and reactive to light. Conjunctivae are normal. Right eye exhibits no discharge. Left eye exhibits no discharge. No scleral icterus.   Neck: Neck supple. No JVD present. No tracheal deviation present.   Cardiovascular: Normal rate, regular rhythm, normal heart sounds and intact distal pulses.   No extrasystoles are present. Exam reveals no gallop and no friction rub.    No murmur heard.  Pulmonary/Chest: Effort normal. No accessory muscle usage. No tachypnea. No respiratory distress. She has decreased breath sounds in the right lower field and the left lower field. She has no wheezes. She has no rales.   Abdominal: Soft. Bowel sounds are normal. She exhibits no distension. There is no hepatosplenomegaly. There is no tenderness. There is no CVA tenderness.   Musculoskeletal: She exhibits no edema, tenderness or deformity.   No clubbing or cyanosis   Neurological: She is easily aroused. She appears lethargic. She exhibits abnormal muscle tone (generally weak, not focal). She displays no seizure activity.   Pleasantly interactive, perseverates on cooking   Skin: Skin is warm and dry. No rash noted. She is not diaphoretic. No cyanosis. Nails show no clubbing.   Psychiatric: Her mood appears not anxious. Her speech is not delayed. She is not aggressive and not slowed. Cognition and memory are impaired. She is attentive.   Nursing note and vitals reviewed.      Medications  Current Facility-Administered Medications   Medication Dose Route Frequency Provider Last Rate Last Dose   • labetalol (NORMODYNE,TRANDATE) injection 10 mg  10 mg Intravenous Q HOUR PRN CHRYSTAL DickAINDU.       • sodium chloride (SALT) tablet 2 g  2 g Feeding Tube TID WITH MEALS Montana Peña M.D.   2 g at 10/06/18 1700   • acetaminophen (TYLENOL)  tablet 650 mg  650 mg Feeding Tube Q4HRS PRN Gil Leone Jr., D.O.   650 mg at 10/06/18 1125    Or   • acetaminophen (TYLENOL) suppository 650 mg  650 mg Rectal Q4HRS PRN Gil Leone Jr., D.O.       • amLODIPine (NORVASC) tablet 5 mg  5 mg Per NG Tube Q DAY Andres Wells D.O.   5 mg at 10/07/18 0505   • metoprolol (LOPRESSOR) tablet 50 mg  50 mg Feeding Tube Q8HRS Andres Wells D.O.   50 mg at 10/07/18 0505   • Pharmacy Consult: Enteral tube feeding - review meds/change route/product selection   Other PRN Gil Leone Jr., D.O.       • labetalol (NORMODYNE,TRANDATE) injection 5-10 mg  5-10 mg Intravenous Q4HRS PRN Gil Leone Jr. D.O.   5 mg at 09/26/18 0303   • digoxin (LANOXIN) tablet 125 mcg  125 mcg Feeding Tube DAILY AT 1800 William Medina M.D.   125 mcg at 10/06/18 1659   • ondansetron (ZOFRAN ODT) dispertab 4 mg  4 mg Feeding Tube Q4HRS PRMO Medina M.D.        Or   • ondansetron (ZOFRAN) syringe/vial injection 4 mg  4 mg Intravenous Q4HRS PRMO Medina M.D.       • Pharmacy Consult Request ...Pain Management Review   Other PRMO Medina M.D.        And   • oxyCODONE immediate-release (ROXICODONE) tablet 2.5 mg  2.5 mg Feeding Tube Q3HRS PRMO Medina M.D.   2.5 mg at 10/04/18 1928    And   • oxyCODONE immediate-release (ROXICODONE) tablet 5 mg  5 mg Feeding Tube Q3HRS PRMO Medina M.D.   5 mg at 09/30/18 2231    And   • HYDROmorphone (DILAUDID) injection 0.25-1 mg  0.25-1 mg Intravenous Q2HRS PRMO Medina M.D.   0.5 mg at 10/04/18 0200   • polyethylene glycol/lytes (MIRALAX) PACKET 1 Packet  1 Packet Feeding Tube QDKELSEY Medina M.D.   1 Packet at 10/01/18 1752    And   • senna-docusate (PERICOLACE or SENOKOT S) 8.6-50 MG per tablet 2 Tab  2 Tab Feeding Tube BID William Medina M.D.   Stopped at 10/02/18 1800    And   • magnesium hydroxide (MILK OF MAGNESIA) suspension 30 mL  30 mL Feeding Tube QDAY PAPITO Medina M.D.   30 mL at 10/02/18  0516    And   • bisacodyl (DULCOLAX) suppository 10 mg  10 mg Rectal QDAY PRN William Medina M.D.       • Respiratory Care per Protocol   Nebulization Continuous RT Rowena Calero M.D.       • MD Alert...ICU Electrolyte Replacement per Pharmacy   Other pharmacy to dose Rowena Calero M.D.       • LORazepam (ATIVAN) injection 2 mg  2 mg Intravenous Q5 MIN PRN Rowena Calero M.D.       • hydrALAZINE (APRESOLINE) injection 10 mg  10 mg Intravenous Q4HRS PRN Rowena Calero M.D.       • enalaprilat (VASOTEC) injection 1.25 mg  1.25 mg Intravenous Q6HRS PRN Rowena Calero M.D.           Fluids    Intake/Output Summary (Last 24 hours) at 10/07/18 0705  Last data filed at 10/07/18 0600   Gross per 24 hour   Intake             1800 ml   Output              940 ml   Net              860 ml       Laboratory  Recent Results (from the past 48 hour(s))   BASIC METABOLIC PANEL    Collection Time: 10/06/18  3:50 AM   Result Value Ref Range    Sodium 135 135 - 145 mmol/L    Potassium 4.1 3.6 - 5.5 mmol/L    Chloride 100 96 - 112 mmol/L    Co2 23 20 - 33 mmol/L    Glucose 131 (H) 65 - 99 mg/dL    Bun 29 (H) 8 - 22 mg/dL    Creatinine 0.49 (L) 0.50 - 1.40 mg/dL    Calcium 9.6 8.5 - 10.5 mg/dL    Anion Gap 12.0 (H) 0.0 - 11.9   MAGNESIUM    Collection Time: 10/06/18  3:50 AM   Result Value Ref Range    Magnesium 2.2 1.5 - 2.5 mg/dL   ESTIMATED GFR    Collection Time: 10/06/18  3:50 AM   Result Value Ref Range    GFR If African American >60 >60 mL/min/1.73 m 2    GFR If Non African American >60 >60 mL/min/1.73 m 2   PROTHROMBIN TIME    Collection Time: 10/06/18  5:59 AM   Result Value Ref Range    PT 14.5 12.0 - 14.6 sec    INR 1.12 0.87 - 1.13   CBC WITH DIFFERENTIAL    Collection Time: 10/06/18  5:59 AM   Result Value Ref Range    WBC 9.7 4.8 - 10.8 K/uL    RBC 3.67 (L) 4.20 - 5.40 M/uL    Hemoglobin 10.8 (L) 12.0 - 16.0 g/dL    Hematocrit 32.5 (L) 37.0 - 47.0 %    MCV 88.6 81.4 - 97.8 fL    MCH 29.4 27.0 - 33.0 pg    MCHC  33.2 (L) 33.6 - 35.0 g/dL    RDW 41.2 35.9 - 50.0 fL    Platelet Count 311 164 - 446 K/uL    MPV 10.2 9.0 - 12.9 fL    Neutrophils-Polys 75.80 (H) 44.00 - 72.00 %    Lymphocytes 10.80 (L) 22.00 - 41.00 %    Monocytes 8.60 0.00 - 13.40 %    Eosinophils 2.40 0.00 - 6.90 %    Basophils 0.60 0.00 - 1.80 %    Immature Granulocytes 1.80 (H) 0.00 - 0.90 %    Nucleated RBC 0.00 /100 WBC    Neutrophils (Absolute) 7.35 (H) 2.00 - 7.15 K/uL    Lymphs (Absolute) 1.05 1.00 - 4.80 K/uL    Monos (Absolute) 0.83 0.00 - 0.85 K/uL    Eos (Absolute) 0.23 0.00 - 0.51 K/uL    Baso (Absolute) 0.06 0.00 - 0.12 K/uL    Immature Granulocytes (abs) 0.17 (H) 0.00 - 0.11 K/uL    NRBC (Absolute) 0.00 K/uL   BASIC METABOLIC PANEL    Collection Time: 10/07/18  5:39 AM   Result Value Ref Range    Sodium 135 135 - 145 mmol/L    Potassium 4.2 3.6 - 5.5 mmol/L    Chloride 102 96 - 112 mmol/L    Co2 24 20 - 33 mmol/L    Glucose 125 (H) 65 - 99 mg/dL    Bun 31 (H) 8 - 22 mg/dL    Creatinine 0.44 (L) 0.50 - 1.40 mg/dL    Calcium 9.3 8.5 - 10.5 mg/dL    Anion Gap 9.0 0.0 - 11.9   MAGNESIUM    Collection Time: 10/07/18  5:39 AM   Result Value Ref Range    Magnesium 2.1 1.5 - 2.5 mg/dL   PHOSPHORUS    Collection Time: 10/07/18  5:39 AM   Result Value Ref Range    Phosphorus 2.8 2.5 - 4.5 mg/dL   CBC WITH DIFFERENTIAL    Collection Time: 10/07/18  5:39 AM   Result Value Ref Range    WBC 9.0 4.8 - 10.8 K/uL    RBC 3.72 (L) 4.20 - 5.40 M/uL    Hemoglobin 11.2 (L) 12.0 - 16.0 g/dL    Hematocrit 32.9 (L) 37.0 - 47.0 %    MCV 88.4 81.4 - 97.8 fL    MCH 30.1 27.0 - 33.0 pg    MCHC 34.0 33.6 - 35.0 g/dL    RDW 41.0 35.9 - 50.0 fL    Platelet Count 296 164 - 446 K/uL    MPV 10.2 9.0 - 12.9 fL    Neutrophils-Polys 71.40 44.00 - 72.00 %    Lymphocytes 14.20 (L) 22.00 - 41.00 %    Monocytes 9.80 0.00 - 13.40 %    Eosinophils 2.50 0.00 - 6.90 %    Basophils 0.40 0.00 - 1.80 %    Immature Granulocytes 1.70 (H) 0.00 - 0.90 %    Nucleated RBC 0.00 /100 WBC    Neutrophils  (Absolute) 6.40 2.00 - 7.15 K/uL    Lymphs (Absolute) 1.27 1.00 - 4.80 K/uL    Monos (Absolute) 0.88 (H) 0.00 - 0.85 K/uL    Eos (Absolute) 0.22 0.00 - 0.51 K/uL    Baso (Absolute) 0.04 0.00 - 0.12 K/uL    Immature Granulocytes (abs) 0.15 (H) 0.00 - 0.11 K/uL    NRBC (Absolute) 0.00 K/uL   ESTIMATED GFR    Collection Time: 10/07/18  5:39 AM   Result Value Ref Range    GFR If African American >60 >60 mL/min/1.73 m 2    GFR If Non African American >60 >60 mL/min/1.73 m 2       Imaging  X-Ray:  I have personally reviewed the images and compared with prior images.  CT:    Reviewed    Assessment/Plan  Acute intracranial hemorrhage (HCC)- (present on admission)   Assessment & Plan    S/P fall 1 week prior- CT head on 9/14 negative for acute intracranial hemorrhage  CT scan on 9/22 with intraventricular hemorrhage, right greater than left and intraparenchymal hemorrhage  Noncommunicating hydrocephalus - s/p emergent bilateral EVD placement by Dr. Santizo on 9/22.  Intraventricular TPA given by NeuroSx with improvement in EVD output  Continue Keppra 500 mg IV twice daily for seizure prophylaxis  Strict blood pressure control - goal SBP <160 mmHg, long-term use less than 130 in school  Continue close neuro checks every 2 hours, consider dropping to every 4 hours tomorrow  Repeat CT on 9/28 with improved shift from 1 cm to 7.1 mm, continue EVD drainage this weekend, and increase next week  EVDs clamped 10/2  Follow-up CT 10/3 with no change in bleed with midline shift noted to be 4.7 mm  Follow-up CT 10/4 with midline shift 8.2 mm but in comparing to prior film problem no change per both radiology and neurosurgery  EVDs pulled mid day 10/4 by neurosurgery  CT head with increased shift on 10/6  Continue current blood pressure and sodium goals        Hyponatremia   Assessment & Plan    Secondary to ICH  Start salt tabs 1 g 3 times per day  Serial BMP, if serum sodium continues to drop will increase frequency of lab consider  starting 3% saline  CT scan head improving 10/3, goal probably just to normalize sodium 135-145  Monitor        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)- (present on admission)   Assessment & Plan    Reversed on hospital day 1  Holding anticoagulants until okay with neurosurgery, will ease back in with chest prophylactic heparin first  Possibly 2 weeks post initial event neurosurgery, await their okay, query 10/6 if follow-up CT head okay  Monitor        Paroxysmal atrial fibrillation (HCC)- (present on admission)   Assessment & Plan    Previously on apixaban, 5 mg twice daily - now strictly contraindicated due to ICH  Continue digoxin and metoprolol as this regimen has been working well, no recurrence of any significant tachycardia  Monitor potassium and magnesium and supplement to keep levels greater than 2 and 4 respectively  Prophylactic subcu heparin when okay with neurosurgery, still pending, possibly 2 weeks post initial event, query 10/6 if CT head okay        Encephalopathy acute- (present on admission)   Assessment & Plan    improved  Structural, secondary to intracranial hemorrhage  re-orientation, family, glasses, hearing aids, sleep, lights on during day, treat pain, ambulate, minimize benzos/anticholinergic agents        Hypomagnesemia- (present on admission)   Assessment & Plan    Electrolyte replacement protocol to maintain >2        Hypokalemia   Assessment & Plan    Electrolyte replacement protocol to maintain >4             VTE:  Contraindicated  Ulcer: Not Indicated  Lines: Mack Catheter  Ongoing indication addressed    I have performed a physical exam and reviewed and updated ROS and Plan today (10/7/2018). In review of yesterday's note (10/6/2018), there are no changes except as documented above.     Discussed patient condition and risk of morbidity and/or mortality with Hospitalist, RN, RT, Pharmacy, , , Charge nurse / hot rounds, Patient  and neurosurgery.

## 2018-10-07 NOTE — CARE PLAN
"Problem: Pain Management  Goal: Pain level will decrease to patient's comfort goal    Intervention: Follow pain managment plan developed in collaboration with patient and Interdisciplinary Team  Assessed for pain and medicated per the MAR        Problem: Mobility  Goal: Risk for activity intolerance will decrease    Intervention: Assess and monitor signs of activity intolerance  Increased mobility today. No signs of intolerance noted. Patient reports feeling \"good but so tired so soon\"         "

## 2018-10-07 NOTE — CARE PLAN
Problem: Respiratory:  Goal: Respiratory status will improve    Intervention: Assess and monitor pulmonary status  Continuous vital sign and pulse oximetry monitoring, suctioning as needed, pillows used for positioning, range of motion       Problem: Skin Integrity  Goal: Risk for impaired skin integrity will decrease    Intervention: Assess risk factors for impaired skin integrity and/or pressure ulcers  Q2 hour turns, pillows used for positioning, mepilex in place, appropriate skin care interventions in place, low airloss mattress

## 2018-10-08 ENCOUNTER — APPOINTMENT (OUTPATIENT)
Dept: RADIOLOGY | Facility: MEDICAL CENTER | Age: 78
DRG: 023 | End: 2018-10-08
Attending: HOSPITALIST
Payer: MEDICARE

## 2018-10-08 PROBLEM — R13.12 OROPHARYNGEAL DYSPHAGIA: Status: ACTIVE | Noted: 2018-10-08

## 2018-10-08 LAB
ANION GAP SERPL CALC-SCNC: 9 MMOL/L (ref 0–11.9)
BASOPHILS # BLD AUTO: 0.4 % (ref 0–1.8)
BASOPHILS # BLD: 0.04 K/UL (ref 0–0.12)
BUN SERPL-MCNC: 30 MG/DL (ref 8–22)
CALCIUM SERPL-MCNC: 9.6 MG/DL (ref 8.5–10.5)
CHLORIDE SERPL-SCNC: 101 MMOL/L (ref 96–112)
CO2 SERPL-SCNC: 27 MMOL/L (ref 20–33)
CREAT SERPL-MCNC: 0.53 MG/DL (ref 0.5–1.4)
EOSINOPHIL # BLD AUTO: 0.25 K/UL (ref 0–0.51)
EOSINOPHIL NFR BLD: 2.6 % (ref 0–6.9)
ERYTHROCYTE [DISTWIDTH] IN BLOOD BY AUTOMATED COUNT: 41.7 FL (ref 35.9–50)
GLUCOSE SERPL-MCNC: 121 MG/DL (ref 65–99)
HCT VFR BLD AUTO: 33.4 % (ref 37–47)
HGB BLD-MCNC: 11.5 G/DL (ref 12–16)
IMM GRANULOCYTES # BLD AUTO: 0.12 K/UL (ref 0–0.11)
IMM GRANULOCYTES NFR BLD AUTO: 1.2 % (ref 0–0.9)
INR PPP: 1.14 (ref 0.87–1.13)
LYMPHOCYTES # BLD AUTO: 1.3 K/UL (ref 1–4.8)
LYMPHOCYTES NFR BLD: 13.5 % (ref 22–41)
MAGNESIUM SERPL-MCNC: 2.1 MG/DL (ref 1.5–2.5)
MCH RBC QN AUTO: 30.3 PG (ref 27–33)
MCHC RBC AUTO-ENTMCNC: 34.4 G/DL (ref 33.6–35)
MCV RBC AUTO: 88.1 FL (ref 81.4–97.8)
MONOCYTES # BLD AUTO: 0.92 K/UL (ref 0–0.85)
MONOCYTES NFR BLD AUTO: 9.6 % (ref 0–13.4)
NEUTROPHILS # BLD AUTO: 7 K/UL (ref 2–7.15)
NEUTROPHILS NFR BLD: 72.7 % (ref 44–72)
NRBC # BLD AUTO: 0 K/UL
NRBC BLD-RTO: 0 /100 WBC
PLATELET # BLD AUTO: 310 K/UL (ref 164–446)
PMV BLD AUTO: 9.8 FL (ref 9–12.9)
POTASSIUM SERPL-SCNC: 4.2 MMOL/L (ref 3.6–5.5)
PROTHROMBIN TIME: 14.7 SEC (ref 12–14.6)
RBC # BLD AUTO: 3.79 M/UL (ref 4.2–5.4)
SODIUM SERPL-SCNC: 137 MMOL/L (ref 135–145)
WBC # BLD AUTO: 9.6 K/UL (ref 4.8–10.8)

## 2018-10-08 PROCEDURE — 99222 1ST HOSP IP/OBS MODERATE 55: CPT | Performed by: PHYSICAL MEDICINE & REHABILITATION

## 2018-10-08 PROCEDURE — 770001 HCHG ROOM/CARE - MED/SURG/GYN PRIV*

## 2018-10-08 PROCEDURE — 99231 SBSQ HOSP IP/OBS SF/LOW 25: CPT | Performed by: HOSPITALIST

## 2018-10-08 PROCEDURE — 97530 THERAPEUTIC ACTIVITIES: CPT

## 2018-10-08 PROCEDURE — A9270 NON-COVERED ITEM OR SERVICE: HCPCS | Performed by: INTERNAL MEDICINE

## 2018-10-08 PROCEDURE — 99233 SBSQ HOSP IP/OBS HIGH 50: CPT | Performed by: INTERNAL MEDICINE

## 2018-10-08 PROCEDURE — A9270 NON-COVERED ITEM OR SERVICE: HCPCS | Performed by: HOSPITALIST

## 2018-10-08 PROCEDURE — 85610 PROTHROMBIN TIME: CPT

## 2018-10-08 PROCEDURE — 700111 HCHG RX REV CODE 636 W/ 250 OVERRIDE (IP): Performed by: INTERNAL MEDICINE

## 2018-10-08 PROCEDURE — 700102 HCHG RX REV CODE 250 W/ 637 OVERRIDE(OP): Performed by: HOSPITALIST

## 2018-10-08 PROCEDURE — 70450 CT HEAD/BRAIN W/O DYE: CPT

## 2018-10-08 PROCEDURE — 85025 COMPLETE CBC W/AUTO DIFF WBC: CPT

## 2018-10-08 PROCEDURE — G8979 MOBILITY GOAL STATUS: HCPCS | Mod: CI

## 2018-10-08 PROCEDURE — G8978 MOBILITY CURRENT STATUS: HCPCS | Mod: CJ

## 2018-10-08 PROCEDURE — 83735 ASSAY OF MAGNESIUM: CPT

## 2018-10-08 PROCEDURE — 80048 BASIC METABOLIC PNL TOTAL CA: CPT

## 2018-10-08 PROCEDURE — 92526 ORAL FUNCTION THERAPY: CPT

## 2018-10-08 PROCEDURE — 97164 PT RE-EVAL EST PLAN CARE: CPT

## 2018-10-08 PROCEDURE — 97162 PT EVAL MOD COMPLEX 30 MIN: CPT

## 2018-10-08 PROCEDURE — 700102 HCHG RX REV CODE 250 W/ 637 OVERRIDE(OP): Performed by: INTERNAL MEDICINE

## 2018-10-08 RX ORDER — HEPARIN SODIUM 5000 [USP'U]/ML
5000 INJECTION, SOLUTION INTRAVENOUS; SUBCUTANEOUS EVERY 12 HOURS
Status: DISCONTINUED | OUTPATIENT
Start: 2018-10-08 | End: 2018-10-09 | Stop reason: HOSPADM

## 2018-10-08 RX ADMIN — METOPROLOL TARTRATE 50 MG: 50 TABLET ORAL at 14:35

## 2018-10-08 RX ADMIN — METOPROLOL TARTRATE 50 MG: 50 TABLET ORAL at 22:08

## 2018-10-08 RX ADMIN — AMLODIPINE BESYLATE 5 MG: 5 TABLET ORAL at 05:01

## 2018-10-08 RX ADMIN — HEPARIN SODIUM 5000 UNITS: 5000 INJECTION, SOLUTION INTRAVENOUS; SUBCUTANEOUS at 18:08

## 2018-10-08 RX ADMIN — SODIUM CHLORIDE TAB 1 GM 2 G: 1 TAB at 18:07

## 2018-10-08 RX ADMIN — SENNOSIDES AND DOCUSATE SODIUM 2 TABLET: 8.6; 5 TABLET ORAL at 05:01

## 2018-10-08 RX ADMIN — DIGOXIN 125 MCG: 125 TABLET ORAL at 18:07

## 2018-10-08 RX ADMIN — SODIUM CHLORIDE TAB 1 GM 2 G: 1 TAB at 06:12

## 2018-10-08 RX ADMIN — METOPROLOL TARTRATE 50 MG: 50 TABLET ORAL at 05:01

## 2018-10-08 RX ADMIN — SENNOSIDES AND DOCUSATE SODIUM 2 TABLET: 8.6; 5 TABLET ORAL at 18:07

## 2018-10-08 RX ADMIN — SODIUM CHLORIDE TAB 1 GM 2 G: 1 TAB at 12:26

## 2018-10-08 ASSESSMENT — ENCOUNTER SYMPTOMS
NERVOUS/ANXIOUS: 1
CONFUSION: 1
HEADACHES: 1
GASTROINTESTINAL NEGATIVE: 1
WEAKNESS: 1
AGITATION: 0
NAUSEA: 0
BRUISES/BLEEDS EASILY: 0
MUSCULOSKELETAL NEGATIVE: 1
ABDOMINAL DISTENTION: 0
POLYDIPSIA: 0
HEARTBURN: 0
SHORTNESS OF BREATH: 0
MEMORY LOSS: 1
VOMITING: 0
SINUS PRESSURE: 0
CARDIOVASCULAR NEGATIVE: 1
FEVER: 0
DIZZINESS: 0
DEPRESSION: 0
EYE DISCHARGE: 0
SEIZURES: 0
NUMBNESS: 0
FOCAL WEAKNESS: 0
CHILLS: 0
COUGH: 0
RESPIRATORY NEGATIVE: 1
SPEECH DIFFICULTY: 0
NERVOUS/ANXIOUS: 0
BACK PAIN: 0
PALPITATIONS: 0
EYES NEGATIVE: 1
NECK PAIN: 0

## 2018-10-08 ASSESSMENT — COGNITIVE AND FUNCTIONAL STATUS - GENERAL
WALKING IN HOSPITAL ROOM: A LITTLE
MOVING TO AND FROM BED TO CHAIR: UNABLE
CLIMB 3 TO 5 STEPS WITH RAILING: TOTAL
TOILETING: A LOT
MOBILITY SCORE: 11
DAILY ACTIVITIY SCORE: 13
DRESSING REGULAR UPPER BODY CLOTHING: A LITTLE
DRESSING REGULAR LOWER BODY CLOTHING: A LOT
TURNING FROM BACK TO SIDE WHILE IN FLAT BAD: A LOT
PERSONAL GROOMING: A LITTLE
HELP NEEDED FOR BATHING: A LOT
STANDING UP FROM CHAIR USING ARMS: A LITTLE
MOVING FROM LYING ON BACK TO SITTING ON SIDE OF FLAT BED: UNABLE
SUGGESTED CMS G CODE MODIFIER DAILY ACTIVITY: CL
SUGGESTED CMS G CODE MODIFIER MOBILITY: CL
EATING MEALS: TOTAL

## 2018-10-08 ASSESSMENT — PAIN SCALES - GENERAL
PAINLEVEL_OUTOF10: 0
PAINLEVEL_OUTOF10: 2
PAINLEVEL_OUTOF10: 0
PAINLEVEL_OUTOF10: 0
PAINLEVEL_OUTOF10: 2
PAINLEVEL_OUTOF10: 0
PAINLEVEL_OUTOF10: 2
PAINLEVEL_OUTOF10: 0
PAINLEVEL_OUTOF10: 2
PAINLEVEL_OUTOF10: 0

## 2018-10-08 ASSESSMENT — GAIT ASSESSMENTS
GAIT LEVEL OF ASSIST: MINIMAL ASSIST
ASSISTIVE DEVICE: FRONT WHEEL WALKER
DEVIATION: BRADYKINETIC;SHUFFLED GAIT;ATAXIC
DISTANCE (FEET): 55

## 2018-10-08 NOTE — PROGRESS NOTES
Critical Care Progress Note    Date of admission  9/22/2018    Chief Complaint  78 y.o. female admitted 9/22/2018 with intracranial hemorrhage.    Hospital Course  This lady was admitted with an acute intracranial hemorrhage.  She is undergone bilateral EVDs for hydrocephalus, IPH/IVH.    Interval Problem Update  Reviewed last 24 hour events:   - EVDs removed 10/2   - AF, nl WBC   - CT head showing no change   - AAOx3 (date unknown)   - SBP    - vibha TFs, BM this morning   - ambulates   - AF   - pending SLP re-eval   - transfer out today, possible rehab transfer today?   - SCDs only   - salt tabs with Na 137    Review of Systems  Review of Systems   Constitutional: Negative for chills and fever.   HENT: Negative for congestion and sinus pressure.    Eyes: Negative for discharge.   Respiratory: Negative for cough and shortness of breath.    Cardiovascular: Negative for chest pain.   Gastrointestinal: Negative for abdominal distention and nausea.   Endocrine: Negative for cold intolerance.   Genitourinary: Negative for dysuria.   Musculoskeletal: Negative for back pain and neck pain.   Skin: Negative for rash.   Allergic/Immunologic: Negative for immunocompromised state.   Neurological: Positive for headaches. Negative for dizziness, seizures, speech difficulty and numbness.   Hematological: Does not bruise/bleed easily.   Psychiatric/Behavioral: Positive for confusion. Negative for agitation. The patient is not nervous/anxious.    All other systems reviewed and are negative.       Vital Signs for last 24 hours   Temp:  [36.1 °C (97 °F)-37.2 °C (98.9 °F)] 36.7 °C (98 °F)  Pulse:  [71-87] 74  Resp:  [16-35] 18  BP: (115-124)/(58-64) 115/64    Hemodynamic parameters for last 24 hours  EHR flow sheets are reviewed       Vent Settings for last 24 hours, NA   Room air    Physical Exam   Physical Exam   Constitutional: She is oriented to person, place, and time. She appears well-developed and well-nourished. No  distress.   HENT:   Head: Normocephalic and atraumatic.   Nose: Nose normal.   Mouth/Throat: Oropharynx is clear and moist.   Eyes: Pupils are equal, round, and reactive to light. Conjunctivae and EOM are normal. No scleral icterus.   Neck: Neck supple. No JVD present. No tracheal deviation present.   Cardiovascular: Normal rate, regular rhythm, normal heart sounds and intact distal pulses.    No murmur heard.  Pulmonary/Chest: Effort normal and breath sounds normal. No respiratory distress. She has no wheezes.   Abdominal: Soft. Bowel sounds are normal. She exhibits no distension. There is no tenderness.   Musculoskeletal: She exhibits edema. She exhibits no tenderness.   Neurological: She is alert and oriented to person, place, and time. No cranial nerve deficit. She exhibits normal muscle tone. Coordination normal.   Occasional confabulation/confusion   Skin: Skin is warm and dry. No rash noted.   Psychiatric: She has a normal mood and affect. Her behavior is normal. Judgment and thought content normal.   Nursing note and vitals reviewed.      Medications  Current Facility-Administered Medications   Medication Dose Route Frequency Provider Last Rate Last Dose   • labetalol (NORMODYNE,TRANDATE) injection 10 mg  10 mg Intravenous Q HOUR PRN Raisa Bartlett P.A.-C.       • sodium chloride (SALT) tablet 2 g  2 g Feeding Tube TID WITH MEALS Montana Peña M.D.   2 g at 10/08/18 0612   • acetaminophen (TYLENOL) tablet 650 mg  650 mg Feeding Tube Q4HRS PRN Gil Leone Jr., D.O.   650 mg at 10/07/18 1036    Or   • acetaminophen (TYLENOL) suppository 650 mg  650 mg Rectal Q4HRS PRN Gil Leone Jr., D.O.       • amLODIPine (NORVASC) tablet 5 mg  5 mg Per NG Tube Q DAY Andres Wells D.O.   5 mg at 10/08/18 0501   • metoprolol (LOPRESSOR) tablet 50 mg  50 mg Feeding Tube Q8HRS Andres Wells D.O.   50 mg at 10/08/18 0501   • Pharmacy Consult: Enteral tube feeding - review meds/change  route/product selection   Other PRN Gil Leone Jr., D.O.       • labetalol (NORMODYNE,TRANDATE) injection 5-10 mg  5-10 mg Intravenous Q4HRS PRN NUSRAT Angelo Jr..OJhoana   5 mg at 09/26/18 0303   • digoxin (LANOXIN) tablet 125 mcg  125 mcg Feeding Tube DAILY AT 1800 William Medina M.D.   125 mcg at 10/07/18 1732   • ondansetron (ZOFRAN ODT) dispertab 4 mg  4 mg Feeding Tube Q4HRS PRN William Medina M.D.        Or   • ondansetron (ZOFRAN) syringe/vial injection 4 mg  4 mg Intravenous Q4HRS PRMO Medina M.D.       • Pharmacy Consult Request ...Pain Management Review   Other PRN William Medina M.D.        And   • oxyCODONE immediate-release (ROXICODONE) tablet 2.5 mg  2.5 mg Feeding Tube Q3HRS PRMO Medina M.D.   2.5 mg at 10/04/18 1928    And   • oxyCODONE immediate-release (ROXICODONE) tablet 5 mg  5 mg Feeding Tube Q3HRS PRMO Medina M.D.   5 mg at 10/07/18 2300    And   • HYDROmorphone (DILAUDID) injection 0.25-1 mg  0.25-1 mg Intravenous Q2HRS PRMO Medina M.D.   0.5 mg at 10/04/18 0200   • polyethylene glycol/lytes (MIRALAX) PACKET 1 Packet  1 Packet Feeding Tube QDAY PRMO Medina M.D.   1 Packet at 10/01/18 1752    And   • senna-docusate (PERICOLACE or SENOKOT S) 8.6-50 MG per tablet 2 Tab  2 Tab Feeding Tube BID William Medina M.D.   2 Tab at 10/08/18 0501    And   • magnesium hydroxide (MILK OF MAGNESIA) suspension 30 mL  30 mL Feeding Tube QDAY PRMO Medina M.D.   30 mL at 10/02/18 0516    And   • bisacodyl (DULCOLAX) suppository 10 mg  10 mg Rectal QDAY PRN William Medina M.D.       • Respiratory Care per Protocol   Nebulization Continuous RT Rowena Calero M.D.       • MD Alert...ICU Electrolyte Replacement per Pharmacy   Other pharmacy to dose Rowena Calero M.D.       • LORazepam (ATIVAN) injection 2 mg  2 mg Intravenous Q5 MIN PRN Rowena Calero M.D.       • hydrALAZINE (APRESOLINE) injection 10 mg  10 mg Intravenous Q4HRS PRN Rowena Calero M.D.       • enalaprilat  (VASOTEC) injection 1.25 mg  1.25 mg Intravenous Q6HRS PAPITO Calero M.D.           Fluids    Intake/Output Summary (Last 24 hours) at 10/08/18 0742  Last data filed at 10/08/18 0600   Gross per 24 hour   Intake             1530 ml   Output             1025 ml   Net              505 ml       Laboratory  Recent Results (from the past 48 hour(s))   BASIC METABOLIC PANEL    Collection Time: 10/07/18  5:39 AM   Result Value Ref Range    Sodium 135 135 - 145 mmol/L    Potassium 4.2 3.6 - 5.5 mmol/L    Chloride 102 96 - 112 mmol/L    Co2 24 20 - 33 mmol/L    Glucose 125 (H) 65 - 99 mg/dL    Bun 31 (H) 8 - 22 mg/dL    Creatinine 0.44 (L) 0.50 - 1.40 mg/dL    Calcium 9.3 8.5 - 10.5 mg/dL    Anion Gap 9.0 0.0 - 11.9   MAGNESIUM    Collection Time: 10/07/18  5:39 AM   Result Value Ref Range    Magnesium 2.1 1.5 - 2.5 mg/dL   PHOSPHORUS    Collection Time: 10/07/18  5:39 AM   Result Value Ref Range    Phosphorus 2.8 2.5 - 4.5 mg/dL   CBC WITH DIFFERENTIAL    Collection Time: 10/07/18  5:39 AM   Result Value Ref Range    WBC 9.0 4.8 - 10.8 K/uL    RBC 3.72 (L) 4.20 - 5.40 M/uL    Hemoglobin 11.2 (L) 12.0 - 16.0 g/dL    Hematocrit 32.9 (L) 37.0 - 47.0 %    MCV 88.4 81.4 - 97.8 fL    MCH 30.1 27.0 - 33.0 pg    MCHC 34.0 33.6 - 35.0 g/dL    RDW 41.0 35.9 - 50.0 fL    Platelet Count 296 164 - 446 K/uL    MPV 10.2 9.0 - 12.9 fL    Neutrophils-Polys 71.40 44.00 - 72.00 %    Lymphocytes 14.20 (L) 22.00 - 41.00 %    Monocytes 9.80 0.00 - 13.40 %    Eosinophils 2.50 0.00 - 6.90 %    Basophils 0.40 0.00 - 1.80 %    Immature Granulocytes 1.70 (H) 0.00 - 0.90 %    Nucleated RBC 0.00 /100 WBC    Neutrophils (Absolute) 6.40 2.00 - 7.15 K/uL    Lymphs (Absolute) 1.27 1.00 - 4.80 K/uL    Monos (Absolute) 0.88 (H) 0.00 - 0.85 K/uL    Eos (Absolute) 0.22 0.00 - 0.51 K/uL    Baso (Absolute) 0.04 0.00 - 0.12 K/uL    Immature Granulocytes (abs) 0.15 (H) 0.00 - 0.11 K/uL    NRBC (Absolute) 0.00 K/uL   PROTHROMBIN TIME    Collection Time:  10/07/18  5:39 AM   Result Value Ref Range    PT 14.2 12.0 - 14.6 sec    INR 1.09 0.87 - 1.13   ESTIMATED GFR    Collection Time: 10/07/18  5:39 AM   Result Value Ref Range    GFR If African American >60 >60 mL/min/1.73 m 2    GFR If Non African American >60 >60 mL/min/1.73 m 2   CBC WITH DIFFERENTIAL    Collection Time: 10/08/18  5:43 AM   Result Value Ref Range    WBC 9.6 4.8 - 10.8 K/uL    RBC 3.79 (L) 4.20 - 5.40 M/uL    Hemoglobin 11.5 (L) 12.0 - 16.0 g/dL    Hematocrit 33.4 (L) 37.0 - 47.0 %    MCV 88.1 81.4 - 97.8 fL    MCH 30.3 27.0 - 33.0 pg    MCHC 34.4 33.6 - 35.0 g/dL    RDW 41.7 35.9 - 50.0 fL    Platelet Count 310 164 - 446 K/uL    MPV 9.8 9.0 - 12.9 fL    Neutrophils-Polys 72.70 (H) 44.00 - 72.00 %    Lymphocytes 13.50 (L) 22.00 - 41.00 %    Monocytes 9.60 0.00 - 13.40 %    Eosinophils 2.60 0.00 - 6.90 %    Basophils 0.40 0.00 - 1.80 %    Immature Granulocytes 1.20 (H) 0.00 - 0.90 %    Nucleated RBC 0.00 /100 WBC    Neutrophils (Absolute) 7.00 2.00 - 7.15 K/uL    Lymphs (Absolute) 1.30 1.00 - 4.80 K/uL    Monos (Absolute) 0.92 (H) 0.00 - 0.85 K/uL    Eos (Absolute) 0.25 0.00 - 0.51 K/uL    Baso (Absolute) 0.04 0.00 - 0.12 K/uL    Immature Granulocytes (abs) 0.12 (H) 0.00 - 0.11 K/uL    NRBC (Absolute) 0.00 K/uL   BASIC METABOLIC PANEL    Collection Time: 10/08/18  5:43 AM   Result Value Ref Range    Sodium 137 135 - 145 mmol/L    Potassium 4.2 3.6 - 5.5 mmol/L    Chloride 101 96 - 112 mmol/L    Co2 27 20 - 33 mmol/L    Glucose 121 (H) 65 - 99 mg/dL    Bun 30 (H) 8 - 22 mg/dL    Creatinine 0.53 0.50 - 1.40 mg/dL    Calcium 9.6 8.5 - 10.5 mg/dL    Anion Gap 9.0 0.0 - 11.9   MAGNESIUM    Collection Time: 10/08/18  5:43 AM   Result Value Ref Range    Magnesium 2.1 1.5 - 2.5 mg/dL   PROTHROMBIN TIME    Collection Time: 10/08/18  5:43 AM   Result Value Ref Range    PT 14.7 (H) 12.0 - 14.6 sec    INR 1.14 (H) 0.87 - 1.13   ESTIMATED GFR    Collection Time: 10/08/18  5:43 AM   Result Value Ref Range    GFR If  African American >60 >60 mL/min/1.73 m 2    GFR If Non African American >60 >60 mL/min/1.73 m 2       Imaging  X-Ray:  No film today  CT:    Reviewed    10/8:  1.  Evolving intra-axial hemorrhage in the right temporal and parietal lobe.  2.  Intraventricular hemorrhage.  3.  Right to left midline shift.  4.  Subdural fluid collection along the right cerebral hemisphere.  5.  Mild cerebral atrophy.  6.  There has been no significant interval change.    Assessment/Plan  Acute intracranial hemorrhage (HCC)- (present on admission)   Assessment & Plan    S/P fall 1 week prior  CT scan on 9/22 with intraventricular hemorrhage, right greater than left and intraparenchymal hemorrhage  Noncommunicating hydrocephalus - s/p emergent bilateral EVD placement by Dr. Santizo on 9/22 --> removed 10/2  Intraventricular TPA given by Neuro[facility] with improvement in EVD output  Keppra  Seizure precautions  Therapies, rehab consultation          Hyponatremia   Assessment & Plan    Secondary to ICH --> improving  Continue salt tabs  Daily BMP        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)- (present on admission)   Assessment & Plan    Reversed on hospital day 1  Resume subcu heparin today if ok with neurosurgery        Paroxysmal atrial fibrillation (HCC)- (present on admission)   Assessment & Plan    Rate control  Digoxin, metoprolol  Hold on full dose anticoagulation for now        Encephalopathy acute- (present on admission)   Assessment & Plan    Improving  Limit sedatives  Reorient/maintain sleep wake cycle        Oropharyngeal dysphagia   Assessment & Plan    Ongoing speech therapy evaluation and treatment  Continue feeds via core track        Hypomagnesemia- (present on admission)   Assessment & Plan    Repletion and monitor daily        Hypokalemia   Assessment & Plan    Repletion and monitor daily             VTE:  Heparin  Ulcer: Not Indicated  Lines: None    I have performed a physical exam and  reviewed and updated ROS and Plan today (10/8/2018). In review of yesterday's note (10/7/2018), there are no changes except as documented above.     Ok to transfer patient out of ICU today or to rehab. [facility] will sign off at transfer. Please call with questions.    Discussed patient condition and risk of morbidity and/or mortality with Hospitalist, RN, RT, Pharmacy, , , Charge nurse / hot rounds, Patient and neurosurgery.

## 2018-10-08 NOTE — DISCHARGE PLANNING
TBI as the result of a fall.  Large intraparenchymal hemorrhage in the right posterior medial temporal lobe which measures 5 x 3.3 x 3.3 cm in greatest diameter and has a moderate amount of surrounding vasogenic edema displacing the right temporal horn anteriorly and causing marked right to left midline shift of the ventricular system of 13 mm. Marked amount of diffuse intraventricular hemorrhage especially pronounced in the right lateral ventricle and fourth ventricle.  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 Small holohemispheric right sided subdural hematoma effacing the underlying cortical sulci and gyri and measuring 13 mm in greatest diameter in the right frontal temporal region. 3.6 cm subcutaneous hematoma in the left frontal region. Ongoing medical management as well as therapy need.   Anticipate post acute services to facilitate a successful transition to community home with family support in [place] adult son and daughter-in-law versus adult daughters home in [place] able to provide physical support as needed. Goal for rehab would be mod I to min assist with mobility and self care. Dr. Nguyen to consult per protocol,

## 2018-10-08 NOTE — PROGRESS NOTES
Neurosurgery Progress Note    Subjective:  Pt with IVH / SAH / SDH with Bilateral EVD day #17  Sitting up in chair and resting comfortably.    Awaiting Bed on regular floor  Asking to return to regular diet sson  Denies HA, Dizziness or vision changes.  Stable overnight. No changes.     Exam:  Awake. Eyes open.  PERRL  Will follow simple commands.well  Moves ext x 4   Speaking softly but appropriate with answers  Incisions c/d/i.   CT with right fluid collection hygroma vs chronic sub dural hematoma. Somewhat increased to 8.6mm shift.    BP  Min: 115/64  Max: 124/58  Pulse  Av.4  Min: 71  Max: 87  Resp  Av.9  Min: 16  Max: 35  Temp  Av.5 °C (97.7 °F)  Min: 36.1 °C (97 °F)  Max: 37.2 °C (98.9 °F)  SpO2  Av.5 %  Min: 93 %  Max: 100 %    No Data Recorded    Recent Labs      10/06/18   0559  10/07/18   0539  10/08/18   0543   WBC  9.7  9.0  9.6   RBC  3.67*  3.72*  3.79*   HEMOGLOBIN  10.8*  11.2*  11.5*   HEMATOCRIT  32.5*  32.9*  33.4*   MCV  88.6  88.4  88.1   MCH  29.4  30.1  30.3   MCHC  33.2*  34.0  34.4   RDW  41.2  41.0  41.7   PLATELETCT  311  296  310   MPV  10.2  10.2  9.8     Recent Labs      10/06/18   0350  10/07/18   0539  10/08/18   0543   SODIUM  135  135  137   POTASSIUM  4.1  4.2  4.2   CHLORIDE  100  102  101   CO2  23  24  27   GLUCOSE  131*  125*  121*   BUN  29*  31*  30*   CREATININE  0.49*  0.44*  0.53   CALCIUM  9.6  9.3  9.6     Recent Labs      10/06/18   0559  10/07/18   0539  10/08/18   0543   INR  1.12  1.09  1.14*           Intake/Output       10/07/18 0700 - 10/08/18 0659 10/08/18 07 - 10/09/18 0659       Total [id number] Total       Intake    Other  210  -- 210  --  -- --    Medications (PO/Enteral Liquids) 90 -- 90 -- -- --    Free water flush per  -- 120 -- -- --    NG/GT  600  720 1320  --  -- --    Intake (mL) (Enteral Tube 18 Cortrak - Gastric 10 Fr. Right nare)  -- -- --    Total Intake  -- -- --        Output    Urine  425  600 1025  --  -- --    Number of Times Voided 1 x 3 x 4 x -- -- --    Urine Void (mL) 200 600 800 -- -- --    Output (mL) ([REMOVED] Urethral Catheter Latex 16 Fr.) 225 -- 225 -- -- --    Stool  --  -- --  --  -- --    Number of Times Stooled 0 x 2 x 2 x -- -- --    Total Output  -- -- --       Net I/O     385 120 505 -- -- --            Intake/Output Summary (Last 24 hours) at 10/08/18 0754  Last data filed at 10/08/18 0600   Gross per 24 hour   Intake             1530 ml   Output             1025 ml   Net              505 ml            • labetalol  10 mg Q HOUR PRN   • sodium chloride  2 g TID WITH MEALS   • acetaminophen  650 mg Q4HRS PRN    Or   • acetaminophen  650 mg Q4HRS PRN   • amLODIPine  5 mg Q DAY   • metoprolol  50 mg Q8HRS   • Pharmacy   PRN   • labetalol  5-10 mg Q4HRS PRN   • digoxin  125 mcg DAILY AT 1800   • ondansetron  4 mg Q4HRS PRN    Or   • ondansetron  4 mg Q4HRS PRN   • Pharmacy Consult Request   PRN    And   • oxyCODONE immediate-release  2.5 mg Q3HRS PRN    And   • oxyCODONE immediate-release  5 mg Q3HRS PRN    And   • HYDROmorphone  0.25-1 mg Q2HRS PRN   • polyethylene glycol/lytes  1 Packet QDAY PRN    And   • senna-docusate  2 Tab BID    And   • magnesium hydroxide  30 mL QDAY PRN    And   • bisacodyl  10 mg QDAY PRN   • Respiratory Care per Protocol   Continuous RT   • MD Alert...Adult ICU Electrolyte Replacement per Pharmacy   pharmacy to dose   • LORazepam  2 mg Q5 MIN PRN   • hydrALAZINE  10 mg Q4HRS PRN   • enalaprilat  1.25 mg Q6HRS PRN       Assessment and Plan:  Hospital day #16   Neuro stable, so no immediate imaging orders.  CT a bit worse yesterday but will discuss additional imaging with Dr. Sukhdev JONAS for q 4 hour neuro checks.  Would be OK for floor from neurosurgery standpoint.   PT/OT/Speech

## 2018-10-08 NOTE — PROGRESS NOTES
[facility] Hospitalist Progress Note    Date of Service: 10/8/2018    Chief Complaint  78 y.o. female admitted 9/22/2018 with HA to [facility].  Had had a GLF with neg CT one week prior.  CT on this admit showing SDH and SAH  Bilateral EVDs placed Dr Santizo 9/22      Interval Problem Update  Patient seen and examined today. ICU Care  Care and plan discussed in IDT/Hot rounds.  Lines and assistive devices reviewed.    Patient tolerating treatment and therapies.  All Data, Medication data reviewed.  Case discussed with nursing as available.  Plan of Care reviewed with patient and notified of changes.  10/2 the patient seems improved, alert and oriented times 1-3, at times confused, remains hemodynamically stable, on room air, remains with feeding tube, ICP between 3 and 8, left EVD is at 20 cm with slow output, neurosurgical follow-up noted, plan for continuing to watch ICPs, follow-up CT planned for Wednesday and Thursday  10/3 the patient feels better, her EVD's are clamped, she is tired, did not sleep well last night, hemodynamically stable, tolerates core track feeding,  10/4 the patient appears clinically fairly unchanged, her follow-up CT shows an increase in shift from 4.7-8.6 mm, EVD's were clamped and are now removed, patient's mentation waxes and wanes, she complains of a mild headache, hemodynamically stable, tolerating core track feeding, aiming for high normal sodium levels  10/5 patient may continue lethargy and confusion, hemodynamically stable, close neuro monitoring and follow-up CT per neurosurgery  10/6 the patient overall stabilized and slightly improved in terms of mentation, CT scan reviewed by neurosurgeon and no need for further intervention reported.  Slight increase in midline shift, hemodynamically stable, daughter at bedside, suggested referral to acute rehab  10/7 the patient feels different today, she complains of heaviness in her head, hemodynamically stable, still on tube feeds, remains on salt  tablets to have sodium high normal, decision to observe in ICU and repeat CT in the morning  10/8 patient overall improving slowly, her CT is reported somewhat improved, she seen by speech therapy and is now cleared for a modified diet.  The patient is being evaluated for rehabilitation transfer  Consultants/Specialty  Neurosurgery  Pulmonology  Physiatry  Disposition  Okay to transfer to neurology and/or transfer to acute rehab          Review of Systems   Constitutional: Positive for malaise/fatigue. Negative for chills and fever.   HENT: Negative.    Eyes: Negative.    Respiratory: Negative.  Negative for cough.    Cardiovascular: Negative.  Negative for chest pain and palpitations.   Gastrointestinal: Negative.  Negative for heartburn, nausea and vomiting.   Genitourinary: Negative.  Negative for dysuria and frequency.   Musculoskeletal: Negative.  Negative for back pain and neck pain.   Skin: Negative.  Negative for itching and rash.   Neurological: Positive for weakness. Negative for dizziness and focal weakness.   Endo/Heme/Allergies: Negative.  Negative for polydipsia. Does not bruise/bleed easily.   Psychiatric/Behavioral: Positive for memory loss. Negative for depression. The patient is nervous/anxious.       Physical Exam  Laboratory/Imaging   Hemodynamics  Temp (24hrs), Av.5 °C (97.7 °F), Min:36.1 °C (97 °F), Max:37.2 °C (98.9 °F)   Temperature: 36.7 °C (98 °F)  Pulse  Av.3  Min: 52  Max: 131 Heart Rate (Monitored): 74  Blood Pressure : 115/64, NIBP: 116/59      Respiratory      Respiration: 18, Pulse Oximetry: 97 %     Work Of Breathing / Effort: Shallow  RUL Breath Sounds: Clear, RML Breath Sounds: Clear, RLL Breath Sounds: Clear;Diminished, JUAN Breath Sounds: Clear, LLL Breath Sounds: Clear;Diminished    Fluids    Intake/Output Summary (Last 24 hours) at 10/08/18 0741  Last data filed at 10/08/18 0600   Gross per 24 hour   Intake             1530 ml   Output             1025 ml   Net               505 ml       Nutrition  Orders Placed This Encounter   Procedures   • Diet NPO     Standing Status:   Standing     Number of Occurrences:   1     Order Specific Question:   Restrict to:     Answer:   Strict [1]     Physical Exam   Constitutional: She appears well-developed and well-nourished. No distress.   Patient seen and examined by me.   HENT:   Nose: Nose normal.   Mouth/Throat: Oropharynx is clear and moist. No oropharyngeal exudate.   Bruising left side of face no bony step off   Eyes: Conjunctivae are normal. Right eye exhibits no discharge. Left eye exhibits no discharge. No scleral icterus.   Neck: No JVD present. No tracheal deviation present.   Cardiovascular: Normal rate, regular rhythm and normal heart sounds.    Pulmonary/Chest: Effort normal and breath sounds normal. No stridor. No respiratory distress. She has no wheezes. She has no rales. She exhibits no tenderness.   Abdominal: Soft. Bowel sounds are normal. She exhibits no distension. There is no tenderness. There is no guarding.   Musculoskeletal: She exhibits no edema or tenderness.   Neurological: She is alert. No cranial nerve deficit. She exhibits normal muscle tone. Coordination (left leg drift) abnormal.   Orientation is variable  Alert   Following all commands   Skin: Skin is warm and dry. She is not diaphoretic. No pallor.   Psychiatric: Her affect is blunt. Her speech is delayed. She is slowed.   Nursing note and vitals reviewed.      Recent Labs      10/06/18   0559  10/07/18   0539  10/08/18   0543   WBC  9.7  9.0  9.6   RBC  3.67*  3.72*  3.79*   HEMOGLOBIN  10.8*  11.2*  11.5*   HEMATOCRIT  32.5*  32.9*  33.4*   MCV  88.6  88.4  88.1   MCH  29.4  30.1  30.3   MCHC  33.2*  34.0  34.4   RDW  41.2  41.0  41.7   PLATELETCT  311  296  310   MPV  10.2  10.2  9.8     Recent Labs      10/06/18   0350  10/07/18   0539  10/08/18   0543   SODIUM  135  135  137   POTASSIUM  4.1  4.2  4.2   CHLORIDE  100  102  101   CO2  23  24  27   GLUCOSE   131*  125*  121*   BUN  29*  31*  30*   CREATININE  0.49*  0.44*  0.53   CALCIUM  9.6  9.3  9.6     Recent Labs      10/06/18   0559  10/07/18   0539  10/08/18   0543   INR  1.12  1.09  1.14*                  Assessment/Plan     Acute intracranial hemorrhage (HCC)- (present on admission)   Assessment & Plan    Anticoagulation reversed at admission  S/P bilateral EVDs  Repeat CT stable  Monitor ICP   Exam improving, working with PT and OT  Neurosurgery following  Close BP control          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)- (present on admission)   Assessment & Plan    Anticoagulation reversed at the time of admission, was on eliquis outpatient        Paroxysmal atrial fibrillation (HCC)- (present on admission)   Assessment & Plan    On metoprolol 50mg Q8, Dig 125mcg QD  Prn Dilt for breakthrough rate control  No anticoagulation due to ICH        Encephalopathy acute- (present on admission)   Assessment & Plan    Secondary to cerebral compromise from subdural hematoma        Hypomagnesemia- (present on admission)   Assessment & Plan    Replace  Follow daily        Hypokalemia   Assessment & Plan    Replaced  Follow daily          Quality-Core Measures   Reviewed items::  EKG reviewed, Labs reviewed, Medications reviewed and Radiology images reviewed  Mack catheter::  Critically Ill - Requiring Accurate Measurement of Urinary Output  DVT prophylaxis pharmacological::  Contraindicated - High bleeding risk  DVT prophylaxis - mechanical:  SCDs  Ulcer Prophylaxis::  Not indicated      Plan  Overall and slowly stabilizing, okay to go to neurosurgical unit and/or rehab  Continue with supportive care  Order modified diet as per speech therapy follow-up  Sodium replacement and support  The patient will likely need prolonged rehabilitation, rehabilitation consult    See orders  Critically ill

## 2018-10-08 NOTE — THERAPY
"Speech Language Therapy dysphagia treatment completed.   Functional Status:  Patient seen for dysphagia therapy on this date, per RN request.  Patient asking for a PO diet.  She demonstrated improved alertness and stamina as compared to prior sessions but remains confused and oriented to self. She was confuses as to the date, situation and location.  Presentation of PO included ice chips, nectars, and purees.  The patient presented with timely initiation of swallow trigger to mildly delayed and complete laryngeal elevation upon palpation.  She maintained clear vocal quality and had no overt s/sx of aspiration with any consistency consumed.  The patient demonstrated impaired initiation and required 1:1 feeding assistance. She followed directions to dysphagia exercises and completed laryngeal elevation, tongue base retraction and pharyngeal squeeze exercises x5 each with \"fair to good\" accuracy, given 1:1 model.    Recommendations: Discussed case with MD and received orders to initiate a Nectar Thick Full Liquid diet with STRICT 1:1 feeding assistance as needed and DIRECT supervision.  No pudding, given aspiration on FEES. Please hold tube feeding but do no pull the Cortrak until she is consistently consuming greater than 75% of meals without difficulty or s/sx of aspiration.  Patient still needs cog eval.    Plan of Care: Will benefit from Speech Therapy 5 times per week  Post-Acute Therapy: Discharge to a transitional care facility for continued skilled therapy services.    See \"Rehab Therapy-Acute\" Patient Summary Report for complete documentation.     "

## 2018-10-08 NOTE — CONSULTS
Physical Medicine and Rehabilitation Consultation         Initial Consult      Date of Consultation: 10/8/2018  Consulting provider: Pritesh Nguyen D.O.  Reason for consultation: management of intracranial hemorrhage, assess for acute inpatient rehab appropriateness  Consulting physician Michael Ahumada MD  Chief complaint: intracranial hemorrhage    HPI:  The patient is a 78 y.o. female with h/o A.fib on eliquis, with 1 week history of fall, who was admitted on 9/22 with AMS found to have ight posterior medial temporal lobe intracranial hemorrhage and intraventricular hemorrhage.     Outside hospital CT scan of head reveled no intracranial hemorrhage    Intraparenchymal hemorrhage in the right posterior medial temporal lobe 5x3.3x3.3 cm with moderate vasogenic enema displacing the right temporal horn anteriorly and causing maked right to left midline shift of the ventricles ~13mm  Intraventricular hemorrhage present in the right lateral ventricle and 4th ventricle.   S/p Bifrontal ventriculostomies.     Complains of dizziness when standing up, improved when sitting down, small ambulation was fatiguing but she is willing to push at acute rehab    No HA at this time  Daughter was at bedside, is interested in taking her home with her, she reports that she will have 24 hour supervision.       ROS:  inconsistent answering of ROS questions, perseverative       PMH:  Past Medical History:   Diagnosis Date   • A-fib (HCC)    • Lymphoma of spleen (HCC) 2016    Finished treatment in January 2018       PSH:  Past Surgical History:   Procedure Laterality Date   • VENTRICULOSTOMY Bilateral 9/22/2018    Procedure: VENTRICULOSTOMY BILATERAL SHUNT;  Surgeon: Juan Alberto Santizo III, M.D.;  Location: SURGERY [facility];  Service: Neurosurgery       FHX:  No history of stroke    Medications:  Current Facility-Administered Medications   Medication Dose   • labetalol (NORMODYNE,TRANDATE) injection 10 mg  10 mg   • sodium  "chloride (SALT) tablet 2 g  2 g   • acetaminophen (TYLENOL) tablet 650 mg  650 mg    Or   • acetaminophen (TYLENOL) suppository 650 mg  650 mg   • amLODIPine (NORVASC) tablet 5 mg  5 mg   • metoprolol (LOPRESSOR) tablet 50 mg  50 mg   • Pharmacy Consult: Enteral tube feeding - review meds/change route/product selection     • labetalol (NORMODYNE,TRANDATE) injection 5-10 mg  5-10 mg   • digoxin (LANOXIN) tablet 125 mcg  125 mcg   • ondansetron (ZOFRAN ODT) dispertab 4 mg  4 mg    Or   • ondansetron (ZOFRAN) syringe/vial injection 4 mg  4 mg   • Pharmacy Consult Request ...Pain Management Review      And   • oxyCODONE immediate-release (ROXICODONE) tablet 2.5 mg  2.5 mg    And   • oxyCODONE immediate-release (ROXICODONE) tablet 5 mg  5 mg    And   • HYDROmorphone (DILAUDID) injection 0.25-1 mg  0.25-1 mg   • polyethylene glycol/lytes (MIRALAX) PACKET 1 Packet  1 Packet    And   • senna-docusate (PERICOLACE or SENOKOT S) 8.6-50 MG per tablet 2 Tab  2 Tab    And   • magnesium hydroxide (MILK OF MAGNESIA) suspension 30 mL  30 mL    And   • bisacodyl (DULCOLAX) suppository 10 mg  10 mg   • Respiratory Care per Protocol     • MD Alert...ICU Electrolyte Replacement per Pharmacy     • LORazepam (ATIVAN) injection 2 mg  2 mg   • hydrALAZINE (APRESOLINE) injection 10 mg  10 mg   • enalaprilat (VASOTEC) injection 1.25 mg  1.25 mg       Allergies:  Allergies   Allergen Reactions   • Corticosteroids Rash     \"Family does not know the exact name of the drug\"       Social Hx:  Pre-morbidly, this patient lived in a single level home with One steps to enter, with spouse.   Employment: retired      Prior level of function:   Independent    Current level of function:  Discussed with PT/OT today   ambulating with FWW short distance, less than 50ft Katharine, Min A with sit to stand        Physical Exam:  Vitals: Blood pressure 115/64, pulse 83, temperature 36.8 °C (98.2 °F), resp. rate (!) 28, height 1.702 m (5' 7.01\"), weight 73.2 kg (161 " lb 6 oz), SpO2 94 %, not currently breastfeeding.  Gen: NAD, sitting in the chair  HEENT:  PERRLA, moist mucous membranes  Cardio: RRR, no mumurs  Pulm: CTAB, with normal respiratory effort  Abd: Soft NTND, active bowel sounds,   Ext: No peripheral edema. No calf tenderness. No clubbing/cyanosis. +dorsal pedalis pulses bilaterally.    Mental status: follows commands  Speech: fluent, no aphasia or dysarthria    CRANIAL NERVES:  2,3: visual acuity grossly intact, PERRL  3,4,6: EOMI bilaterally, no nystagmus or diplopia  5: sensation intact to light touch bilaterally and symmetric  7: no facial asymmetry  8: hearing grossly intact  9,10: symmetric palate elevation  11: SCM/Trapezius strength 5/5 bilaterally  12: tongue protrudes midline    Motor:      4/5 in all major myotomes, bilaterally    Sensory:   intact to light touch through out    DTRs: 2+ in bilateral biceps, triceps, brachioradialis, 3+ in left patellar and achilles tendons  No clonus at bilateral ankles  Negative babinski b/l  Negative Hodge b/l     Tone: no spasticity noted, no cogwheeling noted    Coordination:   Intact to finger and nose bilaterally      Labs:  Recent Labs      10/06/18   0559  10/07/18   0539  10/08/18   0543   RBC  3.67*  3.72*  3.79*   HEMOGLOBIN  10.8*  11.2*  11.5*   HEMATOCRIT  32.5*  32.9*  33.4*   PLATELETCT  311  296  310   PROTHROMBTM  14.5  14.2  14.7*   INR  1.12  1.09  1.14*     Recent Labs      10/06/18   0350  10/07/18   0539  10/08/18   0543   SODIUM  135  135  137   POTASSIUM  4.1  4.2  4.2   CHLORIDE  100  102  101   CO2  23  24  27   GLUCOSE  131*  125*  121*   BUN  29*  31*  30*   CREATININE  0.49*  0.44*  0.53   CALCIUM  9.6  9.3  9.6     Recent Results (from the past 24 hour(s))   CBC WITH DIFFERENTIAL    Collection Time: 10/08/18  5:43 AM   Result Value Ref Range    WBC 9.6 4.8 - 10.8 K/uL    RBC 3.79 (L) 4.20 - 5.40 M/uL    Hemoglobin 11.5 (L) 12.0 - 16.0 g/dL    Hematocrit 33.4 (L) 37.0 - 47.0 %    MCV 88.1  81.4 - 97.8 fL    MCH 30.3 27.0 - 33.0 pg    MCHC 34.4 33.6 - 35.0 g/dL    RDW 41.7 35.9 - 50.0 fL    Platelet Count 310 164 - 446 K/uL    MPV 9.8 9.0 - 12.9 fL    Neutrophils-Polys 72.70 (H) 44.00 - 72.00 %    Lymphocytes 13.50 (L) 22.00 - 41.00 %    Monocytes 9.60 0.00 - 13.40 %    Eosinophils 2.60 0.00 - 6.90 %    Basophils 0.40 0.00 - 1.80 %    Immature Granulocytes 1.20 (H) 0.00 - 0.90 %    Nucleated RBC 0.00 /100 WBC    Neutrophils (Absolute) 7.00 2.00 - 7.15 K/uL    Lymphs (Absolute) 1.30 1.00 - 4.80 K/uL    Monos (Absolute) 0.92 (H) 0.00 - 0.85 K/uL    Eos (Absolute) 0.25 0.00 - 0.51 K/uL    Baso (Absolute) 0.04 0.00 - 0.12 K/uL    Immature Granulocytes (abs) 0.12 (H) 0.00 - 0.11 K/uL    NRBC (Absolute) 0.00 K/uL   BASIC METABOLIC PANEL    Collection Time: 10/08/18  5:43 AM   Result Value Ref Range    Sodium 137 135 - 145 mmol/L    Potassium 4.2 3.6 - 5.5 mmol/L    Chloride 101 96 - 112 mmol/L    Co2 27 20 - 33 mmol/L    Glucose 121 (H) 65 - 99 mg/dL    Bun 30 (H) 8 - 22 mg/dL    Creatinine 0.53 0.50 - 1.40 mg/dL    Calcium 9.6 8.5 - 10.5 mg/dL    Anion Gap 9.0 0.0 - 11.9   MAGNESIUM    Collection Time: 10/08/18  5:43 AM   Result Value Ref Range    Magnesium 2.1 1.5 - 2.5 mg/dL   PROTHROMBIN TIME    Collection Time: 10/08/18  5:43 AM   Result Value Ref Range    PT 14.7 (H) 12.0 - 14.6 sec    INR 1.14 (H) 0.87 - 1.13   ESTIMATED GFR    Collection Time: 10/08/18  5:43 AM   Result Value Ref Range    GFR If African American >60 >60 mL/min/1.73 m 2    GFR If Non African American >60 >60 mL/min/1.73 m 2     Radiolgy:  CT head 10/8:  Impression:       1.  Evolving intra-axial hemorrhage in the right temporal and parietal lobe.  2.  Intraventricular hemorrhage.  3.  Right to left midline shift.  4.  Subdural fluid collection along the right cerebral hemisphere.  5.  Mild cerebral atrophy.  6.  There has been no significant interval change.       ASSESSMENT:   The patient is a 78 y.o. female with h/o A.fib on  eliquis, with 1 week history of fall, who was admitted on 9/22 with AMS found to have ight posterior medial temporal lobe intracranial hemorrhage and intraventricular hemorrhage.     Neuro:    # intracranial hemorrhage and intraventricular hemorrhage.   - CT head 10/8 personally evaluated today, no significant difference from 10/6  - Strict BP control < 160    Sz: ppx, no episodes during admission  - continued keppra for > 7 days, recommend DC Keppra since no episodes, can impair recovery    Orthostatic hypotension: symptoms of dizziness when standing  - place TANNER's and Tubi  on LE prior to getting out of bed to limit fluctuation in bp    Paroxysmal Afib:  Previously on elmira, discussed with Dr Santizo who would prefer not using this medication given the hemorrhage for at least 2 weeks    Dysphagia: NPO  - recommend fluid per NG tube 350cc qshift  - change to bolus feeding to decrease fall risk, 4 cans per day  - follow SLP    DVT ppx:   - discussed with Dr Santizo who agreed with starting heparin 5000 units q12 as DVT ppx given decreased mobility and high risk of dvt  - discuss with neurology about full anticoag in the setting of intracranial bleed and paroxysmal A.fib, consider warfarin slow titration with close monitoring    Rehab: impaired ADL's and mobility  - pt is a good candidate for acute rehab, pending medical clearance and neurology consult    Discussed with pt and family, summarized hospitalization and care, options for next step of care    Discussed with team about recommendations     Patient with multiple co-morbidities(including but not limited to dysphagia, intracranial hemorrhage, paroxysmal A.fib); with cognitive/swallowing/speech deficits and functional deficits in mobility/self-care, and Moderate de-conditioning.     Pre-morbidly, this patient lived in a single level home with One steps to enter, alone and able to care for self. The patient was evaluated by acute care Physical Therapy,  Occupational Therapy and Speech Language Pathology; currently requiring minimal assistance for mobility and minimal assistance for ADLs, also with ongoing cognitive, speech and swallowing deficits.     The patient is a(n) Excellent candidate for an acute inpatient rehabilitation program with a coordinated program of care at an intensity and frequency not available at a lower level of care.     Note: if patient continues to progress while waiting for medical clearance, and no longer requires 2 of of 3 therapy services (PT/OT/SLP) at a CGA/Minimal assistance level, patient will no longer need acute inpatient rehabilitation.    This recommendation is substantiated by the patient's current medical condition with intervention and assessment of medical issues requiring an acute level of care for patient's safety and maximum outcome. A coordinated program of care will be provided by an interdisciplinary team including physical therapy, occupational therapy, speech language pathology, hospitalist, physiatry, rehab nursing and rehab psychology. Rehab goals include improved cognition, speech and swallowing, mobility, self-care management, strength and conditioning/endurance, pain management, bowel and bladder management, mood and affect, and safety with independent home management including caregiver training.     Estimated length of stay is approximately 21 days. Rehab potential: Excellent. Disposition: to pre-morbid independent living setting with supportive care of patient's family. We will continue to follow with you in anticipation of discharge to acute inpatient rehabilitation when medically stable to do so at the discretion of her  attending physician. Thank you for allowing us to participate in her care. Please call with any questions regarding this recommendation.    Pritesh Nguyen D.O.

## 2018-10-08 NOTE — DISCHARGE PLANNING
Physiatry Dr. Nguyen recommending pt is good candidate for IRF pending medical clearance and neurology consult.

## 2018-10-08 NOTE — CARE PLAN
Problem: Pain Management  Goal: Pain level will decrease to patient's comfort goal  Outcome: PROGRESSING AS EXPECTED  Assessing pain q2h. Medicated as needed with PRNs, see MAR.    Problem: Mobility  Goal: Risk for activity intolerance will decrease  Outcome: PROGRESSING AS EXPECTED  Patient up to commode with x1 assist, front wheel walker used. Tolerated well.

## 2018-10-08 NOTE — CARE PLAN
Problem: Urinary Elimination:  Goal: Ability to reestablish a normal urinary elimination pattern will improve    Intervention: Assist patient to sit on commode or toilet for voiding  Assistance with transferring to the bathroom

## 2018-10-08 NOTE — THERAPY
"Physical Therapy Evaluation completed.   Bed Mobility:  Supine to Sit:  (up in chair)  Transfers: Sit to Stand: Minimal Assist  Gait: Level Of Assist: Minimal Assist with Front-Wheel Walker       Plan of Care: Will benefit from Physical Therapy 4 times per week  Discharge Recommendations: Equipment: Will Continue to Assess for Equipment Needs.     Ms. Almeida has been re-ordered as she is now medically appropriate to participate with physical therapy services. She demonstrates significant gross motor coordination deficits, dynamic balance impairments, lower extremity weakness, and processing deficits that negatively impact her ability to perform gait, transfers, and bed mobility at her prior level of function and prevent her safe discharge home. Delayed processing but when given extra time able to follow cues appropriately. She displayed mild left sided neglect with all mobility, head tends to turn to R. If therapist stands on L or pt is asked to look L she is capable. Inability to navigate FWW around obstalces or with anything but a straight trajectory. She is very motivated to participate in therapies. Recommend intensive post acute rehabiliation prior to discharge home. She would benefit from acute physical therapy services to improve her mobility and decrease risk for falls.     See \"Rehab Therapy-Acute\" Patient Summary Report for complete documentation.     "

## 2018-10-08 NOTE — CARE PLAN
Problem: Safety  Goal: Will remain free from injury    Intervention: Provide assistance with mobility  Standby assist and use of wheelchair when mobilizing

## 2018-10-09 ENCOUNTER — HOSPITAL ENCOUNTER (INPATIENT)
Facility: REHABILITATION | Age: 78
LOS: 16 days | DRG: 949 | End: 2018-10-25
Attending: PHYSICAL MEDICINE & REHABILITATION | Admitting: PHYSICAL MEDICINE & REHABILITATION
Payer: MEDICARE

## 2018-10-09 VITALS
DIASTOLIC BLOOD PRESSURE: 64 MMHG | OXYGEN SATURATION: 97 % | HEART RATE: 88 BPM | SYSTOLIC BLOOD PRESSURE: 115 MMHG | WEIGHT: 161.38 LBS | HEIGHT: 67 IN | TEMPERATURE: 98.4 F | RESPIRATION RATE: 31 BRPM | BODY MASS INDEX: 25.33 KG/M2

## 2018-10-09 DIAGNOSIS — G93.40 ENCEPHALOPATHY ACUTE: ICD-10-CM

## 2018-10-09 DIAGNOSIS — I62.9 ACUTE INTRACRANIAL HEMORRHAGE (HCC): ICD-10-CM

## 2018-10-09 LAB
ANION GAP SERPL CALC-SCNC: 11 MMOL/L (ref 0–11.9)
BASOPHILS # BLD AUTO: 0.5 % (ref 0–1.8)
BASOPHILS # BLD: 0.05 K/UL (ref 0–0.12)
BUN SERPL-MCNC: 25 MG/DL (ref 8–22)
CALCIUM SERPL-MCNC: 9.5 MG/DL (ref 8.5–10.5)
CHLORIDE SERPL-SCNC: 100 MMOL/L (ref 96–112)
CO2 SERPL-SCNC: 25 MMOL/L (ref 20–33)
CREAT SERPL-MCNC: 0.56 MG/DL (ref 0.5–1.4)
EOSINOPHIL # BLD AUTO: 0.24 K/UL (ref 0–0.51)
EOSINOPHIL NFR BLD: 2.5 % (ref 0–6.9)
ERYTHROCYTE [DISTWIDTH] IN BLOOD BY AUTOMATED COUNT: 40.3 FL (ref 35.9–50)
GLUCOSE SERPL-MCNC: 104 MG/DL (ref 65–99)
HCT VFR BLD AUTO: 35.1 % (ref 37–47)
HGB BLD-MCNC: 12.1 G/DL (ref 12–16)
IMM GRANULOCYTES # BLD AUTO: 0.15 K/UL (ref 0–0.11)
IMM GRANULOCYTES NFR BLD AUTO: 1.6 % (ref 0–0.9)
INR PPP: 1.09 (ref 0.87–1.13)
LYMPHOCYTES # BLD AUTO: 1.64 K/UL (ref 1–4.8)
LYMPHOCYTES NFR BLD: 17.3 % (ref 22–41)
MAGNESIUM SERPL-MCNC: 2.1 MG/DL (ref 1.5–2.5)
MCH RBC QN AUTO: 30 PG (ref 27–33)
MCHC RBC AUTO-ENTMCNC: 34.5 G/DL (ref 33.6–35)
MCV RBC AUTO: 86.9 FL (ref 81.4–97.8)
MONOCYTES # BLD AUTO: 0.86 K/UL (ref 0–0.85)
MONOCYTES NFR BLD AUTO: 9.1 % (ref 0–13.4)
NEUTROPHILS # BLD AUTO: 6.53 K/UL (ref 2–7.15)
NEUTROPHILS NFR BLD: 69 % (ref 44–72)
NRBC # BLD AUTO: 0 K/UL
NRBC BLD-RTO: 0 /100 WBC
PLATELET # BLD AUTO: 314 K/UL (ref 164–446)
PMV BLD AUTO: 10.1 FL (ref 9–12.9)
POTASSIUM SERPL-SCNC: 4 MMOL/L (ref 3.6–5.5)
PROTHROMBIN TIME: 14.2 SEC (ref 12–14.6)
RBC # BLD AUTO: 4.04 M/UL (ref 4.2–5.4)
SODIUM SERPL-SCNC: 136 MMOL/L (ref 135–145)
WBC # BLD AUTO: 9.5 K/UL (ref 4.8–10.8)

## 2018-10-09 PROCEDURE — 770010 HCHG ROOM/CARE - REHAB SEMI PRIVAT*

## 2018-10-09 PROCEDURE — 83735 ASSAY OF MAGNESIUM: CPT

## 2018-10-09 PROCEDURE — 94760 N-INVAS EAR/PLS OXIMETRY 1: CPT

## 2018-10-09 PROCEDURE — A9270 NON-COVERED ITEM OR SERVICE: HCPCS | Performed by: INTERNAL MEDICINE

## 2018-10-09 PROCEDURE — 700102 HCHG RX REV CODE 250 W/ 637 OVERRIDE(OP): Performed by: INTERNAL MEDICINE

## 2018-10-09 PROCEDURE — A9270 NON-COVERED ITEM OR SERVICE: HCPCS | Performed by: PHYSICAL MEDICINE & REHABILITATION

## 2018-10-09 PROCEDURE — 99233 SBSQ HOSP IP/OBS HIGH 50: CPT | Performed by: INTERNAL MEDICINE

## 2018-10-09 PROCEDURE — 80048 BASIC METABOLIC PNL TOTAL CA: CPT

## 2018-10-09 PROCEDURE — A9270 NON-COVERED ITEM OR SERVICE: HCPCS | Performed by: HOSPITALIST

## 2018-10-09 PROCEDURE — 700102 HCHG RX REV CODE 250 W/ 637 OVERRIDE(OP): Performed by: HOSPITALIST

## 2018-10-09 PROCEDURE — 85610 PROTHROMBIN TIME: CPT

## 2018-10-09 PROCEDURE — 700102 HCHG RX REV CODE 250 W/ 637 OVERRIDE(OP): Performed by: PHYSICAL MEDICINE & REHABILITATION

## 2018-10-09 PROCEDURE — 99223 1ST HOSP IP/OBS HIGH 75: CPT | Mod: AI | Performed by: PHYSICAL MEDICINE & REHABILITATION

## 2018-10-09 PROCEDURE — 99239 HOSP IP/OBS DSCHRG MGMT >30: CPT | Performed by: HOSPITALIST

## 2018-10-09 PROCEDURE — 700111 HCHG RX REV CODE 636 W/ 250 OVERRIDE (IP): Performed by: PHYSICAL MEDICINE & REHABILITATION

## 2018-10-09 PROCEDURE — 92526 ORAL FUNCTION THERAPY: CPT

## 2018-10-09 PROCEDURE — 85025 COMPLETE CBC W/AUTO DIFF WBC: CPT

## 2018-10-09 PROCEDURE — 700111 HCHG RX REV CODE 636 W/ 250 OVERRIDE (IP): Performed by: INTERNAL MEDICINE

## 2018-10-09 RX ORDER — POLYETHYLENE GLYCOL 3350 17 G/17G
1 POWDER, FOR SOLUTION ORAL
Status: DISCONTINUED | OUTPATIENT
Start: 2018-10-09 | End: 2018-10-15

## 2018-10-09 RX ORDER — METOPROLOL TARTRATE 50 MG/1
50 TABLET, FILM COATED ORAL EVERY 8 HOURS
Status: CANCELLED | OUTPATIENT
Start: 2018-10-09

## 2018-10-09 RX ORDER — AMLODIPINE BESYLATE 5 MG/1
5 TABLET ORAL DAILY
Qty: 30 TAB | Status: ON HOLD
Start: 2018-10-10 | End: 2018-10-25

## 2018-10-09 RX ORDER — SODIUM CHLORIDE 1 G/1
2 TABLET ORAL
Status: DISCONTINUED | OUTPATIENT
Start: 2018-10-09 | End: 2018-10-09 | Stop reason: HOSPADM

## 2018-10-09 RX ORDER — HEPARIN SODIUM 5000 [USP'U]/ML
5000 INJECTION, SOLUTION INTRAVENOUS; SUBCUTANEOUS EVERY 12 HOURS
Status: DISCONTINUED | OUTPATIENT
Start: 2018-10-09 | End: 2018-10-18

## 2018-10-09 RX ORDER — SODIUM CHLORIDE 1 G/1
2 TABLET ORAL
Qty: 90 TAB | Refills: 3 | Status: ON HOLD | OUTPATIENT
Start: 2018-10-09 | End: 2018-10-25

## 2018-10-09 RX ORDER — TRAZODONE HYDROCHLORIDE 50 MG/1
50 TABLET ORAL
Status: DISCONTINUED | OUTPATIENT
Start: 2018-10-09 | End: 2018-10-19

## 2018-10-09 RX ORDER — AMLODIPINE BESYLATE 5 MG/1
5 TABLET ORAL
Status: DISCONTINUED | OUTPATIENT
Start: 2018-10-10 | End: 2018-10-09 | Stop reason: HOSPADM

## 2018-10-09 RX ORDER — BISACODYL 10 MG
10 SUPPOSITORY, RECTAL RECTAL
Status: DISCONTINUED | OUTPATIENT
Start: 2018-10-09 | End: 2018-10-09 | Stop reason: HOSPADM

## 2018-10-09 RX ORDER — ACETAMINOPHEN 325 MG/1
650 TABLET ORAL EVERY 4 HOURS PRN
Status: DISCONTINUED | OUTPATIENT
Start: 2018-10-09 | End: 2018-10-25 | Stop reason: HOSPADM

## 2018-10-09 RX ORDER — BISACODYL 10 MG
10 SUPPOSITORY, RECTAL RECTAL
Status: DISCONTINUED | OUTPATIENT
Start: 2018-10-09 | End: 2018-10-15

## 2018-10-09 RX ORDER — SODIUM CHLORIDE 1 G/1
2 TABLET ORAL
Status: CANCELLED | OUTPATIENT
Start: 2018-10-09

## 2018-10-09 RX ORDER — DIGOXIN 125 MCG
125 TABLET ORAL DAILY
Status: DISCONTINUED | OUTPATIENT
Start: 2018-10-09 | End: 2018-10-25 | Stop reason: HOSPADM

## 2018-10-09 RX ORDER — OXYCODONE HYDROCHLORIDE 5 MG/1
5 TABLET ORAL
Status: DISCONTINUED | OUTPATIENT
Start: 2018-10-09 | End: 2018-10-09 | Stop reason: HOSPADM

## 2018-10-09 RX ORDER — ACETAMINOPHEN 650 MG/1
650 SUPPOSITORY RECTAL EVERY 4 HOURS PRN
Status: DISCONTINUED | OUTPATIENT
Start: 2018-10-09 | End: 2018-10-09 | Stop reason: HOSPADM

## 2018-10-09 RX ORDER — HEPARIN SODIUM 5000 [USP'U]/ML
5000 INJECTION, SOLUTION INTRAVENOUS; SUBCUTANEOUS EVERY 12 HOURS
Refills: 0 | Status: ON HOLD
Start: 2018-10-09 | End: 2018-10-25

## 2018-10-09 RX ORDER — ONDANSETRON 4 MG/1
4 TABLET, ORALLY DISINTEGRATING ORAL 4 TIMES DAILY PRN
Status: DISCONTINUED | OUTPATIENT
Start: 2018-10-09 | End: 2018-10-25 | Stop reason: HOSPADM

## 2018-10-09 RX ORDER — HYDRALAZINE HYDROCHLORIDE 25 MG/1
25 TABLET, FILM COATED ORAL EVERY 8 HOURS PRN
Status: DISCONTINUED | OUTPATIENT
Start: 2018-10-09 | End: 2018-10-25 | Stop reason: HOSPADM

## 2018-10-09 RX ORDER — ONDANSETRON 2 MG/ML
4 INJECTION INTRAMUSCULAR; INTRAVENOUS 4 TIMES DAILY PRN
Status: DISCONTINUED | OUTPATIENT
Start: 2018-10-09 | End: 2018-10-25 | Stop reason: HOSPADM

## 2018-10-09 RX ORDER — HYDROMORPHONE HYDROCHLORIDE 2 MG/ML
.25-1 INJECTION, SOLUTION INTRAMUSCULAR; INTRAVENOUS; SUBCUTANEOUS
Status: DISCONTINUED | OUTPATIENT
Start: 2018-10-09 | End: 2018-10-09 | Stop reason: HOSPADM

## 2018-10-09 RX ORDER — HEPARIN SODIUM 5000 [USP'U]/ML
5000 INJECTION, SOLUTION INTRAVENOUS; SUBCUTANEOUS EVERY 12 HOURS
Status: CANCELLED | OUTPATIENT
Start: 2018-10-09

## 2018-10-09 RX ORDER — DIGOXIN 125 MCG
125 TABLET ORAL DAILY
Status: CANCELLED | OUTPATIENT
Start: 2018-10-09

## 2018-10-09 RX ORDER — ACETAMINOPHEN 325 MG/1
650 TABLET ORAL EVERY 4 HOURS PRN
Qty: 30 TAB | Refills: 0 | Status: SHIPPED | OUTPATIENT
Start: 2018-10-09

## 2018-10-09 RX ORDER — POLYVINYL ALCOHOL 14 MG/ML
1 SOLUTION/ DROPS OPHTHALMIC PRN
Status: DISCONTINUED | OUTPATIENT
Start: 2018-10-09 | End: 2018-10-25 | Stop reason: HOSPADM

## 2018-10-09 RX ORDER — AMLODIPINE BESYLATE 5 MG/1
5 TABLET ORAL
Status: DISCONTINUED | OUTPATIENT
Start: 2018-10-10 | End: 2018-10-10

## 2018-10-09 RX ORDER — AMOXICILLIN 250 MG
2 CAPSULE ORAL 2 TIMES DAILY
Status: DISCONTINUED | OUTPATIENT
Start: 2018-10-09 | End: 2018-10-09 | Stop reason: HOSPADM

## 2018-10-09 RX ORDER — DIGOXIN 125 MCG
125 TABLET ORAL DAILY
Status: DISCONTINUED | OUTPATIENT
Start: 2018-10-09 | End: 2018-10-09 | Stop reason: HOSPADM

## 2018-10-09 RX ORDER — TRAMADOL HYDROCHLORIDE 50 MG/1
50 TABLET ORAL EVERY 4 HOURS PRN
Status: DISCONTINUED | OUTPATIENT
Start: 2018-10-09 | End: 2018-10-25 | Stop reason: HOSPADM

## 2018-10-09 RX ORDER — OMEPRAZOLE 20 MG/1
20 CAPSULE, DELAYED RELEASE ORAL DAILY
Status: DISCONTINUED | OUTPATIENT
Start: 2018-10-10 | End: 2018-10-12

## 2018-10-09 RX ORDER — ACETAMINOPHEN 325 MG/1
650 TABLET ORAL EVERY 4 HOURS PRN
Status: DISCONTINUED | OUTPATIENT
Start: 2018-10-09 | End: 2018-10-09 | Stop reason: HOSPADM

## 2018-10-09 RX ORDER — AMLODIPINE BESYLATE 5 MG/1
5 TABLET ORAL
Status: CANCELLED | OUTPATIENT
Start: 2018-10-10

## 2018-10-09 RX ORDER — ONDANSETRON 4 MG/1
4 TABLET, ORALLY DISINTEGRATING ORAL EVERY 4 HOURS PRN
Qty: 10 TAB | Refills: 0 | Status: ON HOLD
Start: 2018-10-09 | End: 2018-10-25

## 2018-10-09 RX ORDER — ALUMINA, MAGNESIA, AND SIMETHICONE 2400; 2400; 240 MG/30ML; MG/30ML; MG/30ML
20 SUSPENSION ORAL
Status: DISCONTINUED | OUTPATIENT
Start: 2018-10-09 | End: 2018-10-25 | Stop reason: HOSPADM

## 2018-10-09 RX ORDER — DIGOXIN 125 MCG
125 TABLET ORAL DAILY
Qty: 30 TAB | Status: ON HOLD
Start: 2018-10-09 | End: 2018-10-25

## 2018-10-09 RX ORDER — AMOXICILLIN 250 MG
2 CAPSULE ORAL 2 TIMES DAILY
Status: DISCONTINUED | OUTPATIENT
Start: 2018-10-09 | End: 2018-10-15

## 2018-10-09 RX ORDER — OXYCODONE HYDROCHLORIDE 5 MG/1
2.5 TABLET ORAL
Status: DISCONTINUED | OUTPATIENT
Start: 2018-10-09 | End: 2018-10-09 | Stop reason: HOSPADM

## 2018-10-09 RX ORDER — POLYETHYLENE GLYCOL 3350 17 G/17G
1 POWDER, FOR SOLUTION ORAL
Status: DISCONTINUED | OUTPATIENT
Start: 2018-10-09 | End: 2018-10-09 | Stop reason: HOSPADM

## 2018-10-09 RX ORDER — METOPROLOL TARTRATE 50 MG/1
50 TABLET, FILM COATED ORAL EVERY 8 HOURS
Status: DISCONTINUED | OUTPATIENT
Start: 2018-10-09 | End: 2018-10-09 | Stop reason: HOSPADM

## 2018-10-09 RX ORDER — ECHINACEA PURPUREA EXTRACT 125 MG
2 TABLET ORAL PRN
Status: DISCONTINUED | OUTPATIENT
Start: 2018-10-09 | End: 2018-10-25 | Stop reason: HOSPADM

## 2018-10-09 RX ORDER — SODIUM CHLORIDE 1 G/1
2 TABLET ORAL
Status: DISCONTINUED | OUTPATIENT
Start: 2018-10-09 | End: 2018-10-10

## 2018-10-09 RX ADMIN — AMLODIPINE BESYLATE 5 MG: 5 TABLET ORAL at 04:59

## 2018-10-09 RX ADMIN — METOPROLOL TARTRATE 50 MG: 50 TABLET ORAL at 13:05

## 2018-10-09 RX ADMIN — HEPARIN SODIUM 5000 UNITS: 5000 INJECTION, SOLUTION INTRAVENOUS; SUBCUTANEOUS at 04:59

## 2018-10-09 RX ADMIN — Medication 2 G: at 18:13

## 2018-10-09 RX ADMIN — ACETAMINOPHEN 650 MG: 325 TABLET, FILM COATED ORAL at 13:05

## 2018-10-09 RX ADMIN — HEPARIN SODIUM 5000 UNITS: 5000 INJECTION, SOLUTION INTRAVENOUS; SUBCUTANEOUS at 19:56

## 2018-10-09 RX ADMIN — OXYCODONE HYDROCHLORIDE 5 MG: 5 TABLET ORAL at 08:05

## 2018-10-09 RX ADMIN — SENNOSIDES AND DOCUSATE SODIUM 2 TABLET: 8.6; 5 TABLET ORAL at 19:56

## 2018-10-09 RX ADMIN — DIGOXIN 125 MCG: 125 TABLET ORAL at 18:13

## 2018-10-09 RX ADMIN — SODIUM CHLORIDE TAB 1 GM 2 G: 1 TAB at 11:30

## 2018-10-09 RX ADMIN — SODIUM CHLORIDE TAB 1 GM 2 G: 1 TAB at 06:03

## 2018-10-09 RX ADMIN — METOPROLOL TARTRATE 50 MG: 25 TABLET ORAL at 20:06

## 2018-10-09 RX ADMIN — METOPROLOL TARTRATE 50 MG: 50 TABLET ORAL at 04:59

## 2018-10-09 ASSESSMENT — ENCOUNTER SYMPTOMS
FEVER: 0
ABDOMINAL DISTENTION: 0
BACK PAIN: 0
EYE PAIN: 0
HEADACHES: 0
DIZZINESS: 0
SLEEP DISTURBANCE: 0
POLYDIPSIA: 0
SEIZURES: 0
ADENOPATHY: 0
CHEST TIGHTNESS: 0

## 2018-10-09 ASSESSMENT — PAIN SCALES - GENERAL
PAINLEVEL_OUTOF10: 0
PAINLEVEL_OUTOF10: 0
PAINLEVEL_OUTOF10: 1
PAINLEVEL_OUTOF10: 7
PAINLEVEL_OUTOF10: 8
PAINLEVEL_OUTOF10: 0

## 2018-10-09 ASSESSMENT — PATIENT HEALTH QUESTIONNAIRE - PHQ9
2. FEELING DOWN, DEPRESSED, IRRITABLE, OR HOPELESS: NOT AT ALL
SUM OF ALL RESPONSES TO PHQ9 QUESTIONS 1 AND 2: 0
1. LITTLE INTEREST OR PLEASURE IN DOING THINGS: NOT AT ALL

## 2018-10-09 ASSESSMENT — LIFESTYLE VARIABLES: EVER_SMOKED: NEVER

## 2018-10-09 NOTE — CARE PLAN
Problem: Safety  Goal: Will remain free from injury    Intervention: Provide assistance with mobility  Standby assist and front wheel walker with mobilization

## 2018-10-09 NOTE — DISCHARGE INSTRUCTIONS
Discharge Instructions    Discharged to other by medical transportation with relative. Discharged via wheelchair, hospital escort: Yes.  Special equipment needed: Wheelchair    Be sure to schedule a follow-up appointment with your primary care doctor or any specialists as instructed.     Discharge Plan:   Diet Plan: Discussed  Activity Level: Discussed  Confirmed Follow up Appointment: No (Comments)  Confirmed Symptoms Management: Discussed  Medication Reconciliation Updated: Yes  Influenza Vaccine Indication: Patient Refuses    I understand that a diet low in cholesterol, fat, and sodium is recommended for good health. Unless I have been given specific instructions below for another diet, I accept this instruction as my diet prescription.       Special Instructions: None    · Is patient discharged on Warfarin / Coumadin?   No     Depression / Suicide Risk    As you are discharged from this [facility] facility, it is important to learn how to keep safe from harming yourself.    Recognize the warning signs:  · Abrupt changes in personality, positive or negative- including increase in energy   · Giving away possessions  · Change in eating patterns- significant weight changes-  positive or negative  · Change in sleeping patterns- unable to sleep or sleeping all the time   · Unwillingness or inability to communicate  · Depression  · Unusual sadness, discouragement and loneliness  · Talk of wanting to die  · Neglect of personal appearance   · Rebelliousness- reckless behavior  · Withdrawal from people/activities they love  · Confusion- inability to concentrate     If you or a loved one observes any of these behaviors or has concerns about self-harm, here's what you can do:  · Talk about it- your feelings and reasons for harming yourself  · Remove any means that you might use to hurt yourself (examples: pills, rope, extension cords, firearm)  · Get professional help from the community (Mental Health, Substance Abuse,  psychological counseling)  · Do not be alone:Call your Safe Contact- someone whom you trust who will be there for you.  · Call your local CRISIS HOTLINE 537-2929 or 082-778-6207  · Call your local Children's Mobile Crisis Response Team [place] (388) 498-0100 or www.eShakti.com  · Call the toll free National Suicide Prevention Hotlines   · National Suicide Prevention Lifeline 568-760-NLYI (4917)  · National Hope Line Network 800-SUICIDE (923-2984)

## 2018-10-09 NOTE — DISCHARGE PLANNING
Anticipated Discharge Disposition: Rehab    Action:Pr has been accepted to [facility] and transport arranged for 1530 today.    Barriers to Discharge:none    Plan:[facility] at 1530.

## 2018-10-09 NOTE — PREADMISSION SCREENING NOTE
"  Pre-Admission Screening Form    Patient Information:   Name: Yessi Hess     MRN: [id number]       : 1940      Age: 78 y.o.   Gender: female      Race: White [7]       Marital Status:  [5]  Family Contact: Nan Hess,Carlton Hess,Melchor Hernandez,Jet Ramos        Relationship: Daughter [2]  Son-in-law [27]  Son [15]  Daughter-in-law [28]  Son-in-law [27]  Home Phone: [phone]             Cell Phone:         [phone]  Advanced Directives: None  Code Status:  FULL  Current Attending Provider: Michael Ahumada M.D.  Referring Physician: Dr. Ahumada   Physiatrist Consult: Dr. Nguyen    Referral Date: 10-07-18  Primary Payor Source:  MEDICARE  Secondary Payor Source:  [facility]    Medical Information:   Date of Admission to Acute Care [place]2018  Room Number: S130/00  Rehabilitation Diagnosis:  Traumatic, Closed Injury    There is no immunization history on file for this patient.  Allergies   Allergen Reactions   • Corticosteroids Rash     \"Family does not know the exact name of the drug\"     Past Medical History:   Diagnosis Date   • A-fib (HCC)    • Lymphoma of spleen (HCC) 2016    Finished treatment in 2018     Past Surgical History:   Procedure Laterality Date   • VENTRICULOSTOMY Bilateral 2018    Procedure: VENTRICULOSTOMY BILATERAL SHUNT;  Surgeon: Juan Alberto Santizo III, M.D.;  Location: SURGERY [facility];  Service: Neurosurgery       History Leading to Admission, Conditions that Caused the Need for Rehab (CMS):     Rowena Calero M.D. Physician Addendum [facility]  H&P Date of Service: 2018 10:15 AM         []Hide copied text  []Charis for attribution information  Hospital Medicine History & Physical Note     Date of Service  2018     Primary Care Physician  No primary care provider on file.     Consultants  Inrtensivist Dr. Frank  Neurosurgery Dr." "Sukhdev     Code Status  FULL     Chief Complaint  Headache, transferred from [facility] for ICH     History of Presenting Illness  78 y.o. female who presented 9/22/2018 with a headache this morning.  She had suffered a fall from water under a new kitchen mat a week ago, and had had a CT head at that time which was negative.  She presented to the [facility] ER for a headache today some nausea, and was found to have a subdural hematoma as well as a subarachnoid hemorrhage.  She was transferred to [facility] for further evaluation by neurosurgery.  She has been taken now to the OR.     Review of Systems  Review of Systems   Unable to perform ROS: Mental acuity   Is able to say she's nauseated, and her head hurts     Past Medical History   has no past medical history on file.  Unable to obtain, we do know she has a history of atrial fibrillation for which she takes eliquis and metoprolol     Surgical History   has no past surgical history on file.   Unknown     Family History  family history is not on file.   Ujnobtainable at this time as she is lethargic     Social History   Unknown, unobtainable at this time     Allergies        Allergies   Allergen Reactions   • Corticosteroids Rash       \"Family does not know the exact name of the drug\"         Medications  Prior to Admission Medications   Prescriptions Last Dose Informant Patient Reported? Taking?   apixaban (ELIQUIS) 5mg Tab 9/22/2018 at 0400 Patient Yes Yes   Sig: Take 5 mg by mouth 2 Times a Day.   metoprolol (LOPRESSOR) 25 MG Tab unk at unk Patient Yes Yes   Sig: Take 25 mg by mouth 2 times a day.      Facility-Administered Medications: None         Physical Exam  Temp:  [35.5 °C (95.9 °F)] 35.5 °C (95.9 °F)  Pulse:  [65-74] 72  Resp:  [17-23] 20  BP: (137)/(62) 137/62     Physical Exam   Constitutional: She is oriented to person, place, and time. She appears well-developed and well-nourished.   HENT:   Head: Normocephalic and atraumatic.   Eyes: Pupils are equal, round, " and reactive to light.   Neck: Normal range of motion. Tracheal deviation present.   Cardiovascular: Normal rate and regular rhythm.    Murmur (3/6 SABA) heard.  Pulmonary/Chest: Effort normal and breath sounds normal. No respiratory distress. She has no wheezes. She has no rales.   Abdominal: Soft. Bowel sounds are normal. She exhibits no distension. There is no tenderness.   Musculoskeletal: Normal range of motion. She exhibits no edema.   5/5 MS throughout when prompted   Neurological: She is alert and oriented to person, place, and time. No cranial nerve deficit.   Lethargic but arousable   Skin: Skin is warm and dry. No erythema.   Psychiatric: She has a normal mood and affect.   Nursing note and vitals reviewed.        Laboratory:      Recent Labs      09/22/18   0810   WBC  10.0   RBC  4.10*   HEMOGLOBIN  12.2   HEMATOCRIT  36.5*   MCV  89.0   MCH  29.8   MCHC  33.4*   RDW  43.8   PLATELETCT  132*   MPV  10.6          Recent Labs      09/22/18   0810   SODIUM  139   POTASSIUM  3.9   CHLORIDE  104   CO2  25   GLUCOSE  144*   BUN  25*   CREATININE  0.65   CALCIUM  9.5          Recent Labs      09/22/18   0810   ALTSGPT  18   ASTSGOT  16   ALKPHOSPHAT  83   TBILIRUBIN  0.5   GLUCOSE  144*          Recent Labs      09/22/18   0810   APTT  29.0   INR  1.32*         Urinalysis:    No results found      Imaging:  CT-HEAD W/O   Final Result       1.  Interval placement of bilateral ventriculostomy catheters with reduction of ventricular dilation.   2.  RIGHT temporal subarachnoid hemorrhage is slightly more apparent than prior exam.   3.  RIGHT hemispheric subdural hematoma and temporal intraparenchymal hemorrhage are stable.   4.  Slight worsening RIGHT LEFT midline shift.       OUTSIDE IMAGES-CT HEAD   Final Result       CT-CTA HEAD WITH & W/O-POST PROCESS   Final Result   Addendum 1 of 1   These findings were discussed with Dr. Santizo on 9/22/2018 9:08 AM.       Final       1.  Stable intraventricular,  intraparenchymal, subarachnoid and subdural hemorrhage with 5 mm RIGHT-to-LEFT midline shift again seen.   2.  No intracranial aneurysm, focal stenosis, or abrupt large vessel cut off.   3.  LEFT frontal scalp hematoma.       OUTSIDE IMAGES-CT CERVICAL SPINE   Final Result       EC-ECHOCARDIOGRAM COMPLETE W/O CONT    (Results Pending)            Assessment/Plan:  I anticipate this patient will require at least two midnights for appropriate medical management, necessitating inpatient admission.         Acute intracranial hemorrhage (HCC)- (present on admission)   Assessment & Plan     She has been given reversal for Eliquis, and taken to the OR by Dr. Santizo  She received drains bilaterally  I have ordered ICH order set, bp control (currently 130s/80s)  Dr. Santizo following  Will go to ICU for q1 hour neuro checks          Paroxysmal atrial fibrillation (HCC)- (present on admission)   Assessment & Plan     Holding eliquis, got reversal  Continue lopressor prn  Currently rate controlled                VTE prophylaxis: SCDs        Pritesh Nguyen D.O. Physician Signed Physical Medicine & Rehab  Consults Date of Service: 10/8/2018 12:44 PM   Consult Orders:   IP CONSULT FOR PHYSIATRY [[id number]] ordered by Michael Ahumada M.D. at 10/08/18 1132      Expand All Collapse All    []Hide copied text                                                  Physical Medicine and Rehabilitation Consultation                                                                                      Initial Consult        Date of Consultation: 10/8/2018  Consulting provider: Pritesh Nguyen D.O.  Reason for consultation: management of intracranial hemorrhage, assess for acute inpatient rehab appropriateness  Consulting physician Michael Ahumada MD  Chief complaint: intracranial hemorrhage     HPI:  The patient is a 78 y.o. female with h/o A.fib on eliquis, with 1 week history of fall, who was admitted on 9/22 with AMS found to have  ight posterior medial temporal lobe intracranial hemorrhage and intraventricular hemorrhage.      Outside hospital CT scan of head reveled no intracranial hemorrhage     Intraparenchymal hemorrhage in the right posterior medial temporal lobe 5x3.3x3.3 cm with moderate vasogenic enema displacing the right temporal horn anteriorly and causing maked right to left midline shift of the ventricles ~13mm  Intraventricular hemorrhage present in the right lateral ventricle and 4th ventricle.   S/p Bifrontal ventriculostomies.      Complains of dizziness when standing up, improved when sitting down, small ambulation was fatiguing but she is willing to push at acute rehab     No HA at this time  Daughter was at bedside, is interested in taking her home with her, she reports that she will have 24 hour supervision.         ROS:  inconsistent answering of ROS questions, perseverative         PMH:  Past Medical History        Past Medical History:   Diagnosis Date   • A-fib (HCC)     • Lymphoma of spleen (HCC) 2016     Finished treatment in January 2018            PSH:  Past Surgical History         Past Surgical History:   Procedure Laterality Date   • VENTRICULOSTOMY Bilateral 9/22/2018     Procedure: VENTRICULOSTOMY BILATERAL SHUNT;  Surgeon: Juan Alberto Santizo III, M.D.;  Location: SURGERY [facility];  Service: Neurosurgery            FHX:  No history of stroke     Medications:       Current Facility-Administered Medications   Medication Dose   • labetalol (NORMODYNE,TRANDATE) injection 10 mg  10 mg   • sodium chloride (SALT) tablet 2 g  2 g   • acetaminophen (TYLENOL) tablet 650 mg  650 mg     Or   • acetaminophen (TYLENOL) suppository 650 mg  650 mg   • amLODIPine (NORVASC) tablet 5 mg  5 mg   • metoprolol (LOPRESSOR) tablet 50 mg  50 mg   • Pharmacy Consult: Enteral tube feeding - review meds/change route/product selection     • labetalol (NORMODYNE,TRANDATE) injection 5-10 mg  5-10 mg   • digoxin (LANOXIN) tablet 125 mcg   "125 mcg   • ondansetron (ZOFRAN ODT) dispertab 4 mg  4 mg     Or   • ondansetron (ZOFRAN) syringe/vial injection 4 mg  4 mg   • Pharmacy Consult Request ...Pain Management Review       And   • oxyCODONE immediate-release (ROXICODONE) tablet 2.5 mg  2.5 mg     And   • oxyCODONE immediate-release (ROXICODONE) tablet 5 mg  5 mg     And   • HYDROmorphone (DILAUDID) injection 0.25-1 mg  0.25-1 mg   • polyethylene glycol/lytes (MIRALAX) PACKET 1 Packet  1 Packet     And   • senna-docusate (PERICOLACE or SENOKOT S) 8.6-50 MG per tablet 2 Tab  2 Tab     And   • magnesium hydroxide (MILK OF MAGNESIA) suspension 30 mL  30 mL     And   • bisacodyl (DULCOLAX) suppository 10 mg  10 mg   • Respiratory Care per Protocol     • MD Alert...ICU Electrolyte Replacement per Pharmacy     • LORazepam (ATIVAN) injection 2 mg  2 mg   • hydrALAZINE (APRESOLINE) injection 10 mg  10 mg   • enalaprilat (VASOTEC) injection 1.25 mg  1.25 mg         Allergies:        Allergies   Allergen Reactions   • Corticosteroids Rash       \"Family does not know the exact name of the drug\"         Social Hx:  Pre-morbidly, this patient lived in a single level home with One steps to enter, with spouse.   Employment: retired        Prior level of function:   Independent     Current level of function:  Discussed with PT/OT today   ambulating with FWW short distance, less than 50ft Katharine, Min A with sit to stand           Physical Exam:  Vitals: Blood pressure 115/64, pulse 83, temperature 36.8 °C (98.2 °F), resp. rate (!) 28, height 1.702 m (5' 7.01\"), weight 73.2 kg (161 lb 6 oz), SpO2 94 %, not currently breastfeeding.  Gen: NAD, sitting in the chair  HEENT:  PERRLA, moist mucous membranes  Cardio: RRR, no mumurs  Pulm: CTAB, with normal respiratory effort  Abd: Soft NTND, active bowel sounds,   Ext: No peripheral edema. No calf tenderness. No clubbing/cyanosis. +dorsal pedalis pulses bilaterally.     Mental status: follows commands  Speech: fluent, no aphasia " or dysarthria     CRANIAL NERVES:  2,3: visual acuity grossly intact, PERRL  3,4,6: EOMI bilaterally, no nystagmus or diplopia  5: sensation intact to light touch bilaterally and symmetric  7: no facial asymmetry  8: hearing grossly intact  9,10: symmetric palate elevation  11: SCM/Trapezius strength 5/5 bilaterally  12: tongue protrudes midline     Motor:                            4/5 in all major myotomes, bilaterally     Sensory:   intact to light touch through out     DTRs: 2+ in bilateral biceps, triceps, brachioradialis, 3+ in left patellar and achilles tendons  No clonus at bilateral ankles  Negative babinski b/l  Negative Hodge b/l      Tone: no spasticity noted, no cogwheeling noted     Coordination:   Intact to finger and nose bilaterally        Labs:        Recent Labs      10/06/18   0559  10/07/18   0539  10/08/18   0543   RBC  3.67*  3.72*  3.79*   HEMOGLOBIN  10.8*  11.2*  11.5*   HEMATOCRIT  32.5*  32.9*  33.4*   PLATELETCT  311  296  310   PROTHROMBTM  14.5  14.2  14.7*   INR  1.12  1.09  1.14*            Recent Labs      10/06/18   0350  10/07/18   0539  10/08/18   0543   SODIUM  135  135  137   POTASSIUM  4.1  4.2  4.2   CHLORIDE  100  102  101   CO2  23  24  27   GLUCOSE  131*  125*  121*   BUN  29*  31*  30*   CREATININE  0.49*  0.44*  0.53   CALCIUM  9.6  9.3  9.6      Recent Results         Recent Results (from the past 24 hour(s))   CBC WITH DIFFERENTIAL     Collection Time: 10/08/18  5:43 AM   Result Value Ref Range     WBC 9.6 4.8 - 10.8 K/uL     RBC 3.79 (L) 4.20 - 5.40 M/uL     Hemoglobin 11.5 (L) 12.0 - 16.0 g/dL     Hematocrit 33.4 (L) 37.0 - 47.0 %     MCV 88.1 81.4 - 97.8 fL     MCH 30.3 27.0 - 33.0 pg     MCHC 34.4 33.6 - 35.0 g/dL     RDW 41.7 35.9 - 50.0 fL     Platelet Count 310 164 - 446 K/uL     MPV 9.8 9.0 - 12.9 fL     Neutrophils-Polys 72.70 (H) 44.00 - 72.00 %     Lymphocytes 13.50 (L) 22.00 - 41.00 %     Monocytes 9.60 0.00 - 13.40 %     Eosinophils 2.60 0.00 - 6.90 %      Basophils 0.40 0.00 - 1.80 %     Immature Granulocytes 1.20 (H) 0.00 - 0.90 %     Nucleated RBC 0.00 /100 WBC     Neutrophils (Absolute) 7.00 2.00 - 7.15 K/uL     Lymphs (Absolute) 1.30 1.00 - 4.80 K/uL     Monos (Absolute) 0.92 (H) 0.00 - 0.85 K/uL     Eos (Absolute) 0.25 0.00 - 0.51 K/uL     Baso (Absolute) 0.04 0.00 - 0.12 K/uL     Immature Granulocytes (abs) 0.12 (H) 0.00 - 0.11 K/uL     NRBC (Absolute) 0.00 K/uL   BASIC METABOLIC PANEL     Collection Time: 10/08/18  5:43 AM   Result Value Ref Range     Sodium 137 135 - 145 mmol/L     Potassium 4.2 3.6 - 5.5 mmol/L     Chloride 101 96 - 112 mmol/L     Co2 27 20 - 33 mmol/L     Glucose 121 (H) 65 - 99 mg/dL     Bun 30 (H) 8 - 22 mg/dL     Creatinine 0.53 0.50 - 1.40 mg/dL     Calcium 9.6 8.5 - 10.5 mg/dL     Anion Gap 9.0 0.0 - 11.9   MAGNESIUM     Collection Time: 10/08/18  5:43 AM   Result Value Ref Range     Magnesium 2.1 1.5 - 2.5 mg/dL   PROTHROMBIN TIME     Collection Time: 10/08/18  5:43 AM   Result Value Ref Range     PT 14.7 (H) 12.0 - 14.6 sec     INR 1.14 (H) 0.87 - 1.13   ESTIMATED GFR     Collection Time: 10/08/18  5:43 AM   Result Value Ref Range     GFR If African American >60 >60 mL/min/1.73 m 2     GFR If Non African American >60 >60 mL/min/1.73 m 2         Radiolgy:  CT head 10/8:  Impression:       1.  Evolving intra-axial hemorrhage in the right temporal and parietal lobe.  2.  Intraventricular hemorrhage.  3.  Right to left midline shift.  4.  Subdural fluid collection along the right cerebral hemisphere.  5.  Mild cerebral atrophy.  6.  There has been no significant interval change.         ASSESSMENT:   The patient is a 78 y.o. female with h/o A.fib on eliquis, with 1 week history of fall, who was admitted on 9/22 with AMS found to have ight posterior medial temporal lobe intracranial hemorrhage and intraventricular hemorrhage.      Neuro:    # intracranial hemorrhage and intraventricular hemorrhage.   - CT head 10/8 personally  evaluated today, no significant difference from 10/6  - Strict BP control < 160     Sz: ppx, no episodes during admission  - continued keppra for > 7 days, recommend DC Keppra since no episodes, can impair recovery     Orthostatic hypotension: symptoms of dizziness when standing  - place TANNER's and Tubi  on LE prior to getting out of bed to limit fluctuation in bp     Paroxysmal Afib:  Previously on eliquist, discussed with Dr Santizo who would prefer not using this medication given the hemorrhage for at least 2 weeks     Dysphagia: NPO  - recommend fluid per NG tube 350cc qshift  - change to bolus feeding to decrease fall risk, 4 cans per day  - follow SLP     DVT ppx:   - discussed with Dr Santizo who agreed with starting heparin 5000 units q12 as DVT ppx given decreased mobility and high risk of dvt  - discuss with neurology about full anticoag in the setting of intracranial bleed and paroxysmal A.fib, consider warfarin slow titration with close monitoring     Rehab: impaired ADL's and mobility  - pt is a good candidate for acute rehab, pending medical clearance and neurology consult     Discussed with pt and family, summarized hospitalization and care, options for next step of care     Discussed with team about recommendations      Patient with multiple co-morbidities(including but not limited to dysphagia, intracranial hemorrhage, paroxysmal A.fib); with cognitive/swallowing/speech deficits and functional deficits in mobility/self-care, and Moderate de-conditioning.      Pre-morbidly, this patient lived in a single level home with One steps to enter, alone and able to care for self. The patient was evaluated by acute care Physical Therapy, Occupational Therapy and Speech Language Pathology; currently requiring minimal assistance for mobility and minimal assistance for ADLs, also with ongoing cognitive, speech and swallowing deficits.      The patient is a(n) Excellent candidate for an acute inpatient  rehabilitation program with a coordinated program of care at an intensity and frequency not available at a lower level of care.      Note: if patient continues to progress while waiting for medical clearance, and no longer requires 2 of of 3 therapy services (PT/OT/SLP) at a CGA/Minimal assistance level, patient will no longer need acute inpatient rehabilitation.     This recommendation is substantiated by the patient's current medical condition with intervention and assessment of medical issues requiring an acute level of care for patient's safety and maximum outcome. A coordinated program of care will be provided by an interdisciplinary team including physical therapy, occupational therapy, speech language pathology, hospitalist, physiatry, rehab nursing and rehab psychology. Rehab goals include improved cognition, speech and swallowing, mobility, self-care management, strength and conditioning/endurance, pain management, bowel and bladder management, mood and affect, and safety with independent home management including caregiver training.      Estimated length of stay is approximately 21 days. Rehab potential: Excellent. Disposition: to pre-morbid independent living setting with supportive care of patient's family. We will continue to follow with you in anticipation of discharge to acute inpatient rehabilitation when medically stable to do so at the discretion of her  attending physician. Thank you for allowing us to participate in her care. Please call with any questions regarding this recommendation.     Pritesh Nguyen D.O.           Dr. Norton consult:  Assessment and Plan:         Acute intracranial hemorrhage (HCC)   Assessment & Plan     S/P fall 1 week ago - CT head on 9/14 negative for acute intracranial hemorrhage  Acute onset of headache earlier this morning  CT scan today with intraventricular hemorrhage, right greater than left and intraparenchymal hemorrhage  Noncommunicating  hydrocephalus  S/P emergent bilateral EVD placement by Dr. Sukhdev Park, 500 mg IV twice daily for seizure prophylaxis  Strict blood pressure control - goal SBP < 160  Hourly neurologic checks          Paroxysmal atrial fibrillation (HCC)   Assessment & Plan     Previously on apixaban, 5 mg twice daily - now strictly contraindicated  Currently in sinus rhythm  Rate control  Takes metoprolol, 25 mg twice daily                This lady is critically ill in the ICU following placement of bilateral EVDs for acute intracranial hemorrhage with hydrocephalus.  I have assessed and reassessed her respiratory status, blood pressure, hemodynamics, cardiovascular status and neurologic status.  She requires very close neurological observation in the ICU.  She is at high risk for worsening respiratory, cardiovascular and CNS system dysfunction.     High risk of deterioration and worsening vital organ dysfunction and death without the above critical care interventions.     Thank you for allowing me to participate in the care of this lady.  I will continue to follow her with great interest.     Critical Care Time: 35 minutes  [id number]  No time overlap  Time excludes procedures  Discussed with RN, RT, hospitalist     Zeke Norton MD  Pulmonary and Critical Care Medicine    Juan Alberto Santizo III, M.D. Physician Signed Surgery Neurosurgery  Consults Date of Service: 9/22/2018 10:03 AM         []Hide copied text  []Hover for attribution information  DATE OF SERVICE:  09/22/2018     EMERGENCY ROOM CONSULTATION     CHIEF COMPLAINT:  Mental status changes, interventricular hemorrhage, and   intraparenchymal hemorrhage.     HISTORY OF PRESENT ILLNESS:  This 78-year-old right-handed woman who   apparently fell from standing without any problems afterwards.  She had a lot   of facial bruising.  [place]  She reportedly woke up this morning with a significant headache.  She   did  take her Eliquis this morning, she is becoming more sleepy.  She was taken   to an outside hospital and it shows nearly casted right greater than left   interventricular hemorrhage secondary to an intraparenchymal hemorrhage   decompression.  She has a small subdural on the right.  She has minimal right   to left midline shift.  She has no overlying skull fracture.  She has a   cephalohematoma on the left side.  She is on her way to CT angiogram.  Her   Eliquis is being reversed.     PAST MEDICAL HISTORY:  She denies any further medical history except for   atrial fibrillation.     PAST SURGICAL HISTORY:  She denies.     SOCIAL HISTORY:  She does not smoke.  She does not drink.  She has no family   at her bedside.     PHYSICAL EXAMINATION:  GENERAL:  Although, she is lethargic, she answers her name.  She knows where   she is.  She does not know the year.  HEENT:  Her pupils are symmetric.  Her extraocular motions are intact.  Face   is full.  Tongue is midline.  she had a lot of neck ecchymosis in various   stages of healing.  EXTREMITIES:  She seems to move her arms and legs symmetrically with good   sensation in the face, arms and legs.     ASSESSMENT AND PLAN:  The patient is anticoagulated for atrial fibrillation   with spontaneous intracerebral hemorrhage with casted interventricular   hemorrhage.  I am recommending a left versus right versus bilateral external   ventricular drains for possible intraventricular TPA administration and   drainage of the hydrocephalus, which is forming.  I explained the risks,   benefits, and alternatives to her and her family including pain, infection,   bleeding, CSF leak, failure to completely resolve symptoms, neurologic   deficits, prolonged intubation and need for a shunt in the future.  They can   move forward with emergent EVD placement, should go to the ICU for q. 1 hour   neuro checks, head CT later on today.  MRI of the brain with and without   contrast to evaluate for  bleeding mass in the morning.  The mortality of   casting ventricles like this is 50%, but right now she is doing surprisingly   well.     Thank you for allowing us to participate in her care.  Should get daily coags.    We will evaluate need for future administration of PCC, Eliquis reversal in   the future.        Juan Alberto Santizo III, M.D. Physician Signed Surgery Neurosurgery  OP Report Date of Service: 9/22/2018 12:00 AM         []Hide copied text  []Hover for attribution information  DATE OF SERVICE:  09/22/2018     PREOPERATIVE DIAGNOSES:  1.  Intracerebral hemorrhage.  2.  Interventricular hemorrhage.  3.  Anticoagulation.  4.  Hydrocephalus, noncommunicative.     POSTOPERATIVE DIAGNOSES:  1.  Intracerebral hemorrhage.  2.  Interventricular hemorrhage.  3.  Anticoagulation.  4.  Hydrocephalus, noncommunicative.     PROCEDURES PERFORMED:  1.  Left-sided external ventricular drain.  2.  Right-sided external ventricular drain, both 50 modifier.     SURGEON:  Juan Alberto Santizo III, MD     ASSISTANT:  None.     COMPLICATIONS:  None.     DRAINS LEFT:  Bilateral external ventricular drain set at 0.     ANESTHESIA:  General.     COMPLICATIONS:  None.     ESTIMATED BLOOD LOSS:  Less  than 5 mL.     FINDINGS:  Clear blood-tinged clear CSF on the left, ICP approximately 12-15,   blood-tinged slow flow on the right.     DISPOSITION:  Patient is extubated to recovery and back to the ICU.     HISTORY OF PRESENT ILLNESS:  This 78-year-old woman, although she fell a week   ago, was doing well.  She had a negative CAT scan reportedly at an outside   hospital.  She had a sudden onset headache, has a right intraparenchymal   hemorrhage with intraventricular extension, near complete casting of right   much more than left third and fourth ventricles.  I explained risks, benefits   and alternatives via phone to her and her family for bilateral external   ventricular drainage for possible interventricular TPA administration and    treatment of the hydrocephalus to allow the blood to resolve.  I explained   risks, benefits and alternatives, which include pain, infection, bleeding, CSF   leak, failure to completely resolve symptoms, neurologic deficit, weakness,   numbness, speech difficulties, prolonged intubation, need for a shunt in the   future.  They understood the risks and benefits and agreed the consent.  She   was reversed on Eliquis.  She had a CT angiogram that ruled out aneurysm or   AVM.        Brain MRI 09-23-18:  1.  Large intraparenchymal hemorrhage in the right posterior medial temporal lobe which measures 5 x 3.3 x 3.3 cm in greatest diameter and has a moderate amount of surrounding vasogenic edema displacing the right temporal horn anteriorly and causing   marked right to left midline shift of the ventricular system of 13 mm.    2.  Marked amount of diffuse intraventricular hemorrhage especially pronounced in the right lateral ventricle and fourth ventricle.    3.  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    4.  Small holohemispheric right sided subdural hematoma effacing the underlying cortical sulci and gyri and measuring 13 mm in greatest diameter in the right frontal temporal region.    5.  No significant interval change from earlier head CT scan dated 9/22/2018    6.  3.6 cm subcutaneous hematoma in the left frontal region    Co-morbidities: See PMH  Potential Risk - Complications: Aphasia, Cognitive Impairment, Contractures, Deep Vein Thrombosis, Dysphagia, Incontinence, Malnutrition, Pain, Perceptual Impairment, Pneumonia, Pressure Ulcer, Seizures and Urinary Tract Infection  Level of Risk: High    Ongoing Medical Management Needed (Medical/Nursing Needs):   Patient Active Problem List    Diagnosis Date Noted   • Acute intracranial hemorrhage (HCC) 09/22/2018     Priority: High   • Hyponatremia 10/03/2018     Priority: Medium   • Hemorrhagic disorder due to extrinsic circulating  "anticoagulants (HCC) 09/24/2018     Priority: Medium   • Paroxysmal atrial fibrillation (HCC) 09/22/2018     Priority: Medium   • Oropharyngeal dysphagia 10/08/2018     Priority: Low   • Hypokalemia 09/23/2018     Priority: Low   • Hypomagnesemia 09/23/2018     Priority: Low   • Encephalopathy acute 09/27/2018     A & O    Current Vital Signs:   Temperature: 36.9 °C (98.4 °F) Pulse: 75 Respiration: 19 Blood Pressure : 115/64  Weight: 73.2 kg (161 lb 6 oz) Height: 170.2 cm (5' 7.01\")  Pulse Oximetry: 96 % O2 (LPM): 0      Completed Laboratory Reports:  Recent Labs      10/07/18   0539  10/08/18   0543  10/09/18   0329   WBC  9.0  9.6  9.5   HEMOGLOBIN  11.2*  11.5*  12.1   HEMATOCRIT  32.9*  33.4*  35.1*   PLATELETCT  296  310  314   SODIUM  135  137  136   POTASSIUM  4.2  4.2  4.0   BUN  31*  30*  25*   CREATININE  0.44*  0.53  0.56   GLUCOSE  125*  121*  104*   INR  1.09  1.14*  1.09     Additional Labs: Not Applicable    Prior Living Situation:   Housing / Facility: 1 Story House  Lives with - Patient's Self Care Capacity: Alone and Able to Care For Self  Equipment Owned: Unable to Determine At This Time    Prior Level of Function / Living Situation:   Physical Therapy: Prior Services: None  Housing / Facility: [address] [facility] [address]  Equipment Owned: Unable to Determine At This Time  Lives with - Patient's Self Care Capacity: Alone and Able to Care For Self  Bed Mobility: Independent  Transfer Status: Independent  Ambulation: Independent  Distance Ambulation (Feet):  (community ambulator)  Current Level of Function:   Level Of Assist: Minimal Assist  Assistive Device: Front Wheel Walker  Distance (Feet): 55  Deviation: Bradykinetic, Shuffled Gait, Ataxic  Weight Bearing Status: FWB  Supine to Sit:  (up in chair)  Sit to Supine:  (returned to chair)  Scooting: Moderate Assist  Rolling: Moderate Assist to Rt., Moderate Assist to Lt.  Sit to Stand: Minimal Assist  Bed, Chair, Wheelchair Transfer: Minimal Assist  Sitting in " Chair: >30min  Sitting Edge of Bed: 8min  Standinmin  Occupational Therapy:   Self Feeding: Unable To Determine At This Time  Dressing: Unable To Determine At This Time  Medication Management: Unable To Determine At This Time  Prior Level Of Mobility: Unable to Determine At This Time  Prior Services: None  Housing / Facility: [facility]  Current Level of Function:   Upper Body Dressing: Minimal Assist  Lower Body Dressing: Moderate Assist  Toileting: Maximal Assist  Skilled Intervention: Facilitation, Tactile Cuing, Verbal Cuing  Speech Language Pathology:   Assessment : FEES procedure explained, the patient agreed to proceed and tolerated well.  Upon insertion of the scope, edematous arytenoids were noted.  Vocal cords were symmetrical and complete vocal cord adduction was achieved during phonation but not during breath hold.  Presentation of PO included ice chips, nectars, purees, pudding, soft solids, and thin liquids.  The patient presented with moderate pharyngeal dysphagia as seen by reduced sensation, impaired tongue base retraction, sluggish epiglottic inversion and weak laryngeal elevation.  Subsequent premature spillage to the level of the valleculae and pyriform sinuses was observed with all consistencies as well as trace residue.  Penetration was observed before the swallow to the upper 1/3 of the epiglottis consistently with all consistencies but inconsistently had penetration that filled the laryngeal vestibule with applesauce and nectars with wet vocal cords noted after the swallow. Aspiration was visualized before the swallow with thins with spillage over the backside of epiglottis.  Aspiration noted after the swallow with pudding between the arytenoids.  At this time, the patient is not at the level for a PO diet.  Recommend she remain NPO with tube feeding.  OK for single ice chips x3-4 per hour with nursing only.  SLP following     Problem List: Dysphagia  Diet / Liquid Recommendation:  "Nectar Thick Liquid, Nectar Thick Full Liquid  Comments: Patient seen for dysphagia therapy on this date, per RN request.  Patient asking for a PO diet.  She demonstrated improved alertness and stamina as compared to prior sessions but remains confused and oriented to self. She was confuses as to the date, situation and location.  Presentation of PO included ice chips, nectars, and purees.  The patient presented with timely initiation of swallow trigger to mildly delayed and complete laryngeal elevation upon palpation.  She maintained clear vocal quality and had no overt s/sx of aspiration with any consistency consumed.  The patient demonstrated impaired initiation and required 1:1 feeding assistance. She followed directions to dysphagia exercises and completed laryngeal elevation, tongue base retraction and pharyngeal squeeze exercises x5 each with \"fair to good\" accuracy, given 1:1 model.  Discussed case with MD and received orders to initiate a Nectar Thick Full Liquid diet with STRICT 1:1 feeding assistance as needed and DIRECT supervision.  No pudding, given aspiration on FEES. Please hold tube feeding but do no pull the Cortrak until she is consistently consuming greater than 75% of meals without difficulty or s/sx of aspiration.  Patient still needs cog eval.    Rehabilitation Prognosis/Potential: Good  Estimated Length of Stay: 21-28 days    Nursing:   Orientation : Disoriented to Time  Continent    Scope/Intensity of Services Recommended:  Physical Therapy: 1 hr / day  5 days / week. Therapeutic Interventions Required: Maximize Endurance, Mobility, Strength and Safety  Occupational Therapy: 1 hr / day 5 days / week. Therapeutic Interventions Required: Maximize Self Care, ADLs, IADLs and Energy Conservation  Speech & Language Pathology: 1 hr / day 5 days / week. Therapeutic Interventions Required: Maximize Cognition, Swallowing and Safety  Rehabilitation Nursin/7. Therapeutic Interventions Required: " Monitor Pain, Skin, Wound(s), Vital Signs, Intake and Output, Labs, Safety, Aspiration Risk and Family Training  Rehabilitation Physician: 3 - 5 days / week. Therapeutic Interventions Required: Medical Management  Dietician: Tube Feedings-transitioning to PO nutrition. Therapeutic Interventions Required: .    Rehabilitation Goals and Plan (Expected frequency & duration of treatment in the IRF):   Return to the Community, Modified Independent Level of Care and Family Able to Provide 24/7 Assistance  Anticipated Date of Rehabilitation Admission: 10-09-18  Patient/Family oriented IRF level of care/facility/plan: Yes  Patient/Family willing to participate in IRF care/facility/plan: Yes  Patient able to tolerate IRF level of care proposed: Yes  Patient has potential to benefit IRF level of care proposed: Yes  Comments: Not Applicable    Special Needs or Precautions - Medical Necessity:  Tube Feedings   Safety Concerns/Precautions:  Fall Risk / High Risk for Falls, Balance, Cognition and Bed / Chair Alarm  Complex Wound Care: Surgical  Pain Management  Special Equipment: R PurePredictive  IV Site: Peripheral  Current Medications:    Current Facility-Administered Medications Ordered in Epic   Medication Dose Route Frequency Provider Last Rate Last Dose   • [START ON 10/10/2018] amLODIPine (NORVASC) tablet 5 mg  5 mg Oral Q DAY Jeremy M Gonda, M.D.       • digoxin (LANOXIN) tablet 125 mcg  125 mcg Oral DAILY AT 1800 Jeremy M Gonda, M.D.       • metoprolol (LOPRESSOR) tablet 50 mg  50 mg Oral Q8HRS Jeremy M Gonda, M.D.   50 mg at 10/09/18 1305   • senna-docusate (PERICOLACE or SENOKOT S) 8.6-50 MG per tablet 2 Tab  2 Tab Oral BID Jeremy M Gonda, M.D.        And   • polyethylene glycol/lytes (MIRALAX) PACKET 1 Packet  1 Packet Oral QDAY PRN Jeremy M Gonda, M.D.        And   • magnesium hydroxide (MILK OF MAGNESIA) suspension 30 mL  30 mL Oral QDAY PRN Jeremy M Gonda, M.D.        And   • bisacodyl (DULCOLAX) suppository 10 mg  10 mg  Rectal QDAY PRN Jeremy M Gonda, M.D.       • sodium chloride (SALT) tablet 2 g  2 g Oral TID WITH MEALS Jeremy M Gonda, M.D.   2 g at 10/09/18 1130   • acetaminophen (TYLENOL) tablet 650 mg  650 mg Oral Q4HRS PRN Jeremy M Gonda, M.D.   650 mg at 10/09/18 1305    Or   • acetaminophen (TYLENOL) suppository 650 mg  650 mg Rectal Q4HRS PRN Jeremy M Gonda, M.D.       • Pharmacy Consult Request ...Pain Management Review   Other PRN Jeremy M Gonda, M.D.        And   • oxyCODONE immediate-release (ROXICODONE) tablet 2.5 mg  2.5 mg Oral Q3HRS PRN Jeremy M Gonda, M.D.        And   • oxyCODONE immediate-release (ROXICODONE) tablet 5 mg  5 mg Oral Q3HRS PRN Jeremy M Gonda, M.D.        And   • HYDROmorphone (DILAUDID) injection 0.25-1 mg  0.25-1 mg Intravenous Q2HRS PRN Jeremy M Gonda, M.D.       • heparin injection 5,000 Units  5,000 Units Subcutaneous Q12HRS Jeremy M Gonda, M.D.   5,000 Units at 10/09/18 0459   • labetalol (NORMODYNE,TRANDATE) injection 10 mg  10 mg Intravenous Q HOUR PRN Raisa Bartlett P.A.-C.       • Pharmacy Consult: Enteral tube feeding - review meds/change route/product selection   Other PRN Gil Leone Jr., D.O.       • labetalol (NORMODYNE,TRANDATE) injection 5-10 mg  5-10 mg Intravenous Q4HRS PRN Gil Leone Jr., D.O.   5 mg at 09/26/18 0303   • ondansetron (ZOFRAN ODT) dispertab 4 mg  4 mg Feeding Tube Q4HRS PRN William Medina M.D.        Or   • ondansetron (ZOFRAN) syringe/vial injection 4 mg  4 mg Intravenous Q4HRS PRN William Medina M.D.       • Respiratory Care per Protocol   Nebulization Continuous RT Rowena M Kindig, M.D.       • MD Alert...ICU Electrolyte Replacement per Pharmacy   Other pharmacy to dose Rowena Calero M.D.       • LORazepam (ATIVAN) injection 2 mg  2 mg Intravenous Q5 MIN PRN Rowena Calero M.D.       • hydrALAZINE (APRESOLINE) injection 10 mg  10 mg Intravenous Q4HRS PRN Rowena Calero M.D.       • enalaprilat (VASOTEC) injection 1.25 mg  1.25 mg Intravenous  Q6HRS PRN Rowena Calero M.D.         Current Outpatient Prescriptions Ordered in [facility]   Medication Sig Dispense Refill   • acetaminophen (TYLENOL) 325 MG Tab Take 2 Tabs by mouth every four hours as needed. 30 Tab 0   • heparin 5000 UNIT/ML Solution Inject 1 mL as instructed every 12 hours.  0   • ondansetron (ZOFRAN ODT) 4 MG TABLET DISPERSIBLE Take 1 Tab by mouth every four hours as needed for Nausea (Nausea/Vomiting). 10 Tab 0   • digoxin (LANOXIN) 125 MCG Tab Take 1 Tab by mouth every day at 6 PM. 30 Tab    • [START ON 10/10/2018] amLODIPine (NORVASC) 5 MG Tab Take 1 Tab by mouth every day. 30 Tab    • sodium chloride (SALT) 1 GM Tab Take 2 Tabs by mouth 3 times a day, with meals. 90 Tab 3     Diet:   DIET ORDERS (Through next 24h)    Start     Ordered    10/08/18 1306  Diet Order Full Liquid (No pudding )  ALL MEALS     Question Answer Comment   Diet: Full Liquid No pudding    Consistency/Fluid modifications: Nectar Thick    Miscellaneous modifications: SLP - 1:1 Supervision by Nursing    Miscellaneous modifications: SLP - Deliver to Nursing Station        10/08/18 1305    09/24/18 1020  Diet Tube Feeding  CONTINUOUS DIET     Comments:  Start at 25mL/hr and advance per protocol.   Question Answer Comment   Which Rate/Volume? 60 ml/hr    Formula: REPLETE FIBER    Specify Type: CONTINUOUS        09/24/18 1019          Anticipated Discharge Destination / Patient/Family Goal:  Destination: Home with Assistance Support System: Daughter  Anticipated home health services: OT, PT and SLP  Previously used  service/ provider: Not Applicable  Anticipated DME Needs: [employer]  Outpatient Services: OT, PT and SLP  Alternative resources to address additional identified needs:     Pre-Screen Completed: 10/9/2018 1:40 PM Cr Gutierrez L.P.N.

## 2018-10-09 NOTE — FLOWSHEET NOTE
10/09/18 1614   Type of Assessment   Assessment Yes   Patient History   Pulmonary Diagnosis no   Surgical Procedures no   Home O2 No   Home Treatments/Frequency No   COPD Risk Screening   Do you have a history of COPD? No   Smoking History   Have you ever smoked Never   Level Of Consciousness   Level of Consciousness Alert   Respiratory WDL   Respiratory (WDL) X   Chest Exam   Respiration 18   Pulse 77   Breath Sounds   Pre/Post Intervention (clearr t/o)   Oximetry   #Pulse Oximetry (Single Determination) Yes   Oxygen   Home O2 Use Prior To Admission? No   Pulse Oximetry 96 %   O2 Daily Delivery Respiratory  Room Air with O2 Available

## 2018-10-09 NOTE — DISCHARGE SUMMARY
Discharge Summary    CHIEF COMPLAINT ON ADMISSION  Chief Complaint   Patient presents with   • Other     Transfer from [place] for brain bleed   • Head Ache     This AM   • N/V     This AM       Reason for Admission  Transfer     Admission Date  9/22/2018    CODE STATUS  Full Code    HPI & HOSPITAL COURSE  This is a 78 y.o. female here with headache       Ms. Hess has a history of atrial fibrillation previously on Elquis that had sustained a ground level fall in the kitchen about a week prior to admission. She had a CT of the head which was negative. She presented to [facility] due to worsening headache and nausea and a CT of the head revealed a subarachnoid hemorrhage thus she was transferred to [facility] for neurosurgery consultation and ICU admission. She was given reversal for Eliquis and underwent surgery by Dr. Santzio on 9/22/18 with bilateral drains placed. Both EVDs were removed 10/4. She has been continued on salt tabs with normal sodium levels. Her afib has been rate-controlled on digoxin and metoprolol.  RN notes that she has been tolerating her diet well. I discussed with her speech pathologists and she recommends pulling the cortrak.   Dr. Santizo has recommended remaining off full anticoagulation for one more week which would be starting on 10/16. As she is at significant risk of bleeding, I discussed with patient, her daughters at bedside, and Dr. Nguyen that perhaps starting with coumadin on 10/16 without a bridge and goal INR of 2-3 so that she can taper up slowly and can have an immediate reversal if needed. She has been cleared by Dr. Santizo for prophylactic heparin 5,000 units q12 hours.   An echocardiogram revealed a moderately severe pulmonary hypertension.   Therefore, she is discharged in good and stable condition to an inpatient rehabilitation hospital.    The patient met 2-midnight criteria for an inpatient stay at the time of discharge.    Discharge Date  10/9/18    FOLLOW UP ITEMS  "POST DISCHARGE  rehab    DISCHARGE DIAGNOSES  Principal Problem:    Acute intracranial hemorrhage (HCC) POA: Yes  Active Problems:    Paroxysmal atrial fibrillation (HCC) POA: Yes    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) POA: Yes    Hyponatremia POA: Unknown    Hypokalemia POA: No    Hypomagnesemia POA: Yes    Oropharyngeal dysphagia POA: Yes    Encephalopathy acute POA: Yes    Pulmonary hypertension         Resolved Problems:    * No resolved hospital problems. *      FOLLOW UP  No future appointments.  No follow-up provider specified.    MEDICATIONS ON DISCHARGE     Medication List      START taking these medications      Instructions   acetaminophen 325 MG Tabs  Commonly known as:  TYLENOL   Take 2 Tabs by mouth every four hours as needed.  Dose:  650 mg     amLODIPine 5 MG Tabs  Commonly known as:  NORVASC   Take 1 Tab by mouth every day.  Dose:  5 mg     digoxin 125 MCG Tabs  Commonly known as:  LANOXIN   Take 1 Tab by mouth every day at 6 PM.  Dose:  125 mcg     heparin 5000 UNIT/ML Soln   Inject 1 mL as instructed every 12 hours.  Dose:  5000 Units     ondansetron 4 MG Tbdp  Commonly known as:  ZOFRAN ODT   Take 1 Tab by mouth every four hours as needed for Nausea (Nausea/Vomiting).  Dose:  4 mg     sodium chloride 1 GM Tabs  Commonly known as:  SALT   Take 2 Tabs by mouth 3 times a day, with meals.  Dose:  2 g        CONTINUE taking these medications      Instructions   metoprolol 25 MG Tabs  Commonly known as:  LOPRESSOR   Take 25 mg by mouth 2 times a day.  Dose:  25 mg        STOP taking these medications    ELIQUIS 5mg Tabs  Generic drug:  apixaban            Allergies  Allergies   Allergen Reactions   • Corticosteroids Rash     \"Family does not know the exact name of the drug\"       DIET  Orders Placed This Encounter   Procedures   • Diet Order Full Liquid (No pudding )     Standing Status:   Standing     Number of Occurrences:   1     Order Specific Question:   Diet:    "  Answer:   Full Liquid [11]     Comments:   No pudding      Order Specific Question:   Consistency/Fluid modifications:     Answer:   Nectar Thick [2]     Order Specific Question:   Miscellaneous modifications:     Answer:   SLP - 1:1 Supervision by Nursing [21]     Order Specific Question:   Miscellaneous modifications:     Answer:   SLP - Deliver to Nursing Station [22]       ACTIVITY  As tolerated.  Weight bearing as tolerated    CONSULTATIONS  Dr. Santizo  Critical care    PROCEDURES  9/22/18 by Dr. Santizo:  PREOPERATIVE DIAGNOSES:  1.  Intracerebral hemorrhage.  2.  Interventricular hemorrhage.  3.  Anticoagulation.  4.  Hydrocephalus, noncommunicative.     POSTOPERATIVE DIAGNOSES:  1.  Intracerebral hemorrhage.  2.  Interventricular hemorrhage.  3.  Anticoagulation.  4.  Hydrocephalus, noncommunicative.     PROCEDURES PERFORMED:  1.  Left-sided external ventricular drain.  2.  Right-sided external ventricular drain, both 50 modifier    LABORATORY  Lab Results   Component Value Date    SODIUM 136 10/09/2018    POTASSIUM 4.0 10/09/2018    CHLORIDE 100 10/09/2018    CO2 25 10/09/2018    GLUCOSE 104 (H) 10/09/2018    BUN 25 (H) 10/09/2018    CREATININE 0.56 10/09/2018        Lab Results   Component Value Date    WBC 9.5 10/09/2018    HEMOGLOBIN 12.1 10/09/2018    HEMATOCRIT 35.1 (L) 10/09/2018    PLATELETCT 314 10/09/2018      CT head 10/8/18:  1.  Evolving intra-axial hemorrhage in the right temporal and parietal lobe.  2.  Intraventricular hemorrhage.  3.  Right to left midline shift.  4.  Subdural fluid collection along the right cerebral hemisphere.  5.  Mild cerebral atrophy.  6.  There has been no significant interval change    Echocardiogram revealed a RVSP of 62-67 mmHg    Total time of the discharge process exceeds 35 minutes.

## 2018-10-09 NOTE — DISCHARGE PLANNING
Msg placed to Dr. Stevens regarding Dr. Nguyen request for a Neuro consult and medical clearance.

## 2018-10-09 NOTE — DISCHARGE PLANNING
Msg received from Dr. Stevens asking to speak with Dr. Nguyen.  Msg delivered.  TCC remains following.

## 2018-10-09 NOTE — PROGRESS NOTES
2 RN skin check complete. No changes from previous skin assessment. Patient repositions self. Will continue to monitor.

## 2018-10-09 NOTE — CARE PLAN
Problem: Pain Management  Goal: Pain level will decrease to patient's comfort goal  Outcome: PROGRESSING AS EXPECTED  Assessing pain q2h. Medicated as needed with PRNs, see MAR.    Problem: Respiratory:  Goal: Respiratory status will improve  Outcome: PROGRESSING AS EXPECTED  Patient on RA, SpO2 98%

## 2018-10-09 NOTE — DISCHARGE PLANNING
Dr. Stevens has medically cleared.  In anticipation of Dr. Ortega accepting, I've arranged for transport @ 4130.  Msg left for Pat, CM [phone].

## 2018-10-09 NOTE — H&P
REHABILITATION HISTORY AND PHYSICAL/POST ADMISSION PHYSICAL EVALUATION    Date of Admission: 10/9/2018  4:38 PM  Yessi Hess  [id number]    [facility] Code / Diagnosis to Support: 02.22 Traumatic, Closed Injury  Reason for admission: Acute intracranial hemorrhage (HCC)    HPI:  Patient is a 78 y.o. female with a PMH of A fib on Eliquis who had a fall 1 week prior to admission on 9/22/18 and presented to OSH with new headache.  Patient reportedly had a mechanical fall while in the kitchen.  At [facility] ER she had worsening nausea and CT showed SDH, intraventricular hemorrhage and possible subarachnoid hemorrhage.  Eliquis was reversed.   Patient was transferred to [facility] and neurosurgery was consulted. Patient had bilateral EVD placement with Dr. Santizo on 9/22/18 with minimal blood loss.  Patient’s stay has been complicated by confusion, lethargy and previously with hyponatremia.  In addition complicated by A Fib. ECHO showed EF of 65% on 9/23/18.  EVDs were removed on 10/4/18 without complication.  Repeat CT head on 10/8/18 with stable findings.  Per discharge summary recommending to start Coumadin without bridge on 10/16/18 as NSG concern for extensive bleeds.  Patient on heparin on transfer.     Patient last evaluated by SLP on 10/9/18 and was recommended for NTFL diet.  Patient was last evaluated by OT on 10/8/18 and was min-modA for ADLs and transfers. Patient last evaluated by PT on 10/8/18 and was Martha for gait and with left neglect.     Patient in bed after transfer.  Her daughters are at bedside. Patient reports she usually has a headache at the end of the day. She currently has sutures in place.  She reports tylenol works well for her.  Otherwise she reports very poor sleep for 2 weeks.  Denies SOB or cough. Denies N/V/D.  Previously only on metoprolol and Eliquis.  Discussed plan for coumadin and patient/family in agreement. Patient reports she is concerned as she does not want to be on it long term.      REVIEW OF SYSTEMS:     Comprehensive 14 point ROS was reviewed and all were negative except as noted elsewhere in this document.     PMH:  Past Medical History:   Diagnosis Date   • A-fib (HCC)    • Lymphoma of spleen (HCC) 2016    Finished treatment in January 2018       PSH:  Past Surgical History:   Procedure Laterality Date   • VENTRICULOSTOMY Bilateral 9/22/2018    Procedure: VENTRICULOSTOMY BILATERAL SHUNT;  Surgeon: Juan Alberto Santizo III, M.D.;  Location: SURGERY [facility];  Service: Neurosurgery       FAMILY HISTORY:  No family history on file.    MEDICATIONS:  Current Facility-Administered Medications   Medication Dose   • Respiratory Care per Protocol     • Pharmacy Consult Request ...Pain Management Review 1 Each  1 Each   • tramadol (ULTRAM) 50 MG tablet 50 mg  50 mg   • acetaminophen (TYLENOL) tablet 650 mg  650 mg   • senna-docusate (PERICOLACE or SENOKOT S) 8.6-50 MG per tablet 2 Tab  2 Tab    And   • polyethylene glycol/lytes (MIRALAX) PACKET 1 Packet  1 Packet    And   • magnesium hydroxide (MILK OF MAGNESIA) suspension 30 mL  30 mL    And   • bisacodyl (DULCOLAX) suppository 10 mg  10 mg   • artificial tears 1.4 % ophthalmic solution 1 Drop  1 Drop   • benzocaine-menthol (CEPACOL) lozenge 1 Lozenge  1 Lozenge   • hydrALAZINE (APRESOLINE) tablet 25 mg  25 mg   • mag hydrox-al hydrox-simeth (MAALOX PLUS ES or MYLANTA DS) suspension 20 mL  20 mL   • ondansetron (ZOFRAN ODT) dispertab 4 mg  4 mg    Or   • ondansetron (ZOFRAN) syringe/vial injection 4 mg  4 mg   • traZODone (DESYREL) tablet 50 mg  50 mg   • sodium chloride (OCEAN) 0.65 % nasal spray 2 Spray  2 Spray   • [START ON 10/10/2018] amLODIPine (NORVASC) tablet 5 mg  5 mg   • digoxin (LANOXIN) tablet 125 mcg  125 mcg   • heparin injection 5,000 Units  5,000 Units   • metoprolol (LOPRESSOR) tablet 50 mg  50 mg   • sodium chloride (SALT) tablet 2 g  2 g       ALLERGIES:  Corticosteroids    PSYCHOSOCIAL HISTORY:  Housing / Facility: 1 Story  House  Lives with - Patient's Self Care Capacity: Alone and Able to Care For Self  Equipment Owned: Unable to Determine At This Time     Prior Level of Function / Living Situation:   Physical Therapy: Prior Services: None  Housing / Facility: [address] [facility]  Equipment Owned: Unable to Determine At This Time  Lives with - Patient's Self Care Capacity: Alone and Able to Care For Self  Bed Mobility: Independent  Transfer Status: Independent  Ambulation: Independent  Distance Ambulation (Feet):  (community ambulator)  Current Level of Function:   Level Of Assist: Minimal Assist  Assistive Device: Front Wheel Walker  Distance (Feet): 55  Deviation: Bradykinetic, Shuffled Gait, Ataxic  Weight Bearing Status: FWB  Supine to Sit:  (up in chair)  Sit to Supine:  (returned to chair)  Scooting: Moderate Assist  Rolling: Moderate Assist to Rt., Moderate Assist to Lt.  Sit to Stand: Minimal Assist  Bed, Chair, Wheelchair Transfer: Minimal Assist  Sitting in Chair: >30min  Sitting Edge of Bed: 8min  Standinmin  Occupational Therapy:   Self Feeding: Unable To Determine At This Time  Dressing: Unable To Determine At This Time  Medication Management: Unable To Determine At This Time  Prior Level Of Mobility: Unable to Determine At This Time  Prior Services: None  Housing / Facility: 1 [facility]  Current Level of Function:   Upper Body Dressing: Minimal Assist  Lower Body Dressing: Moderate Assist  Toileting: Maximal Assist  Skilled Intervention: Facilitation, Tactile Cuing, Verbal Cuing  Speech Language Pathology:   Assessment : FEES procedure explained, the patient agreed to proceed and tolerated well.  Upon insertion of the scope, edematous arytenoids were noted.  Vocal cords were symmetrical and complete vocal cord adduction was achieved during phonation but not during breath hold.  Presentation of PO included ice chips, nectars, purees, pudding, soft solids, and thin liquids.  The patient presented with moderate pharyngeal  "dysphagia as seen by reduced sensation, impaired tongue base retraction, sluggish epiglottic inversion and weak laryngeal elevation.  Subsequent premature spillage to the level of the valleculae and pyriform sinuses was observed with all consistencies as well as trace residue.  Penetration was observed before the swallow to the upper 1/3 of the epiglottis consistently with all consistencies but inconsistently had penetration that filled the laryngeal vestibule with applesauce and nectars with wet vocal cords noted after the swallow. Aspiration was visualized before the swallow with thins with spillage over the backside of epiglottis.  Aspiration noted after the swallow with pudding between the arytenoids.  At this time, the patient is not at the level for a PO diet.  Recommend she remain NPO with tube feeding.  OK for single ice chips x3-4 per hour with nursing only.  SLP following     Problem List: Dysphagia  Diet / Liquid Recommendation: Nectar Thick Liquid, Nectar Thick Full Liquid  Comments: Patient seen for dysphagia therapy on this date, per RN request.  Patient asking for a PO diet.  She demonstrated improved alertness and stamina as compared to prior sessions but remains confused and oriented to self. She was confuses as to the date, situation and location.  Presentation of PO included ice chips, nectars, and purees.  The patient presented with timely initiation of swallow trigger to mildly delayed and complete laryngeal elevation upon palpation.  She maintained clear vocal quality and had no overt s/sx of aspiration with any consistency consumed.  The patient demonstrated impaired initiation and required 1:1 feeding assistance. She followed directions to dysphagia exercises and completed laryngeal elevation, tongue base retraction and pharyngeal squeeze exercises x5 each with \"fair to good\" accuracy, given 1:1 model.  Discussed case with MD and received orders to initiate a Nectar Thick Full Liquid diet " "with STRICT 1:1 feeding assistance as needed and DIRECT supervision.  No pudding, given aspiration on FEES. Please hold tube feeding but do no pull the Cortrak until she is consistently consuming greater than 75% of meals without difficulty or s/sx of aspiration.  Patient still needs cog eval.    Rehabilitation Prognosis/Potential: Good  Estimated Length of Stay: 21-28 days     Nursing:   Orientation : Disoriented to Time  Continent       CURRENT LEVEL OF FUNCTION:   Same as level of function prior to admission to [facility]    PHYSICAL EXAM:     VITAL SIGNS:   height is 1.651 m (5' 5\") and weight is 70.8 kg (156 lb). Her temperature is 36.6 °C (97.8 °F). Her blood pressure is 105/61 and her pulse is 77. Her respiration is 18 and oxygen saturation is 96%.     GENERAL: No apparent distress  HEENT: Normocephalic/atraumatic, EOMI and PERRL; incision well healing, sutures in place  CARDIAC: Irregularly irregular, trace edema at bilateral ankles  LUNGS: Clear to auscultation; no wheezing   ABDOMINAL: bowel sounds present, soft, nontender and nondistended    EXTREMITIES: no contractures, spasticity, or edema.  No calf tenderness bilaterally  NEURO:  Mental status:  A&Ox4 (person, place, date, situation) answers questions appropriately follows commands; some confusion about hospital stay and timing.   Speech: fluent, no aphasia or dysarthria  CRANIAL NERVES: CN 2-12 intact, possible left neglect, as following with head mostly on right side  Motor:  4+/5 BUE  4/5 B HF/KE, 4+/5 APF, EHL  Sensory: intact to light touch through out  DTRs: 3+ in left patella vs 2+ on right. No clonus   Negative babinski b/l  Negative Hodge b/l   Tone: no spasticity noted  Coordination: finger to nose slow, turning head to avoid left side of field    RADIOLOGY:                                                CT head 10/8/18  Impression       1.  Evolving intra-axial hemorrhage in the right temporal and parietal lobe.  2. "  Intraventricular hemorrhage.  3.  Right to left midline shift.  4.  Subdural fluid collection along the right cerebral hemisphere.  5.  Mild cerebral atrophy.  6.  There has been no significant interval change.                    CT head 10/3/18  Impression         1.  Changes of atrophy and small vessel ischemia.  2.  Right temporal lobe hemorrhage appears decreased in size in density since prior study.  3.  Right holohemispheric chronic appearing subdural hematoma, size appears slightly more pronounced compared to prior.  4.  Bilateral intraventricular hemorrhage, decreased since prior.  5.  Ventricular dilatation decreased since prior  6.  4.7 mm left-to-right midline shift.  7.  Atherosclerosis.                               9/23/18  Results for orders placed during the hospital encounter of 09/22/18   MR-BRAIN-WITH & W/O    Impression 1.  Large intraparenchymal hemorrhage in the right posterior medial temporal lobe which measures 5 x 3.3 x 3.3 cm in greatest diameter and has a moderate amount of surrounding vasogenic edema displacing the right temporal horn anteriorly and causing   marked right to left midline shift of the ventricular system of 13 mm.    2.  Marked amount of diffuse intraventricular hemorrhage especially pronounced in the right lateral ventricle and fourth ventricle.    3.  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    4.  Small holohemispheric right sided subdural hematoma effacing the underlying cortical sulci and gyri and measuring 13 mm in greatest diameter in the right frontal temporal region.    5.  No significant interval change from earlier head CT scan dated 9/22/2018    6.  3.6 cm subcutaneous hematoma in the left frontal region                                                                                                            LABS:  Recent Labs      10/07/18   0539  10/08/18   0543  10/09/18   0329   SODIUM  135  137  136   POTASSIUM  4.2  4.2  4.0    CHLORIDE  102  101  100   CO2  24  27  25   GLUCOSE  125*  121*  104*   BUN  31*  30*  25*   CREATININE  0.44*  0.53  0.56   CALCIUM  9.3  9.6  9.5     Recent Labs      10/07/18   0539  10/08/18   0543  10/09/18   0329   WBC  9.0  9.6  9.5   RBC  3.72*  3.79*  4.04*   HEMOGLOBIN  11.2*  11.5*  12.1   HEMATOCRIT  32.9*  33.4*  35.1*   MCV  88.4  88.1  86.9   MCH  30.1  30.3  30.0   MCHC  34.0  34.4  34.5   RDW  41.0  41.7  40.3   PLATELETCT  296  310  314   MPV  10.2  9.8  10.1     Recent Labs      10/07/18   0539  10/08/18   0543  10/09/18   0329   INR  1.09  1.14*  1.09         PRIMARY REHAB DIAGNOSIS:    This patient is a 78 y.o. female admitted for acute inpatient rehabilitation with Acute intracranial hemorrhage (HCC).    IMPAIRMENTS:   Cognitive  ADLs/IADLs  Mobility  Swallow    SECONDARY DIAGNOSIS/MEDICAL CO-MORBIDITIES AFFECTING FUNCTION:  Atrial fibrillation  Hypertension  Hypokalemia  Hyponatremia     RELEVANT CHANGES SINCE PREADMISSION EVALUATION:    Status unchanged    The patient's rehabilitation potential is Very Good  The patient's medical prognosis is good    PLAN:   Discussion and Recommendations:   1. The patient requires an acute inpatient rehabilitation program with a coordinated program of care at an intensity and frequency not available at a lower level of care. This recommendation is substantiated by the patient's medical physicians who recommend that the patient's intervention and assessment of medical issues needs to be done at an acute level of care for patient's safety and maximum outcome.   2. A coordinated program of care will be supplied by an interdisciplinary team of physical therapy, occupational therapy, rehab physician, rehab nursing, and, if needed, speech therapy and rehab psychology. Rehab team presents a patient-specific rehabilitation and education program concentrating on prevention of future problems related to accessibility, mobility, skin, bowel, bladder, sexuality, and  psychosocial and medical/surgical problems.   3. Need for Rehabilitation Physician: The rehab physician will be evaluating the patient on a multi-weekly basis to help coordinate the program of care. The rehab physician communicates between medical physicians, therapists, and nurses to maximize the patient's potential outcome. Specific areas in which the rehab physician will be providing daily assessment include the following:   A. Assessing the patient's heart rate and blood pressure response (vitals monitoring) to activity and making adjustments in medications or conservative measures as needed.   B. The rehab physician will be assessing the frequency at which the program can be increased to allow the patient to reach optimal functional outcome.   C. The rehab physician will also provide assessments in daily skin care, especially in light of patient's impairments in mobility.   D. The rehab physician will provide special expertise in understanding how to work with functional impairment and recommend appropriate interventions, compensatory techniques, and education that will facilitate the patient's outcome.   4. Rehab R.N.   The rehab RN will be working with patient to carry over in room mobility and activities of daily living when the patient is not in 3 hours of skilled therapy. Rehab nursing will be working in conjunction with rehab physician to address all the medical issues above and continue to assess laboratory work and discuss abnormalities with the treating physicians, assess vitals, and response to activity, and discuss and report abnormalities with the rehab physician. Rehab RN will also continue daily skin care, supervise bladder/bowel program, instruct in medication administration, and ensure patient safety.   5. Rehab Therapy: Therapies to treat at intensity and frequency of (may change after completion of evaluation by all therapeutic disciplines):       PT:  Physical therapy to address mobility,  transfer, gait training and evaluation for adaptive equipment needs 1 hour/day at least 5 days/week for the duration of the ELOS (see below)       OT:  Occupational therapy to address ADLs, self-care, home management training, functional mobility/transfers and assistive device evaluation, and community re-integration 1  hour/day at least 5 days/week for the duration of the ELOS (see below).        ST/Dysphagia:  Speech therapy to address speech, language, and cognitive deficits as well as swallowing difficulties with retraining/dysphagia management and community re-integration with comprehension, expression, cognitive training 1  hour/day at least 5 days/week for the duration of the ELOS (see below).     Medical management / Rehabilitation Issues/ Adverse Potential as part of rehabilitation plan     REHABILITATION ISSUES/ADVERSE POTENTIAL:  1.  ICH - Patient was previously on Eliquis for A Fib, held until 10/16/18 Patient demonstrates functional deficits in cognition, behavior, strength, balance, coordination, and ADL's. The patient requires therapy to correct these deficits prior to discharge. Patient is admitted to [facility] for comprehensive rehabilitation therapy, including physical, occupational and speech therapy.     Rehabilitation nursing monitors bowel and bladder control, educates on medication administration, co-morbidities and monitors patient safety.    2.  Neurostimulants: None at this time but continue to assess daily for need to initiate should status change.    3.  DVT prophylaxis:  Patient is on hold for anticoagulation upon transfer, plan to restart 10/16/18. Encourage OOB. Monitor daily for signs and symptoms of DVT including but not limited to swelling and pain to prevent the development of DVT that may interfere with therapies.    4.  GI prophylaxis:  On prilosec to prevent gastritis/dyspepsia which may interfere with therapies.    5.  Pain: No issues with pain currently /  Controlled with APAP/Tramadol    6.  Nutrition/Dysphagia: Dietician monitors nutrient intake, recommend supplements prn and provide nutrition education to pt/family to promote optimal nutrition for wound healing/recovery.     7.  Bladder/bowel:  Start bowel and bladder program as described below, to prevent constipation, urinary retention (which may lead to UTI), and urinary incontinence (which will impact upon pt's functional independence).   - TV Q3h while awake with post void bladder scans, I&O cath for PVRs >400  - up to commode after meal     8.  Skin/dermal ulcer prophylaxis: Monitor for new skin conditions with q.2 h. turns as required to prevent the development of skin breakdown.     9.  Cognition/Behavior:  Psychologist Dr. Brennan provides adjustment counseling to illness and psychosocial barriers that may be potential barriers to rehabilitation.     10. Respiratory therapy: RT performs O2 management prn, breathing retraining, pulmonary hygiene and bronchospasm management prn to optimize participation in therapies.     MEDICAL CO-MORBIDITIES/ADVERSE POTENTIAL AFFECTING FUNCTION:  Bilateral SDH/ICH - Patient s/p bilateral EVDs placed by Dr. Santizo on 9/22/18.  Patient at high risk for bleed so hold AC for another week.   -PT and OT for mobility and ADLs  -SLP for cognition     Dysphagia - Patient on NTFL diet on transfer.   -SLP for swallow.  Most likely will require MBSS    A Fib - Patient previously on Eliquis which was reversed after ICH.  Currently rate controlled  -Continue Digoxin 125 mcg daily, Metoprolol 50 mg q8h. Consider consult hospitalist  -Restart AC on 10/16/18.     Hyponatremia - Patient on 2g TID on transfer. Will check admission CMP    DVT Ppx - Patient on Heparin on transfer.  Will need transition to Coumadin on 10/16/18.    I personally performed a complete drug regimen review and no potential clinically significant medication issues were identified.     Pt was seen today for 75 min, and  entire time spent in face-to-face contact was >50% in counseling and coordination of care as detailed in A/P above.        GOALS/EXPECTED LEVEL OF FUNCTION BASED ON CURRENT MEDICAL AND FUNCTIONAL STATUS (may change based on patient's medical status and rate of impairment recovery):  Transfers:   Modified Independent  Mobility/Gait:   Modified Independent  ADL's:   Modified Independent  Cognition:  Niall  Swallowing:  Regular diet    DISPOSITION: Discharge to pre-morbid independent living setting with the supportive care of patient's family.      ELOS: 14-21 days

## 2018-10-09 NOTE — PROGRESS NOTES
Critical Care Progress Note    Date of admission  9/22/2018    Chief Complaint  78 y.o. female admitted 9/22/2018 with intracranial hemorrhage.    Hospital Course  This lady was admitted with an acute intracranial hemorrhage.  She is undergone bilateral EVDs for hydrocephalus, IPH/IVH.    Interval Problem Update  Reviewed last 24 hour events:   - AF, nl WBC   - AAOx3, remains confused to year   - NSR, SBP    - vibha diet, coretrak remains in place in case of inadequate nutrition   - BM today   - slight decrease in UOP with poor PO intake   - seen and accepted for rehab once neuro has been consulted and clears patient   - Na in goal on salt tabs    Review of Systems  Review of Systems   Constitutional: Negative for fever.   HENT: Negative for ear discharge.    Eyes: Negative for pain.   Respiratory: Negative for chest tightness.    Cardiovascular: Negative for chest pain.   Gastrointestinal: Negative for abdominal distention.   Endocrine: Negative for polydipsia.   Genitourinary: Negative for hematuria.   Musculoskeletal: Negative for back pain.   Skin: Negative for rash.   Neurological: Negative for dizziness, seizures and headaches.   Hematological: Negative for adenopathy.   Psychiatric/Behavioral: Negative for sleep disturbance.   All other systems reviewed and are negative.       Vital Signs for last 24 hours   Temp:  [36.7 °C (98 °F)-37.1 °C (98.7 °F)] 36.9 °C (98.4 °F)  Pulse:  [69-84] 73  Resp:  [13-78] 20    Hemodynamic parameters for last 24 hours  EHR flow sheets are reviewed       Vent Settings for last 24 hours, NA   Room air    Physical Exam   Physical Exam   Constitutional: She is oriented to person, place, and time. She appears well-developed and well-nourished. No distress.   HENT:   Head: Normocephalic and atraumatic.   Right Ear: External ear normal.   Left Ear: External ear normal.   Mouth/Throat: No oropharyngeal exudate.   Eyes: Pupils are equal, round, and reactive to light. Conjunctivae  are normal. Right eye exhibits no discharge. Left eye exhibits no discharge.   Neck: Neck supple. No thyromegaly present.   Cardiovascular: Normal rate, regular rhythm and normal heart sounds.  Exam reveals no friction rub.    No murmur heard.  Pulmonary/Chest: Effort normal and breath sounds normal. She has no wheezes. She has no rales.   Abdominal: Soft. Bowel sounds are normal. There is no tenderness. There is no rebound.   Musculoskeletal: She exhibits no edema, tenderness or deformity.   Lymphadenopathy:     She has no cervical adenopathy.   Neurological: She is alert and oriented to person, place, and time. No cranial nerve deficit. She exhibits normal muscle tone. Coordination normal.   Occasional confabulation/confusion to year - unchanged   Skin: Skin is warm and dry. She is not diaphoretic. No erythema.   Psychiatric: She has a normal mood and affect. Judgment and thought content normal.   Nursing note and vitals reviewed.      Medications  Current Facility-Administered Medications   Medication Dose Route Frequency Provider Last Rate Last Dose   • heparin injection 5,000 Units  5,000 Units Subcutaneous Q12HRS Jeremy M Gonda, M.D.   5,000 Units at 10/09/18 0459   • labetalol (NORMODYNE,TRANDATE) injection 10 mg  10 mg Intravenous Q HOUR PRN Raisa Bartlett P.A.-C.       • sodium chloride (SALT) tablet 2 g  2 g Feeding Tube TID WITH MEALS Montana Peña M.D.   2 g at 10/09/18 0603   • acetaminophen (TYLENOL) tablet 650 mg  650 mg Feeding Tube Q4HRS PRN Gil Leone Jr., D.O.   650 mg at 10/07/18 1036    Or   • acetaminophen (TYLENOL) suppository 650 mg  650 mg Rectal Q4HRS PRN Gil Leone Jr., D.O.       • amLODIPine (NORVASC) tablet 5 mg  5 mg Per NG Tube Q DAY Andres Wells D.O.   5 mg at 10/09/18 0459   • metoprolol (LOPRESSOR) tablet 50 mg  50 mg Feeding Tube Q8HRS Andres Wells D.O.   50 mg at 10/09/18 0459   • Pharmacy Consult: Enteral tube feeding - review meds/change  route/product selection   Other PRN Gil Leone Jr., D.O.       • labetalol (NORMODYNE,TRANDATE) injection 5-10 mg  5-10 mg Intravenous Q4HRS PRN NUSRAT Angelo Jr..OJhoana   5 mg at 09/26/18 0303   • digoxin (LANOXIN) tablet 125 mcg  125 mcg Feeding Tube DAILY AT 1800 William Medina M.D.   125 mcg at 10/08/18 1807   • ondansetron (ZOFRAN ODT) dispertab 4 mg  4 mg Feeding Tube Q4HRS PRN William Medina M.D.        Or   • ondansetron (ZOFRAN) syringe/vial injection 4 mg  4 mg Intravenous Q4HRS PRMO Medina M.D.       • Pharmacy Consult Request ...Pain Management Review   Other PRN William Medina M.D.        And   • oxyCODONE immediate-release (ROXICODONE) tablet 2.5 mg  2.5 mg Feeding Tube Q3HRS PRN William Medina M.D.   2.5 mg at 10/04/18 1928    And   • oxyCODONE immediate-release (ROXICODONE) tablet 5 mg  5 mg Feeding Tube Q3HRS PRN William Medina M.D.   5 mg at 10/07/18 2300    And   • HYDROmorphone (DILAUDID) injection 0.25-1 mg  0.25-1 mg Intravenous Q2HRS PRMO Medina M.D.   0.5 mg at 10/04/18 0200   • polyethylene glycol/lytes (MIRALAX) PACKET 1 Packet  1 Packet Feeding Tube QDAY PRMO Medina M.D.   1 Packet at 10/01/18 1752    And   • senna-docusate (PERICOLACE or SENOKOT S) 8.6-50 MG per tablet 2 Tab  2 Tab Feeding Tube BID William Medina M.D.   Stopped at 10/09/18 0600    And   • magnesium hydroxide (MILK OF MAGNESIA) suspension 30 mL  30 mL Feeding Tube QDAY PRMO Medina M.D.   30 mL at 10/02/18 0516    And   • bisacodyl (DULCOLAX) suppository 10 mg  10 mg Rectal QDAY PRN William Medina M.D.       • Respiratory Care per Protocol   Nebulization Continuous RT Rowena Calero M.D.       • MD Alert...ICU Electrolyte Replacement per Pharmacy   Other pharmacy to dose Rowena Calero M.D.       • LORazepam (ATIVAN) injection 2 mg  2 mg Intravenous Q5 MIN PRN Rowena Calero M.D.       • hydrALAZINE (APRESOLINE) injection 10 mg  10 mg Intravenous Q4HRS PRN Rowena Calero M.D.       •  enalaprilat (VASOTEC) injection 1.25 mg  1.25 mg Intravenous Q6HRS MELVINN Rowena Calero M.D.           Fluids    Intake/Output Summary (Last 24 hours) at 10/09/18 0756  Last data filed at 10/09/18 0600   Gross per 24 hour   Intake              540 ml   Output              450 ml   Net               90 ml       Laboratory  Recent Results (from the past 48 hour(s))   CBC WITH DIFFERENTIAL    Collection Time: 10/08/18  5:43 AM   Result Value Ref Range    WBC 9.6 4.8 - 10.8 K/uL    RBC 3.79 (L) 4.20 - 5.40 M/uL    Hemoglobin 11.5 (L) 12.0 - 16.0 g/dL    Hematocrit 33.4 (L) 37.0 - 47.0 %    MCV 88.1 81.4 - 97.8 fL    MCH 30.3 27.0 - 33.0 pg    MCHC 34.4 33.6 - 35.0 g/dL    RDW 41.7 35.9 - 50.0 fL    Platelet Count 310 164 - 446 K/uL    MPV 9.8 9.0 - 12.9 fL    Neutrophils-Polys 72.70 (H) 44.00 - 72.00 %    Lymphocytes 13.50 (L) 22.00 - 41.00 %    Monocytes 9.60 0.00 - 13.40 %    Eosinophils 2.60 0.00 - 6.90 %    Basophils 0.40 0.00 - 1.80 %    Immature Granulocytes 1.20 (H) 0.00 - 0.90 %    Nucleated RBC 0.00 /100 WBC    Neutrophils (Absolute) 7.00 2.00 - 7.15 K/uL    Lymphs (Absolute) 1.30 1.00 - 4.80 K/uL    Monos (Absolute) 0.92 (H) 0.00 - 0.85 K/uL    Eos (Absolute) 0.25 0.00 - 0.51 K/uL    Baso (Absolute) 0.04 0.00 - 0.12 K/uL    Immature Granulocytes (abs) 0.12 (H) 0.00 - 0.11 K/uL    NRBC (Absolute) 0.00 K/uL   BASIC METABOLIC PANEL    Collection Time: 10/08/18  5:43 AM   Result Value Ref Range    Sodium 137 135 - 145 mmol/L    Potassium 4.2 3.6 - 5.5 mmol/L    Chloride 101 96 - 112 mmol/L    Co2 27 20 - 33 mmol/L    Glucose 121 (H) 65 - 99 mg/dL    Bun 30 (H) 8 - 22 mg/dL    Creatinine 0.53 0.50 - 1.40 mg/dL    Calcium 9.6 8.5 - 10.5 mg/dL    Anion Gap 9.0 0.0 - 11.9   MAGNESIUM    Collection Time: 10/08/18  5:43 AM   Result Value Ref Range    Magnesium 2.1 1.5 - 2.5 mg/dL   PROTHROMBIN TIME    Collection Time: 10/08/18  5:43 AM   Result Value Ref Range    PT 14.7 (H) 12.0 - 14.6 sec    INR 1.14 (H) 0.87 - 1.13    ESTIMATED GFR    Collection Time: 10/08/18  5:43 AM   Result Value Ref Range    GFR If African American >60 >60 mL/min/1.73 m 2    GFR If Non African American >60 >60 mL/min/1.73 m 2   CBC WITH DIFFERENTIAL    Collection Time: 10/09/18  3:29 AM   Result Value Ref Range    WBC 9.5 4.8 - 10.8 K/uL    RBC 4.04 (L) 4.20 - 5.40 M/uL    Hemoglobin 12.1 12.0 - 16.0 g/dL    Hematocrit 35.1 (L) 37.0 - 47.0 %    MCV 86.9 81.4 - 97.8 fL    MCH 30.0 27.0 - 33.0 pg    MCHC 34.5 33.6 - 35.0 g/dL    RDW 40.3 35.9 - 50.0 fL    Platelet Count 314 164 - 446 K/uL    MPV 10.1 9.0 - 12.9 fL    Neutrophils-Polys 69.00 44.00 - 72.00 %    Lymphocytes 17.30 (L) 22.00 - 41.00 %    Monocytes 9.10 0.00 - 13.40 %    Eosinophils 2.50 0.00 - 6.90 %    Basophils 0.50 0.00 - 1.80 %    Immature Granulocytes 1.60 (H) 0.00 - 0.90 %    Nucleated RBC 0.00 /100 WBC    Neutrophils (Absolute) 6.53 2.00 - 7.15 K/uL    Lymphs (Absolute) 1.64 1.00 - 4.80 K/uL    Monos (Absolute) 0.86 (H) 0.00 - 0.85 K/uL    Eos (Absolute) 0.24 0.00 - 0.51 K/uL    Baso (Absolute) 0.05 0.00 - 0.12 K/uL    Immature Granulocytes (abs) 0.15 (H) 0.00 - 0.11 K/uL    NRBC (Absolute) 0.00 K/uL   BASIC METABOLIC PANEL    Collection Time: 10/09/18  3:29 AM   Result Value Ref Range    Sodium 136 135 - 145 mmol/L    Potassium 4.0 3.6 - 5.5 mmol/L    Chloride 100 96 - 112 mmol/L    Co2 25 20 - 33 mmol/L    Glucose 104 (H) 65 - 99 mg/dL    Bun 25 (H) 8 - 22 mg/dL    Creatinine 0.56 0.50 - 1.40 mg/dL    Calcium 9.5 8.5 - 10.5 mg/dL    Anion Gap 11.0 0.0 - 11.9   MAGNESIUM    Collection Time: 10/09/18  3:29 AM   Result Value Ref Range    Magnesium 2.1 1.5 - 2.5 mg/dL   PROTHROMBIN TIME    Collection Time: 10/09/18  3:29 AM   Result Value Ref Range    PT 14.2 12.0 - 14.6 sec    INR 1.09 0.87 - 1.13   ESTIMATED GFR    Collection Time: 10/09/18  3:29 AM   Result Value Ref Range    GFR If African American >60 >60 mL/min/1.73 m 2    GFR If Non African American >60 >60 mL/min/1.73 m 2        Imaging  X-Ray:  No film today  CT:    Reviewed    10/8:  1.  Evolving intra-axial hemorrhage in the right temporal and parietal lobe.  2.  Intraventricular hemorrhage.  3.  Right to left midline shift.  4.  Subdural fluid collection along the right cerebral hemisphere.  5.  Mild cerebral atrophy.  6.  There has been no significant interval change.    Assessment/Plan  * Acute intracranial hemorrhage (HCC)- (present on admission)   Assessment & Plan    S/P fall 1 week prior  CT scan on 9/22 with intraventricular hemorrhage, right greater than left and intraparenchymal hemorrhage  Noncommunicating hydrocephalus - s/p emergent bilateral EVD placement by Dr. Santizo on 9/22 --> removed 10/2  Intraventricular TPA given by NeuroSx with improvement in EVD output  [facility]izure precautions, [facility]  Neurology consultation at the request of rehab  Therapies/rehabilitation          Hyponatremia   Assessment & Plan    Improved  Continue current dose of salt tabs, avoid free water        Encephalopathy acute- (present on admission)   Assessment & Plan    Improving  Limit sedatives  Maintain sleep/wake cycle, reorient        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)- (present on admission)   Assessment & Plan    Reversed on hospital day 1  Continue subcutaneous heparin for now but hold on full dose anticoagulation        Paroxysmal atrial fibrillation (HCC)- (present on admission)   Assessment & Plan    Unable to fully anticoagulate for now until cleared by neurosurgery  Continue digoxin and metoprolol        Oropharyngeal dysphagia- (present on admission)   Assessment & Plan    Past speech evaluation  Monitor for adequacy of nutrition, start supplements if needed             VTE:  Heparin  Ulcer: Not Indicated  Lines: None    I have performed a physical exam and reviewed and updated ROS and Plan today (10/9/2018). In review of yesterday's note (10/8/2018), there are no changes except as  documented above.     Cleared from pulmonary/critical care standpoint to transfer to rehab today.  No need for outpatient pulmonary follow-up    Discussed patient condition and risk of morbidity and/or mortality with Hospitalist, Family, RN, RT, Therapies, Pharmacy, , Rehabilitation eval, Charge nurse / hot rounds, Patient and neurology and neurosurgery.

## 2018-10-09 NOTE — PROGRESS NOTES
Neurosurgery Progress Note    Subjective:  Pt with IVH / SAH / SDH with Bilateral EVD day #18  Sitting up in Bed, more lethargic today.with mild HA.    Awaiting Bed on regular floor  Started thick liquid dietyesterday  Denies  Dizziness or vision changes.  Stable overnight. No changes.     Exam:  Awake. Eyes open.  PERRL  Will follow simple commands.well  Moves ext x 4   Speaking softly but appropriate with answers  Incisions c/d/i.   CT with right fluid collection hygroma vs chronic sub dural hematoma. Somewhat increased to 8.6mm shift.    Pulse  Av.9  Min: 69  Max: 84  Resp  Av.1  Min: 13  Max: 78  Temp  Av.9 °C (98.4 °F)  Min: 36.7 °C (98 °F)  Max: 37.1 °C (98.7 °F)  SpO2  Av.5 %  Min: 94 %  Max: 99 %    No Data Recorded    Recent Labs      10/07/18   0539  10/08/18   0543  10/09/18   0329   WBC  9.0  9.6  9.5   RBC  3.72*  3.79*  4.04*   HEMOGLOBIN  11.2*  11.5*  12.1   HEMATOCRIT  32.9*  33.4*  35.1*   MCV  88.4  88.1  86.9   MCH  30.1  30.3  30.0   MCHC  34.0  34.4  34.5   RDW  41.0  41.7  40.3   PLATELETCT  296  310  314   MPV  10.2  9.8  10.1     Recent Labs      10/07/18   0539  10/08/18   0543  10/09/18   032   SODIUM  135  137  136   POTASSIUM  4.2  4.2  4.0   CHLORIDE  102  101  100   CO2  24  27  25   GLUCOSE  125*  121*  104*   BUN  31*  30*  25*   CREATININE  0.44*  0.53  0.56   CALCIUM  9.3  9.6  9.5     Recent Labs      10/07/18   0539  10/08/18   0543  10/09/18   0329   INR  1.09  1.14*  1.09           Intake/Output       10/08/18 07 - 10/09/18 0659 10/09/18 0700 - 10/10/18 0659      [id number] 0909-6184 Total 1045-6069 9525-9806 Total       Intake    P.O.  --  60 60  --  -- --    P.O. -- 60 60 -- -- --    Other  --  120 120  --  -- --    Free water flush per NG -- 120 120 -- -- --    NG/GT  360  0 360  --  -- --    Intake (mL) (Enteral Tube 18 Cortrak - Gastric 10 Fr. Right nare) 360 0 360 -- -- --    Total Intake 360 180 540 -- -- --       Output    Urine  300  150 450   --  -- --    Number of Times Voided 1 x 3 x 4 x -- -- --    Urine Void (mL) 300 150 450 -- -- --    Stool  --  -- --  --  -- --    Number of Times Stooled 1 x 3 x 4 x -- -- --    Total Output 300 150 450 -- -- --       Net I/O     60 30 90 -- -- --            Intake/Output Summary (Last 24 hours) at 10/09/18 0802  Last data filed at 10/09/18 0600   Gross per 24 hour   Intake              420 ml   Output              450 ml   Net              -30 ml            • heparin  5,000 Units Q12HRS   • labetalol  10 mg Q HOUR PRN   • sodium chloride  2 g TID WITH MEALS   • acetaminophen  650 mg Q4HRS PRN    Or   • acetaminophen  650 mg Q4HRS PRN   • amLODIPine  5 mg Q DAY   • metoprolol  50 mg Q8HRS   • Pharmacy   PRN   • labetalol  5-10 mg Q4HRS PRN   • digoxin  125 mcg DAILY AT 1800   • ondansetron  4 mg Q4HRS PRN    Or   • ondansetron  4 mg Q4HRS PRN   • Pharmacy Consult Request   PRN    And   • oxyCODONE immediate-release  2.5 mg Q3HRS PRN    And   • oxyCODONE immediate-release  5 mg Q3HRS PRN    And   • HYDROmorphone  0.25-1 mg Q2HRS PRN   • polyethylene glycol/lytes  1 Packet QDAY PRN    And   • senna-docusate  2 Tab BID    And   • magnesium hydroxide  30 mL QDAY PRN    And   • bisacodyl  10 mg QDAY PRN   • Respiratory Care per Protocol   Continuous RT   • MD Alert...Adult ICU Electrolyte Replacement per Pharmacy   pharmacy to dose   • LORazepam  2 mg Q5 MIN PRN   • hydrALAZINE  10 mg Q4HRS PRN   • enalaprilat  1.25 mg Q6HRS PRN       Assessment and Plan:  Hospital day #18   Neuro stable, so no immediate imaging orders.  Will order CT only if Neuro status worsens.  Discussed with Nursing.  OK for q 4 hour neuro checks.  Would be OK for floor from neurosurgery standpoint.   PT/OT/Speech

## 2018-10-09 NOTE — THERAPY
"Occupational Therapy Treatment completed with focus on ADLs and ADL transfers.  Functional Status:  Min/mod A with ADLs and txfs  Plan of Care: Will benefit from Occupational Therapy 3 times per week  Discharge Recommendations:  Equipment Will Continue to Assess for Equipment Needs. Post-acute therapy Discharge to a transitional care facility for continued therapy services.    See \"Rehab Therapy-Acute\" Patient Summary Report for complete documentation.   "

## 2018-10-09 NOTE — DIETARY
Nutrition Services:  Brief Update    · Pt previously on tube feedings.  · Swallow evaluation by SLP 10/8; recommended Full Liquid, Nectar thick diet with 1:1 feeding assistance.  · Tube feedings are off and Cortrak remains in place until adequate PO taken.  · Per ADL flow sheet, PO 25-50% for the last two documented meals.  · RD will continue to follow.    Recommendations/Plan:  1. Encourage intake of meals and snacks.   2. Document intake of all meals and snacks as % taken in ADLs to provide interdisciplinary communication across all shifts.   3. Monitor weight.  4. Nutrition rep will continue to see patient for ongoing meal and snack preferences.   5. Obtain supplement order per RD as needed.

## 2018-10-09 NOTE — CARE PLAN
Problem: Bowel/Gastric:  Goal: Will not experience complications related to bowel motility    Intervention: Assess baseline bowel pattern  Bowel regimen normal

## 2018-10-09 NOTE — THERAPY
"Speech Language Therapy dysphagia treatment completed.   Functional Status:  Patient seen for dysphagia therapy on this date with NTFL tray.  She was pleasant and agreeable but emotionally labile at times.  The patient fed herself independently but demonstrated delayed initiation and required encouragement.  She took small bites of purees but required min cues to reduce bolus size with nectars and had delayed initiation of swallow trigger. RN at bedside and removed Cortrak.  The patient consumed medications crushed in applesauce with out difficulty.  She maintained clear vocal quality throughout the session and had no overt s/sx of aspiration.    Recommendations: Continue Nectar Thick Full Liquids with close monitoring during meals.  Plan for cognitive evaluation during next session.    Plan of Care: Will benefit from Speech Therapy 5 times per week  Post-Acute Therapy: Discharge to a transitional care facility for continued skilled therapy services.    See \"Rehab Therapy-Acute\" Patient Summary Report for complete documentation.     "

## 2018-10-09 NOTE — CARE PLAN
Problem: Nutritional:  Goal: Achieve adequate nutritional intake  Patient will consume 50% of meals.  Outcome: PROGRESSING SLOWER THAN EXPECTED  PO 25-50% for the most recent two meals.

## 2018-10-10 LAB
25(OH)D3 SERPL-MCNC: 28 NG/ML (ref 30–100)
ALBUMIN SERPL BCP-MCNC: 3.8 G/DL (ref 3.2–4.9)
ALBUMIN/GLOB SERPL: 1.7 G/DL
ALP SERPL-CCNC: 81 U/L (ref 30–99)
ALT SERPL-CCNC: 25 U/L (ref 2–50)
ANION GAP SERPL CALC-SCNC: 10 MMOL/L (ref 0–11.9)
AST SERPL-CCNC: 20 U/L (ref 12–45)
BASOPHILS # BLD AUTO: 0.4 % (ref 0–1.8)
BASOPHILS # BLD: 0.03 K/UL (ref 0–0.12)
BILIRUB SERPL-MCNC: 0.5 MG/DL (ref 0.1–1.5)
BUN SERPL-MCNC: 20 MG/DL (ref 8–22)
CALCIUM SERPL-MCNC: 9.2 MG/DL (ref 8.5–10.5)
CHLORIDE SERPL-SCNC: 104 MMOL/L (ref 96–112)
CO2 SERPL-SCNC: 23 MMOL/L (ref 20–33)
CREAT SERPL-MCNC: 0.56 MG/DL (ref 0.5–1.4)
EOSINOPHIL # BLD AUTO: 0.14 K/UL (ref 0–0.51)
EOSINOPHIL NFR BLD: 2.1 % (ref 0–6.9)
ERYTHROCYTE [DISTWIDTH] IN BLOOD BY AUTOMATED COUNT: 40.8 FL (ref 35.9–50)
EST. AVERAGE GLUCOSE BLD GHB EST-MCNC: 114 MG/DL
GLOBULIN SER CALC-MCNC: 2.3 G/DL (ref 1.9–3.5)
GLUCOSE SERPL-MCNC: 128 MG/DL (ref 65–99)
HBA1C MFR BLD: 5.6 % (ref 0–5.6)
HCT VFR BLD AUTO: 30.9 % (ref 37–47)
HGB BLD-MCNC: 11.3 G/DL (ref 12–16)
IMM GRANULOCYTES # BLD AUTO: 0.08 K/UL (ref 0–0.11)
IMM GRANULOCYTES NFR BLD AUTO: 1.2 % (ref 0–0.9)
LYMPHOCYTES # BLD AUTO: 0.96 K/UL (ref 1–4.8)
LYMPHOCYTES NFR BLD: 14.4 % (ref 22–41)
MCH RBC QN AUTO: 33.6 PG (ref 27–33)
MCHC RBC AUTO-ENTMCNC: 36.6 G/DL (ref 33.6–35)
MCV RBC AUTO: 92 FL (ref 81.4–97.8)
MONOCYTES # BLD AUTO: 0.47 K/UL (ref 0–0.85)
MONOCYTES NFR BLD AUTO: 7 % (ref 0–13.4)
NEUTROPHILS # BLD AUTO: 5 K/UL (ref 2–7.15)
NEUTROPHILS NFR BLD: 74.9 % (ref 44–72)
NRBC # BLD AUTO: 0 K/UL
NRBC BLD-RTO: 0 /100 WBC
PLATELET # BLD AUTO: 266 K/UL (ref 164–446)
PMV BLD AUTO: 10.6 FL (ref 9–12.9)
POTASSIUM SERPL-SCNC: 3.6 MMOL/L (ref 3.6–5.5)
PROT SERPL-MCNC: 6.1 G/DL (ref 6–8.2)
RBC # BLD AUTO: 3.36 M/UL (ref 4.2–5.4)
SODIUM SERPL-SCNC: 137 MMOL/L (ref 135–145)
TSH SERPL DL<=0.005 MIU/L-ACNC: 1.32 UIU/ML (ref 0.38–5.33)
WBC # BLD AUTO: 6.7 K/UL (ref 4.8–10.8)

## 2018-10-10 PROCEDURE — 97535 SELF CARE MNGMENT TRAINING: CPT

## 2018-10-10 PROCEDURE — 99233 SBSQ HOSP IP/OBS HIGH 50: CPT | Performed by: PHYSICAL MEDICINE & REHABILITATION

## 2018-10-10 PROCEDURE — 83036 HEMOGLOBIN GLYCOSYLATED A1C: CPT

## 2018-10-10 PROCEDURE — 36415 COLL VENOUS BLD VENIPUNCTURE: CPT

## 2018-10-10 PROCEDURE — 97166 OT EVAL MOD COMPLEX 45 MIN: CPT

## 2018-10-10 PROCEDURE — 700111 HCHG RX REV CODE 636 W/ 250 OVERRIDE (IP): Performed by: PHYSICAL MEDICINE & REHABILITATION

## 2018-10-10 PROCEDURE — 92523 SPEECH SOUND LANG COMPREHEN: CPT

## 2018-10-10 PROCEDURE — 85025 COMPLETE CBC W/AUTO DIFF WBC: CPT

## 2018-10-10 PROCEDURE — A9270 NON-COVERED ITEM OR SERVICE: HCPCS | Performed by: PHYSICAL MEDICINE & REHABILITATION

## 2018-10-10 PROCEDURE — 92610 EVALUATE SWALLOWING FUNCTION: CPT

## 2018-10-10 PROCEDURE — 84443 ASSAY THYROID STIM HORMONE: CPT

## 2018-10-10 PROCEDURE — 80053 COMPREHEN METABOLIC PANEL: CPT

## 2018-10-10 PROCEDURE — 82306 VITAMIN D 25 HYDROXY: CPT

## 2018-10-10 PROCEDURE — 97530 THERAPEUTIC ACTIVITIES: CPT

## 2018-10-10 PROCEDURE — 700102 HCHG RX REV CODE 250 W/ 637 OVERRIDE(OP): Performed by: PHYSICAL MEDICINE & REHABILITATION

## 2018-10-10 PROCEDURE — 97162 PT EVAL MOD COMPLEX 30 MIN: CPT

## 2018-10-10 PROCEDURE — 770010 HCHG ROOM/CARE - REHAB SEMI PRIVAT*

## 2018-10-10 RX ORDER — SODIUM CHLORIDE 1 G/1
1 TABLET ORAL
Status: DISCONTINUED | OUTPATIENT
Start: 2018-10-10 | End: 2018-10-12

## 2018-10-10 RX ORDER — AMLODIPINE BESYLATE 5 MG/1
5 TABLET ORAL DAILY
Status: DISCONTINUED | OUTPATIENT
Start: 2018-10-11 | End: 2018-10-11

## 2018-10-10 RX ADMIN — AMLODIPINE BESYLATE 5 MG: 5 TABLET ORAL at 05:15

## 2018-10-10 RX ADMIN — METOPROLOL TARTRATE 50 MG: 25 TABLET ORAL at 05:15

## 2018-10-10 RX ADMIN — METOPROLOL TARTRATE 50 MG: 25 TABLET ORAL at 14:52

## 2018-10-10 RX ADMIN — Medication 2 G: at 12:14

## 2018-10-10 RX ADMIN — HEPARIN SODIUM 5000 UNITS: 5000 INJECTION, SOLUTION INTRAVENOUS; SUBCUTANEOUS at 20:24

## 2018-10-10 RX ADMIN — SENNOSIDES AND DOCUSATE SODIUM 2 TABLET: 8.6; 5 TABLET ORAL at 09:31

## 2018-10-10 RX ADMIN — TRAZODONE HYDROCHLORIDE 50 MG: 50 TABLET ORAL at 23:59

## 2018-10-10 RX ADMIN — TRAMADOL HYDROCHLORIDE 50 MG: 50 TABLET, FILM COATED ORAL at 22:42

## 2018-10-10 RX ADMIN — METOPROLOL TARTRATE 50 MG: 25 TABLET ORAL at 20:25

## 2018-10-10 RX ADMIN — ACETAMINOPHEN 650 MG: 325 TABLET, FILM COATED ORAL at 18:23

## 2018-10-10 RX ADMIN — Medication 2 G: at 09:31

## 2018-10-10 RX ADMIN — OMEPRAZOLE 20 MG: 20 CAPSULE, DELAYED RELEASE ORAL at 09:31

## 2018-10-10 RX ADMIN — DIGOXIN 125 MCG: 125 TABLET ORAL at 18:23

## 2018-10-10 RX ADMIN — Medication 1 G: at 18:23

## 2018-10-10 RX ADMIN — HEPARIN SODIUM 5000 UNITS: 5000 INJECTION, SOLUTION INTRAVENOUS; SUBCUTANEOUS at 09:32

## 2018-10-10 ASSESSMENT — GAIT ASSESSMENTS
DISTANCE (FEET): 40
DEVIATION: BRADYKINETIC
ASSISTIVE DEVICE: PARALLEL BARS
GAIT LEVEL OF ASSIST: CONTACT GUARD ASSIST

## 2018-10-10 ASSESSMENT — BRIEF INTERVIEW FOR MENTAL STATUS (BIMS)
WHAT MONTH IS IT: MISSED BY 6 DAYS TO 1 MONTH
ASKED TO RECALL BLUE: YES, AFTER CUEING (A COLOR")"
INITIAL REPETITION OF BED BLUE SOCK - FIRST ATTEMPT: 3
WHAT DAY OF THE WEEK IS IT: INCORRECT
ASKED TO RECALL SOCK: NO, COULD NOT RECALL
ASKED TO RECALL BED: NO, COULD NOT RECALL
WHAT YEAR IS IT: MISSED BY > 5 YEARS
BIMS SUMMARY SCORE: 5

## 2018-10-10 ASSESSMENT — PAIN SCALES - GENERAL: PAINLEVEL_OUTOF10: 6

## 2018-10-10 ASSESSMENT — ACTIVITIES OF DAILY LIVING (ADL): TOILETING: INDEPENDENT

## 2018-10-10 NOTE — CARE PLAN
Problem: Swallowing STGs  Goal: STG-Within one week, patient will safely swallow  1) Individualized goal:  dys 2 textures with NTL with no overt s/sx of asp/pen noted across 2/2 meals provided MIN cues.   2) Interventions:  SLP Swallowing Dysfunction Treatment and SLP Video Swallow Evaluation     Outcome: NOT MET    Goal: STG-Within one week, patient will  1) Individualized goal: participate in MBSS with advancement of diet as appropriate within one week.   2) Interventions:  SLP Swallowing Dysfunction Treatment and SLP Video Swallow Evaluation     Outcome: NOT MET      Problem: Problem Solving STGs  Goal: STG-Within one week, patient will  1) Individualized goal:  Participate in divided attention tasks with 80% accuracy provided MOD A.   2) Interventions:  SLP Self Care / ADL Training , SLP Cognitive Skill Development and SLP Group Treatment     Outcome: NOT MET      Problem: Memory STGs  Goal: STG-Within one week, patient will  1) Individualized goal:  Achieve a score of 25 or greater across 3 consecutive sessions on the orientation log.   2) Interventions:  SLP Self Care / ADL Training  and SLP Cognitive Skill Development     Outcome: NOT MET

## 2018-10-10 NOTE — REHAB-PT IDT TEAM NOTE
Physical Therapy   Mobility  Bed mobility:  SBA  Bed /Chair/Wheelchair Transfer Initial:  4 - Minimal Assistance  Bed /Chair/Wheelchair Transfer Current:  4 - Minimal Assistance   Bed/Chair/Wheelchair Transfer Description:   (Min A CGA/min A SPT)  Walk Initial:  1 - Total Assistance  Walk Current:  1 - Total Assistance   Walk Description:  Two hand rails, Requires incidental assist (CGA // bars 40 ft x 2 )  Wheelchair Initial:  1 - Total Assistance  Wheelchair Current:  1 - Total Assistance   Wheelchair Description:   (30 ft indoors SBA )  Stairs Initial:  0 - Not tested, patient refused  Stairs Current: 0 - Not tested, patient refused   Stairs Description:    Patient/Family Training/Education: Son and patient educated on POC, goals established   DME/DC Recommendations:  TBD, anticipate FWW  Strengths:  Independent PLOF, Manages pain appropriately, Motivated for self care and independence, Pleasant and cooperative, Supportive family, Willingly participates in therapeutic activities and Other: Daughter will likely move in with patient after D/C indefinitely  Barriers:   Decreased endurance, Limited mobility, Poor activity tolerance, Poor balance and Other: Not oriented to time/ day, impaired sequencing  # of short term goals set= 2  # of short term goals met= 0 Eval 10/10       Physical Therapy Problems           Problem: Mobility     Dates: Start: 10/10/18       Goal: STG-Within one week, patient will ambulate household distance     Dates: Start: 10/10/18       Description: 1) Individualized goal:  Patient will amb with FWW >30 ft indoors CGA/SBA  2) Interventions:  PT Group Therapy, PT E Stim Attended, PT Gait Training, PT Self Care/Home Eval, PT Therapeutic Exercises, PT Neuro Re-Ed/Balance, PT Aquatic Therapy, PT Therapeutic Activity, PT Manual Therapy and PT Evaluation.         Note:     Goal Note filed on 10/10/18 1259 by Sasha Egan DPT    Goal: STG-Within one week, patient will ambulate household  distance  Outcome: NOT MET  Eval 10/10                  Problem: Mobility Transfers     Dates: Start: 10/10/18       Goal: STG-Within one week, patient will sit to stand     Dates: Start: 10/10/18       Description: 1) Individualized goal:  Patient will transfer with FWW SBA STS/SPT  2) Interventions:  PT Group Therapy, PT E Stim Attended, PT Gait Training, PT Self Care/Home Eval, PT Therapeutic Exercises, PT Neuro Re-Ed/Balance, PT Aquatic Therapy, PT Therapeutic Activity, PT Manual Therapy and PT Evaluation.         Note:     Goal Note filed on 10/10/18 1259 by Sasha Egan, DPT    Goal: STG-Within one week, patient will sit to stand  Outcome: NOT MET  Eval 10/10                  Problem: PT-Long Term Goals     Dates: Start: 10/10/18       Goal: LTG-By discharge, patient will ambulate     Dates: Start: 10/10/18       Description: 1) Individualized goal:  Patient will amb >150 ft over various surfaces with FWW SPV-mod I  2) Interventions:  PT Group Therapy, PT E Stim Attended, PT Gait Training, PT Self Care/Home Eval, PT Therapeutic Exercises, PT Neuro Re-Ed/Balance, PT Aquatic Therapy, PT Therapeutic Activity, PT Manual Therapy and PT Evaluation.               Goal: LTG-By discharge, patient will transfer one surface to another     Dates: Start: 10/10/18       Description: 1) Individualized goal:  Patient will transfer SPV-mod I with FWW   2) Interventions:  PT Group Therapy, PT E Stim Attended, PT Gait Training, PT Self Care/Home Eval, PT Therapeutic Exercises, PT Neuro Re-Ed/Balance, PT Aquatic Therapy, PT Therapeutic Activity, PT Manual Therapy and PT Evaluation.                       Section completed by:  Sasha Egan PT, DPT

## 2018-10-10 NOTE — CARE PLAN
Problem: Safety  Goal: Will remain free from falls    Intervention: Implement fall precautions  Use of call light encouraged to make needs known.Bed in low position, non skid socks/shoes on when out of bed.Alarms in place for safety.Pt's room is close to the nursing station.Call light within reach.Will continue to monitor and assess needs and safety.      Problem: Bowel/Gastric:  Goal: Normal bowel function is maintained or improved    Intervention: Educate patient and significant other/support system about signs and symptoms of constipation and interventions to implement  Pt is continent of bowel.Scheduled medication given.LB 10/09.Will continue to monitor and assess for s/sx of constipation.      Problem: Urinary Elimination:  Goal: Ability to reestablish a normal urinary elimination pattern will improve    Intervention: Assist patient to sit on commode or toilet for voiding  Assisted to the bathroom, continent of bladder.Voiding adequate amount of urine.PVR 54.Denies any discomfort or dysuria.Will continue to monitor and assess.

## 2018-10-10 NOTE — CARE PLAN
Problem: Safety  Goal: Will remain free from injury  Patient demonstrates good safety technique this shift.  Asks for assistance when needed and does not attempt self transfer.  Able to verbalize needs.  Will continue to monitor.    Problem: Pain Management  Goal: Pain level will decrease to patient's comfort goal  Patient able to verbalize needs.  Denies pain or discomfort this shift and no s/s same noted.  Will continue to monitor.

## 2018-10-10 NOTE — THERAPY
Speech Therapy Initial Plan of Care Note    1) Assessment:  Patient is 78 y.o. female with a diagnosis of Acute intracranial hemorrhage .  Additional factors influencing patient status / progress (ie: cognitive factors, co-morbidities, social support, etc): dysphagia and severe cognitive deficits at the time of evaluation. Pt has good family support and is motivated to participate in therapeutic intervention.  Long term and short term goals have been discussed with patient and they are in agreement.  2) Plan:  Recommend Speech Therapy 30-60 minutes per day 5-6 days per week for 2 weeks for the following treatments:  SLP Swallowing Dysfunction Treatment, SLP Oral Pharyngeal Evaluation, SLP Video Swallow Evaluation, SLP Self Care / ADL Training , SLP Cognitive Skill Development and SLP Group Treatment.  3) Goals:  Please refer to care plan for goals.

## 2018-10-10 NOTE — REHAB-PHARMACY IDT TEAM NOTE
Pharmacy   Pharmacy  Antibiotics/Antifungals/Antivirals:  Medication:      Active Orders     None        Route:         None  Stop Date:  N/A  Reason:   Antihypertensives/Cardiac:  Medication:    Orders (72h ago through future)    Start     Ordered    10/11/18 0900  amLODIPine (NORVASC) tablet 5 mg  DAILY      10/10/18 1258    10/10/18 0600  amLODIPine (NORVASC) tablet 5 mg  Q DAY,   Status:  Discontinued      10/09/18 1638    10/09/18 2200  metoprolol (LOPRESSOR) tablet 50 mg  EVERY 8 HOURS      10/09/18 1638    10/09/18 1800  digoxin (LANOXIN) tablet 125 mcg  DAILY      10/09/18 1638    10/09/18 1638  hydrALAZINE (APRESOLINE) tablet 25 mg  EVERY 8 HOURS PRN      10/09/18 1638        Patient Vitals for the past 24 hrs:   BP Pulse   10/10/18 1452 119/55 87   10/10/18 1318 100/58 70   10/10/18 0700 102/56 72   10/10/18 0515 120/65 90   10/09/18 2006 105/60 85   10/09/18 1859 (!) 84/40 86   10/09/18 1614 - 77   10/09/18 1605 105/61 75       Anticoagulation:  Medication:  heparin  INR:    Recent Labs      10/07/18   0539  10/08/18   0543  10/09/18   0329   INR  1.09  1.14*  1.09     Other key medications:       A review of the medication list reveals no issues at this time. Patient is currently on antihypertensive(s). Recommend home blood pressure monitoring/recording if antihypertensive(s) regimen(s) continue.    Section completed by:  Danilo Yeh [facility]

## 2018-10-10 NOTE — PROGRESS NOTES
"Rehab Progress Note     Encounter Date: 10/10/2018    CC: Decreased function after ICH, SDH; dysphagia    Interval Events (Subjective)  Patient sitting up in room visiting with son. She reports she did well overnight. They were not able to get all of the blood they needed so nursing is going to get the CBC this morning. Otherwise she denies pain, headache is controlled. She reports she enjoyed her shower this morning.  Denies N/V/D.  Discussed admission labs including mild low vitamin D.  OK to rmeove peripheral IV.  Blood sugars mildly elevated, A1c still pending.     Objective:  VITAL SIGNS: /56   Pulse 72   Temp 36.3 °C (97.4 °F)   Resp 20   Ht 1.651 m (5' 5\")   Wt 70.8 kg (156 lb)   SpO2 97%   BMI 25.96 kg/m²   Gen: NAD  Psych: Mood and affect appropriate  CV: RRR, no edema  Resp: CTAB, no upper airway sounds  Abd: NTND  Skin: 5-6 sutures on bilateral scalp well healing, well approximated no erythema    Recent Results (from the past 72 hour(s))   CBC WITH DIFFERENTIAL    Collection Time: 10/08/18  5:43 AM   Result Value Ref Range    WBC 9.6 4.8 - 10.8 K/uL    RBC 3.79 (L) 4.20 - 5.40 M/uL    Hemoglobin 11.5 (L) 12.0 - 16.0 g/dL    Hematocrit 33.4 (L) 37.0 - 47.0 %    MCV 88.1 81.4 - 97.8 fL    MCH 30.3 27.0 - 33.0 pg    MCHC 34.4 33.6 - 35.0 g/dL    RDW 41.7 35.9 - 50.0 fL    Platelet Count 310 164 - 446 K/uL    MPV 9.8 9.0 - 12.9 fL    Neutrophils-Polys 72.70 (H) 44.00 - 72.00 %    Lymphocytes 13.50 (L) 22.00 - 41.00 %    Monocytes 9.60 0.00 - 13.40 %    Eosinophils 2.60 0.00 - 6.90 %    Basophils 0.40 0.00 - 1.80 %    Immature Granulocytes 1.20 (H) 0.00 - 0.90 %    Nucleated RBC 0.00 /100 WBC    Neutrophils (Absolute) 7.00 2.00 - 7.15 K/uL    Lymphs (Absolute) 1.30 1.00 - 4.80 K/uL    Monos (Absolute) 0.92 (H) 0.00 - 0.85 K/uL    Eos (Absolute) 0.25 0.00 - 0.51 K/uL    Baso (Absolute) 0.04 0.00 - 0.12 K/uL    Immature Granulocytes (abs) 0.12 (H) 0.00 - 0.11 K/uL    NRBC (Absolute) 0.00 K/uL "   BASIC METABOLIC PANEL    Collection Time: 10/08/18  5:43 AM   Result Value Ref Range    Sodium 137 135 - 145 mmol/L    Potassium 4.2 3.6 - 5.5 mmol/L    Chloride 101 96 - 112 mmol/L    Co2 27 20 - 33 mmol/L    Glucose 121 (H) 65 - 99 mg/dL    Bun 30 (H) 8 - 22 mg/dL    Creatinine 0.53 0.50 - 1.40 mg/dL    Calcium 9.6 8.5 - 10.5 mg/dL    Anion Gap 9.0 0.0 - 11.9   MAGNESIUM    Collection Time: 10/08/18  5:43 AM   Result Value Ref Range    Magnesium 2.1 1.5 - 2.5 mg/dL   PROTHROMBIN TIME    Collection Time: 10/08/18  5:43 AM   Result Value Ref Range    PT 14.7 (H) 12.0 - 14.6 sec    INR 1.14 (H) 0.87 - 1.13   ESTIMATED GFR    Collection Time: 10/08/18  5:43 AM   Result Value Ref Range    GFR If African American >60 >60 mL/min/1.73 m 2    GFR If Non African American >60 >60 mL/min/1.73 m 2   CBC WITH DIFFERENTIAL    Collection Time: 10/09/18  3:29 AM   Result Value Ref Range    WBC 9.5 4.8 - 10.8 K/uL    RBC 4.04 (L) 4.20 - 5.40 M/uL    Hemoglobin 12.1 12.0 - 16.0 g/dL    Hematocrit 35.1 (L) 37.0 - 47.0 %    MCV 86.9 81.4 - 97.8 fL    MCH 30.0 27.0 - 33.0 pg    MCHC 34.5 33.6 - 35.0 g/dL    RDW 40.3 35.9 - 50.0 fL    Platelet Count 314 164 - 446 K/uL    MPV 10.1 9.0 - 12.9 fL    Neutrophils-Polys 69.00 44.00 - 72.00 %    Lymphocytes 17.30 (L) 22.00 - 41.00 %    Monocytes 9.10 0.00 - 13.40 %    Eosinophils 2.50 0.00 - 6.90 %    Basophils 0.50 0.00 - 1.80 %    Immature Granulocytes 1.60 (H) 0.00 - 0.90 %    Nucleated RBC 0.00 /100 WBC    Neutrophils (Absolute) 6.53 2.00 - 7.15 K/uL    Lymphs (Absolute) 1.64 1.00 - 4.80 K/uL    Monos (Absolute) 0.86 (H) 0.00 - 0.85 K/uL    Eos (Absolute) 0.24 0.00 - 0.51 K/uL    Baso (Absolute) 0.05 0.00 - 0.12 K/uL    Immature Granulocytes (abs) 0.15 (H) 0.00 - 0.11 K/uL    NRBC (Absolute) 0.00 K/uL   BASIC METABOLIC PANEL    Collection Time: 10/09/18  3:29 AM   Result Value Ref Range    Sodium 136 135 - 145 mmol/L    Potassium 4.0 3.6 - 5.5 mmol/L    Chloride 100 96 - 112 mmol/L     Co2 25 20 - 33 mmol/L    Glucose 104 (H) 65 - 99 mg/dL    Bun 25 (H) 8 - 22 mg/dL    Creatinine 0.56 0.50 - 1.40 mg/dL    Calcium 9.5 8.5 - 10.5 mg/dL    Anion Gap 11.0 0.0 - 11.9   MAGNESIUM    Collection Time: 10/09/18  3:29 AM   Result Value Ref Range    Magnesium 2.1 1.5 - 2.5 mg/dL   PROTHROMBIN TIME    Collection Time: 10/09/18  3:29 AM   Result Value Ref Range    PT 14.2 12.0 - 14.6 sec    INR 1.09 0.87 - 1.13   ESTIMATED GFR    Collection Time: 10/09/18  3:29 AM   Result Value Ref Range    GFR If African American >60 >60 mL/min/1.73 m 2    GFR If Non African American >60 >60 mL/min/1.73 m 2   Comp Metabolic Panel (CMP)    Collection Time: 10/10/18  6:08 AM   Result Value Ref Range    Sodium 137 135 - 145 mmol/L    Potassium 3.6 3.6 - 5.5 mmol/L    Chloride 104 96 - 112 mmol/L    Co2 23 20 - 33 mmol/L    Anion Gap 10.0 0.0 - 11.9    Glucose 128 (H) 65 - 99 mg/dL    Bun 20 8 - 22 mg/dL    Creatinine 0.56 0.50 - 1.40 mg/dL    Calcium 9.2 8.5 - 10.5 mg/dL    AST(SGOT) 20 12 - 45 U/L    ALT(SGPT) 25 2 - 50 U/L    Alkaline Phosphatase 81 30 - 99 U/L    Total Bilirubin 0.5 0.1 - 1.5 mg/dL    Albumin 3.8 3.2 - 4.9 g/dL    Total Protein 6.1 6.0 - 8.2 g/dL    Globulin 2.3 1.9 - 3.5 g/dL    A-G Ratio 1.7 g/dL   TSH with Reflex to FT4    Collection Time: 10/10/18  6:08 AM   Result Value Ref Range    TSH 1.320 0.380 - 5.330 uIU/mL   Vitamin D, 25-hydroxy (blood)    Collection Time: 10/10/18  6:08 AM   Result Value Ref Range    25-Hydroxy   Vitamin D 25 28 (L) 30 - 100 ng/mL   ESTIMATED GFR    Collection Time: 10/10/18  6:08 AM   Result Value Ref Range    GFR If African American >60 >60 mL/min/1.73 m 2    GFR If Non African American >60 >60 mL/min/1.73 m 2   CBC WITH DIFFERENTIAL    Collection Time: 10/10/18 10:00 AM   Result Value Ref Range    WBC 6.7 4.8 - 10.8 K/uL    RBC 3.36 (L) 4.20 - 5.40 M/uL    Hemoglobin 11.3 (L) 12.0 - 16.0 g/dL    Hematocrit 30.9 (L) 37.0 - 47.0 %    MCV 92.0 81.4 - 97.8 fL    MCH 33.6 (H)  27.0 - 33.0 pg    MCHC 36.6 (H) 33.6 - 35.0 g/dL    RDW 40.8 35.9 - 50.0 fL    Platelet Count 266 164 - 446 K/uL    MPV 10.6 9.0 - 12.9 fL    Neutrophils-Polys 74.90 (H) 44.00 - 72.00 %    Lymphocytes 14.40 (L) 22.00 - 41.00 %    Monocytes 7.00 0.00 - 13.40 %    Eosinophils 2.10 0.00 - 6.90 %    Basophils 0.40 0.00 - 1.80 %    Immature Granulocytes 1.20 (H) 0.00 - 0.90 %    Nucleated RBC 0.00 /100 WBC    Neutrophils (Absolute) 5.00 2.00 - 7.15 K/uL    Lymphs (Absolute) 0.96 (L) 1.00 - 4.80 K/uL    Monos (Absolute) 0.47 0.00 - 0.85 K/uL    Eos (Absolute) 0.14 0.00 - 0.51 K/uL    Baso (Absolute) 0.03 0.00 - 0.12 K/uL    Immature Granulocytes (abs) 0.08 0.00 - 0.11 K/uL    NRBC (Absolute) 0.00 K/uL       Current Facility-Administered Medications   Medication Frequency   • Respiratory Care per Protocol Continuous RT   • Pharmacy Consult Request ...Pain Management Review 1 Each PRN   • tramadol (ULTRAM) 50 MG tablet 50 mg Q4HRS PRN   • acetaminophen (TYLENOL) tablet 650 mg Q4HRS PRN   • senna-docusate (PERICOLACE or SENOKOT S) 8.6-50 MG per tablet 2 Tab BID    And   • polyethylene glycol/lytes (MIRALAX) PACKET 1 Packet QDAY PRN    And   • magnesium hydroxide (MILK OF MAGNESIA) suspension 30 mL QDAY PRN    And   • bisacodyl (DULCOLAX) suppository 10 mg QDAY PRN   • artificial tears 1.4 % ophthalmic solution 1 Drop PRN   • benzocaine-menthol (CEPACOL) lozenge 1 Lozenge Q2HRS PRN   • hydrALAZINE (APRESOLINE) tablet 25 mg Q8HRS PRN   • mag hydrox-al hydrox-simeth (MAALOX PLUS ES or MYLANTA DS) suspension 20 mL Q2HRS PRN   • ondansetron (ZOFRAN ODT) dispertab 4 mg 4X/DAY PRN    Or   • ondansetron (ZOFRAN) syringe/vial injection 4 mg 4X/DAY PRN   • traZODone (DESYREL) tablet 50 mg QHS PRN   • sodium chloride (OCEAN) 0.65 % nasal spray 2 Spray PRN   • amLODIPine (NORVASC) tablet 5 mg Q DAY   • digoxin (LANOXIN) tablet 125 mcg DAILY AT 1800   • heparin injection 5,000 Units Q12HRS   • metoprolol (LOPRESSOR) tablet 50 mg Q8HRS    • sodium chloride (SALT) tablet 2 g TID WITH MEALS   • omeprazole (PRILOSEC) capsule 20 mg DAILY       Orders Placed This Encounter   Procedures   • Diet Order Regular (No pudding )     Standing Status:   Standing     Number of Occurrences:   1     Order Specific Question:   Diet:     Answer:   Regular [1]     Comments:   No pudding      Order Specific Question:   Texture/Fiber modifications:     Answer:   Dysphagia 2(Pureed/Chopped)specify fluid consistency(question 6) [2]     Order Specific Question:   Consistency/Fluid modifications:     Answer:   [place] Thick [2]       Assessment:  Active Hospital Problems    Diagnosis   • *Acute intracranial hemorrhage (HCC)   • Hyponatremia   • Encephalopathy acute   • 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 Santizo on 9/22/18.  Patient at high risk for bleed so hold AC for another week.   -PT and OT for mobility and ADLs  -SLP for cognition      Dysphagia - Patient on NTFL diet on transfer.   -SLP for swallow.  Most likely will require MBSS     A Fib - Patient previously on Eliquis which was reversed after ICH.  Currently rate controlled  -Continue Digoxin 125 mcg daily, Metoprolol 50 mg q8h.   -Restart AC on 10/16/18.      HTN - Patient on 5 mg Amlodipine. Per family was not on that prior. Patient with hypotension x1, will monitor.     Hyponatremia - Patient on 2g TID on transfer. Will check admission CMP - 137 on admission.  -Reduce to 1 g TID, will slowly titrate off and recheck.      Low Vitamin D - 28 on admission. Start on 1000 U while inpatient.     DVT Ppx - Patient on Heparin on transfer.  Will need transition to Coumadin without bridge on 10/16/18.    Total time:  35 minutes.  I spent greater than 50% of the time for patient care, counseling, and  coordination on this date, including unit/floor time, and face-to-face time with the patient as per interval events and assessment and plan above. Topics discussed included hyponatremia, blood sugars, and vitamin D deficiency.  Discussed stay and planning with son.     Janae Ortega M.D.

## 2018-10-10 NOTE — PROGRESS NOTES
Patient admitted to facility at 1605 via transport; accompanied by hospital transport and daughters.  Patient assisted to room and positioned in bed for comfort and safety; call light within reach.  Patient assisted with stowing belongings and oriented to room and facility.  Admission assessment performed and documented in computer, 2 RN skin check preformed, pictures placed in computer.  Admission paperwork completed; signed copies placed in chart.  Will continue to monitor.

## 2018-10-10 NOTE — THERAPY
Occupational Therapy Initial Plan of Care Note    1) Assessment:  Patient is 78 y.o. female with a diagnosis of Acute intracranial hemorrhage .  Additional factors influencing patient status / progress PLOF independent in all areas of occupation, good motivation to return to function, good social support. Limiting factors: lives alone, cognitive deficits  Long term and short term goals have been discussed with patient and they are in agreement.  2) Plan:  Recommend Occupational Therapy  minutes per day 5-6 days per week for 14-21 days for the following treatments:  OT Group Therapy, OT Self Care/ADL, OT Cognitive Skill Dev, OT Community Reintegration, OT Manual Ther Technique, OT Neuro Re-Ed/Balance, OT Therapeutic Activity, OT Evaluation and OT Therapeutic Exercise    3) Goals:  Please refer to care plan for goals.

## 2018-10-10 NOTE — REHAB-SLP IDT TEAM NOTE
Speech Therapy   Cognitive Linquistic Functions  Comprehension Initial:  5 - Stand-by Prompting/Supervision or Set-up  Comprehension Current:  5 - Stand-by Prompting/Supervision or Set-up   Comprehension Description:     Expression Initial:  6 - Modified Independent  Expression Current:  6 - Modified Independent   Expression Description:     Social Interaction Initial:  6 - Modified Independent  Social Interaction Current:  6 - Modified Independent   Social Interaction Description:     Problem Solving Initial:  2 - Max Assistance  Problem Solving Current:  2 - Max Assistance   Problem Solving Description:     Memory Initial:  2 - Max Assistance  Memory Current:  2 - Max Assistance   Memory Description:     Executive Functioning / Organization Initial:  Severe (2)  Executive Functioning / Organization Current:  Severe (2)   Executive Functioning Desciption:  Processing, planning and organization skills     Swallowing  Swallowing Status: Pt advanced to dys 2 textured with NTL, pt impulsive and required mod cues for decreased rate of intake and appropriate bite sizes throughout the meal. MBSS to be completed as for pt participate in FEES at acute hospital and results indicated aspiration with pudding and thin liquids. MBSS scheduled for 10/11 and advancement of diet as appropriate.   Orders Placed This Encounter   Procedures   • Diet Order Regular (No pudding )     Standing Status:   Standing     Number of Occurrences:   1     Order Specific Question:   Diet:     Answer:   Regular [1]     Comments:   No pudding      Order Specific Question:   Texture/Fiber modifications:     Answer:   Dysphagia 2(Pureed/Chopped)specify fluid consistency(question 6) [2]     Order Specific Question:   Consistency/Fluid modifications:     Answer:   [place] Thick [2]     Behavior Modification Plan  Allow for rest time, Keep instructions simple/concrete, Give clear feedback, Redirect to task/topic, Provide reasonable choices, Decrease the  chance of failure by offering activities that are within the patient's abilities, Analyze tasks (break down into smaller steps) and Reinforce participation in desired tasks  Assistive Technology  Low/Impaired vision equipment and Low tech: Calendar, planner, schedule, alarms/timers, pill organizer, post-it notes, lists  Family Training/Education: ongoing with pt and son   DC Recommendations:  TBD  Strengths:  Effective communication skills, Independent PLOF, Motivated for self care and independence, Pleasant and cooperative, Supportive family and Willingly participates in therapeutic activities  Barriers:  Aspiration risk, Impulsive and Other: severe cognitive deficits   # of short term goals set=4  # of short term goals met=0 recent eval on 10/10       Speech Therapy Problems           Problem: Memory STGs     Dates: Start: 10/10/18       Goal: STG-Within one week, patient will     Dates: Start: 10/10/18       Description: 1) Individualized goal:  Achieve a score of 25 or greater across 3 consecutive sessions on the orientation log.   2) Interventions:  SLP Self Care / ADL Training  and SLP Cognitive Skill Development               Problem: Problem Solving STGs     Dates: Start: 10/10/18       Goal: STG-Within one week, patient will     Dates: Start: 10/10/18       Description: 1) Individualized goal:  Participate in divided attention tasks with 80% accuracy provided MOD A.   2) Interventions:  SLP Self Care / ADL Training , SLP Cognitive Skill Development and SLP Group Treatment               Problem: Speech/Swallowing LTGs     Dates: Start: 10/10/18       Goal: LTG-By discharge, patient will safely swallow     Dates: Start: 10/10/18       Description: 1) Individualized goal:  Regular textures and thin liquids with no overt s/sx of asp/pen noted across meals with 100% accuracy provided MOD I.   2) Interventions:  SLP Swallowing Dysfunction Treatment, SLP Video Swallow Evaluation, SLP Self Care / ADL Training , SLP  Cognitive Skill Development and SLP Group Treatment             Goal: LTG-By discharge, patient will     Dates: Start: 10/10/18       Description: 1) Individualized goal:  Complete functional problem solving and recall information with 80% accuracy provided MOD I.   2) Interventions:  SLP Self Care / ADL Training , SLP Cognitive Skill Development and SLP Group Treatment               Problem: Swallowing STGs     Dates: Start: 10/10/18       Goal: STG-Within one week, patient will safely swallow     Dates: Start: 10/10/18       Description: 1) Individualized goal:  dys 2 textures with NTL with no overt s/sx of asp/pen noted across 2/2 meals provided MIN cues.   2) Interventions:  SLP Swallowing Dysfunction Treatment and SLP Video Swallow Evaluation             Goal: STG-Within one week, patient will     Dates: Start: 10/10/18       Description: 1) Individualized goal: participate in MBSS with advancement of diet as appropriate within one week.   2) Interventions:  SLP Swallowing Dysfunction Treatment and SLP Video Swallow Evaluation                    Section completed by:  Sherly Nava MS,CCC-SLP

## 2018-10-10 NOTE — THERAPY
Physical Therapy Initial Plan of Care Note    1) Assessment: Patient is 78 y.o. female with a diagnosis of 78 year old female with acute intracranial hemorrhage, with fall 1 wk prior to admission on 9/22, and worsening nausea (intraventricular hemorrhage and possible subarchnoid hemorrhage), carolyn EVD, and EF of 65%.  Additional factors influencing patient status / progress (ie: cognitive factors, co-morbidities, social support, etc): dec sequencing, supportive son and daughter, motivated.  Long term and short term goals have been discussed with patient and son and they are in agreement.  2) Plan: Recommend Physical Therapy  minutes per day 5-6 days per week for 2 weeks for the following treatments:  PT Group Therapy, PT E Stim Attended, PT Gait Training, PT Self Care/Home Eval, PT Therapeutic Exercises, PT Neuro Re-Ed/Balance, PT Aquatic Therapy, PT Therapeutic Activity, PT Manual Therapy and PT Evaluation.  3) Goals:  Please refer to care plan for goals.

## 2018-10-10 NOTE — CARE PLAN
Problem: Mobility  Goal: STG-Within one week, patient will ambulate household distance  1) Individualized goal:  Patient will amb with FWW >30 ft indoors CGA/SBA  2) Interventions:  PT Group Therapy, PT E Stim Attended, PT Gait Training, PT Self Care/Home Eval, PT Therapeutic Exercises, PT Neuro Re-Ed/Balance, PT Aquatic Therapy, PT Therapeutic Activity, PT Manual Therapy and PT Evaluation.       Outcome: NOT MET  Eval 10/10    Problem: Mobility Transfers  Goal: STG-Within one week, patient will sit to stand  1) Individualized goal:  Patient will transfer with FWW SBA STS/SPT  2) Interventions:  PT Group Therapy, PT E Stim Attended, PT Gait Training, PT Self Care/Home Eval, PT Therapeutic Exercises, PT Neuro Re-Ed/Balance, PT Aquatic Therapy, PT Therapeutic Activity, PT Manual Therapy and PT Evaluation.       Outcome: NOT MET  Eval 10/10

## 2018-10-11 ENCOUNTER — APPOINTMENT (OUTPATIENT)
Dept: PAIN MANAGEMENT | Facility: REHABILITATION | Age: 78
DRG: 949 | End: 2018-10-11
Attending: PHYSICAL MEDICINE & REHABILITATION
Payer: MEDICARE

## 2018-10-11 ENCOUNTER — APPOINTMENT (OUTPATIENT)
Dept: RADIOLOGY | Facility: REHABILITATION | Age: 78
DRG: 949 | End: 2018-10-11
Attending: PHYSICAL MEDICINE & REHABILITATION
Payer: MEDICARE

## 2018-10-11 PROCEDURE — 74230 X-RAY XM SWLNG FUNCJ C+: CPT

## 2018-10-11 PROCEDURE — 97110 THERAPEUTIC EXERCISES: CPT

## 2018-10-11 PROCEDURE — 700102 HCHG RX REV CODE 250 W/ 637 OVERRIDE(OP): Performed by: PHYSICAL MEDICINE & REHABILITATION

## 2018-10-11 PROCEDURE — 770010 HCHG ROOM/CARE - REHAB SEMI PRIVAT*

## 2018-10-11 PROCEDURE — 97112 NEUROMUSCULAR REEDUCATION: CPT

## 2018-10-11 PROCEDURE — 97535 SELF CARE MNGMENT TRAINING: CPT

## 2018-10-11 PROCEDURE — 92526 ORAL FUNCTION THERAPY: CPT

## 2018-10-11 PROCEDURE — A9270 NON-COVERED ITEM OR SERVICE: HCPCS | Performed by: PHYSICAL MEDICINE & REHABILITATION

## 2018-10-11 PROCEDURE — 97116 GAIT TRAINING THERAPY: CPT

## 2018-10-11 PROCEDURE — 92611 MOTION FLUOROSCOPY/SWALLOW: CPT

## 2018-10-11 PROCEDURE — 700111 HCHG RX REV CODE 636 W/ 250 OVERRIDE (IP): Performed by: PHYSICAL MEDICINE & REHABILITATION

## 2018-10-11 PROCEDURE — 99233 SBSQ HOSP IP/OBS HIGH 50: CPT | Performed by: PHYSICAL MEDICINE & REHABILITATION

## 2018-10-11 RX ORDER — BUTALBITAL, ACETAMINOPHEN AND CAFFEINE 50; 325; 40 MG/1; MG/1; MG/1
1 TABLET ORAL EVERY 6 HOURS PRN
Status: DISCONTINUED | OUTPATIENT
Start: 2018-10-11 | End: 2018-10-25 | Stop reason: HOSPADM

## 2018-10-11 RX ADMIN — METOPROLOL TARTRATE 50 MG: 25 TABLET ORAL at 13:40

## 2018-10-11 RX ADMIN — VITAMIN D, TAB 1000IU (100/BT) 1000 UNITS: 25 TAB at 09:00

## 2018-10-11 RX ADMIN — Medication 1 G: at 11:40

## 2018-10-11 RX ADMIN — DIGOXIN 125 MCG: 125 TABLET ORAL at 17:40

## 2018-10-11 RX ADMIN — Medication 1 G: at 08:24

## 2018-10-11 RX ADMIN — Medication 1 G: at 17:40

## 2018-10-11 RX ADMIN — HEPARIN SODIUM 5000 UNITS: 5000 INJECTION, SOLUTION INTRAVENOUS; SUBCUTANEOUS at 09:18

## 2018-10-11 RX ADMIN — HEPARIN SODIUM 5000 UNITS: 5000 INJECTION, SOLUTION INTRAVENOUS; SUBCUTANEOUS at 21:43

## 2018-10-11 RX ADMIN — METOPROLOL TARTRATE 50 MG: 25 TABLET ORAL at 05:27

## 2018-10-11 RX ADMIN — TRAMADOL HYDROCHLORIDE 50 MG: 50 TABLET, FILM COATED ORAL at 03:49

## 2018-10-11 RX ADMIN — METOPROLOL TARTRATE 50 MG: 25 TABLET ORAL at 21:43

## 2018-10-11 ASSESSMENT — GAIT ASSESSMENTS
DISTANCE (FEET): 150
DEVIATION: BRADYKINETIC;SHUFFLED GAIT
GAIT LEVEL OF ASSIST: MINIMAL ASSIST
ASSISTIVE DEVICE: 4 WHEEL WALKER

## 2018-10-11 ASSESSMENT — PAIN SCALES - GENERAL
PAINLEVEL_OUTOF10: 0
PAINLEVEL_OUTOF10: 8
PAINLEVEL_OUTOF10: 0
PAINLEVEL_OUTOF10: 0

## 2018-10-11 NOTE — REHAB-DIETARY IDT TEAM NOTE
Dietary   Nutrition       Dietary Problems           Problem: Other Problem (see comments)     Description: Diagnosis: Swallowing difficulty related to dysphagia as evidenced by need for texture modified diet with thickened liquids and SLP following.       Goal: Other Goal     Description: Monitor/Evaluation: Monitor PO intake, weight, labs, medication adjustments, skin integrity, GI function, vitals, I/Os, and overall hydration status.  Adjust nutritional POC pending clinical outcomes.   RD following weekly.    Goal: Maintain >/= 50% adequate oral nutrient/fluid intake to promote nutrition optimization/healing.                NUTRITION SERVICES - Brief TF Consult Note  The pt is a 79 yo female with an admitting dx of Intraparenchymal hemorrhage of brain.     Consult received for TF assessment for this pt. Per review of chart, pt has been upgraded to Regular D2 diet with nectar thick liquids today. Plans for MBSS tomorrow. PO intake has been <25% x1 and 25-50% x1 meal so far. Pt and her daughter seen in room this evening to discuss meals so far. She dislikes the nectar thick liquids and is hoping that she can advance her diet pending her evaluation tomorrow. She declines Boost or any changes for now. Agreed to reassess additional meal items and fluids again after MBSS as pt more amenable to thin liquid items. Pt and her daughter thanked for their time.     Labs, meds, fluids, GI/, PO, skin - all reviewed.     Diagnosis: Swallowing difficulty related to dysphagia as evidenced by need for texture modified diet with thickened liquids and SLP following.       Intervention/ Recommendations/POC:  1. Continue current diet. Upgrades per SLP.  2. Encourage adequate PO/fluid intake.  3. Nutrition rep to see regarding food prefs/ honor within dietary restrictions (if indicated)     Monitor/Evaluation: Monitor PO intake, weight, labs, medication adjustments, skin integrity, GI function, vitals, I/Os, and overall hydration  status.  Adjust nutritional POC pending clinical outcomes.   RD following weekly.     Goal: Maintain >/= 50% adequate oral nutrient/fluid intake to promote nutrition optimization/healing.      Section completed by:  Margy Alves R.D.

## 2018-10-11 NOTE — REHAB-NURSING IDT TEAM NOTE
Nursing   Nursing  Diet/Nutrition:  Regular, Dysphagia II and Nectar Thick Liquids  Medication Administration:  Crush all Medications in Puree  % consumed at meals in last 24 hours:  Consumed 25%-50% of meals per documentation.  Meal/Snack Consumption for the past 24 hrs:   Oral Nutrition   10/10/18 1200 Lunch;Between 25-50% Consumed   10/10/18 0900 Breakfast;Less than 25% Consumed     Snack schedule:  None  Appetite:  Poor  Fluid Intake/Output in past 24 hours: In: 540 [P.O.:540]  Out: 1400   Admit Weight:  Weight: 70.8 kg (156 lb)  Weight Last 7 Days   [70.8 kg (156 lb)] 70.8 kg (156 lb) (10/09 1605)    Pain Issues:    Location:  Head (10/10 2244)  Anterior (10/10 2244)         Severity:  Moderate   Scheduled pain medications:  None     PRN pain medications used in last 24 hours:  acetaminophen (TYLENOL)  and tramadol (Ultram)   Non Pharmacologic Interventions:  emotional support, relaxation, repositioned and rest  Effectiveness of pain management plan:  good=patient states acceptable comfort after interventions    Bowel:    Bowel Assist Initial Score:  3 - Moderate Assistance  Bowel Assist Current Score:  3 - Moderate Assistance  Bowl Accidents in last 7 days:     Last bowel movement: 10/10/18  Stool Description: Medium, Soft     Usual bowel pattern:  daily  Scheduled bowel medications:  senna-docusate (PERICOLACE or SENOKOT S)   PRN bowel medications used in last 24 hours:  None  Nursing Interventions:  Increased time, Scheduled medication, Supervision, Verbal cueing, Positioning on commode/toilet  Effectiveness of bowel program:   fair=sometimes needs prn bowel meds for constipation  Bladder:    Bladder Assist Initial Score:  3 - Moderate Assistance  Bladder Assist Current Score:  3 - Moderate Assistance  Bladder Accidents in last 7 days:     PVR range for past 24-48 hours:  []  ()  Medications affecting bladder:  None    Interventions:  Increased time, Supervision, Verbal cueing  Effectiveness of bladder  training:  Good=regular, predictable, emptying of bladder, patient initiates time voiding    Wound:   Patient Lines/Drains/Airways Status    Active Current Wounds     Name: Placement date: Placement time: Site: Days:    Wound 10/09/18 Laceration Head scab 10/09/18   1756      1                   Interventions: Monitor and assess   Effectiveness of intervention:  wound is unchanged     Sleep/wake cycle:   Average hours slept:  Sleeps 4-6 hours without waking  Sleep medication usage:  Trazodone    Patient/Family Training/Education:  Aspiration Precautions, Bladder Management/Training, Bowel Management/Training, Diet/Nutrition, Fall Prevention, General Self Care, Medication Management, Pain Management, Respiratory Hygiene, Safety and Wound Care    Strengths: Willingly participates in therapeutic activities, Able to follow instructions and Supportive family   Barriers:   Fatigue, Generalized weakness and Limited mobility            Nursing Problems           Problem: Bowel/Gastric:     Goal: Normal bowel function is maintained or improved           Goal: Will not experience complications related to bowel motility             Problem: Communication     Goal: The ability to communicate needs accurately and effectively will improve             Problem: Discharge Barriers/Planning     Goal: Patient's continuum of care needs will be met             Problem: Infection     Goal: Will remain free from infection             Problem: Knowledge Deficit     Goal: Knowledge of disease process/condition, treatment plan, diagnostic tests, and medications will improve           Goal: Knowledge of the prescribed therapeutic regimen will improve             Problem: Mobility     Goal: Risk for activity intolerance will decrease             Problem: Pain Management     Goal: Pain level will decrease to patient's comfort goal             Problem: Safety     Goal: Will remain free from injury           Goal: Will remain free from falls              Problem: Skin Integrity     Goal: Risk for impaired skin integrity will decrease             Problem: Urinary Elimination:     Goal: Ability to reestablish a normal urinary elimination pattern will improve             Problem: Venous Thromboembolism (VTW)/Deep Vein Thrombosis (DVT) Prevention:     Goal: Patient will participate in Venous Thrombosis (VTE)/Deep Vein Thrombosis (DVT)Prevention Measures                  Long Term Goals:   At discharge patient will be able to function safely at home and in the community with support.    Section completed by:  Viviana Chaudhary R.N.

## 2018-10-11 NOTE — REHAB-COLLABORATIVE ONGOING IDT TEAM NOTE
Weekly Interdisciplinary Team Conference Note    Weekly Interdisciplinary Team Conference # 1  Date:  10/11/2018    Clinicians present and reporting at team conference include the following:   MD: Janae Ortega MD   RN:  Jovita Whittaker, ATUL   PT:   Asif Pascual, PT, DPT  OT:  Rosanna Carter, MS, OTR/L   ST:  Sherly Nava, MS, CCC-SLP  CM:  Ivania Napoles, ATUL, CCM  REC:  Not Applicable  RT:  RT Liliana  RPh:  Matthew Alvarado [facility]  All reporting clinicians have a working knowledge of this patient's plan of care.    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) 09/24/2018     Priority: Medium   • Paroxysmal atrial fibrillation (HCC) 09/22/2018     Priority: Medium   • Oropharyngeal dysphagia 10/08/2018     Priority: Low   • Hypokalemia 09/23/2018     Priority: Low   • Hypomagnesemia 09/23/2018     Priority: Low     Results     Procedure Component Value Ref Range Date/Time    HEMOGLOBIN A1C [[id number]] Collected:  10/10/18 1000    Order Status:  Completed Lab Status:  Final result Updated:  10/10/18 1550     Glycohemoglobin 5.6 0.0 - 5.6 %      Comment: Increased risk for diabetes:  5.7 -6.4%  Diabetes:  >6.4%  Glycemic control for adults with diabetes:  <7.0%  The above interpretations are per ADA guidelines.  Diagnosis  of diabetes mellitus on the basis of elevated Hemoglobin A1c  should be confirmed by repeating the Hb A1c test.          Est Avg Glucose 114 mg/dL      Comment: The eAG calculation is based on the A1c-Derived Daily Glucose  (ADAG) study.  See the ADA's website for additional information.         Narrative:       SPECIMEN IS A RECOLLECT  QNS           Nursing  Diet/Nutrition:  Regular, Dysphagia II  and Nectar Thick Liquids  Medication Administration:  Crush all Medications in Puree  % consumed at meals in last 24 hours:  Consumed 25%-50% of meals per documentation.  Meal/Snack Consumption for the past 24 hrs:   Oral Nutrition   10/10/18 1200 Lunch;Between 25-50% Consumed   10/10/18 0900 Breakfast;Less than 25% Consumed     Snack schedule:  None  Appetite:  Poor  Fluid Intake/Output in past 24 hours: In: 540 [P.O.:540]  Out: 1400   Admit Weight:  Weight: 70.8 kg (156 lb)  Weight Last 7 Days   [70.8 kg (156 lb)] 70.8 kg (156 lb) (10/09 1605)    Pain Issues:    Location:  Head (10/10 2244)  Anterior (10/10 2244)         Severity:  Moderate   Scheduled pain medications:  None     PRN pain medications used in last 24 hours:  acetaminophen (TYLENOL)  and tramadol (Ultram)   Non Pharmacologic Interventions:  emotional support, relaxation, repositioned and rest  Effectiveness of pain management plan:  good=patient states acceptable comfort after interventions    Bowel:    Bowel Assist Initial Score:  3 - Moderate Assistance  Bowel Assist Current Score:  3 - Moderate Assistance  Bowl Accidents in last 7 days:     Last bowel movement: 10/10/18  Stool Description: Medium, Soft     Usual bowel pattern:  daily  Scheduled bowel medications:  senna-docusate (PERICOLACE or SENOKOT S)   PRN bowel medications used in last 24 hours:  None  Nursing Interventions:  Increased time, Scheduled medication, Supervision, Verbal cueing, Positioning on commode/toilet  Effectiveness of bowel program:   fair=sometimes needs prn bowel meds for constipation  Bladder:    Bladder Assist Initial Score:  3 - Moderate Assistance  Bladder Assist Current Score:  3 - Moderate Assistance  Bladder Accidents in last 7 days:     PVR range for past 24-48 hours:  []  ()  Medications affecting bladder:  None    Interventions:  Increased time, Supervision, Verbal cueing  Effectiveness of bladder training:  Good=regular, predictable, emptying of  bladder, patient initiates time voiding    Wound:   Patient Lines/Drains/Airways Status    Active Current Wounds     Name: Placement date: Placement time: Site: Days:    Wound 10/09/18 Laceration Head scab 10/09/18   1756      1                   Interventions: Monitor and assess   Effectiveness of intervention:  wound is unchanged     Sleep/wake cycle:   Average hours slept:  Sleeps 4-6 hours without waking  Sleep medication usage:  Trazodone    Patient/Family Training/Education:  Aspiration Precautions, Bladder Management/Training, Bowel Management/Training, Diet/Nutrition, Fall Prevention, General Self Care, Medication Management, Pain Management, Respiratory Hygiene, Safety and Wound Care    Strengths: Willingly participates in therapeutic activities, Able to follow instructions and Supportive family   Barriers:   Fatigue, Generalized weakness and Limited mobility            Nursing Problems           Problem: Bowel/Gastric:     Goal: Normal bowel function is maintained or improved           Goal: Will not experience complications related to bowel motility             Problem: Communication     Goal: The ability to communicate needs accurately and effectively will improve             Problem: Discharge Barriers/Planning     Goal: Patient's continuum of care needs will be met             Problem: Infection     Goal: Will remain free from infection             Problem: Knowledge Deficit     Goal: Knowledge of disease process/condition, treatment plan, diagnostic tests, and medications will improve           Goal: Knowledge of the prescribed therapeutic regimen will improve             Problem: Mobility     Goal: Risk for activity intolerance will decrease             Problem: Pain Management     Goal: Pain level will decrease to patient's comfort goal             Problem: Safety     Goal: Will remain free from injury           Goal: Will remain free from falls             Problem: Skin Integrity     Goal: Risk for  impaired skin integrity will decrease             Problem: Urinary Elimination:     Goal: Ability to reestablish a normal urinary elimination pattern will improve             Problem: Venous Thromboembolism (VTW)/Deep Vein Thrombosis (DVT) Prevention:     Goal: Patient will participate in Venous Thrombosis (VTE)/Deep Vein Thrombosis (DVT)Prevention Measures                  Long Term Goals:   At discharge patient will be able to function safely at home and in the community with support.    Section completed by:  Viviana Chaudhary R.N.              Mobility  Bed mobility:   SBA - SPV  Bed /Chair/Wheelchair Transfer Initial:  4 - Minimal Assistance  Bed /Chair/Wheelchair Transfer Current:  4 - Minimal Assistance   Bed/Chair/Wheelchair Transfer Description:   (Min A CGA/min A SPT)  Walk Initial:  1 - Total Assistance  Walk Current:  1 - Total Assistance   Walk Description:  Two hand rails, Requires incidental assist (CGA // bars 40 ft x 2 )  Wheelchair Initial:  1 - Total Assistance  Wheelchair Current:  1 - Total Assistance   Wheelchair Description:   (30 ft indoors SBA )  Stairs Initial:  0 - Not tested, patient refused  Stairs Current: 0 - Not tested, patient refused   Stairs Description:    Patient/Family Training/Education:  Ongoing, rehab expectations, POC  DME/DC Recommendations:  TBD, eval yesterday  Strengths:  Able to follow instructions, Independent PLOF, Motivated for self care and independence and Pleasant and cooperative  Barriers:   Poor balance, Poor carryover of learning and Poor family support  # of short term goals set= 2  # of short term goals met=0 (eval yesterday)       Physical Therapy Problems           Problem: Mobility     Dates: Start: 10/10/18       Goal: STG-Within one week, patient will ambulate household distance     Dates: Start: 10/10/18       Description: 1) Individualized goal:  Patient will amb with FWW >30 ft indoors CGA/SBA  2) Interventions:  PT Group Therapy, PT E Stim  Attended, PT Gait Training, PT Self Care/Home Eval, PT Therapeutic Exercises, PT Neuro Re-Ed/Balance, PT Aquatic Therapy, PT Therapeutic Activity, PT Manual Therapy and PT Evaluation.         Note:     Goal Note filed on 10/11/18 1154 by Cristino Pascual, PT    Goal: STG-Within one week, patient will ambulate household distance  Outcome: NOT MET  eval yesterday                  Problem: Mobility Transfers     Dates: Start: 10/10/18       Goal: STG-Within one week, patient will sit to stand     Dates: Start: 10/10/18       Description: 1) Individualized goal:  Patient will transfer with FWW SBA STS/SPT  2) Interventions:  PT Group Therapy, PT E Stim Attended, PT Gait Training, PT Self Care/Home Eval, PT Therapeutic Exercises, PT Neuro Re-Ed/Balance, PT Aquatic Therapy, PT Therapeutic Activity, PT Manual Therapy and PT Evaluation.         Note:     Goal Note filed on 10/11/18 1154 by Cristino Pascual, PT    Goal: STG-Within one week, patient will sit to stand  Outcome: NOT MET  eval yesterday                  Problem: PT-Long Term Goals     Dates: Start: 10/10/18       Goal: LTG-By discharge, patient will ambulate     Dates: Start: 10/10/18       Description: 1) Individualized goal:  Patient will amb >150 ft over various surfaces with FWW SPV-mod I  2) Interventions:  PT Group Therapy, PT E Stim Attended, PT Gait Training, PT Self Care/Home Eval, PT Therapeutic Exercises, PT Neuro Re-Ed/Balance, PT Aquatic Therapy, PT Therapeutic Activity, PT Manual Therapy and PT Evaluation.               Goal: LTG-By discharge, patient will transfer one surface to another     Dates: Start: 10/10/18       Description: 1) Individualized goal:  Patient will transfer SPV-mod I with FWW   2) Interventions:  PT Group Therapy, PT E Stim Attended, PT Gait Training, PT Self Care/Home Eval, PT Therapeutic Exercises, PT Neuro Re-Ed/Balance, PT Aquatic Therapy, PT Therapeutic Activity, PT Manual Therapy and PT Evaluation.                        Section completed by:  Cristino Pascual PT       Activities of Daily Living  Eating Initial:  5 - Standby Prompting/Supervision or Set-up  Eating Current:  5 - Standby Prompting/Supervision or Set-up   Eating Description:     Grooming Initial:  5 - Standby Prompting/Supervision or Set-up  Grooming Current:  5 - Standby Prompting/Supervision or Set-up   Grooming Description:  Supervision for safety  Bathing Initial:  4 - Minimal Assistance  Bathing Current:  4 - Minimal Assistance   Bathing Description:  Adaptive equipment, Grab bar, Long handled bath tool, Hand held shower, Increased time, Supervision for safety, Verbal cueing (MIN A to stand with use of grab bars to clean elva area)  Upper Body Dressing Initial:  4 - Minimal Assistance  Upper Body Dressing Current:  4 - Minimal Assistance   Upper Body Dressing Description:   (Incidental assistance with donning shirt)  Lower Body Dressing Initial:  3 - Moderate Assistance  Lower Body Dressing Current:  3 - Moderate Assistance   Lower Body Dressing Description:  3 - Moderate Assistance  Toileting Initial:  3 - Moderate Assistance  Toileting Current:  4 - Minimal Assistance   Toileting Description:  Grab bar, Increased time, Supervision for safety (MIN A to stand and pull up pants over hips with use of grab bars)  Toilet Transfer Initial:  4 - Minimal Assistance  Toilet Transfer Current:  4 - Minimal Assistance   Toilet Transfer Description:  4 - Minimal Assistance  Tub / Shower Transfer Initial:  4 - Minimal Assistance  Tub / Shower Transfer Current:  4 - Minimal Assistance   Tub / Shower Transfer Description:     IADL:  TBD   Family Training/Education:  TBD   DME/DC Recommendations:  TBD     Strengths:  Able to follow instructions, Effective communication skills, Making steady progress towards goals and Willingly participates in therapeutic activities  Barriers:  Unable to follow instructions, Confused, Decreased endurance, Generalized weakness, Impulsive,  Limited mobility, Poor balance and Poor carryover of learning     # of short term goals set= 3    # of short term goals met= 0          Occupational Therapy Goals           Problem: Dressing     Dates: Start: 10/10/18       Goal: STG-Within one week, patient will dress LB     Dates: Start: 10/10/18       Description: 1) Individualized Goal:  With moderate assistance   2) Interventions:  OT Group Therapy, OT Self Care/ADL, OT Cognitive Skill Dev, OT Community Reintegration, OT Manual Ther Technique, OT Neuro Re-Ed/Balance, OT Therapeutic Activity, OT Evaluation and OT Therapeutic Exercise       Note:     Goal Note filed on 10/11/18 1152 by Rosanna Carter, OT    Goal: STG-Within one week, patient will dress LB  Outcome: PROGRESSING AS EXPECTED  Pt has not demonstrated competence with skill, goal not met but in   progress                  Problem: Functional Transfers     Dates: Start: 10/10/18       Goal: STG-Within one week, patient will transfer to step in shower     Dates: Start: 10/10/18       Description: 1) Individualized Goal: with SBA/supervision assistance and use of DME/AE prn   2) Interventions:  OT Group Therapy, OT Self Care/ADL, OT Cognitive Skill Dev, OT Community Reintegration, OT Manual Ther Technique, OT Neuro Re-Ed/Balance, OT Therapeutic Activity, OT Evaluation and OT Therapeutic Exercise               Problem: OT Long Term Goals     Dates: Start: 10/10/18       Goal: LTG-By discharge, patient will complete basic self care tasks     Dates: Start: 10/10/18       Description: 1) Individualized Goal:  With  Modified independence and use of AE/DME prn   2) Interventions:  OT Group Therapy, OT Self Care/ADL, OT Cognitive Skill Dev, OT Community Reintegration, OT Manual Ther Technique, OT Neuro Re-Ed/Balance, OT Therapeutic Activity, OT Evaluation and OT Therapeutic Exercise             Goal: LTG-By discharge, patient will perform bathroom transfers     Dates: Start: 10/10/18       Description: 1)  Individualized Goal: with modified independence and use of DME/AE prn   2) Interventions: OT Group Therapy, OT Self Care/ADL, OT Cognitive Skill Dev, OT Community Reintegration, OT Manual Ther Technique, OT Neuro Re-Ed/Balance, OT Therapeutic Activity, OT Evaluation and OT Therapeutic Exercise             Goal: LTG-By discharge, patient will complete basic home management     Dates: Start: 10/10/18       Description: 1) Individualized Goal: with modified independence and use of AE/DME prn   2) Interventions:  OT Group Therapy, OT Self Care/ADL, OT Cognitive Skill Dev, OT Community Reintegration, OT Manual Ther Technique, OT Neuro Re-Ed/Balance, OT Therapeutic Activity, OT Evaluation and OT Therapeutic Exercise               Problem: Toileting     Dates: Start: 10/10/18       Goal: STG-Within one week, patient will complete toileting tasks     Dates: Start: 10/10/18       Description: 1) Individualized Goal:  With supervision assistance and use of DME/AE prn   2) Interventions:  OT Group Therapy, OT Self Care/ADL, OT Cognitive Skill Dev, OT Community Reintegration, OT Manual Ther Technique, OT Neuro Re-Ed/Balance, OT Therapeutic Activity, OT Evaluation and OT Therapeutic Exercise                   Section completed by:  Rosanna Carter, OT       Cognitive Linquistic Functions  Comprehension Initial:  5 - Stand-by Prompting/Supervision or Set-up  Comprehension Current:  5 - Stand-by Prompting/Supervision or Set-up   Comprehension Description:     Expression Initial:  6 - Modified Independent  Expression Current:  6 - Modified Independent   Expression Description:     Social Interaction Initial:  6 - Modified Independent  Social Interaction Current:  6 - Modified Independent   Social Interaction Description:     Problem Solving Initial:  2 - Max Assistance  Problem Solving Current:  2 - Max Assistance   Problem Solving Description:     Memory Initial:  2 - Max Assistance  Memory Current:  2 - Max Assistance   Memory  Description:     Executive Functioning / Organization Initial:  Severe (2)  Executive Functioning / Organization Current:  Severe (2)   Executive Functioning Desciption:  Processing, planning and organization skills     Swallowing  Swallowing Status: Pt advanced to dys 2 textured with NTL, pt impulsive and required mod cues for decreased rate of intake and appropriate bite sizes throughout the meal. MBSS to be completed as for pt participate in FEES at acute hospital and results indicated aspiration with pudding and thin liquids. MBSS scheduled for 10/11 and advancement of diet as appropriate.   Orders Placed This Encounter   Procedures   • Diet Order Regular (No pudding )     Standing Status:   Standing     Number of Occurrences:   1     Order Specific Question:   Diet:     Answer:   Regular [1]     Comments:   No pudding      Order Specific Question:   Texture/Fiber modifications:     Answer:   Dysphagia 2(Pureed/Chopped)specify fluid consistency(question 6) [2]     Order Specific Question:   Consistency/Fluid modifications:     Answer:   Nectar Thick [2]     Behavior Modification Plan  Allow for rest time, Keep instructions simple/concrete, Give clear feedback, Redirect to task/topic, Provide reasonable choices, Decrease the chance of failure by offering activities that are within the patient's abilities, Analyze tasks (break down into smaller steps) and Reinforce participation in desired tasks  Assistive Technology  Low/Impaired vision equipment and Low tech: Calendar, planner, schedule, alarms/timers, pill organizer, post-it notes, lists  Family Training/Education: ongoing with pt and son   DC Recommendations:  TBD  Strengths:  Effective communication skills, Independent PLOF, Motivated for self care and independence, Pleasant and cooperative, Supportive family and Willingly participates in therapeutic activities  Barriers:  Aspiration risk, Impulsive and Other: severe cognitive deficits   # of short term goals  set=4  # of short term goals met=0 recent eval on 10/10       Speech Therapy Problems           Problem: Memory STGs     Dates: Start: 10/10/18       Goal: STG-Within one week, patient will     Dates: Start: 10/10/18       Description: 1) Individualized goal:  Achieve a score of 25 or greater across 3 consecutive sessions on the orientation log.   2) Interventions:  SLP Self Care / ADL Training  and SLP Cognitive Skill Development               Problem: Problem Solving STGs     Dates: Start: 10/10/18       Goal: STG-Within one week, patient will     Dates: Start: 10/10/18       Description: 1) Individualized goal:  Participate in divided attention tasks with 80% accuracy provided MOD A.   2) Interventions:  SLP Self Care / ADL Training , SLP Cognitive Skill Development and SLP Group Treatment               Problem: Speech/Swallowing LTGs     Dates: Start: 10/10/18       Goal: LTG-By discharge, patient will safely swallow     Dates: Start: 10/10/18       Description: 1) Individualized goal:  Regular textures and thin liquids with no overt s/sx of asp/pen noted across meals with 100% accuracy provided MOD I.   2) Interventions:  SLP Swallowing Dysfunction Treatment, SLP Video Swallow Evaluation, SLP Self Care / ADL Training , SLP Cognitive Skill Development and SLP Group Treatment             Goal: LTG-By discharge, patient will     Dates: Start: 10/10/18       Description: 1) Individualized goal:  Complete functional problem solving and recall information with 80% accuracy provided MOD I.   2) Interventions:  SLP Self Care / ADL Training , SLP Cognitive Skill Development and SLP Group Treatment               Problem: Swallowing STGs     Dates: Start: 10/10/18       Goal: STG-Within one week, patient will safely swallow     Dates: Start: 10/10/18       Description: 1) Individualized goal:  dys 2 textures with NTL with no overt s/sx of asp/pen noted across 2/2 meals provided MIN cues.   2) Interventions:  SLP  Swallowing Dysfunction Treatment and SLP Video Swallow Evaluation             Goal: STG-Within one week, patient will     Dates: Start: 10/10/18       Description: 1) Individualized goal: participate in MBSS with advancement of diet as appropriate within one week.   2) Interventions:  SLP Swallowing Dysfunction Treatment and SLP Video Swallow Evaluation                    Section completed by:  Sherly Nava MS,[facility]SLP          Nutrition       Dietary Problems           Problem: Other Problem (see comments)     Description: Diagnosis: Swallowing difficulty related to dysphagia as evidenced by need for texture modified diet with thickened liquids and SLP following.       Goal: Other Goal     Description: Monitor/Evaluation: Monitor PO intake, weight, labs, medication adjustments, skin integrity, GI function, vitals, I/Os, and overall hydration status.  Adjust nutritional POC pending clinical outcomes.   RD following weekly.    Goal: Maintain >/= 50% adequate oral nutrient/fluid intake to promote nutrition optimization/healing.                NUTRITION SERVICES - Brief TF Consult Note  The pt is a 79 yo female with an admitting dx of Intraparenchymal hemorrhage of brain.     Consult received for TF assessment for this pt. Per review of chart, pt has been upgraded to Regular D2 diet with nectar thick liquids today. Plans for MBSS tomorrow. PO intake has been <25% x1 and 25-50% x1 meal so far. Pt and her daughter seen in room this evening to discuss meals so far. She dislikes the nectar thick liquids and is hoping that she can advance her diet pending her evaluation tomorrow. She declines Boost or any changes for now. Agreed to reassess additional meal items and fluids again after MBSS as pt more amenable to thin liquid items. Pt and her daughter thanked for their time.     Labs, meds, fluids, GI/, PO, skin - all reviewed.     Diagnosis: Swallowing difficulty related to dysphagia as evidenced by need for texture  modified diet with thickened liquids and SLP following.       Intervention/ Recommendations/POC:  1. Continue current diet. Upgrades per SLP.  2. Encourage adequate PO/fluid intake.  3. Nutrition rep to see regarding food prefs/ honor within dietary restrictions (if indicated)     Monitor/Evaluation: Monitor PO intake, weight, labs, medication adjustments, skin integrity, GI function, vitals, I/Os, and overall hydration status.  Adjust nutritional POC pending clinical outcomes.   RD following weekly.     Goal: Maintain >/= 50% adequate oral nutrient/fluid intake to promote nutrition optimization/healing.      Section completed by:  Margy Alves R.D.    REHAB-Pharmacy IDT Team Note by Danilo Yeh RPH at 10/10/2018  2:56 PM  Version 1 of 1    Author:  Danilo Yeh RPH Service:  (none) Author Type:  Pharmacist    Filed:  10/10/2018  2:57 PM Date of Service:  10/10/2018  2:56 PM Status:  Signed    :  Danilo Yeh RPH (Pharmacist)         Pharmacy   Pharmacy  Antibiotics/Antifungals/Antivirals:  Medication:      Active Orders     None        Route:         None  Stop Date:  N/A  Reason:   Antihypertensives/Cardiac:  Medication:    Orders (72h ago through future)    Start     Ordered    10/11/18 0900  amLODIPine (NORVASC) tablet 5 mg  DAILY      10/10/18 1258    10/10/18 0600  amLODIPine (NORVASC) tablet 5 mg  Q DAY,   Status:  Discontinued      10/09/18 1638    10/09/18 2200  metoprolol (LOPRESSOR) tablet 50 mg  EVERY 8 HOURS      10/09/18 1638    10/09/18 1800  digoxin (LANOXIN) tablet 125 mcg  DAILY      10/09/18 1638    10/09/18 1638  hydrALAZINE (APRESOLINE) tablet 25 mg  EVERY 8 HOURS PRN      10/09/18 1638        Patient Vitals for the past 24 hrs:   BP Pulse   10/10/18 1452 119/55 87   10/10/18 1318 100/58 70   10/10/18 0700 102/56 72   10/10/18 0515 120/65 90   10/09/18 2006 105/60 85   10/09/18 1859 (!) 84/40 86   10/09/18 1614 - 77   10/09/18 1605 105/61 75        Anticoagulation:  Medication:  heparin  INR:    Recent Labs      10/07/18   0539  10/08/18   0543  10/09/18   0329   INR  1.09  1.14*  1.09     Other key medications:       A review of the medication list reveals no issues at this time. Patient is currently on antihypertensive(s). Recommend home blood pressure monitoring/recording if antihypertensive(s) regimen(s) continue.    Section completed by:  Danilo Yeh RPH[WR.1]        Attribution Ivy     WR.1 - Danilo Yeh RPH on 10/10/2018  2:56 PM                    DC Planning    DC destination/dispostion: TBD, PLOF: independent, active lifestyle, lives alone, son in [facility], patient does not drive. Daughter (from [place]) able to stay w/ patient at discharge also.     Referrals: Not at this time.    DC Needs: TBD. No previous DME equipment.    Barriers to discharge: Pain mgmt, cognitive & functional deficits.     Strengths: Supportive, actively involved family. PLOF & independence. Insurance coverage: Medicare, Supplemental & has long term care policy. Motivation.    Section completed by:  Ivania Napoles R.N.      Physician Summary  Janae Ortega MD participated and led team conference discussion.

## 2018-10-11 NOTE — REHAB-CM IDT TEAM NOTE
Case Management    DC Planning    DC destination/dispostion: TBD, PLOF: independent, active lifestyle, lives alone, son in [facility], patient does not drive. Daughter (from [place]) able to stay w/ patient at discharge also.     Referrals: Not at this time.    DC Needs: TBD. No previous DME equipment.    Barriers to discharge: Pain mgmt, cognitive & functional deficits.     Strengths: Supportive, actively involved family. PLOF & independence. Insurance coverage: Medicare, Supplemental & has long term care policy. Motivation.    Section completed by:  Ivania Napoles R.N.

## 2018-10-11 NOTE — PROGRESS NOTES
"0400 Pt requesting for a stronger pain medication, tramadol 50 mg given 2x for headache. \" The medication is not strong enough to relieve my headache, I need a stronger medication. \" per pt.Will endorse to day nurse for f/u.  "

## 2018-10-11 NOTE — DIETARY
NUTRITION SERVICES - Brief TF Consult Note  The pt is a 79 yo female with an admitting dx of Intraparenchymal hemorrhage of brain.    Consult received for TF assessment for this pt. Per review of chart, pt has been upgraded to Regular D2 diet with nectar thick liquids today. Plans for MBSS tomorrow. PO intake has been <25% x1 and 25-50% x1 meal so far. Pt and her daughter seen in room this evening to discuss meals so far. She dislikes the nectar thick liquids and is hoping that she can advance her diet pending her evaluation tomorrow. She declines Boost or any changes for now. Agreed to reassess additional meal items and fluids again after MBSS as pt more amenable to thin liquid items. Pt and her daughter thanked for their time.    Labs, meds, fluids, GI/, PO, skin - all reviewed.    Diagnosis: Swallowing difficulty related to dysphagia as evidenced by need for texture modified diet with thickened liquids and SLP following.      Intervention/ Recommendations/POC:  1. Continue current diet. Upgrades per SLP.  2. Encourage adequate PO/fluid intake.  3. Nutrition rep to see regarding food prefs/ honor within dietary restrictions (if indicated)     Monitor/Evaluation: Monitor PO intake, weight, labs, medication adjustments, skin integrity, GI function, vitals, I/Os, and overall hydration status.  Adjust nutritional POC pending clinical outcomes.   RD following weekly.    Goal: Maintain >/= 50% adequate oral nutrient/fluid intake to promote nutrition optimization/healing.

## 2018-10-11 NOTE — DISCHARGE PLANNING
CASE MANAGEMENT INITIAL ASSESSMENT    Admit Date:  10/9/2018     I met with patient & daughter, Nan, to discuss role of case management / discharge planning / team conference.   Patient is a  78 y.o. female transferred from [facility]. PLOF: independent, active lifestyle, does not drive, enjoys garden & [place] at home. Spends time between [place] & [place] w/ daughter, Nan. Nan can stay w/patient at discharge if needed.    Admitting MD: Dr. 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  Lives with - Patient's Self Care Capacity: Alone and Able to Care For Self    Prior Level of Function:  Medication Management: Independent  Finances: Independent  Home Management: Independent  Shopping: Independent  Prior Level Of Mobility: Independent Without Device in Community  Driving / Transportation: Driving Independent    Support Systems:  Primary : Nan Schmidt, daughter, [phone]. Melchor Hess, son, [phone]  Other support systems: friends in area   Advance Directives: Yes    Previous Services Utilized:   Equipment Owned: None. Has walk in shower w/ built in tile seat.  Prior Services: Home-Independent    Other Information:  Occupation (Pre-Hospital Vocational): Retired Due To Age     Primary  Payor Source: Medicare A  Secondary Payor Source: Supplemental Insurance  Primary Care Practitioner : Dr. Chun Lew [phone]  Other MDs: Dr. Juan Alberto Santizo III, neuro-surg    Patient / Family Goal:  Patient / Family Goal: return to PLOF & living status. Patient , travels between [place] & [place] to be w/ daughter & family. Son lives in [place]. Patient has never driven. Fiercely independent w/ minimal medical issues per daughter. PCP Dr. Chun Lew in [place]. Cardiologist: Dr. Anderson Hendrix in [place]. 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 Discussed weekly IDT conference. Informed I will update family after conference for initial anticipated DC date & recommendations. Reviewed Medicare acute care hospital days & coverage, answered questions. Copy of adv directive scanned to Media Tab.    Additional Case Management Questions:  Have you ever received case management services for yourself or a family member? yes    Do you feel you have an an understanding of of what services  provide? Yes    Do you have any additional questions regarding case management?  Reviewed Medicare coverage questions. No further questions at this time.    CASE MANAGEMENT PLAN OF CARE   Individualized Goals:   1. Patient & family goal: return to PLOF & independence, lives alone.  2. Case mgmt will assist w/DC planning needs, referrals & f/u appts as needed/TBD.  3. Provide emotional support & update family regarding team conference & POC.    Barriers:   1. Lives alone.  2. Cognitive & functional deficits.    Plan:  1. Continue to follow patient through hospitalization and provide discharge planning in collaboration with patient, family, physicians and ancillary services.     2. Utilize community resources to ensure a safe discharge.

## 2018-10-11 NOTE — REHAB-OT IDT TEAM NOTE
Occupational Therapy   Activities of Daily Living  Eating Initial:  5 - Standby Prompting/Supervision or Set-up  Eating Current:  5 - Standby Prompting/Supervision or Set-up   Eating Description:     Grooming Initial:  5 - Standby Prompting/Supervision or Set-up  Grooming Current:  5 - Standby Prompting/Supervision or Set-up   Grooming Description:  Supervision for safety  Bathing Initial:  4 - Minimal Assistance  Bathing Current:  4 - Minimal Assistance   Bathing Description:  Adaptive equipment, Grab bar, Long handled bath tool, Hand held shower, Increased time, Supervision for safety, Verbal cueing (MIN A to stand with use of grab bars to clean elva area)  Upper Body Dressing Initial:  4 - Minimal Assistance  Upper Body Dressing Current:  4 - Minimal Assistance   Upper Body Dressing Description:   (Incidental assistance with donning shirt)  Lower Body Dressing Initial:  3 - Moderate Assistance  Lower Body Dressing Current:  3 - Moderate Assistance   Lower Body Dressing Description:  3 - Moderate Assistance  Toileting Initial:  3 - Moderate Assistance  Toileting Current:  4 - Minimal Assistance   Toileting Description:  Grab bar, Increased time, Supervision for safety (MIN A to stand and pull up pants over hips with use of grab bars)  Toilet Transfer Initial:  4 - Minimal Assistance  Toilet Transfer Current:  4 - Minimal Assistance   Toilet Transfer Description:  4 - Minimal Assistance  Tub / Shower Transfer Initial:  4 - Minimal Assistance  Tub / Shower Transfer Current:  4 - Minimal Assistance   Tub / Shower Transfer Description:     IADL:  TBD   Family Training/Education:  TBD   DME/DC Recommendations:  TBD     Strengths:  Able to follow instructions, Effective communication skills, Making steady progress towards goals and Willingly participates in therapeutic activities  Barriers:  Unable to follow instructions, Confused, Decreased endurance, Generalized weakness, Impulsive, Limited mobility, Poor balance and  Poor carryover of learning     # of short term goals set= 3    # of short term goals met= 0          Occupational Therapy Goals           Problem: Dressing     Dates: Start: 10/10/18       Goal: STG-Within one week, patient will dress LB     Dates: Start: 10/10/18       Description: 1) Individualized Goal:  With moderate assistance   2) Interventions:  OT Group Therapy, OT Self Care/ADL, OT Cognitive Skill Dev, OT Community Reintegration, OT Manual Ther Technique, OT Neuro Re-Ed/Balance, OT Therapeutic Activity, OT Evaluation and OT Therapeutic Exercise       Note:     Goal Note filed on 10/11/18 1152 by Rosanna Carter OT    Goal: STG-Within one week, patient will dress LB  Outcome: PROGRESSING AS EXPECTED  Pt has not demonstrated competence with skill, goal not met but in   progress                  Problem: Functional Transfers     Dates: Start: 10/10/18       Goal: STG-Within one week, patient will transfer to step in shower     Dates: Start: 10/10/18       Description: 1) Individualized Goal: with SBA/supervision assistance and use of DME/AE prn   2) Interventions:  OT Group Therapy, OT Self Care/ADL, OT Cognitive Skill Dev, OT Community Reintegration, OT Manual Ther Technique, OT Neuro Re-Ed/Balance, OT Therapeutic Activity, OT Evaluation and OT Therapeutic Exercise               Problem: OT Long Term Goals     Dates: Start: 10/10/18       Goal: LTG-By discharge, patient will complete basic self care tasks     Dates: Start: 10/10/18       Description: 1) Individualized Goal:  With  Modified independence and use of AE/DME prn   2) Interventions:  OT Group Therapy, OT Self Care/ADL, OT Cognitive Skill Dev, OT Community Reintegration, OT Manual Ther Technique, OT Neuro Re-Ed/Balance, OT Therapeutic Activity, OT Evaluation and OT Therapeutic Exercise             Goal: LTG-By discharge, patient will perform bathroom transfers     Dates: Start: 10/10/18       Description: 1) Individualized Goal: with modified  independence and use of DME/AE prn   2) Interventions: OT Group Therapy, OT Self Care/ADL, OT Cognitive Skill Dev, OT Community Reintegration, OT Manual Ther Technique, OT Neuro Re-Ed/Balance, OT Therapeutic Activity, OT Evaluation and OT Therapeutic Exercise             Goal: LTG-By discharge, patient will complete basic home management     Dates: Start: 10/10/18       Description: 1) Individualized Goal: with modified independence and use of AE/DME prn   2) Interventions:  OT Group Therapy, OT Self Care/ADL, OT Cognitive Skill Dev, OT Community Reintegration, OT Manual Ther Technique, OT Neuro Re-Ed/Balance, OT Therapeutic Activity, OT Evaluation and OT Therapeutic Exercise               Problem: Toileting     Dates: Start: 10/10/18       Goal: STG-Within one week, patient will complete toileting tasks     Dates: Start: 10/10/18       Description: 1) Individualized Goal:  With supervision assistance and use of DME/AE prn   2) Interventions:  OT Group Therapy, OT Self Care/ADL, OT Cognitive Skill Dev, OT Community Reintegration, OT Manual Ther Technique, OT Neuro Re-Ed/Balance, OT Therapeutic Activity, OT Evaluation and OT Therapeutic Exercise                   Section completed by:  Rosanna Carter, OT

## 2018-10-11 NOTE — CARE PLAN
Problem: Mobility  Goal: STG-Within one week, patient will ambulate household distance  1) Individualized goal:  Patient will amb with FWW >30 ft indoors CGA/SBA  2) Interventions:  PT Group Therapy, PT E Stim Attended, PT Gait Training, PT Self Care/Home Eval, PT Therapeutic Exercises, PT Neuro Re-Ed/Balance, PT Aquatic Therapy, PT Therapeutic Activity, PT Manual Therapy and PT Evaluation.       Outcome: NOT MET  eval yesterday    Problem: Mobility Transfers  Goal: STG-Within one week, patient will sit to stand  1) Individualized goal:  Patient will transfer with FWW SBA STS/SPT  2) Interventions:  PT Group Therapy, PT E Stim Attended, PT Gait Training, PT Self Care/Home Eval, PT Therapeutic Exercises, PT Neuro Re-Ed/Balance, PT Aquatic Therapy, PT Therapeutic Activity, PT Manual Therapy and PT Evaluation.       Outcome: NOT MET  eval yesterday

## 2018-10-11 NOTE — REHAB-PT IDT TEAM NOTE
Physical Therapy   Mobility  Bed mobility:   SBA - SPV  Bed /Chair/Wheelchair Transfer Initial:  4 - Minimal Assistance  Bed /Chair/Wheelchair Transfer Current:  4 - Minimal Assistance   Bed/Chair/Wheelchair Transfer Description:   (Min A CGA/min A SPT)  Walk Initial:  1 - Total Assistance  Walk Current:  1 - Total Assistance   Walk Description:  Two hand rails, Requires incidental assist (CGA // bars 40 ft x 2 )  Wheelchair Initial:  1 - Total Assistance  Wheelchair Current:  1 - Total Assistance   Wheelchair Description:   (30 ft indoors SBA )  Stairs Initial:  0 - Not tested, patient refused  Stairs Current: 0 - Not tested, patient refused   Stairs Description:    Patient/Family Training/Education:  Ongoing, rehab expectations, POC  DME/DC Recommendations:  TBD, eval yesterday  Strengths:  Able to follow instructions, Independent PLOF, Motivated for self care and independence and Pleasant and cooperative  Barriers:   Poor balance, Poor carryover of learning and Poor family support  # of short term goals set= 2  # of short term goals met=0 (eval yesterday)       Physical Therapy Problems           Problem: Mobility     Dates: Start: 10/10/18       Goal: STG-Within one week, patient will ambulate household distance     Dates: Start: 10/10/18       Description: 1) Individualized goal:  Patient will amb with FWW >30 ft indoors CGA/SBA  2) Interventions:  PT Group Therapy, PT E Stim Attended, PT Gait Training, PT Self Care/Home Eval, PT Therapeutic Exercises, PT Neuro Re-Ed/Balance, PT Aquatic Therapy, PT Therapeutic Activity, PT Manual Therapy and PT Evaluation.         Note:     Goal Note filed on 10/11/18 1154 by Cristino Pascual, PT    Goal: STG-Within one week, patient will ambulate household distance  Outcome: NOT MET  eval yesterday                  Problem: Mobility Transfers     Dates: Start: 10/10/18       Goal: STG-Within one week, patient will sit to stand     Dates: Start: 10/10/18       Description:  1) Individualized goal:  Patient will transfer with FWW SBA STS/SPT  2) Interventions:  PT Group Therapy, PT E Stim Attended, PT Gait Training, PT Self Care/Home Eval, PT Therapeutic Exercises, PT Neuro Re-Ed/Balance, PT Aquatic Therapy, PT Therapeutic Activity, PT Manual Therapy and PT Evaluation.         Note:     Goal Note filed on 10/11/18 1154 by Cristino Pascual, PT    Goal: STG-Within one week, patient will sit to stand  Outcome: NOT MET  eval yesterday                  Problem: PT-Long Term Goals     Dates: Start: 10/10/18       Goal: LTG-By discharge, patient will ambulate     Dates: Start: 10/10/18       Description: 1) Individualized goal:  Patient will amb >150 ft over various surfaces with FWW SPV-mod I  2) Interventions:  PT Group Therapy, PT E Stim Attended, PT Gait Training, PT Self Care/Home Eval, PT Therapeutic Exercises, PT Neuro Re-Ed/Balance, PT Aquatic Therapy, PT Therapeutic Activity, PT Manual Therapy and PT Evaluation.               Goal: LTG-By discharge, patient will transfer one surface to another     Dates: Start: 10/10/18       Description: 1) Individualized goal:  Patient will transfer SPV-mod I with FWW   2) Interventions:  PT Group Therapy, PT E Stim Attended, PT Gait Training, PT Self Care/Home Eval, PT Therapeutic Exercises, PT Neuro Re-Ed/Balance, PT Aquatic Therapy, PT Therapeutic Activity, PT Manual Therapy and PT Evaluation.                       Section completed by:  Cristino Pascual, PT

## 2018-10-11 NOTE — DISCHARGE PLANNING
Met w/ GIOVANNI Oseguera, at bedside to review initial DC date of 10.25.18 & therapy recommendations. Questions answered.  Supportive family. Voiced understanding & agreement of next steps in rehab process. Will continue to follow & assist DC planning.

## 2018-10-11 NOTE — CARE PLAN
Problem: Safety  Goal: Will remain free from falls    Intervention: Implement fall precautions  Appropriately uses call light to make needs known.Bed in low position, non skid socks/shoes on when out of bed.Alarms in place for safety.Call light within reach.Will continue to monitor and assess needs and safety.      Problem: Bowel/Gastric:  Goal: Normal bowel function is maintained or improved    Intervention: Educate patient and significant other/support system about signs and symptoms of constipation and interventions to implement  Pt refused scheduled medication.Continent of bowel per report.LBM 10/10.Will continue to monitor and assess for s/sx of constipation.      Problem: Pain Management  Goal: Pain level will decrease to patient's comfort goal    Intervention: Follow pain managment plan developed in collaboration with patient and Interdisciplinary Team  Medicated per request for headache, 6/10.Repositioned with pillows for comfort.Will continue to monitor and assess pain level and medicate as needed.

## 2018-10-11 NOTE — CARE PLAN
Problem: Dressing  Goal: STG-Within one week, patient will dress LB  1) Individualized Goal:  With moderate assistance   2) Interventions:  OT Group Therapy, OT Self Care/ADL, OT Cognitive Skill Dev, OT Community Reintegration, OT Manual Ther Technique, OT Neuro Re-Ed/Balance, OT Therapeutic Activity, OT Evaluation and OT Therapeutic Exercise     Outcome: PROGRESSING AS EXPECTED  Pt has not demonstrated competence with skill, goal not met but in progress    Problem: Toileting  Goal: STG-Within one week, patient will complete toileting tasks  1) Individualized Goal:  With supervision assistance and use of DME/AE prn   2) Interventions:  OT Group Therapy, OT Self Care/ADL, OT Cognitive Skill Dev, OT Community Reintegration, OT Manual Ther Technique, OT Neuro Re-Ed/Balance, OT Therapeutic Activity, OT Evaluation and OT Therapeutic Exercise     Outcome: NOT MET      Problem: Functional Transfers  Goal: STG-Within one week, patient will transfer to step in shower  1) Individualized Goal: with SBA/supervision assistance and use of DME/AE prn   2) Interventions:  OT Group Therapy, OT Self Care/ADL, OT Cognitive Skill Dev, OT Community Reintegration, OT Manual Ther Technique, OT Neuro Re-Ed/Balance, OT Therapeutic Activity, OT Evaluation and OT Therapeutic Exercise     Outcome: NOT MET

## 2018-10-11 NOTE — CARE PLAN
Problem: Other Problem (see comments)  Goal: Other Goal  Monitor/Evaluation: Monitor PO intake, weight, labs, medication adjustments, skin integrity, GI function, vitals, I/Os, and overall hydration status.  Adjust nutritional POC pending clinical outcomes.   RD following weekly.    Goal: Maintain >/= 50% adequate oral nutrient/fluid intake to promote nutrition optimization/healing.     Outcome: PROGRESSING AS EXPECTED

## 2018-10-11 NOTE — PROGRESS NOTES
"Rehab Progress Note     Encounter Date: 10/11/2018    CC: Decreased function after ICH, SDH; dysphagia    Interval Events (Subjective)  Patient laying down in bed. Per daughter at bedside she had poor sleep and headache overnight. Discussed Fioricet addition for headache. Otherwise patient's daughter refusing prilosec but explained about dysphagia. Patient now passed swallow test.  Discussed ongoing treatment with patient and daughter. Patient denies pain currently but headache is bilateral pounding.  Discussed IDT will occur this afternoon.    IDT Team Meeting 10/11/2018    Janae HERNÁNDEZ M.D., was present and led the interdisciplinary team conference on 10/11/2018.  I led the IDT conference and agree with the IDT conference documentation and plan of care as noted below.     RN:  Diet Regular   % Meal     Pain    Sleep    Bowel    Bladder    In's & Out's    SBP, held amlodipine   Medication with thin    PT:  Bed Mobility Martha   Transfers totA   Mobility totA 30 feet   Stairs    Improved today; limited by balance  Limited by cognitive    OT:  Eating SBA   Grooming SBA   Bathing Martha   UB Dressing Martha   LB Dressing modA   Toileting Martha   Shower & Transfer Martha   Limited by carry through  Limited by cognition   Work on iADLs    SLP:  Candice problem solving  maxA memory  MOCA 9/30 severe cognitive deficits  Limited by orientation and swallow  MBSS - advanced to dys 3 w thin liquids    Resp:  No issues    CM:  Continues to work on disposition and DME needs.      DC/Disposition:  10/25/18    Objective:  VITAL SIGNS: BP (!) 97/57   Pulse 82   Temp 36.7 °C (98 °F)   Resp 16   Ht 1.651 m (5' 5\")   Wt 70.8 kg (156 lb)   SpO2 96%   BMI 25.96 kg/m²   Gen: NAD  Psych: Mood and affect appropriate  CV: RRR, no edema  Resp: CTAB, no upper airway sounds  Abd: NTND  Skin: 5-6 sutures on bilateral scalp well healing, well approximated no erythema  Unchanged from 10/10/18.     Recent Results (from the past 72 " hour(s))   CBC WITH DIFFERENTIAL    Collection Time: 10/09/18  3:29 AM   Result Value Ref Range    WBC 9.5 4.8 - 10.8 K/uL    RBC 4.04 (L) 4.20 - 5.40 M/uL    Hemoglobin 12.1 12.0 - 16.0 g/dL    Hematocrit 35.1 (L) 37.0 - 47.0 %    MCV 86.9 81.4 - 97.8 fL    MCH 30.0 27.0 - 33.0 pg    MCHC 34.5 33.6 - 35.0 g/dL    RDW 40.3 35.9 - 50.0 fL    Platelet Count 314 164 - 446 K/uL    MPV 10.1 9.0 - 12.9 fL    Neutrophils-Polys 69.00 44.00 - 72.00 %    Lymphocytes 17.30 (L) 22.00 - 41.00 %    Monocytes 9.10 0.00 - 13.40 %    Eosinophils 2.50 0.00 - 6.90 %    Basophils 0.50 0.00 - 1.80 %    Immature Granulocytes 1.60 (H) 0.00 - 0.90 %    Nucleated RBC 0.00 /100 WBC    Neutrophils (Absolute) 6.53 2.00 - 7.15 K/uL    Lymphs (Absolute) 1.64 1.00 - 4.80 K/uL    Monos (Absolute) 0.86 (H) 0.00 - 0.85 K/uL    Eos (Absolute) 0.24 0.00 - 0.51 K/uL    Baso (Absolute) 0.05 0.00 - 0.12 K/uL    Immature Granulocytes (abs) 0.15 (H) 0.00 - 0.11 K/uL    NRBC (Absolute) 0.00 K/uL   BASIC METABOLIC PANEL    Collection Time: 10/09/18  3:29 AM   Result Value Ref Range    Sodium 136 135 - 145 mmol/L    Potassium 4.0 3.6 - 5.5 mmol/L    Chloride 100 96 - 112 mmol/L    Co2 25 20 - 33 mmol/L    Glucose 104 (H) 65 - 99 mg/dL    Bun 25 (H) 8 - 22 mg/dL    Creatinine 0.56 0.50 - 1.40 mg/dL    Calcium 9.5 8.5 - 10.5 mg/dL    Anion Gap 11.0 0.0 - 11.9   MAGNESIUM    Collection Time: 10/09/18  3:29 AM   Result Value Ref Range    Magnesium 2.1 1.5 - 2.5 mg/dL   PROTHROMBIN TIME    Collection Time: 10/09/18  3:29 AM   Result Value Ref Range    PT 14.2 12.0 - 14.6 sec    INR 1.09 0.87 - 1.13   ESTIMATED GFR    Collection Time: 10/09/18  3:29 AM   Result Value Ref Range    GFR If African American >60 >60 mL/min/1.73 m 2    GFR If Non African American >60 >60 mL/min/1.73 m 2   Comp Metabolic Panel (CMP)    Collection Time: 10/10/18  6:08 AM   Result Value Ref Range    Sodium 137 135 - 145 mmol/L    Potassium 3.6 3.6 - 5.5 mmol/L    Chloride 104 96 - 112  mmol/L    Co2 23 20 - 33 mmol/L    Anion Gap 10.0 0.0 - 11.9    Glucose 128 (H) 65 - 99 mg/dL    Bun 20 8 - 22 mg/dL    Creatinine 0.56 0.50 - 1.40 mg/dL    Calcium 9.2 8.5 - 10.5 mg/dL    AST(SGOT) 20 12 - 45 U/L    ALT(SGPT) 25 2 - 50 U/L    Alkaline Phosphatase 81 30 - 99 U/L    Total Bilirubin 0.5 0.1 - 1.5 mg/dL    Albumin 3.8 3.2 - 4.9 g/dL    Total Protein 6.1 6.0 - 8.2 g/dL    Globulin 2.3 1.9 - 3.5 g/dL    A-G Ratio 1.7 g/dL   TSH with Reflex to FT4    Collection Time: 10/10/18  6:08 AM   Result Value Ref Range    TSH 1.320 0.380 - 5.330 uIU/mL   Vitamin D, 25-hydroxy (blood)    Collection Time: 10/10/18  6:08 AM   Result Value Ref Range    25-Hydroxy   Vitamin D 25 28 (L) 30 - 100 ng/mL   ESTIMATED GFR    Collection Time: 10/10/18  6:08 AM   Result Value Ref Range    GFR If African American >60 >60 mL/min/1.73 m 2    GFR If Non African American >60 >60 mL/min/1.73 m 2   CBC WITH DIFFERENTIAL    Collection Time: 10/10/18 10:00 AM   Result Value Ref Range    WBC 6.7 4.8 - 10.8 K/uL    RBC 3.36 (L) 4.20 - 5.40 M/uL    Hemoglobin 11.3 (L) 12.0 - 16.0 g/dL    Hematocrit 30.9 (L) 37.0 - 47.0 %    MCV 92.0 81.4 - 97.8 fL    MCH 33.6 (H) 27.0 - 33.0 pg    MCHC 36.6 (H) 33.6 - 35.0 g/dL    RDW 40.8 35.9 - 50.0 fL    Platelet Count 266 164 - 446 K/uL    MPV 10.6 9.0 - 12.9 fL    Neutrophils-Polys 74.90 (H) 44.00 - 72.00 %    Lymphocytes 14.40 (L) 22.00 - 41.00 %    Monocytes 7.00 0.00 - 13.40 %    Eosinophils 2.10 0.00 - 6.90 %    Basophils 0.40 0.00 - 1.80 %    Immature Granulocytes 1.20 (H) 0.00 - 0.90 %    Nucleated RBC 0.00 /100 WBC    Neutrophils (Absolute) 5.00 2.00 - 7.15 K/uL    Lymphs (Absolute) 0.96 (L) 1.00 - 4.80 K/uL    Monos (Absolute) 0.47 0.00 - 0.85 K/uL    Eos (Absolute) 0.14 0.00 - 0.51 K/uL    Baso (Absolute) 0.03 0.00 - 0.12 K/uL    Immature Granulocytes (abs) 0.08 0.00 - 0.11 K/uL    NRBC (Absolute) 0.00 K/uL   HEMOGLOBIN A1C    Collection Time: 10/10/18 10:00 AM   Result Value Ref Range     Glycohemoglobin 5.6 0.0 - 5.6 %    Est Avg Glucose 114 mg/dL       Current Facility-Administered Medications   Medication Frequency   • acetaminophen/caffeine/butalbital 325-40-50 mg (FIORICET) -40 MG per tablet 1 Tab Q6HRS PRN   • sodium chloride (SALT) tablet 1 g TID WITH MEALS   • amLODIPine (NORVASC) tablet 5 mg DAILY   • vitamin D (cholecalciferol) tablet 1,000 Units DAILY   • Respiratory Care per Protocol Continuous RT   • Pharmacy Consult Request ...Pain Management Review 1 Each PRN   • tramadol (ULTRAM) 50 MG tablet 50 mg Q4HRS PRN   • acetaminophen (TYLENOL) tablet 650 mg Q4HRS PRN   • senna-docusate (PERICOLACE or SENOKOT S) 8.6-50 MG per tablet 2 Tab BID    And   • polyethylene glycol/lytes (MIRALAX) PACKET 1 Packet QDAY PRN    And   • magnesium hydroxide (MILK OF MAGNESIA) suspension 30 mL QDAY PRN    And   • bisacodyl (DULCOLAX) suppository 10 mg QDAY PRN   • artificial tears 1.4 % ophthalmic solution 1 Drop PRN   • benzocaine-menthol (CEPACOL) lozenge 1 Lozenge Q2HRS PRN   • hydrALAZINE (APRESOLINE) tablet 25 mg Q8HRS PRN   • mag hydrox-al hydrox-simeth (MAALOX PLUS ES or MYLANTA DS) suspension 20 mL Q2HRS PRN   • ondansetron (ZOFRAN ODT) dispertab 4 mg 4X/DAY PRN    Or   • ondansetron (ZOFRAN) syringe/vial injection 4 mg 4X/DAY PRN   • traZODone (DESYREL) tablet 50 mg QHS PRN   • sodium chloride (OCEAN) 0.65 % nasal spray 2 Spray PRN   • digoxin (LANOXIN) tablet 125 mcg DAILY AT 1800   • heparin injection 5,000 Units Q12HRS   • metoprolol (LOPRESSOR) tablet 50 mg Q8HRS   • omeprazole (PRILOSEC) capsule 20 mg DAILY       Orders Placed This Encounter   Procedures   • Diet Order Regular (meds whole one at a time with liquid wash)     Standing Status:   Standing     Number of Occurrences:   1     Order Specific Question:   Diet:     Answer:   Regular [1]     Comments:   meds whole one at a time with liquid wash     Order Specific Question:   Texture/Fiber modifications:     Answer:   Dysphagia  3(Mechanical Soft)specify fluid consistency(question 6) [3]     Order Specific Question:   Consistency/Fluid modifications:     Answer:   Thin Liquids [3]       Assessment:  Active Hospital Problems    Diagnosis   • *Acute intracranial hemorrhage (HCC)   • Hyponatremia   • Encephalopathy acute   • 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 Santizo on 9/22/18.  Patient at high risk for bleed so hold AC for another week.   -PT and OT for mobility and ADLs  -SLP for cognition   -Fioricet for headache.       Dysphagia - Patient on NTFL diet on transfer.   -SLP for swallow.  MBSS on 10/11/18 will follow-up with SLP. Per family passed      A Fib - Patient previously on Eliquis which was reversed after ICH.  Currently rate controlled  -Continue Digoxin 125 mcg daily, Metoprolol 50 mg q8h.   -Restart AC on 10/16/18.      HTN - Patient on 5 mg Amlodipine. Per family was not on that prior. Patient with hypotension x1, will monitor.     Hyponatremia - Patient on 2g TID on transfer. Will check admission CMP - 137 on admission.  -Reduce to 1 g TID, will slowly titrate off and recheck.      Low Vitamin D - 28 on admission. Start on 1000 U while inpatient.     DVT Ppx - Patient on Heparin on transfer.  Will need transition to Coumadin without bridge on 10/16/18.    Total time:  45 minutes.  I spent greater than 50% of the time for patient care, counseling, and coordination on this date, including unit/floor time, and face-to-face time with the patient as per interval events and assessment and plan above. Topics discussed included discharge planning, fioricet for headache, prilosec for GERD and TANNER tristan.     Janae Ortega M.D.

## 2018-10-12 PROCEDURE — 92526 ORAL FUNCTION THERAPY: CPT

## 2018-10-12 PROCEDURE — 99232 SBSQ HOSP IP/OBS MODERATE 35: CPT | Performed by: PHYSICAL MEDICINE & REHABILITATION

## 2018-10-12 PROCEDURE — 97530 THERAPEUTIC ACTIVITIES: CPT

## 2018-10-12 PROCEDURE — 97535 SELF CARE MNGMENT TRAINING: CPT

## 2018-10-12 PROCEDURE — A9270 NON-COVERED ITEM OR SERVICE: HCPCS | Performed by: PHYSICAL MEDICINE & REHABILITATION

## 2018-10-12 PROCEDURE — 97116 GAIT TRAINING THERAPY: CPT

## 2018-10-12 PROCEDURE — 97110 THERAPEUTIC EXERCISES: CPT

## 2018-10-12 PROCEDURE — 700102 HCHG RX REV CODE 250 W/ 637 OVERRIDE(OP): Performed by: PHYSICAL MEDICINE & REHABILITATION

## 2018-10-12 PROCEDURE — 770010 HCHG ROOM/CARE - REHAB SEMI PRIVAT*

## 2018-10-12 PROCEDURE — 700111 HCHG RX REV CODE 636 W/ 250 OVERRIDE (IP): Performed by: PHYSICAL MEDICINE & REHABILITATION

## 2018-10-12 RX ORDER — SODIUM CHLORIDE 1 G/1
1 TABLET ORAL 2 TIMES DAILY WITH MEALS
Status: DISCONTINUED | OUTPATIENT
Start: 2018-10-12 | End: 2018-10-15

## 2018-10-12 RX ADMIN — METOPROLOL TARTRATE 50 MG: 25 TABLET ORAL at 14:18

## 2018-10-12 RX ADMIN — DIGOXIN 125 MCG: 125 TABLET ORAL at 17:49

## 2018-10-12 RX ADMIN — TRAZODONE HYDROCHLORIDE 50 MG: 50 TABLET ORAL at 20:32

## 2018-10-12 RX ADMIN — ALUMINUM HYDROXIDE, MAGNESIUM HYDROXIDE, AND DIMETHICONE 20 ML: 400; 400; 40 SUSPENSION ORAL at 20:32

## 2018-10-12 RX ADMIN — HEPARIN SODIUM 5000 UNITS: 5000 INJECTION, SOLUTION INTRAVENOUS; SUBCUTANEOUS at 20:36

## 2018-10-12 RX ADMIN — METOPROLOL TARTRATE 50 MG: 25 TABLET ORAL at 05:49

## 2018-10-12 RX ADMIN — VITAMIN D, TAB 1000IU (100/BT) 1000 UNITS: 25 TAB at 10:30

## 2018-10-12 RX ADMIN — Medication 1 G: at 17:49

## 2018-10-12 RX ADMIN — Medication 1 G: at 08:24

## 2018-10-12 RX ADMIN — Medication 1 G: at 11:34

## 2018-10-12 RX ADMIN — HEPARIN SODIUM 5000 UNITS: 5000 INJECTION, SOLUTION INTRAVENOUS; SUBCUTANEOUS at 10:30

## 2018-10-12 RX ADMIN — METOPROLOL TARTRATE 50 MG: 25 TABLET ORAL at 21:06

## 2018-10-12 RX ADMIN — BUTALBITAL, ACETAMINOPHEN AND CAFFEINE 1 TABLET: 50; 325; 40 TABLET ORAL at 22:07

## 2018-10-12 ASSESSMENT — GAIT ASSESSMENTS
DISTANCE (FEET): 300
GAIT LEVEL OF ASSIST: CONTACT GUARD ASSIST
ASSISTIVE DEVICE: 4 WHEEL WALKER
DEVIATION: BRADYKINETIC;DECREASED HEEL STRIKE;DECREASED TOE OFF

## 2018-10-12 ASSESSMENT — PAIN SCALES - GENERAL
PAINLEVEL_OUTOF10: 0
PAINLEVEL_OUTOF10: 0
PAINLEVEL_OUTOF10: 9

## 2018-10-12 NOTE — CARE PLAN
Problem: Safety  Goal: Will remain free from falls  Outcome: PROGRESSING AS EXPECTED  Patient demonstrated impulsiveness by getting out of bed on her own without using the call light. Reiterated to patient to please use call light for safety, she verbalized understanding. Alarms on, bed in low position and locked, hourly rounding in place.     Problem: Pain Management  Goal: Pain level will decrease to patient's comfort goal  Outcome: MET Date Met: 10/12/18  Patient denies any pain or discomfort at this time, encouraged her to use her call light should that change. She verbalized understanding, hourly rounding in place.

## 2018-10-12 NOTE — PROGRESS NOTES
"Rehab Progress Note     Encounter Date: 10/12/2018    CC: Decreased function after ICH, SDH; dysphagia    Interval Events (Subjective)  Patient sitting up in room. She reports she feels much better today including less nausea and headache. Patient reports she is otherwise looking forward to therapy with increased energy.  She reports she slept better overnight.  Denies fever or chills. Patient reports TANNER hoses were too tight and causing severe discomfort.  Discussed leg exercises she can perform seated and in bed as well as ambulating with therapy as much as able.  Patient in agreement. Patient still refusing prilosec, she reports she understands she has GERD on her swallow study but it is not symptomatic for her.     IDT Team Meeting 10/11/2018  DC/Disposition:  10/25/18    Objective:  VITAL SIGNS: /90   Pulse 72   Temp 36.6 °C (97.9 °F)   Resp 18   Ht 1.651 m (5' 5\")   Wt 70.8 kg (156 lb)   SpO2 97%   BMI 25.96 kg/m²   Gen: NAD  Psych: Mood and affect appropriate  CV: RRR, no edema  Resp: CTAB, no upper airway sounds  Abd: NTND  Neuro: AOx4, 5/5 BUE at wheelchair level    Recent Results (from the past 72 hour(s))   Comp Metabolic Panel (CMP)    Collection Time: 10/10/18  6:08 AM   Result Value Ref Range    Sodium 137 135 - 145 mmol/L    Potassium 3.6 3.6 - 5.5 mmol/L    Chloride 104 96 - 112 mmol/L    Co2 23 20 - 33 mmol/L    Anion Gap 10.0 0.0 - 11.9    Glucose 128 (H) 65 - 99 mg/dL    Bun 20 8 - 22 mg/dL    Creatinine 0.56 0.50 - 1.40 mg/dL    Calcium 9.2 8.5 - 10.5 mg/dL    AST(SGOT) 20 12 - 45 U/L    ALT(SGPT) 25 2 - 50 U/L    Alkaline Phosphatase 81 30 - 99 U/L    Total Bilirubin 0.5 0.1 - 1.5 mg/dL    Albumin 3.8 3.2 - 4.9 g/dL    Total Protein 6.1 6.0 - 8.2 g/dL    Globulin 2.3 1.9 - 3.5 g/dL    A-G Ratio 1.7 g/dL   TSH with Reflex to FT4    Collection Time: 10/10/18  6:08 AM   Result Value Ref Range    TSH 1.320 0.380 - 5.330 uIU/mL   Vitamin D, 25-hydroxy (blood)    Collection Time: 10/10/18 "  6:08 AM   Result Value Ref Range    25-Hydroxy   Vitamin D 25 28 (L) 30 - 100 ng/mL   ESTIMATED GFR    Collection Time: 10/10/18  6:08 AM   Result Value Ref Range    GFR If African American >60 >60 mL/min/1.73 m 2    GFR If Non African American >60 >60 mL/min/1.73 m 2   CBC WITH DIFFERENTIAL    Collection Time: 10/10/18 10:00 AM   Result Value Ref Range    WBC 6.7 4.8 - 10.8 K/uL    RBC 3.36 (L) 4.20 - 5.40 M/uL    Hemoglobin 11.3 (L) 12.0 - 16.0 g/dL    Hematocrit 30.9 (L) 37.0 - 47.0 %    MCV 92.0 81.4 - 97.8 fL    MCH 33.6 (H) 27.0 - 33.0 pg    MCHC 36.6 (H) 33.6 - 35.0 g/dL    RDW 40.8 35.9 - 50.0 fL    Platelet Count 266 164 - 446 K/uL    MPV 10.6 9.0 - 12.9 fL    Neutrophils-Polys 74.90 (H) 44.00 - 72.00 %    Lymphocytes 14.40 (L) 22.00 - 41.00 %    Monocytes 7.00 0.00 - 13.40 %    Eosinophils 2.10 0.00 - 6.90 %    Basophils 0.40 0.00 - 1.80 %    Immature Granulocytes 1.20 (H) 0.00 - 0.90 %    Nucleated RBC 0.00 /100 WBC    Neutrophils (Absolute) 5.00 2.00 - 7.15 K/uL    Lymphs (Absolute) 0.96 (L) 1.00 - 4.80 K/uL    Monos (Absolute) 0.47 0.00 - 0.85 K/uL    Eos (Absolute) 0.14 0.00 - 0.51 K/uL    Baso (Absolute) 0.03 0.00 - 0.12 K/uL    Immature Granulocytes (abs) 0.08 0.00 - 0.11 K/uL    NRBC (Absolute) 0.00 K/uL   HEMOGLOBIN A1C    Collection Time: 10/10/18 10:00 AM   Result Value Ref Range    Glycohemoglobin 5.6 0.0 - 5.6 %    Est Avg Glucose 114 mg/dL       Current Facility-Administered Medications   Medication Frequency   • acetaminophen/caffeine/butalbital 325-40-50 mg (FIORICET) -40 MG per tablet 1 Tab Q6HRS PRN   • sodium chloride (SALT) tablet 1 g TID WITH MEALS   • vitamin D (cholecalciferol) tablet 1,000 Units DAILY   • Respiratory Care per Protocol Continuous RT   • Pharmacy Consult Request ...Pain Management Review 1 Each PRN   • tramadol (ULTRAM) 50 MG tablet 50 mg Q4HRS PRN   • acetaminophen (TYLENOL) tablet 650 mg Q4HRS PRN   • senna-docusate (PERICOLACE or SENOKOT S) 8.6-50 MG per  tablet 2 Tab BID    And   • polyethylene glycol/lytes (MIRALAX) PACKET 1 Packet QDAY PRN    And   • magnesium hydroxide (MILK OF MAGNESIA) suspension 30 mL QDAY PRN    And   • bisacodyl (DULCOLAX) suppository 10 mg QDAY PRN   • artificial tears 1.4 % ophthalmic solution 1 Drop PRN   • benzocaine-menthol (CEPACOL) lozenge 1 Lozenge Q2HRS PRN   • hydrALAZINE (APRESOLINE) tablet 25 mg Q8HRS PRN   • mag hydrox-al hydrox-simeth (MAALOX PLUS ES or MYLANTA DS) suspension 20 mL Q2HRS PRN   • ondansetron (ZOFRAN ODT) dispertab 4 mg 4X/DAY PRN    Or   • ondansetron (ZOFRAN) syringe/vial injection 4 mg 4X/DAY PRN   • traZODone (DESYREL) tablet 50 mg QHS PRN   • sodium chloride (OCEAN) 0.65 % nasal spray 2 Spray PRN   • digoxin (LANOXIN) tablet 125 mcg DAILY AT 1800   • heparin injection 5,000 Units Q12HRS   • metoprolol (LOPRESSOR) tablet 50 mg Q8HRS   • omeprazole (PRILOSEC) capsule 20 mg DAILY       Orders Placed This Encounter   Procedures   • Diet Order Regular (meds whole one at a time with liquid wash)     Standing Status:   Standing     Number of Occurrences:   1     Order Specific Question:   Diet:     Answer:   Regular [1]     Comments:   meds whole one at a time with liquid wash     Order Specific Question:   Texture/Fiber modifications:     Answer:   Dysphagia 3(Mechanical Soft)specify fluid consistency(question 6) [3]     Order Specific Question:   Consistency/Fluid modifications:     Answer:   Thin Liquids [3]       Assessment:  Active Hospital Problems    Diagnosis   • *Acute intracranial hemorrhage (HCC)   • Hyponatremia   • Encephalopathy acute   • 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 Santizo on 9/22/18.  Patient at high risk for bleed so hold AC for another week.   -PT and OT for mobility and  ADLs  -SLP for cognition   -Fioricet for headache.       Dysphagia - Patient on NTFL diet on transfer.   -SLP for swallow.  MBSS on 10/11/18 - advanced diet to Dysphagia 3, per SLP limited by impulsivity not dysphagia at this time     A Fib - Patient previously on Eliquis which was reversed after ICH.  Currently rate controlled  -Continue Digoxin 125 mcg daily, Metoprolol 50 mg q8h.   -Restart AC on 10/16/18.      HTN - Patient on 5 mg Amlodipine. Per family was not on that prior. Patient with hypotension x2, will discontinue and monitor.     Hyponatremia - Patient on 2g TID on transfer. Will check admission CMP - 137 on admission.  -Reduce to 1 g TID, will slowly titrate off and recheck. Decrease to BID     Low Vitamin D - 28 on admission. Start on 1000 U while inpatient.     DVT Ppx - Patient on Heparin on transfer.  Will need transition to Coumadin without bridge on 10/16/18.    Total time:  30 minutes.  I spent greater than 50% of the time for patient care, counseling, and coordination on this date, including unit/floor time, and face-to-face time with the patient as per interval events and assessment and plan above. Topics discussed included TANNER hoses, discontinue Prilosec, decrease salt tabs and discontinue Amlodipine for hypotension.    Janae Ortega M.D.

## 2018-10-13 PROCEDURE — 700111 HCHG RX REV CODE 636 W/ 250 OVERRIDE (IP): Performed by: PHYSICAL MEDICINE & REHABILITATION

## 2018-10-13 PROCEDURE — A9270 NON-COVERED ITEM OR SERVICE: HCPCS | Performed by: PHYSICAL MEDICINE & REHABILITATION

## 2018-10-13 PROCEDURE — 97535 SELF CARE MNGMENT TRAINING: CPT

## 2018-10-13 PROCEDURE — 97530 THERAPEUTIC ACTIVITIES: CPT

## 2018-10-13 PROCEDURE — 97116 GAIT TRAINING THERAPY: CPT

## 2018-10-13 PROCEDURE — 92526 ORAL FUNCTION THERAPY: CPT

## 2018-10-13 PROCEDURE — G0515 COGNITIVE SKILLS DEVELOPMENT: HCPCS

## 2018-10-13 PROCEDURE — 770010 HCHG ROOM/CARE - REHAB SEMI PRIVAT*

## 2018-10-13 PROCEDURE — 700102 HCHG RX REV CODE 250 W/ 637 OVERRIDE(OP): Performed by: PHYSICAL MEDICINE & REHABILITATION

## 2018-10-13 PROCEDURE — 97110 THERAPEUTIC EXERCISES: CPT

## 2018-10-13 RX ADMIN — METOPROLOL TARTRATE 50 MG: 25 TABLET ORAL at 14:05

## 2018-10-13 RX ADMIN — VITAMIN D, TAB 1000IU (100/BT) 1000 UNITS: 25 TAB at 08:59

## 2018-10-13 RX ADMIN — METOPROLOL TARTRATE 50 MG: 25 TABLET ORAL at 21:25

## 2018-10-13 RX ADMIN — ALUMINUM HYDROXIDE, MAGNESIUM HYDROXIDE, AND DIMETHICONE 20 ML: 400; 400; 40 SUSPENSION ORAL at 08:58

## 2018-10-13 RX ADMIN — HEPARIN SODIUM 5000 UNITS: 5000 INJECTION, SOLUTION INTRAVENOUS; SUBCUTANEOUS at 09:00

## 2018-10-13 RX ADMIN — METOPROLOL TARTRATE 50 MG: 25 TABLET ORAL at 05:49

## 2018-10-13 RX ADMIN — HEPARIN SODIUM 5000 UNITS: 5000 INJECTION, SOLUTION INTRAVENOUS; SUBCUTANEOUS at 21:25

## 2018-10-13 RX ADMIN — Medication 1 G: at 08:59

## 2018-10-13 RX ADMIN — DIGOXIN 125 MCG: 125 TABLET ORAL at 17:19

## 2018-10-13 RX ADMIN — Medication 1 G: at 17:19

## 2018-10-13 ASSESSMENT — PAIN SCALES - GENERAL: PAINLEVEL_OUTOF10: 0

## 2018-10-13 NOTE — CARE PLAN
Problem: Bowel/Gastric:  Goal: Normal bowel function is maintained or improved  Outcome: PROGRESSING AS EXPECTED  Patient reports an upset stomach this evening, she was given maalox. Patient also refused her scheduled senna, she states she went earlier and had loose stools. Encouraged patient to sit upright to help empty gastric contents. Will continue to monitor.     Problem: Pain Management  Goal: Pain level will decrease to patient's comfort goal  Outcome: PROGRESSING AS EXPECTED  Patient reports pressure in her head, she was given fiorcet and is now sleeping with no s/s of pain such as restlessness, grimacing, ect. Hourly rounding in place.

## 2018-10-13 NOTE — PROGRESS NOTES
At 1915 received report from the day shift RN and assumed care of patient. Patient is alert and oriented and reports an upset stomach, medicated appropriately per MAR. Bed is in the lowest position with the call light in reach, all patient's needs are met at this time. Will continue to monitor.

## 2018-10-13 NOTE — CARE PLAN
Problem: Safety  Goal: Will remain free from injury  Pt remains free from accidental fall or injury, pt's daughter is cleared by Therapist to transfer pt    Problem: Pain Management  Goal: Pain level will decrease to patient's comfort goal  Patient able to verbalize needs.  Denies pain or discomfort this shift and no s/s same noted.  Will continue to monitor.

## 2018-10-14 PROCEDURE — 700102 HCHG RX REV CODE 250 W/ 637 OVERRIDE(OP): Performed by: PHYSICAL MEDICINE & REHABILITATION

## 2018-10-14 PROCEDURE — 700111 HCHG RX REV CODE 636 W/ 250 OVERRIDE (IP): Performed by: PHYSICAL MEDICINE & REHABILITATION

## 2018-10-14 PROCEDURE — 770010 HCHG ROOM/CARE - REHAB SEMI PRIVAT*

## 2018-10-14 PROCEDURE — A9270 NON-COVERED ITEM OR SERVICE: HCPCS | Performed by: PHYSICAL MEDICINE & REHABILITATION

## 2018-10-14 RX ADMIN — Medication 1 G: at 08:00

## 2018-10-14 RX ADMIN — METOPROLOL TARTRATE 50 MG: 25 TABLET ORAL at 20:21

## 2018-10-14 RX ADMIN — ACETAMINOPHEN 650 MG: 325 TABLET, FILM COATED ORAL at 20:20

## 2018-10-14 RX ADMIN — HEPARIN SODIUM 5000 UNITS: 5000 INJECTION, SOLUTION INTRAVENOUS; SUBCUTANEOUS at 08:01

## 2018-10-14 RX ADMIN — HEPARIN SODIUM 5000 UNITS: 5000 INJECTION, SOLUTION INTRAVENOUS; SUBCUTANEOUS at 20:21

## 2018-10-14 RX ADMIN — DIGOXIN 125 MCG: 125 TABLET ORAL at 17:55

## 2018-10-14 RX ADMIN — ACETAMINOPHEN 650 MG: 325 TABLET, FILM COATED ORAL at 15:04

## 2018-10-14 RX ADMIN — METOPROLOL TARTRATE 50 MG: 25 TABLET ORAL at 05:36

## 2018-10-14 RX ADMIN — VITAMIN D, TAB 1000IU (100/BT) 1000 UNITS: 25 TAB at 08:00

## 2018-10-14 RX ADMIN — METOPROLOL TARTRATE 50 MG: 25 TABLET ORAL at 15:05

## 2018-10-14 RX ADMIN — TRAZODONE HYDROCHLORIDE 50 MG: 50 TABLET ORAL at 20:20

## 2018-10-14 RX ADMIN — Medication 1 G: at 17:54

## 2018-10-14 ASSESSMENT — PAIN SCALES - GENERAL
PAINLEVEL_OUTOF10: 0
PAINLEVEL_OUTOF10: 9
PAINLEVEL_OUTOF10: 7

## 2018-10-14 NOTE — CONSULTS
DATE OF SERVICE:  10/12/2018    BEHAVIORAL MEDICINE EVALUATION    BRIEF HISTORY OF PRESENTING COMPLAINTS:  The patient is a 78-year-old white    female who was referred for a behavioral medicine evaluation by Dr. Ortega.  The patient was transferred to rehab from acute where she was   treated for intracranial hemorrhage.  Patient experienced a mechanical fall   while in her kitchen.  She was taken to the ED and she had worsening nausea.    CT revealed FDH.  Neurosurgery was consulted.  Patient had a bilateral EVD   placement on 09/22/2018.  Patient's hospitalization was complicated by   confusion, lethargy and hyponatremia.  Initially, the patient was stabilized   acutely.  She was sent to rehab to address her general debility.    PAST MEDICAL HISTORY:  Significant for AFib, lymphoma of the spleen.    PSYCHOLOGICAL STATUS:  MENTAL STATUS EXAMINATION:  Patient is a well-nourished, medium-built female   of medium stature who appeared somewhat younger than her stated age of 78.  At   presentation, the patient was alert.  She was sitting upright in her bed   talking to her daughter who is at her bedside when approached.  The patient   oriented well to my presence.  The patient was kempt in appearance.  She was   dressed in hospital garb.  Patient's manner of presentation was cooperative   and friendly.    The patient was grossly oriented to time, place and person.  Her language was   logical and goal oriented, and her speech was normal for rate and rhythm.    Patient's concentration and memory function appeared slightly diminished.    The patient's affect was somewhat constricted, stable and mildly intense.  She   related well.  Her mood appeared anxious, but appropriate to the context.    There was no evidence for delusional or perceptual disturbance.  Also, patient   showed no unusual pain or motor behavior during the interview.    SPECIFIC BEHAVIORAL COMPLAINTS:  The patient admitted to mild symptoms of    anxiousness.  She reported in the past several days she has felt restless and   somewhat worried and nervous.  She denied any feelings of panic.  She also   reported no feelings of depression.    Patient reported some mild head pain.  She also said her appetite is   diminished and her energy level is low.  She said her sleep is intermittently   restless.    The patient reported no interpersonal discord or discomfort.  However, she   said she sometimes feels lonely.  She also denied any family disharmony or   problems managing her day-to-day stressors.    The patient reported no ETOH or other drug abuse or any use of tobacco.    PSYCHIATRIC HISTORY:  The patient denied any history significant for   psychiatric disturbance or treatment including in or outpatient care.    PSYCHOMETRIC TESTING:  The patient was administered 2 psychometric tests and 2   screening instruments.  The patient's PS/PC-R revealed mild symptoms of   anxiety.  The CDR survey showed no problems with level of consciousness or   significant problems with attention or thinking.  She was fairly well   oriented.  Her speech was within normal limits.  She reported some loss of   vigor, intermittent sleep disturbance and problems with behavioral activation   and basic self-care.  She also reported anxious mood and diminished appetite.    She says she is bothered by intermittent head pain.    The patient was screened for any elder abuse or risk of suicide.  There is no   strong evidence for either problem.    SOCIAL HISTORY:  The patient is retired and .  She was living alone in   [place], [place], at the time of her hospitalization.    IMPRESSION:  Adjustment disorder with anxious mood.    RECOMMENDATION:  Patient will be followed for status and supportive care.       ____________________________________     RENAE GIPSON, PHD    [facility]    DD:  10/13/2018 15:53:28  DT:  10/13/2018 19:56:23    D#:  [id number]  Job#:  [id number]

## 2018-10-14 NOTE — PROGRESS NOTES
At 1915 received report from the day shift RN and assumed care of patient. Patient is asleep in bed with no s/s of discomfort, VS stable. Bed is in the lowest position with the call light in reach, all patient's needs are met at this time. Will continue to monitor.

## 2018-10-14 NOTE — CARE PLAN
Problem: Skin Integrity  Goal: Risk for impaired skin integrity will decrease  Outcome: PROGRESSING AS EXPECTED  Patient's surgical incision on her head were assessed, no s/s of infection at this time. Pictures taken and uploaded to [employer] for reference. Will continue to monitor.     Problem: Urinary Elimination:  Goal: Ability to reestablish a normal urinary elimination pattern will improve  Outcome: PROGRESSING AS EXPECTED  Patient was toileted prior to bed, urine is yellow and clear with no strong odor. Patient denies any pain, urgency, burning ect. Will continue to monitor.

## 2018-10-14 NOTE — CARE PLAN
Problem: Safety  Goal: Will remain free from falls  Patient unsteady, assisted with transfers to prevent falls.    Problem: Pain Management  Goal: Pain level will decrease to patient's comfort goal    Intervention: Follow pain managment plan developed in collaboration with patient and Interdisciplinary Team  Patient medicated with Tylenol 650 mg po for right sided headache, will continue to monitor.

## 2018-10-15 PROCEDURE — 97112 NEUROMUSCULAR REEDUCATION: CPT

## 2018-10-15 PROCEDURE — 99232 SBSQ HOSP IP/OBS MODERATE 35: CPT | Performed by: PHYSICAL MEDICINE & REHABILITATION

## 2018-10-15 PROCEDURE — 700111 HCHG RX REV CODE 636 W/ 250 OVERRIDE (IP): Performed by: PHYSICAL MEDICINE & REHABILITATION

## 2018-10-15 PROCEDURE — G0515 COGNITIVE SKILLS DEVELOPMENT: HCPCS

## 2018-10-15 PROCEDURE — A9270 NON-COVERED ITEM OR SERVICE: HCPCS | Performed by: PHYSICAL MEDICINE & REHABILITATION

## 2018-10-15 PROCEDURE — 700102 HCHG RX REV CODE 250 W/ 637 OVERRIDE(OP): Performed by: PHYSICAL MEDICINE & REHABILITATION

## 2018-10-15 PROCEDURE — 770010 HCHG ROOM/CARE - REHAB SEMI PRIVAT*

## 2018-10-15 PROCEDURE — 97535 SELF CARE MNGMENT TRAINING: CPT

## 2018-10-15 PROCEDURE — 97530 THERAPEUTIC ACTIVITIES: CPT

## 2018-10-15 RX ORDER — POLYETHYLENE GLYCOL 3350 17 G/17G
1 POWDER, FOR SOLUTION ORAL
Status: DISCONTINUED | OUTPATIENT
Start: 2018-10-15 | End: 2018-10-25 | Stop reason: HOSPADM

## 2018-10-15 RX ORDER — SODIUM CHLORIDE 1 G/1
1 TABLET ORAL DAILY
Status: DISCONTINUED | OUTPATIENT
Start: 2018-10-16 | End: 2018-10-16

## 2018-10-15 RX ORDER — BISACODYL 10 MG
10 SUPPOSITORY, RECTAL RECTAL
Status: DISCONTINUED | OUTPATIENT
Start: 2018-10-15 | End: 2018-10-25 | Stop reason: HOSPADM

## 2018-10-15 RX ORDER — AMOXICILLIN 250 MG
2 CAPSULE ORAL 2 TIMES DAILY PRN
Status: DISCONTINUED | OUTPATIENT
Start: 2018-10-15 | End: 2018-10-25 | Stop reason: HOSPADM

## 2018-10-15 RX ADMIN — HEPARIN SODIUM 5000 UNITS: 5000 INJECTION, SOLUTION INTRAVENOUS; SUBCUTANEOUS at 08:40

## 2018-10-15 RX ADMIN — METOPROLOL TARTRATE 50 MG: 25 TABLET ORAL at 05:21

## 2018-10-15 RX ADMIN — HEPARIN SODIUM 5000 UNITS: 5000 INJECTION, SOLUTION INTRAVENOUS; SUBCUTANEOUS at 20:39

## 2018-10-15 RX ADMIN — ALUMINUM HYDROXIDE, MAGNESIUM HYDROXIDE, AND DIMETHICONE 20 ML: 400; 400; 40 SUSPENSION ORAL at 08:46

## 2018-10-15 RX ADMIN — VITAMIN D, TAB 1000IU (100/BT) 1000 UNITS: 25 TAB at 08:40

## 2018-10-15 RX ADMIN — BUTALBITAL, ACETAMINOPHEN AND CAFFEINE 1 TABLET: 50; 325; 40 TABLET ORAL at 05:21

## 2018-10-15 RX ADMIN — METOPROLOL TARTRATE 50 MG: 25 TABLET ORAL at 20:39

## 2018-10-15 RX ADMIN — METOPROLOL TARTRATE 50 MG: 25 TABLET ORAL at 16:02

## 2018-10-15 RX ADMIN — Medication 1 G: at 08:39

## 2018-10-15 RX ADMIN — DIGOXIN 125 MCG: 125 TABLET ORAL at 17:41

## 2018-10-15 ASSESSMENT — PAIN SCALES - GENERAL: PAINLEVEL_OUTOF10: 9

## 2018-10-15 ASSESSMENT — BALANCE ASSESSMENTS
SITTING TO STANDING: 3
STANDING TO SITTING: 3
SITTING UNSUPPORTED: 4
STANDING UNSUPPORTED WITH FEET TOGETHER: 3
LOOK OVER LEFT AND RIGHT SHOULDERS WHILE STANDING: 3
TRANSFERS: 4
PICK UP OBJECT FROM THE FLOOR FROM A STANDING POSITION: 3
STANDING UNSUPPORTED: 3
STANDING UNSUPPORTED WITH EYES CLOSED: 3
TURN 360 DEGREES: 0
REACHING FORWARD WITH OUTSTRETCHED ARM WHILE STANDING: 4

## 2018-10-15 NOTE — CARE PLAN
Problem: Safety  Goal: Will remain free from falls    Intervention: Assess risk factors for falls  Patient unsteady, assisted with transfers to prevent falls.      Problem: Skin Integrity  Goal: Risk for impaired skin integrity will decrease  Patient had shower with OT, skin intact with no skin breakdown.

## 2018-10-15 NOTE — CARE PLAN
Problem: Safety  Goal: Will remain free from injury  Patient uses call light  appropriately this shift.  Waits for assistance when needed and does not attempt self transfer.  Able to verbalize needs.  Will continue to monitor.    Problem: Pain Management  Goal: Pain level will decrease to patient's comfort goal  Tylenol given PRN per MAR for c/o headache 9/10 with adequate relief. Pt asleep upon reassessment. Will continue to monitor.

## 2018-10-15 NOTE — PROGRESS NOTES
"Rehab Progress Note     Encounter Date: 10/15/2018    CC: Decreased function after ICH, SDH; dysphagia    Interval Events (Subjective)  Patient sitting up in wheelchair. She reports she is doing well. She denies pain at this time.  Family at bedside.  She reports continued headache. Receiving occasional Fioricet which she cannot tell if it helps. She would also like bowel medications changed to PRN.  Discussed decreasing salt tabs and checking on 10/17. Patient in agreement.     IDT Team Meeting 10/11/2018  DC/Disposition:  10/25/18    Objective:  VITAL SIGNS: /80   Pulse 79   Temp 36.6 °C (97.8 °F)   Resp 18   Ht 1.651 m (5' 5\")   Wt 69.5 kg (153 lb 3.5 oz)   SpO2 96%   BMI 25.50 kg/m²   Gen: NAD  Psych: Mood and affect appropriate  CV: RRR, no edema  Resp: CTAB, no upper airway sounds  Abd: NTND  Neuro: AOx4, 5/5 BLE at bed level    No results found for this or any previous visit (from the past 72 hour(s)).    Current Facility-Administered Medications   Medication Frequency   • sodium chloride (SALT) tablet 1 g BID WITH MEALS   • acetaminophen/caffeine/butalbital 325-40-50 mg (FIORICET) -40 MG per tablet 1 Tab Q6HRS PRN   • vitamin D (cholecalciferol) tablet 1,000 Units DAILY   • Respiratory Care per Protocol Continuous RT   • Pharmacy Consult Request ...Pain Management Review 1 Each PRN   • tramadol (ULTRAM) 50 MG tablet 50 mg Q4HRS PRN   • acetaminophen (TYLENOL) tablet 650 mg Q4HRS PRN   • senna-docusate (PERICOLACE or SENOKOT S) 8.6-50 MG per tablet 2 Tab BID    And   • polyethylene glycol/lytes (MIRALAX) PACKET 1 Packet QDAY PRN    And   • magnesium hydroxide (MILK OF MAGNESIA) suspension 30 mL QDAY PRN    And   • bisacodyl (DULCOLAX) suppository 10 mg QDAY PRN   • artificial tears 1.4 % ophthalmic solution 1 Drop PRN   • benzocaine-menthol (CEPACOL) lozenge 1 Lozenge Q2HRS PRN   • hydrALAZINE (APRESOLINE) tablet 25 mg Q8HRS PRN   • mag hydrox-al hydrox-simeth (MAALOX PLUS ES or MYLANTA DS) " suspension 20 mL Q2HRS PRN   • ondansetron (ZOFRAN ODT) dispertab 4 mg 4X/DAY PRN    Or   • ondansetron (ZOFRAN) syringe/vial injection 4 mg 4X/DAY PRN   • traZODone (DESYREL) tablet 50 mg QHS PRN   • sodium chloride (OCEAN) 0.65 % nasal spray 2 Spray PRN   • digoxin (LANOXIN) tablet 125 mcg DAILY AT 1800   • heparin injection 5,000 Units Q12HRS   • metoprolol (LOPRESSOR) tablet 50 mg Q8HRS       Orders Placed This Encounter   Procedures   • Diet Order Regular (meds whole one at a time with liquid wash)     Standing Status:   Standing     Number of Occurrences:   1     Order Specific Question:   Diet:     Answer:   Regular [1]     Comments:   meds whole one at a time with liquid wash     Order Specific Question:   Consistency/Fluid modifications:     Answer:   Thin Liquids [3]       Assessment:  Active Hospital Problems    Diagnosis   • *Acute intracranial hemorrhage (HCC)   • Hyponatremia   • Encephalopathy acute   • 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 Santizo on 9/22/18.  Patient at high risk for bleed so hold AC for another week.   -PT and OT for mobility and ADLs  -SLP for cognition   -Fioricet for headache.       Dysphagia - Patient on NTFL diet on transfer.   -SLP for swallow.  MBSS on 10/11/18 - advanced diet to Dysphagia 3, per SLP limited by impulsivity not dysphagia at this time     A Fib - Patient previously on Eliquis which was reversed after ICH.  Currently rate controlled  -Continue Digoxin 125 mcg daily, Metoprolol 50 mg q8h.   -Restart AC on 10/16/18.      HTN - Patient on 5 mg Amlodipine. Per family was not on that prior. Patient with hypotension x2, will discontinue and monitor.     Hyponatremia - Patient on 2g TID on transfer. Will check admission CMP - 137 on admission.  -Reduce  to 1 g TID, will slowly titrate off and recheck. Decrease to daily and recheck on 10/17/18     Low Vitamin D - 28 on admission. Start on 1000 U while inpatient.     DVT Ppx - Patient on Heparin on transfer.  Will need transition to Coumadin without bridge on 10/16/18.    Total time:  30 minutes.  I spent greater than 50% of the time for patient care, counseling, and coordination on this date, including unit/floor time, and face-to-face time with the patient as per interval events and assessment and plan above. Topics discussed included hyponatremia, decrease salt tabs, and change bowel medications to PRN.     Janae Ortega M.D.

## 2018-10-16 PROCEDURE — 99232 SBSQ HOSP IP/OBS MODERATE 35: CPT | Performed by: PHYSICAL MEDICINE & REHABILITATION

## 2018-10-16 PROCEDURE — 97530 THERAPEUTIC ACTIVITIES: CPT

## 2018-10-16 PROCEDURE — G0515 COGNITIVE SKILLS DEVELOPMENT: HCPCS

## 2018-10-16 PROCEDURE — 97116 GAIT TRAINING THERAPY: CPT

## 2018-10-16 PROCEDURE — 770010 HCHG ROOM/CARE - REHAB SEMI PRIVAT*

## 2018-10-16 PROCEDURE — A9270 NON-COVERED ITEM OR SERVICE: HCPCS | Performed by: PHYSICAL MEDICINE & REHABILITATION

## 2018-10-16 PROCEDURE — 700111 HCHG RX REV CODE 636 W/ 250 OVERRIDE (IP): Performed by: PHYSICAL MEDICINE & REHABILITATION

## 2018-10-16 PROCEDURE — 700102 HCHG RX REV CODE 250 W/ 637 OVERRIDE(OP): Performed by: PHYSICAL MEDICINE & REHABILITATION

## 2018-10-16 PROCEDURE — 97535 SELF CARE MNGMENT TRAINING: CPT

## 2018-10-16 PROCEDURE — 97110 THERAPEUTIC EXERCISES: CPT

## 2018-10-16 PROCEDURE — 97112 NEUROMUSCULAR REEDUCATION: CPT

## 2018-10-16 RX ADMIN — Medication 1 G: at 08:13

## 2018-10-16 RX ADMIN — HEPARIN SODIUM 5000 UNITS: 5000 INJECTION, SOLUTION INTRAVENOUS; SUBCUTANEOUS at 20:30

## 2018-10-16 RX ADMIN — DIGOXIN 125 MCG: 125 TABLET ORAL at 17:24

## 2018-10-16 RX ADMIN — HEPARIN SODIUM 5000 UNITS: 5000 INJECTION, SOLUTION INTRAVENOUS; SUBCUTANEOUS at 08:13

## 2018-10-16 RX ADMIN — METOPROLOL TARTRATE 50 MG: 25 TABLET ORAL at 20:30

## 2018-10-16 RX ADMIN — VITAMIN D, TAB 1000IU (100/BT) 1000 UNITS: 25 TAB at 08:13

## 2018-10-16 RX ADMIN — METOPROLOL TARTRATE 50 MG: 25 TABLET ORAL at 15:38

## 2018-10-16 RX ADMIN — BUTALBITAL, ACETAMINOPHEN AND CAFFEINE 1 TABLET: 50; 325; 40 TABLET ORAL at 07:50

## 2018-10-16 RX ADMIN — METOPROLOL TARTRATE 50 MG: 25 TABLET ORAL at 05:31

## 2018-10-16 ASSESSMENT — GAIT ASSESSMENTS
DEVIATION: DECREASED HEEL STRIKE
DISTANCE (FEET): 400
GAIT LEVEL OF ASSIST: SUPERVISED
ASSISTIVE DEVICE: 4 WHEEL WALKER

## 2018-10-16 ASSESSMENT — PAIN SCALES - GENERAL
PAINLEVEL_OUTOF10: 9
PAINLEVEL_OUTOF10: 1
PAINLEVEL_OUTOF10: 0

## 2018-10-16 ASSESSMENT — BALANCE ASSESSMENTS
STANDING ON ONE LEG: 1
STANDING UNSUPPORTED ONE FOOT IN FRONT: 0
PLACE ALTERNATE FOOT ON STEP OR STOOL WHILE STANDING UNSUPPORTED: 1

## 2018-10-16 ASSESSMENT — PATIENT HEALTH QUESTIONNAIRE - PHQ9
SUM OF ALL RESPONSES TO PHQ9 QUESTIONS 1 AND 2: 0
1. LITTLE INTEREST OR PLEASURE IN DOING THINGS: NOT AT ALL
2. FEELING DOWN, DEPRESSED, IRRITABLE, OR HOPELESS: NOT AT ALL

## 2018-10-16 NOTE — PROGRESS NOTES
"Rehab Progress Note     Encounter Date: 10/16/2018    CC: Decreased function after ICH, SDH; dysphagia    Interval Events (Subjective)  Patient sitting up in room. She reports she is doing well. No acute complaints at this time.  Fioricet helps with headache.  Denies N/V/D.     IDT Team Meeting 10/11/2018  DC/Disposition:  10/25/18    Objective:  VITAL SIGNS: /62   Pulse 75   Temp 36.2 °C (97.1 °F)   Resp 18   Ht 1.651 m (5' 5\")   Wt 69.5 kg (153 lb 3.5 oz)   SpO2 97%   BMI 25.50 kg/m²   Gen: NAD  Psych: Mood and affect appropriate  CV: RRR, no edema  Resp: CTAB, no upper airway sounds  Abd: NTND  Neuro: AOx4, 5/5 BLE at bed level  Unchanged from 10/15/18    No results found for this or any previous visit (from the past 72 hour(s)).    Current Facility-Administered Medications   Medication Frequency   • senna-docusate (PERICOLACE or SENOKOT S) 8.6-50 MG per tablet 2 Tab BID PRN    And   • polyethylene glycol/lytes (MIRALAX) PACKET 1 Packet QDAY PRN    And   • magnesium hydroxide (MILK OF MAGNESIA) suspension 30 mL QDAY PRN    And   • bisacodyl (DULCOLAX) suppository 10 mg QDAY PRN   • sodium chloride (SALT) tablet 1 g DAILY   • acetaminophen/caffeine/butalbital 325-40-50 mg (FIORICET) -40 MG per tablet 1 Tab Q6HRS PRN   • vitamin D (cholecalciferol) tablet 1,000 Units DAILY   • Respiratory Care per Protocol Continuous RT   • Pharmacy Consult Request ...Pain Management Review 1 Each PRN   • tramadol (ULTRAM) 50 MG tablet 50 mg Q4HRS PRN   • acetaminophen (TYLENOL) tablet 650 mg Q4HRS PRN   • artificial tears 1.4 % ophthalmic solution 1 Drop PRN   • benzocaine-menthol (CEPACOL) lozenge 1 Lozenge Q2HRS PRN   • hydrALAZINE (APRESOLINE) tablet 25 mg Q8HRS PRN   • mag hydrox-al hydrox-simeth (MAALOX PLUS ES or MYLANTA DS) suspension 20 mL Q2HRS PRN   • ondansetron (ZOFRAN ODT) dispertab 4 mg 4X/DAY PRN    Or   • ondansetron (ZOFRAN) syringe/vial injection 4 mg 4X/DAY PRN   • traZODone (DESYREL) tablet " 50 mg QHS PRN   • sodium chloride (OCEAN) 0.65 % nasal spray 2 Spray PRN   • digoxin (LANOXIN) tablet 125 mcg DAILY AT 1800   • heparin injection 5,000 Units Q12HRS   • metoprolol (LOPRESSOR) tablet 50 mg Q8HRS       Orders Placed This Encounter   Procedures   • Diet Order Regular (meds whole one at a time with liquid wash)     Standing Status:   Standing     Number of Occurrences:   1     Order Specific Question:   Diet:     Answer:   Regular [1]     Comments:   meds whole one at a time with liquid wash     Order Specific Question:   Consistency/Fluid modifications:     Answer:   Thin Liquids [3]       Assessment:  Active Hospital Problems    Diagnosis   • *Acute intracranial hemorrhage (HCC)   • Hyponatremia   • Encephalopathy acute   • 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 Santizo on 9/22/18.  Patient at high risk for bleed so hold AC for another week.   -PT and OT for mobility and ADLs  -SLP for cognition   -Fioricet for headache - improved     Dysphagia - Patient on NTFL diet on transfer.   -SLP for swallow.  MBSS on 10/11/18 - advanced diet to Regular.      A Fib - Patient previously on Eliquis which was reversed after ICH.  Currently rate controlled  -Continue Digoxin 125 mcg daily, Metoprolol 50 mg q8h.   -Restart AC on 10/16/18.      HTN - Patient on 5 mg Amlodipine. Per family was not on that prior. Patient with hypotension x2, will discontinue and monitor.     Hyponatremia - Patient on 2g TID on transfer. Will check admission CMP - 137 on admission.  -Reduce to 1 g TID, will slowly titrate off and recheck. Discontinue and recheck on 10/17/18     Low Vitamin D - 28 on admission. Start on 1000 U while inpatient.     DVT Ppx - Patient on Heparin on transfer.  Will need transition to Coumadin  without bridge on 10/16/18. Will discuss with family tomorrow.     Total time:  25 minutes.  I spent greater than 50% of the time for patient care, counseling, and coordination on this date, including unit/floor time, and face-to-face time with the patient as per interval events and assessment and plan above. Topics discussed included discontinue Salt tabs, recheck BMP in AM and will discuss coumadin with family per patient request.     Janae Ortega M.D.

## 2018-10-16 NOTE — CARE PLAN
Problem: Swallowing STGs  Goal: STG-Within one week, patient will safely swallow  1) Individualized goal:  dys 2 textures with NTL with no overt s/sx of asp/pen noted across 2/2 meals provided MIN cues.   2) Interventions:  SLP Swallowing Dysfunction Treatment and SLP Video Swallow Evaluation     Outcome: MET Date Met: 10/16/18    Goal: STG-Within one week, patient will  1) Individualized goal: participate in MBSS with advancement of diet as appropriate within one week.   2) Interventions:  SLP Swallowing Dysfunction Treatment and SLP Video Swallow Evaluation     Outcome: MET Date Met: 10/16/18      Problem: Problem Solving STGs  Goal: STG-Within one week, patient will  1) Individualized goal:  Participate in divided attention tasks with 80% accuracy provided MOD A.   2) Interventions:  SLP Self Care / ADL Training , SLP Cognitive Skill Development and SLP Group Treatment     Outcome: NOT MET      Problem: Memory STGs  Goal: STG-Within one week, patient will  1) Individualized goal:  Achieve a score of 25 or greater across 3 consecutive sessions on the orientation log.   2) Interventions:  SLP Self Care / ADL Training  and SLP Cognitive Skill Development     Outcome: NOT MET

## 2018-10-16 NOTE — CARE PLAN
Problem: Bowel/Gastric:  Goal: Normal bowel function is maintained or improved  Pt continent of bowel. On bowel meds. LBM 10/14/18.    Problem: Pain Management  Goal: Pain level will decrease to patient's comfort goal  Pt denies pain or discomfort tonight. Sleeps well. Will continue to monitor.

## 2018-10-16 NOTE — CARE PLAN
Problem: Venous Thromboembolism (VTW)/Deep Vein Thrombosis (DVT) Prevention:  Goal: Patient will participate in Venous Thrombosis (VTE)/Deep Vein Thrombosis (DVT)Prevention Measures  Pt is up and walking, participates in therapies, and up to dinning room for all meals.    Problem: Pain Management  Goal: Pain level will decrease to patient's comfort goal  Pt able to participate in therapies and activities this shift. Medicated with fioricet for headache with relief.    Problem: Skin Integrity  Goal: Risk for impaired skin integrity will decrease  Patient's skin remains intact and free from new or accidental injury this shift.  Will continue to monitor.

## 2018-10-16 NOTE — REHAB-SLP IDT TEAM NOTE
Speech Therapy   Cognitive Linquistic Functions  Comprehension Initial:  5 - Stand-by Prompting/Supervision or Set-up  Comprehension Current:  5 - Stand-by Prompting/Supervision or Set-up   Comprehension Description:  Verbal cues  Expression Initial:  6 - Modified Independent  Expression Current:  6 - Modified Independent   Expression Description:     Social Interaction Initial:  6 - Modified Independent  Social Interaction Current:  6 - Modified Independent   Social Interaction Description:  Increased time  Problem Solving Initial:  2 - Max Assistance  Problem Solving Current:  3 - Moderate Assistance   Problem Solving Description:  Increased time, Verbal cueing  Memory Initial:  2 - Max Assistance  Memory Current:  3 - Moderate Assistance   Memory Description:  Verbal cueing, Therapy schedule  Executive Functioning / Organization Initial:  Severe (2)  Executive Functioning / Organization Current:  Moderate (3)   Executive Functioning Desciption:  Planning, organization, processing and attention to detail     Swallowing  Swallowing Status:  SLP no longer following for dysphagia management, pt advanced to regular textures and thin liquids - d/c from therapeutic dining.   Orders Placed This Encounter   Procedures   • Diet Order Regular (meds whole one at a time with liquid wash)     Standing Status:   Standing     Number of Occurrences:   1     Order Specific Question:   Diet:     Answer:   Regular [1]     Comments:   meds whole one at a time with liquid wash     Order Specific Question:   Consistency/Fluid modifications:     Answer:   Thin Liquids [3]     Behavior Modification Plan  Allow for rest time, Give clear feedback, Redirect to task/topic, Provide reasonable choices, Decrease the chance of failure by offering activities that are within the patient's abilities, Analyze tasks (break down into smaller steps) and Reinforce participation in desired tasks  Assistive Technology  Low tech: Calendar, planner,  schedule, alarms/timers, pill organizer, post-it notes, lists  Family Training/Education:ongoing with family   DC Recommendations:  TBD  Strengths:  Effective communication skills, Independent PLOF, Motivated for self care and independence, Pleasant and cooperative, Supportive family and Willingly participates in therapeutic activities  Barriers:  Impulsive, Poor insight/denial of deficits, Lack of motivation and Other: moderate cognitive deficits in the areas of recall, attention and executive functioning   # of short term goals set=4  # of short term goals met=2       Speech Therapy Problems           Problem: Memory STGs     Dates: Start: 10/10/18       Goal: STG-Within one week, patient will     Dates: Start: 10/10/18       Description: 1) Individualized goal:  Achieve a score of 25 or greater across 3 consecutive sessions on the orientation log.   2) Interventions:  SLP Self Care / ADL Training  and SLP Cognitive Skill Development               Problem: Problem Solving STGs     Dates: Start: 10/10/18       Goal: STG-Within one week, patient will     Dates: Start: 10/10/18       Description: 1) Individualized goal:  Participate in divided attention tasks with 80% accuracy provided MOD A.   2) Interventions:  SLP Self Care / ADL Training , SLP Cognitive Skill Development and SLP Group Treatment               Problem: Speech/Swallowing LTGs     Dates: Start: 10/10/18       Goal: LTG-By discharge, patient will safely swallow     Dates: Start: 10/10/18       Description: 1) Individualized goal:  Regular textures and thin liquids with no overt s/sx of asp/pen noted across meals with 100% accuracy provided MOD I.   2) Interventions:  SLP Swallowing Dysfunction Treatment, SLP Video Swallow Evaluation, SLP Self Care / ADL Training , SLP Cognitive Skill Development and SLP Group Treatment             Goal: LTG-By discharge, patient will     Dates: Start: 10/10/18       Description: 1) Individualized goal:  Complete  functional problem solving and recall information with 80% accuracy provided MOD I.   2) Interventions:  SLP Self Care / ADL Training , SLP Cognitive Skill Development and SLP Group Treatment                    Section completed by:  Sherly Nava MS,CCC-SLP

## 2018-10-17 LAB
ANION GAP SERPL CALC-SCNC: 8 MMOL/L (ref 0–11.9)
APPEARANCE UR: ABNORMAL
BACTERIA #/AREA URNS HPF: ABNORMAL /HPF
BILIRUB UR QL STRIP.AUTO: NEGATIVE
BUN SERPL-MCNC: 10 MG/DL (ref 8–22)
CALCIUM SERPL-MCNC: 9 MG/DL (ref 8.5–10.5)
CAOX CRY #/AREA URNS HPF: ABNORMAL /HPF
CHLORIDE SERPL-SCNC: 105 MMOL/L (ref 96–112)
CO2 SERPL-SCNC: 29 MMOL/L (ref 20–33)
COLOR UR: YELLOW
CREAT SERPL-MCNC: 0.62 MG/DL (ref 0.5–1.4)
EPI CELLS #/AREA URNS HPF: NEGATIVE /HPF
GLUCOSE SERPL-MCNC: 95 MG/DL (ref 65–99)
GLUCOSE UR STRIP.AUTO-MCNC: NEGATIVE MG/DL
KETONES UR STRIP.AUTO-MCNC: NEGATIVE MG/DL
LEUKOCYTE ESTERASE UR QL STRIP.AUTO: ABNORMAL
MICRO URNS: ABNORMAL
NITRITE UR QL STRIP.AUTO: POSITIVE
PH UR STRIP.AUTO: 5 [PH]
POTASSIUM SERPL-SCNC: 3.7 MMOL/L (ref 3.6–5.5)
PROT UR QL STRIP: NEGATIVE MG/DL
RBC # URNS HPF: ABNORMAL /HPF
RBC UR QL AUTO: ABNORMAL
SODIUM SERPL-SCNC: 142 MMOL/L (ref 135–145)
SP GR UR STRIP.AUTO: 1.02
UROBILINOGEN UR STRIP.AUTO-MCNC: 0.2 MG/DL
WBC #/AREA URNS HPF: ABNORMAL /HPF

## 2018-10-17 PROCEDURE — G0515 COGNITIVE SKILLS DEVELOPMENT: HCPCS

## 2018-10-17 PROCEDURE — 87086 URINE CULTURE/COLONY COUNT: CPT

## 2018-10-17 PROCEDURE — 770010 HCHG ROOM/CARE - REHAB SEMI PRIVAT*

## 2018-10-17 PROCEDURE — 97530 THERAPEUTIC ACTIVITIES: CPT

## 2018-10-17 PROCEDURE — 87077 CULTURE AEROBIC IDENTIFY: CPT

## 2018-10-17 PROCEDURE — 99233 SBSQ HOSP IP/OBS HIGH 50: CPT | Performed by: PHYSICAL MEDICINE & REHABILITATION

## 2018-10-17 PROCEDURE — 700111 HCHG RX REV CODE 636 W/ 250 OVERRIDE (IP): Performed by: PHYSICAL MEDICINE & REHABILITATION

## 2018-10-17 PROCEDURE — 80048 BASIC METABOLIC PNL TOTAL CA: CPT

## 2018-10-17 PROCEDURE — 87186 SC STD MICRODIL/AGAR DIL: CPT

## 2018-10-17 PROCEDURE — 81001 URINALYSIS AUTO W/SCOPE: CPT

## 2018-10-17 PROCEDURE — 36415 COLL VENOUS BLD VENIPUNCTURE: CPT

## 2018-10-17 PROCEDURE — 97110 THERAPEUTIC EXERCISES: CPT

## 2018-10-17 PROCEDURE — 700102 HCHG RX REV CODE 250 W/ 637 OVERRIDE(OP): Performed by: PHYSICAL MEDICINE & REHABILITATION

## 2018-10-17 PROCEDURE — A9270 NON-COVERED ITEM OR SERVICE: HCPCS | Performed by: PHYSICAL MEDICINE & REHABILITATION

## 2018-10-17 RX ORDER — LANOLIN ALCOHOL/MO/W.PET/CERES
3 CREAM (GRAM) TOPICAL
Status: DISCONTINUED | OUTPATIENT
Start: 2018-10-17 | End: 2018-10-19

## 2018-10-17 RX ORDER — WARFARIN SODIUM 5 MG/1
5 TABLET ORAL
Status: COMPLETED | OUTPATIENT
Start: 2018-10-17 | End: 2018-10-17

## 2018-10-17 RX ADMIN — MELATONIN TAB 3 MG 3 MG: 3 TAB at 20:08

## 2018-10-17 RX ADMIN — METOPROLOL TARTRATE 50 MG: 25 TABLET ORAL at 05:43

## 2018-10-17 RX ADMIN — METOPROLOL TARTRATE 50 MG: 25 TABLET ORAL at 20:08

## 2018-10-17 RX ADMIN — DIGOXIN 125 MCG: 125 TABLET ORAL at 17:19

## 2018-10-17 RX ADMIN — WARFARIN SODIUM 5 MG: 5 TABLET ORAL at 17:19

## 2018-10-17 RX ADMIN — VITAMIN D, TAB 1000IU (100/BT) 1000 UNITS: 25 TAB at 08:17

## 2018-10-17 RX ADMIN — METOPROLOL TARTRATE 50 MG: 25 TABLET ORAL at 14:26

## 2018-10-17 RX ADMIN — HEPARIN SODIUM 5000 UNITS: 5000 INJECTION, SOLUTION INTRAVENOUS; SUBCUTANEOUS at 08:17

## 2018-10-17 ASSESSMENT — PAIN SCALES - GENERAL: PAINLEVEL_OUTOF10: 0

## 2018-10-17 NOTE — CARE PLAN
Problem: Safety  Goal: Will remain free from falls    Intervention: Implement fall precautions  Use of call light encouraged to make needs known.Bed in low position, non skid socks/shoes on when out of bed.Call light within reach.Will continue to monitor and assess needs and safety.      Problem: Bowel/Gastric:  Goal: Normal bowel function is maintained or improved    Intervention: Educate patient and significant other/support system about signs and symptoms of constipation and interventions to implement  Pt is continent of bowel per report.LBM 10/16.Will continue to monitor and assess for s/sx of constipation.      Problem: Pain Management  Goal: Pain level will decrease to patient's comfort goal  Pt is calm, comfortable and stable.Repositioned with pillows for comfort.Will continue to monitor and assess pain level and medicate as needed.    Problem: Urinary Elimination:  Goal: Ability to reestablish a normal urinary elimination pattern will improve    Intervention: Assist patient to sit on commode or toilet for voiding  Assisted to the bathroom, continent of bladder.Denies any discomfort or dysuria.Will continue to monitor and assess level of continence.

## 2018-10-17 NOTE — PROGRESS NOTES
"Rehab Progress Note     Encounter Date: 10/17/2018    CC: Decreased function after ICH, SDH; dysphagia    Interval Events (Subjective)  Patient sitting up in room visiting with daughter in law.  Discussed with patient and daughter in law about starting coumadin today. Discussed will not otherwise have lovenox bridge.  Discussed will monitor her PT/INR while inpatient. Discussed patient also had low energy this AM. Discussed sleep and patient reports her sleep is poor. Discussed adding melatonin for sleep.  Denies N/V/D.     IDT Team Meeting 10/11/2018  DC/Disposition:  10/25/18    Objective:  VITAL SIGNS: /80   Pulse 64   Temp 36.5 °C (97.7 °F)   Resp 18   Ht 1.651 m (5' 5\")   Wt 69.5 kg (153 lb 3.5 oz)   SpO2 96%   BMI 25.50 kg/m²   Gen: NAD  Psych: Mood and affect appropriate  CV: RRR, no edema  Resp: CTAB, no upper airway sounds  Abd: NTND  Neuro: AOx4, 5/5 BUE, sleepy  Skin: Sutures in place in scalp, well healed.     Recent Results (from the past 72 hour(s))   BASIC METABOLIC PANEL    Collection Time: 10/17/18  5:58 AM   Result Value Ref Range    Sodium 142 135 - 145 mmol/L    Potassium 3.7 3.6 - 5.5 mmol/L    Chloride 105 96 - 112 mmol/L    Co2 29 20 - 33 mmol/L    Glucose 95 65 - 99 mg/dL    Bun 10 8 - 22 mg/dL    Creatinine 0.62 0.50 - 1.40 mg/dL    Calcium 9.0 8.5 - 10.5 mg/dL    Anion Gap 8.0 0.0 - 11.9   ESTIMATED GFR    Collection Time: 10/17/18  5:58 AM   Result Value Ref Range    GFR If African American >60 >60 mL/min/1.73 m 2    GFR If Non African American >60 >60 mL/min/1.73 m 2       Current Facility-Administered Medications   Medication Frequency   • melatonin tablet 3 mg QHS   • MD Alert...Warfarin per Pharmacy pharmacy to dose   • warfarin (COUMADIN) tablet 5 mg COUMADIN-ONCE   • senna-docusate (PERICOLACE or SENOKOT S) 8.6-50 MG per tablet 2 Tab BID PRN    And   • polyethylene glycol/lytes (MIRALAX) PACKET 1 Packet QDAY PRN    And   • magnesium hydroxide (MILK OF MAGNESIA) " suspension 30 mL QDAY PRN    And   • bisacodyl (DULCOLAX) suppository 10 mg QDAY PRN   • acetaminophen/caffeine/butalbital 325-40-50 mg (FIORICET) -40 MG per tablet 1 Tab Q6HRS PRN   • vitamin D (cholecalciferol) tablet 1,000 Units DAILY   • Respiratory Care per Protocol Continuous RT   • Pharmacy Consult Request ...Pain Management Review 1 Each PRN   • tramadol (ULTRAM) 50 MG tablet 50 mg Q4HRS PRN   • acetaminophen (TYLENOL) tablet 650 mg Q4HRS PRN   • artificial tears 1.4 % ophthalmic solution 1 Drop PRN   • benzocaine-menthol (CEPACOL) lozenge 1 Lozenge Q2HRS PRN   • hydrALAZINE (APRESOLINE) tablet 25 mg Q8HRS PRN   • mag hydrox-al hydrox-simeth (MAALOX PLUS ES or MYLANTA DS) suspension 20 mL Q2HRS PRN   • ondansetron (ZOFRAN ODT) dispertab 4 mg 4X/DAY PRN    Or   • ondansetron (ZOFRAN) syringe/vial injection 4 mg 4X/DAY PRN   • traZODone (DESYREL) tablet 50 mg QHS PRN   • sodium chloride (OCEAN) 0.65 % nasal spray 2 Spray PRN   • digoxin (LANOXIN) tablet 125 mcg DAILY AT 1800   • heparin injection 5,000 Units Q12HRS   • metoprolol (LOPRESSOR) tablet 50 mg Q8HRS       Orders Placed This Encounter   Procedures   • Diet Order Regular (meds whole one at a time with liquid wash)     Standing Status:   Standing     Number of Occurrences:   1     Order Specific Question:   Diet:     Answer:   Regular [1]     Comments:   meds whole one at a time with liquid wash     Order Specific Question:   Consistency/Fluid modifications:     Answer:   Thin Liquids [3]       Assessment:  Active Hospital Problems    Diagnosis   • *Acute intracranial hemorrhage (HCC)   • Hyponatremia   • Encephalopathy acute   • 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 Santioz on 9/22/18.  Patient at high risk for bleed so hold AC for  another week.   -PT and OT for mobility and ADLs  -SLP for cognition   -Fioricet for headache - improved     Dysphagia - Patient on NTFL diet on transfer.   -SLP for swallow.  MBSS on 10/11/18 - advanced diet to Regular.      A Fib - Patient previously on Eliquis which was reversed after ICH.  Currently rate controlled  -Continue Digoxin 125 mcg daily, Metoprolol 50 mg q8h.   -Restart AC on 10/16/18.      HTN - Patient on 5 mg Amlodipine. Per family was not on that prior. Patient with hypotension x2, will discontinue and monitor.     Hyponatremia - Patient on 2g TID on transfer. Will check admission CMP - 137 on admission.  -Reduce to 1 g TID, will slowly titrate off and recheck. Discontinue and recheck on 10/17/18     Low Vitamin D - 28 on admission. Start on 1000 U while inpatient.     Insomnia/Mood - Patient with decreased mood. Will start Melatonin for sleep. Will discuss mood. Psychology to consult.     DVT Ppx - Patient on Heparin on transfer.  Start coumadin at 5 mg today. Pharmacy to take over management. Will most likely switch back to Eliquis after discussion with cardiologist. Would like reversability of coumadin for now.     Total time:  40 minutes.  I spent greater than 50% of the time for patient care, counseling, and coordination on this date, including unit/floor time, and face-to-face time with the patient as per interval events and assessment and plan above. Topics discussed included start coumadin, goal 2-3, start melatonin and discussed mood. Hold on SSRI.    Janae Ortega M.D.

## 2018-10-17 NOTE — PROGRESS NOTES
Pharmacy Warfarin Consult   10/17/2018     78 y.o.   female     Indication for anticoagulation: Atrial fibrillation    Goal INR = 2 - 3    No results for input(s): INR, HEMOGLOBIN, HEMATOCRIT in the last 72 hours.    Pertinent Drug/Drug Interactions:  Digoxin, Trazodone prn, Tramadol prn  Outpatient Warfarin Regimen:  New start  Recent Warfarin Dosing:  New start    Bridge Therapy: No        1.  Warfarin 5 mg fernando Y[facility]

## 2018-10-18 LAB
INR PPP: 1.09 (ref 0.87–1.13)
PROTHROMBIN TIME: 14.2 SEC (ref 12–14.6)

## 2018-10-18 PROCEDURE — 97530 THERAPEUTIC ACTIVITIES: CPT

## 2018-10-18 PROCEDURE — G0515 COGNITIVE SKILLS DEVELOPMENT: HCPCS

## 2018-10-18 PROCEDURE — 99233 SBSQ HOSP IP/OBS HIGH 50: CPT | Performed by: PHYSICAL MEDICINE & REHABILITATION

## 2018-10-18 PROCEDURE — 770010 HCHG ROOM/CARE - REHAB SEMI PRIVAT*

## 2018-10-18 PROCEDURE — 700102 HCHG RX REV CODE 250 W/ 637 OVERRIDE(OP): Performed by: PHYSICAL MEDICINE & REHABILITATION

## 2018-10-18 PROCEDURE — 85610 PROTHROMBIN TIME: CPT

## 2018-10-18 PROCEDURE — A9270 NON-COVERED ITEM OR SERVICE: HCPCS | Performed by: PHYSICAL MEDICINE & REHABILITATION

## 2018-10-18 PROCEDURE — 97116 GAIT TRAINING THERAPY: CPT

## 2018-10-18 PROCEDURE — 36415 COLL VENOUS BLD VENIPUNCTURE: CPT

## 2018-10-18 PROCEDURE — 97112 NEUROMUSCULAR REEDUCATION: CPT

## 2018-10-18 PROCEDURE — 97535 SELF CARE MNGMENT TRAINING: CPT

## 2018-10-18 RX ORDER — WARFARIN SODIUM 5 MG/1
5 TABLET ORAL
Status: COMPLETED | OUTPATIENT
Start: 2018-10-18 | End: 2018-10-18

## 2018-10-18 RX ORDER — SULFAMETHOXAZOLE AND TRIMETHOPRIM 800; 160 MG/1; MG/1
1 TABLET ORAL EVERY 12 HOURS
Status: DISCONTINUED | OUTPATIENT
Start: 2018-10-18 | End: 2018-10-18

## 2018-10-18 RX ORDER — CIPROFLOXACIN 500 MG/1
500 TABLET, FILM COATED ORAL EVERY 12 HOURS
Status: COMPLETED | OUTPATIENT
Start: 2018-10-18 | End: 2018-10-22

## 2018-10-18 RX ADMIN — CIPROFLOXACIN HYDROCHLORIDE 500 MG: 500 TABLET, FILM COATED ORAL at 19:53

## 2018-10-18 RX ADMIN — VITAMIN D, TAB 1000IU (100/BT) 1000 UNITS: 25 TAB at 09:12

## 2018-10-18 RX ADMIN — MELATONIN TAB 3 MG 3 MG: 3 TAB at 19:53

## 2018-10-18 RX ADMIN — WARFARIN SODIUM 5 MG: 5 TABLET ORAL at 17:53

## 2018-10-18 RX ADMIN — METOPROLOL TARTRATE 50 MG: 25 TABLET ORAL at 22:31

## 2018-10-18 RX ADMIN — CIPROFLOXACIN HYDROCHLORIDE 500 MG: 500 TABLET, FILM COATED ORAL at 14:15

## 2018-10-18 RX ADMIN — METOPROLOL TARTRATE 50 MG: 25 TABLET ORAL at 05:13

## 2018-10-18 RX ADMIN — DIGOXIN 125 MCG: 125 TABLET ORAL at 17:53

## 2018-10-18 RX ADMIN — TRAZODONE HYDROCHLORIDE 50 MG: 50 TABLET ORAL at 22:35

## 2018-10-18 ASSESSMENT — GAIT ASSESSMENTS
DISTANCE (FEET): 150
GAIT LEVEL OF ASSIST: SUPERVISED
DEVIATION: DECREASED HEEL STRIKE
ASSISTIVE DEVICE: 4 WHEEL WALKER

## 2018-10-18 ASSESSMENT — PAIN SCALES - GENERAL
PAINLEVEL_OUTOF10: 0
PAINLEVEL_OUTOF10: 0

## 2018-10-18 NOTE — REHAB-SLP IDT TEAM NOTE
Speech Therapy   Cognitive Linquistic Functions  Comprehension Initial:  5 - Stand-by Prompting/Supervision or Set-up  Comprehension Current:  5 - Stand-by Prompting/Supervision or Set-up   Comprehension Description:  Verbal cues  Expression Initial:  6 - Modified Independent  Expression Current:  6 - Modified Independent   Expression Description:     Social Interaction Initial:  6 - Modified Independent  Social Interaction Current:  6 - Modified Independent   Social Interaction Description:  Increased time  Problem Solving Initial:  2 - Max Assistance  Problem Solving Current:  4 - Minimal Assistance   Problem Solving Description:  Increased time, Verbal cueing  Memory Initial:  2 - Max Assistance  Memory Current:  3 - Moderate Assistance   Memory Description:  Verbal cueing, Therapy schedule  Executive Functioning / Organization Initial:  Severe (2)  Executive Functioning / Organization Current:  Moderate (3)   Executive Functioning Desciption:  Attention, planning and reasoning skills     Swallowing  Swallowing Status: SLP no longer following for dysphagia management   Orders Placed This Encounter   Procedures   • Diet Order Regular (meds whole one at a time with liquid wash)     Standing Status:   Standing     Number of Occurrences:   1     Order Specific Question:   Diet:     Answer:   Regular [1]     Comments:   meds whole one at a time with liquid wash     Order Specific Question:   Consistency/Fluid modifications:     Answer:   Thin Liquids [3]     Behavior Modification Plan  Allow for rest time, Keep instructions simple/concrete, Give clear feedback, Be calm, Redirect to task/topic, Provide reasonable choices, Decrease the chance of failure by offering activities that are within the patient's abilities, Analyze tasks (break down into smaller steps) and Reinforce participation in desired tasks  Assistive Technology  Low tech: Calendar, planner, schedule, alarms/timers, pill organizer, post-it notes,  lists  Family Training/Education:  Ongoing with pt and family   DC Recommendations:  TBD  Strengths:  Able to follow instructions, Effective communication skills, Independent PLOF, Pleasant and cooperative, Supportive family and Willingly participates in therapeutic activities  Barriers:  Generalized weakness, Poor activity tolerance, Poor insight/denial of deficits and Poor sleep pattern moderate cognitive deficits   # of short term goals set=4  # of short term goals met=2       Speech Therapy Problems           Problem: Memory STGs     Dates: Start: 10/10/18       Goal: STG-Within one week, patient will     Dates: Start: 10/10/18       Description: 1) Individualized goal:  Achieve a score of 25 or greater across 3 consecutive sessions on the orientation log.   2) Interventions:  SLP Self Care / ADL Training  and SLP Cognitive Skill Development               Problem: Problem Solving STGs     Dates: Start: 10/10/18       Goal: STG-Within one week, patient will     Dates: Start: 10/10/18       Description: 1) Individualized goal:  Participate in divided attention tasks with 80% accuracy provided MOD A.   2) Interventions:  SLP Self Care / ADL Training , SLP Cognitive Skill Development and SLP Group Treatment               Problem: Speech/Swallowing LTGs     Dates: Start: 10/10/18       Goal: LTG-By discharge, patient will safely swallow     Dates: Start: 10/10/18       Description: 1) Individualized goal:  Regular textures and thin liquids with no overt s/sx of asp/pen noted across meals with 100% accuracy provided MOD I.   2) Interventions:  SLP Swallowing Dysfunction Treatment, SLP Video Swallow Evaluation, SLP Self Care / ADL Training , SLP Cognitive Skill Development and SLP Group Treatment             Goal: LTG-By discharge, patient will     Dates: Start: 10/10/18       Description: 1) Individualized goal:  Complete functional problem solving and recall information with 80% accuracy provided MOD I.   2)  Interventions:  SLP Self Care / ADL Training , SLP Cognitive Skill Development and SLP Group Treatment                    Section completed by:  Sherly Nava MS,CCC-SLP

## 2018-10-18 NOTE — PROGRESS NOTES
Pharmacy Warfarin Consult   10/18/2018     78 y.o.   female     Indication for anticoagulation: Atrial fibrillation     Goal INR = 2 - 3    Recent Labs      10/18/18   0600   INR  1.09       Pertinent Drug/Drug Interactions:  Digoxin, Trazodone prn, Tramadol prn  Outpatient Warfarin Regimen:  New start  Recent Warfarin Dosing:  New start  Dose from last 7 days     Date/Time Dose (mg)    10/18/18 0600  5    10/17/18 1600  5          Bridge Therapy: Heparin SQ           1.  Warfarin 5 mg tonight per INR = 1.09           Matthew Alvarado [facility]

## 2018-10-18 NOTE — CARE PLAN
Problem: Mobility  Goal: STG-Within one week, patient will ambulate household distance  1) Individualized goal:  Patient will amb with FWW >30 ft indoors CGA/SBA  2) Interventions:  PT Group Therapy, PT E Stim Attended, PT Gait Training, PT Self Care/Home Eval, PT Therapeutic Exercises, PT Neuro Re-Ed/Balance, PT Aquatic Therapy, PT Therapeutic Activity, PT Manual Therapy and PT Evaluation.       Outcome: MET Date Met: 10/18/18      Problem: Mobility Transfers  Goal: STG-Within one week, patient will sit to stand  1) Individualized goal:  Patient will transfer with FWW SBA STS/SPT  2) Interventions:  PT Group Therapy, PT E Stim Attended, PT Gait Training, PT Self Care/Home Eval, PT Therapeutic Exercises, PT Neuro Re-Ed/Balance, PT Aquatic Therapy, PT Therapeutic Activity, PT Manual Therapy and PT Evaluation.       Outcome: MET Date Met: 10/18/18

## 2018-10-18 NOTE — CARE PLAN
Problem: Dressing  Goal: STG-Within one week, patient will dress LB  1) Individualized Goal:  With moderate assistance   2) Interventions:  OT Group Therapy, OT Self Care/ADL, OT Cognitive Skill Dev, OT Community Reintegration, OT Manual Ther Technique, OT Neuro Re-Ed/Balance, OT Therapeutic Activity, OT Evaluation and OT Therapeutic Exercise     Outcome: MET Date Met: 10/18/18      Problem: Toileting  Goal: STG-Within one week, patient will complete toileting tasks  1) Individualized Goal:  With supervision assistance and use of DME/AE prn   2) Interventions:  OT Group Therapy, OT Self Care/ADL, OT Cognitive Skill Dev, OT Community Reintegration, OT Manual Ther Technique, OT Neuro Re-Ed/Balance, OT Therapeutic Activity, OT Evaluation and OT Therapeutic Exercise     Outcome: MET Date Met: 10/18/18      Problem: Functional Transfers  Goal: STG-Within one week, patient will transfer to step in shower  1) Individualized Goal: with SBA/supervision assistance and use of DME/AE prn   2) Interventions:  OT Group Therapy, OT Self Care/ADL, OT Cognitive Skill Dev, OT Community Reintegration, OT Manual Ther Technique, OT Neuro Re-Ed/Balance, OT Therapeutic Activity, OT Evaluation and OT Therapeutic Exercise     Outcome: MET Date Met: 10/18/18

## 2018-10-18 NOTE — REHAB-PT IDT TEAM NOTE
"Physical Therapy   Mobility  Bed mobility:   SPV  Bed /Chair/Wheelchair Transfer Initial:  4 - Minimal Assistance  Bed /Chair/Wheelchair Transfer Current:  5 - Standby Prompting/Supervision or Set-up   Bed/Chair/Wheelchair Transfer Description:   (SPT 4WW SPV, vc for brakes and set up)  Walk Initial:  1 - Total Assistance  Walk Current:  5 - Standby Prompting/Supervision or Set-up   Walk Description:  Walker (4WW 150-400 ft x 4 indoors and outdoors SPV, for safety and pathfinding vc )  Wheelchair Initial:  1 - Total Assistance  Wheelchair Current:  5E - Household Exception   Wheelchair Description:   (MOD INDEP with BUE/LE propulsion x100 ft, slow pace. )  Stairs Initial:  0 - Not tested, patient refused  Stairs Current: 2 - Max Assistance   Stairs Description:  (CGA with cues, 4 - 6\" steps x3 reps with seated rest breaks inbetween. 1st rep with JUANY HRs, then with unilateral HR and step to pattern. )  Patient/Family Training/Education:  Continued education on safety with mobility   DME/DC Recommendations:  4WW  Strengths:  Able to follow instructions, Effective communication skills, Independent PLOF, Making steady progress towards goals, Motivated for self care and independence, Pleasant and cooperative, Supportive family and Willingly participates in therapeutic activities  Barriers:   Decreased endurance, Generalized weakness, Poor balance and Poor carryover of learning     # of short term goals set= 2  # of short term goals met=2         Physical Therapy Problems           Problem: PT-Long Term Goals     Dates: Start: 10/10/18       Goal: LTG-By discharge, patient will ambulate     Dates: Start: 10/10/18       Description: 1) Individualized goal:  Patient will amb >150 ft over various surfaces with FWW SPV-mod I  2) Interventions:  PT Group Therapy, PT E Stim Attended, PT Gait Training, PT Self Care/Home Eval, PT Therapeutic Exercises, PT Neuro Re-Ed/Balance, PT Aquatic Therapy, PT Therapeutic Activity, PT Manual " Therapy and PT Evaluation.               Goal: LTG-By discharge, patient will transfer one surface to another     Dates: Start: 10/10/18       Description: 1) Individualized goal:  Patient will transfer SPV-mod I with FWW   2) Interventions:  PT Group Therapy, PT E Stim Attended, PT Gait Training, PT Self Care/Home Eval, PT Therapeutic Exercises, PT Neuro Re-Ed/Balance, PT Aquatic Therapy, PT Therapeutic Activity, PT Manual Therapy and PT Evaluation.                       Section completed by:  Annamarie Leyva DPT

## 2018-10-18 NOTE — CARE PLAN
Problem: Safety  Goal: Will remain free from injury  Outcome: PROGRESSING AS EXPECTED  Patient demonstrates good safety technique this shift.  Asks for assistance when needed and does not attempt self transfer.  Able to verbalize needs. Reminders to lock breaks on FWW.    Problem: Pain Management  Goal: Pain level will decrease to patient's comfort goal  Outcome: PROGRESSING AS EXPECTED  Patient able to verbalize needs.  Denies pain or discomfort this shift and no s/s same noted.  Will continue to monitor.

## 2018-10-18 NOTE — PROGRESS NOTES
"Rehab Progress Note     Encounter Date: 10/18/2018    CC: Decreased function after ICH, SDH; dysphagia    Interval Events (Subjective)  Patient sitting up in bed. She reports she slept much better last night. Son and bedside and discussed coumadin and chance of bleeding.  Called later for positive UA, will start on Cipro. Will check culture.  Patient denies dysuria, reports some foul smelling urine.  Denies abdominal pain. Appointment this afternoon.     IDT Team Meeting 10/18/2018    IJanae M.D., was present and led the interdisciplinary team conference on 10/18/2018.  I led the IDT conference and agree with the IDT conference documentation and plan of care as noted below.     RN:  Diet Regular   % Meal     Pain    Sleep    Bowel Continent   Bladder Continent   In's & Out's    To NSG today  Weak overall    PT:  Bed Mobility SBA   Transfers    Mobility Safety issues with 4WW; limited balance   Stairs maxA     OT:  Eating Niall   Grooming    Bathing    UB Dressing    LB Dressing    Toileting    Shower & Transfer supervs   Supervision for kitchen; meal prep tomorrow  Limited safety for wheelchair lock    SLP:  MOCA doubled in score to 16/30  Advanced to regular diet, primary deficits in attention  Recall; safety awareness  Limited insight    Resp:  None    CM:  Continues to work on disposition and DME needs.     DC/Disposition:  10/25/18, [facility] program for respite    Objective:  VITAL SIGNS: /71   Pulse (!) 56   Temp 36.3 °C (97.3 °F)   Resp 18   Ht 1.651 m (5' 5\")   Wt 69.5 kg (153 lb 3.5 oz)   SpO2 97%   BMI 25.50 kg/m²   Gen: NAD  Psych: Mood and affect appropriate  CV: RRR, no edema  Resp: CTAB, no upper airway sounds  Abd: NTND  Neuro: AOx4, 5/5 BUE, sleepy  Skin: Sutures in place in scalp, well healed.   Unchanged from 10/17/18    Recent Results (from the past 72 hour(s))   BASIC METABOLIC PANEL    Collection Time: 10/17/18  5:58 AM   Result Value Ref Range    Sodium " 142 135 - 145 mmol/L    Potassium 3.7 3.6 - 5.5 mmol/L    Chloride 105 96 - 112 mmol/L    Co2 29 20 - 33 mmol/L    Glucose 95 65 - 99 mg/dL    Bun 10 8 - 22 mg/dL    Creatinine 0.62 0.50 - 1.40 mg/dL    Calcium 9.0 8.5 - 10.5 mg/dL    Anion Gap 8.0 0.0 - 11.9   ESTIMATED GFR    Collection Time: 10/17/18  5:58 AM   Result Value Ref Range    GFR If African American >60 >60 mL/min/1.73 m 2    GFR If Non African American >60 >60 mL/min/1.73 m 2   URINALYSIS    Collection Time: 10/17/18 12:45 PM   Result Value Ref Range    Color Yellow     Character Cloudy (A)     Specific Gravity 1.016 <1.035    Ph 5.0 5.0 - 8.0    Glucose Negative Negative mg/dL    Ketones Negative Negative mg/dL    Protein Negative Negative mg/dL    Bilirubin Negative Negative    Urobilinogen, Urine 0.2 Negative    Nitrite Positive (A) Negative    Leukocyte Esterase Moderate (A) Negative    Occult Blood Moderate (A) Negative    Micro Urine Req Microscopic    URINE MICROSCOPIC (W/UA)    Collection Time: 10/17/18 12:45 PM   Result Value Ref Range    WBC Packed (A) /hpf    RBC 20-50 (A) /hpf    Bacteria Many (A) None /hpf    Epithelial Cells Negative /hpf    Ca Oxalate Crystal Few /hpf   PROTHROMBIN TIME    Collection Time: 10/18/18  6:00 AM   Result Value Ref Range    PT 14.2 12.0 - 14.6 sec    INR 1.09 0.87 - 1.13       Current Facility-Administered Medications   Medication Frequency   • warfarin (COUMADIN) tablet 5 mg COUMADIN-ONCE   • sulfamethoxazole-trimethoprim (BACTRIM DS) 800-160 MG tablet 1 Tab Q12HRS   • melatonin tablet 3 mg QHS   • MD Alert...Warfarin per Pharmacy pharmacy to dose   • senna-docusate (PERICOLACE or SENOKOT S) 8.6-50 MG per tablet 2 Tab BID PRN    And   • polyethylene glycol/lytes (MIRALAX) PACKET 1 Packet QDAY PRN    And   • magnesium hydroxide (MILK OF MAGNESIA) suspension 30 mL QDAY PRN    And   • bisacodyl (DULCOLAX) suppository 10 mg QDAY PRN   • acetaminophen/caffeine/butalbital 325-40-50 mg (FIORICET) -40 MG per  tablet 1 Tab Q6HRS PRN   • vitamin D (cholecalciferol) tablet 1,000 Units DAILY   • Respiratory Care per Protocol Continuous RT   • Pharmacy Consult Request ...Pain Management Review 1 Each PRN   • tramadol (ULTRAM) 50 MG tablet 50 mg Q4HRS PRN   • acetaminophen (TYLENOL) tablet 650 mg Q4HRS PRN   • artificial tears 1.4 % ophthalmic solution 1 Drop PRN   • benzocaine-menthol (CEPACOL) lozenge 1 Lozenge Q2HRS PRN   • hydrALAZINE (APRESOLINE) tablet 25 mg Q8HRS PRN   • mag hydrox-al hydrox-simeth (MAALOX PLUS ES or MYLANTA DS) suspension 20 mL Q2HRS PRN   • ondansetron (ZOFRAN ODT) dispertab 4 mg 4X/DAY PRN    Or   • ondansetron (ZOFRAN) syringe/vial injection 4 mg 4X/DAY PRN   • traZODone (DESYREL) tablet 50 mg QHS PRN   • sodium chloride (OCEAN) 0.65 % nasal spray 2 Spray PRN   • digoxin (LANOXIN) tablet 125 mcg DAILY AT 1800   • heparin injection 5,000 Units Q12HRS   • metoprolol (LOPRESSOR) tablet 50 mg Q8HRS       Orders Placed This Encounter   Procedures   • Diet Order Regular (meds whole one at a time with liquid wash)     Standing Status:   Standing     Number of Occurrences:   1     Order Specific Question:   Diet:     Answer:   Regular [1]     Comments:   meds whole one at a time with liquid wash     Order Specific Question:   Consistency/Fluid modifications:     Answer:   Thin Liquids [3]       Assessment:  Active Hospital Problems    Diagnosis   • *Acute intracranial hemorrhage (HCC)   • Hyponatremia   • Encephalopathy acute   • 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 Santizo on 9/22/18.  Patient at high risk for bleed so hold AC for another week.   -PT and OT for mobility and ADLs  -SLP for cognition   -Fioricet for headache - improved     Dysphagia - Patient on NTFL diet on transfer.   -SLP  for swallow.  MBSS on 10/11/18 - advanced diet to Regular.      UTI - Patient with positive UA on 10/17/18 with lethargy on 10/17. Will check UCx. Start on Ciprofloxacin for 5 days      A Fib - Patient previously on Eliquis which was reversed after ICH.  Currently rate controlled  -Continue Digoxin 125 mcg daily, Metoprolol 50 mg q8h.   -Restart AC on 10/16/18.      HTN - Patient on 5 mg Amlodipine. Per family was not on that prior. Patient with hypotension x2, will discontinue and monitor.     Hyponatremia - Patient on 2g TID on transfer. Will check admission CMP - 137 on admission.  -Reduce to 1 g TID, will slowly titrate off and recheck. Discontinue and recheck on 10/17/18     Low Vitamin D - 28 on admission. Start on 1000 U while inpatient.     Insomnia/Mood - Patient with decreased mood. Will start Melatonin for sleep. Will discuss mood. Psychology to consult.     DVT Ppx - Patient on Heparin on transfer.  Start coumadin at 5 mg today. Pharmacy to take over management. Will most likely switch back to Eliquis after discussion with cardiologist. Would like reversability of coumadin for now.     Total time:  35 minutes.  I spent greater than 50% of the time for patient care, counseling, and coordination on this date, including unit/floor time, and face-to-face time with the patient as per interval events and assessment and plan above. Topics discussed included coumadin discussion with son, UTI+ and starting on Cipro. Patient was discussed separately in IDT today; please see details above.    Janae Ortega M.D.

## 2018-10-18 NOTE — REHAB-COLLABORATIVE ONGOING IDT TEAM NOTE
Weekly Interdisciplinary Team Conference Note    Weekly Interdisciplinary Team Conference # 2  Date:  10/18/2018    Clinicians present and reporting at team conference include the following:   MD: Janae Ortega MD   RN:  Susan Castellano RN   PT:   Annamarie Leyva, PT, DPT  OT:  Rosanna Carter, MS, OTR/L   ST:  Sherly Nava, MS, CCC-SLP  CM:  Ivania Napoles, ATUL, CCM  REC:  Not Applicable  RT:  Not Applicable  RPh:  Flor Holt [facility]  All reporting clinicians have a working knowledge of this patient's plan of care.    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) 09/24/2018     Priority: Medium   • Paroxysmal atrial fibrillation (HCC) 09/22/2018     Priority: Medium   • Oropharyngeal dysphagia 10/08/2018     Priority: Low   • Hypokalemia 09/23/2018     Priority: Low   • Hypomagnesemia 09/23/2018     Priority: Low     Results     Procedure Component Value Ref Range Date/Time    URINE CULTURE-EXISTING-LESS THAN 48 HOURS [[id number]] Collected:  10/17/18 1245    Order Status:  Completed Lab Status:  In process Updated:  10/18/18 1133    Specimen:  Urine from Urine, Clean Catch     Narrative:       Collected By:KUSUM CASTILLO  Indication for culture:->Dysuria/Frequency/Burning    PROTHROMBIN TIME [[id number]] Collected:  10/18/18 0600    Order Status:  Completed Lab Status:  Final result Updated:  10/18/18 0641    Specimen:  Blood      PT 14.2 12.0 - 14.6 sec      INR 1.09 0.87 - 1.13      Comment: INR - Non-therapeutic Reference Range: 0.87-1.13  INR - Therapeutic Reference Range: 2.0-4.0         Narrative:       Indicate which anticoagulants the patient is on:->COUMADIN    URINE MICROSCOPIC (W/UA)  [[id number]]  (Abnormal) Collected:  10/17/18 1245    Order Status:  Completed Lab Status:  Final result Updated:  10/17/18 1931     WBC Packed (A) /hpf      Comment: Female  <12 Yr 0-2  >12 Yr 0-5  Male   None          RBC 20-50 (A) /hpf      Comment: Female  >12 Yr 0-2  Male   None          Bacteria Many (A) None /hpf      Epithelial Cells Negative /hpf      Ca Oxalate Crystal Few /hpf     URINALYSIS [[id number]]  (Abnormal) Collected:  10/17/18 1245    Order Status:  Completed Lab Status:  Final result Updated:  10/17/18 1910     Color Yellow     Character Cloudy (A)     Specific Gravity 1.016 <1.035      Ph 5.0 5.0 - 8.0      Glucose Negative Negative mg/dL      Ketones Negative Negative mg/dL      Protein Negative Negative mg/dL      Bilirubin Negative Negative      Urobilinogen, Urine 0.2 Negative      Nitrite Positive (A) Negative      Leukocyte Esterase Moderate (A) Negative      Occult Blood Moderate (A) Negative      Micro Urine Req Microscopic           Nursing  Diet/Nutrition:  Regular  Medication Administration:  Whole with Liquid Wash  % consumed at meals in last 24 hours:  Consumed 25%-75% of meals per documentation.  Meal/Snack Consumption for the past 24 hrs:   Oral Nutrition   10/17/18 1330 Lunch;Between 50-75% Consumed   10/17/18 1018 Breakfast;Between 25-50% Consumed     Snack schedule:  None  Appetite:  Good  Fluid Intake/Output in past 24 hours: In: 720 [P.O.:720]  Out: -   Admit Weight:  Weight: 70.8 kg (156 lb)  Weight Last 7 Days   [69.5 kg (153 lb 3.5 oz)] 69.5 kg (153 lb 3.5 oz) (10/14 0500)    Pain Issues:    Location:  --  --         Severity:  Denies   Scheduled pain medications:  None     PRN pain medications used in last 24 hours:  None   Non Pharmacologic Interventions:  emotional support, repositioned and rest  Effectiveness of pain management plan:  good=patient states acceptable comfort after interventions    Bowel:    Bowel Assist Initial Score:  3 - Moderate Assistance  Bowel  Assist Current Score:  4 - Minimal Assistance  Bowl Accidents in last 7 days:     Last bowel movement: 10/17/18 (per pt)  Stool Description: Medium, Soft     Usual bowel pattern:  every other day  Scheduled bowel medications:  None  PRN bowel medications used in last 24 hours:  None  Nursing Interventions:  Increased time, Scheduled medication, Supervision, Verbal cueing, Positioning on commode/toilet  Effectiveness of bowel program:   fair=sometimes needs prn bowel meds for constipation  Bladder:    Bladder Assist Initial Score:  3 - Moderate Assistance  Bladder Assist Current Score:  4 - Minimal Assistance  Bladder Accidents in last 7 days:     PVR range for past 24-48 hours: -- ()  Medications affecting bladder:  None    Interventions:  Increased time, Supervision, Verbal cueing  Effectiveness of bladder training:  Fair=occasional unpredictable, incontinent emptying of bladder (< 1 time/day)      Wound:   Patient Lines/Drains/Airways Status    Active Current Wounds     Name: Placement date: Placement time: Site: Days:    Wound 10/09/18 Laceration Head scab 10/09/18   1756      8                   Interventions:  Monitor and assess  Effectiveness of intervention:  wound is unchanged     Sleep/wake cycle:   Average hours slept:  Sleeps 4-6 hours without waking  Sleep medication usage:  Other Melatonin 3 mg    Patient/Family Training/Education:  Bladder Management/Training, Bowel Management/Training, Diet/Nutrition, Fall Prevention, General Self Care, Medication Management, Pain Management, Respiratory Hygiene, Safety and Wound Care    Strengths: Willingly participates in therapeutic activities, Able to follow instructions, Supportive family and Manages pain appropriately   Barriers:   Fatigue and Generalized weakness            Nursing Problems           Problem: Bowel/Gastric:     Goal: Normal bowel function is maintained or improved           Goal: Will not experience complications related to bowel motility              Problem: Communication     Goal: The ability to communicate needs accurately and effectively will improve             Problem: Discharge Barriers/Planning     Goal: Patient's continuum of care needs will be met             Problem: Infection     Goal: Will remain free from infection             Problem: Knowledge Deficit     Goal: Knowledge of disease process/condition, treatment plan, diagnostic tests, and medications will improve           Goal: Knowledge of the prescribed therapeutic regimen will improve             Problem: Mobility     Goal: Risk for activity intolerance will decrease             Problem: Pain Management     Goal: Pain level will decrease to patient's comfort goal     Flowsheet:     Taken at 10/12/18 2248    Non Verbal Scale  Sleeping;Calm by Charlene Urbina R.N.    Taken at 10/12/18 2200    Pain Scale 0 - 10  9 by Charlene Urbina R.N. Comment:  Headache    Taken at 10/11/18 2137    Pain Scale 0 - 10  0 by Charlene Urbina R.N.                  Problem: Safety     Goal: Will remain free from injury           Goal: Will remain free from falls             Problem: Skin Integrity     Goal: Risk for impaired skin integrity will decrease             Problem: Urinary Elimination:     Goal: Ability to reestablish a normal urinary elimination pattern will improve             Problem: Venous Thromboembolism (VTW)/Deep Vein Thrombosis (DVT) Prevention:     Goal: Patient will participate in Venous Thrombosis (VTE)/Deep Vein Thrombosis (DVT)Prevention Measures                  Long Term Goals:   At discharge patient will be able to function safely at home and in the community with support.    Section completed by:  Viviana Chaudhary R.N.              Mobility  Bed mobility:   SPV  Bed /Chair/Wheelchair Transfer Initial:  4 - Minimal Assistance  Bed /Chair/Wheelchair Transfer Current:  5 - Standby Prompting/Supervision or Set-up   Bed/Chair/Wheelchair Transfer Description:   (SPT 4WW SPV, vc for  "brakes and set up)  Walk Initial:  1 - Total Assistance  Walk Current:  5 - Standby Prompting/Supervision or Set-up   Walk Description:  Walker (4WW 150-400 ft x 4 indoors and outdoors SPV, for safety and pathfinding vc )  Wheelchair Initial:  1 - Total Assistance  Wheelchair Current:  5E - Household Exception   Wheelchair Description:   (MOD INDEP with BUE/LE propulsion x100 ft, slow pace. )  Stairs Initial:  0 - Not tested, patient refused  Stairs Current: 2 - Max Assistance   Stairs Description:  (CGA with cues, 4 - 6\" steps x3 reps with seated rest breaks inbetween. 1st rep with JUANY HRs, then with unilateral HR and step to pattern. )  Patient/Family Training/Education:  Continued education on safety with mobility   DME/DC Recommendations:  4WW  Strengths:  Able to follow instructions, Effective communication skills, Independent PLOF, Making steady progress towards goals, Motivated for self care and independence, Pleasant and cooperative, Supportive family and Willingly participates in therapeutic activities  Barriers:   Decreased endurance, Generalized weakness, Poor balance and Poor carryover of learning     # of short term goals set= 2  # of short term goals met=2         Physical Therapy Problems           Problem: PT-Long Term Goals     Dates: Start: 10/10/18       Goal: LTG-By discharge, patient will ambulate     Dates: Start: 10/10/18       Description: 1) Individualized goal:  Patient will amb >150 ft over various surfaces with FWW SPV-mod I  2) Interventions:  PT Group Therapy, PT E Stim Attended, PT Gait Training, PT Self Care/Home Eval, PT Therapeutic Exercises, PT Neuro Re-Ed/Balance, PT Aquatic Therapy, PT Therapeutic Activity, PT Manual Therapy and PT Evaluation.               Goal: LTG-By discharge, patient will transfer one surface to another     Dates: Start: 10/10/18       Description: 1) Individualized goal:  Patient will transfer SPV-mod I with FWW   2) Interventions:  PT Group Therapy, PT E " Stim Attended, PT Gait Training, PT Self Care/Home Eval, PT Therapeutic Exercises, PT Neuro Re-Ed/Balance, PT Aquatic Therapy, PT Therapeutic Activity, PT Manual Therapy and PT Evaluation.                       Section completed by:  Annamarie Leyva DPT       Activities of Daily Living  Eating Initial:  5 - Standby Prompting/Supervision or Set-up  Eating Current:  6 - Modified Independent   Eating Description:  Increased time  Grooming Initial:  5 - Standby Prompting/Supervision or Set-up  Grooming Current:  6 - Modified Independent   Grooming Description:  Supervision for safety  Bathing Initial:  4 - Minimal Assistance  Bathing Current:  6 - Modified Independent   Bathing Description:  Grab bar, Tub bench, Hand held shower, Increased time (Pt no LOB, use of GB and adaptive techniques, pt demsotnrated safety, will need tore-evaluate for cont carry over of skills)  Upper Body Dressing Initial:  4 - Minimal Assistance  Upper Body Dressing Current:  7 - Independent   Upper Body Dressing Description:   (Incidental assistance with donning shirt)  Lower Body Dressing Initial:  3 - Moderate Assistance  Lower Body Dressing Current:  6 - Modified Independent   Lower Body Dressing Description:  6 - Modified Independent  Toileting Initial:  3 - Moderate Assistance  Toileting Current:  5 - Standby Prompting/Supervision or Set-up   Toileting Description:  Grab bar, Supervision for safety  Toilet Transfer Initial:  4 - Minimal Assistance  Toilet Transfer Current:  5 - Standby Prompting/Supervision or Set-up   Toilet Transfer Description:  5 - Standby Prompting/Supervision or Set-up  Tub / Shower Transfer Initial:  4 - Minimal Assistance  Tub / Shower Transfer Current:  5 - Standby Prompting/Supervision or Set-up   Tub / Shower Transfer Description:  Grab bar, Supervision for safety, Verbal cueing  IADL:  Pt SPV for kitchen mobility and knife skills, scheduled simple meal prep on 10/19   Family Training/Education:  Role of OT,  POC, FT for transfers, collaboration for DME for home, provided requested information on adult day program near [place] ([facility])  DME/DC Recommendations:   non-skid adhesive stickers on their built in shower chair, on the floor, and GB placed in front of toilet for supportive transfers, and hand held shower.     Strengths:  Able to follow instructions, Effective communication skills, Good balance, Good carryover of learning, Good insight into deficits/needs, Independent PLOF, Making steady progress towards goals, Manages pain appropriately, Motivated for self care and independence, Pleasant and cooperative, Supportive family and Willingly participates in therapeutic activities  Barriers:  Confused, Decreased endurance, Generalized weakness, Poor activity tolerance, Poor sleep pattern and Other: Limited by ongoing bowel issues     # of short term goals set= 3    # of short term goals met= 3          Occupational Therapy Goals           Problem: Dressing     Dates: Start: 10/10/18       Goal: STG-Within one week, patient will dress LB     Dates: Start: 10/18/18       Description: 1) Individualized Goal: with Mod I and use of DME/AE prn   2) Interventions: OT Group Therapy, OT Self Care/ADL, OT Cognitive Skill Dev, OT Community Reintegration, OT Manual Ther Technique, OT Neuro Re-Ed/Balance, OT Therapeutic Activity, OT Evaluation and OT Therapeutic Exercise          Note:     Goal Note filed on 10/11/18 1152 by Rosanna Carter, OT    Goal: STG-Within one week, patient will dress LB  Outcome: PROGRESSING AS EXPECTED  Pt has not demonstrated competence with skill, goal not met but in   progress                  Problem: IADL's     Dates: Start: 10/18/18       Goal: STG-Within one week, patient will prepare a meal     Dates: Start: 10/18/18       Description: 1) Individualized Goal: with Mod I and use of DME/AE prn   2) Interventions: OT Group Therapy, OT Self Care/ADL, OT Cognitive Skill  Dev, OT Community Reintegration, OT Manual Ther Technique, OT Neuro Re-Ed/Balance, OT Therapeutic Activity, OT Evaluation and OT Therapeutic Exercise               Goal: STG-Within one week, patient will access kitchen area     Dates: Start: 10/18/18       Description: 1) Individualized Goal: with Mod I and use of DME/AE prn   2) Interventions: OT Group Therapy, OT Self Care/ADL, OT Cognitive Skill Dev, OT Community Reintegration, OT Manual Ther Technique, OT Neuro Re-Ed/Balance, OT Therapeutic Activity, OT Evaluation and OT Therapeutic Exercise                 Problem: OT Long Term Goals     Dates: Start: 10/10/18       Goal: LTG-By discharge, patient will complete basic self care tasks     Dates: Start: 10/10/18       Description: 1) Individualized Goal:  With  Modified independence and use of AE/DME prn   2) Interventions:  OT Group Therapy, OT Self Care/ADL, OT Cognitive Skill Dev, OT Community Reintegration, OT Manual Ther Technique, OT Neuro Re-Ed/Balance, OT Therapeutic Activity, OT Evaluation and OT Therapeutic Exercise             Goal: LTG-By discharge, patient will perform bathroom transfers     Dates: Start: 10/10/18       Description: 1) Individualized Goal: with modified independence and use of DME/AE prn   2) Interventions: OT Group Therapy, OT Self Care/ADL, OT Cognitive Skill Dev, OT Community Reintegration, OT Manual Ther Technique, OT Neuro Re-Ed/Balance, OT Therapeutic Activity, OT Evaluation and OT Therapeutic Exercise             Goal: LTG-By discharge, patient will complete basic home management     Dates: Start: 10/10/18       Description: 1) Individualized Goal: with modified independence and use of AE/DME prn   2) Interventions:  OT Group Therapy, OT Self Care/ADL, OT Cognitive Skill Dev, OT Community Reintegration, OT Manual Ther Technique, OT Neuro Re-Ed/Balance, OT Therapeutic Activity, OT Evaluation and OT Therapeutic Exercise               Problem: Toileting     Dates: Start: 10/10/18        Goal: STG-Within one week, patient will complete toileting tasks     Dates: Start: 10/18/18       Description: 1) Individualized Goal: with Mod I and use of DME/AE prn   2) Interventions: OT Group Therapy, OT Self Care/ADL, OT Cognitive Skill Dev, OT Community Reintegration, OT Manual Ther Technique, OT Neuro Re-Ed/Balance, OT Therapeutic Activity, OT Evaluation and OT Therapeutic Exercise                   Section completed by:  Rosanna Carter, OT       Cognitive Linquistic Functions  Comprehension Initial:  5 - Stand-by Prompting/Supervision or Set-up  Comprehension Current:  5 - Stand-by Prompting/Supervision or Set-up   Comprehension Description:  Verbal cues  Expression Initial:  6 - Modified Independent  Expression Current:  6 - Modified Independent   Expression Description:     Social Interaction Initial:  6 - Modified Independent  Social Interaction Current:  6 - Modified Independent   Social Interaction Description:  Increased time  Problem Solving Initial:  2 - Max Assistance  Problem Solving Current:  4 - Minimal Assistance   Problem Solving Description:  Increased time, Verbal cueing  Memory Initial:  2 - Max Assistance  Memory Current:  3 - Moderate Assistance   Memory Description:  Verbal cueing, Therapy schedule  Executive Functioning / Organization Initial:  Severe (2)  Executive Functioning / Organization Current:  Moderate (3)   Executive Functioning Desciption:  Attention, planning and reasoning skills     Swallowing  Swallowing Status: SLP no longer following for dysphagia management   Orders Placed This Encounter   Procedures   • Diet Order Regular (meds whole one at a time with liquid wash)     Standing Status:   Standing     Number of Occurrences:   1     Order Specific Question:   Diet:     Answer:   Regular [1]     Comments:   meds whole one at a time with liquid wash     Order Specific Question:   Consistency/Fluid modifications:     Answer:   Thin Liquids [3]     Behavior  Modification Plan  Allow for rest time, Keep instructions simple/concrete, Give clear feedback, Be calm, Redirect to task/topic, Provide reasonable choices, Decrease the chance of failure by offering activities that are within the patient's abilities, Analyze tasks (break down into smaller steps) and Reinforce participation in desired tasks  Assistive Technology  Low tech: Calendar, planner, schedule, alarms/timers, pill organizer, post-it notes, lists  Family Training/Education:  Ongoing with pt and family   DC Recommendations:  TBD  Strengths:  Able to follow instructions, Effective communication skills, Independent PLOF, Pleasant and cooperative, Supportive family and Willingly participates in therapeutic activities  Barriers:  Generalized weakness, Poor activity tolerance, Poor insight/denial of deficits and Poor sleep pattern moderate cognitive deficits   # of short term goals set=4  # of short term goals met=2       Speech Therapy Problems           Problem: Memory STGs     Dates: Start: 10/10/18       Goal: STG-Within one week, patient will     Dates: Start: 10/10/18       Description: 1) Individualized goal:  Achieve a score of 25 or greater across 3 consecutive sessions on the orientation log.   2) Interventions:  SLP Self Care / ADL Training  and SLP Cognitive Skill Development               Problem: Problem Solving STGs     Dates: Start: 10/10/18       Goal: STG-Within one week, patient will     Dates: Start: 10/10/18       Description: 1) Individualized goal:  Participate in divided attention tasks with 80% accuracy provided MOD A.   2) Interventions:  SLP Self Care / ADL Training , SLP Cognitive Skill Development and SLP Group Treatment               Problem: Speech/Swallowing LTGs     Dates: Start: 10/10/18       Goal: LTG-By discharge, patient will safely swallow     Dates: Start: 10/10/18       Description: 1) Individualized goal:  Regular textures and thin liquids with no overt s/sx of asp/pen noted  across meals with 100% accuracy provided MOD I.   2) Interventions:  SLP Swallowing Dysfunction Treatment, SLP Video Swallow Evaluation, SLP Self Care / ADL Training , SLP Cognitive Skill Development and SLP Group Treatment             Goal: LTG-By discharge, patient will     Dates: Start: 10/10/18       Description: 1) Individualized goal:  Complete functional problem solving and recall information with 80% accuracy provided MOD I.   2) Interventions:  SLP Self Care / ADL Training , SLP Cognitive Skill Development and SLP Group Treatment                    Section completed by:  Sherly Nava MS,[facility]-SLP          Nutrition       Dietary Problems           Problem: Other Problem (see comments)     Description: Diagnosis: Swallowing difficulty related to dysphagia as evidenced by need for texture modified diet with thickened liquids and SLP following.       Goal: Other Goal (Resolved)     Description: Monitor/Evaluation: Monitor PO intake, weight, labs, medication adjustments, skin integrity, GI function, vitals, I/Os, and overall hydration status.  Adjust nutritional POC pending clinical outcomes.   RD following weekly.    Goal: Maintain >/= 50% adequate oral nutrient/fluid intake to promote nutrition optimization/healing.                Pt and family seen in room today. Pt reports that she has been liking the food OK and the kitchen has been working to find substitutions that she will like. She states that she eats 'what pleases her'. Discussed adding PM snack as she gets a little hungry between lunch and dinner at times. PO intake has been mostly % of recent meals and with good consistency. Discussed dinner options and preferences relayed to kitchen. Wt on admit 10/9 = 70.7kg by bed scale --> 10/14 = 69.5kg (measurement method unknown) which reflects 1.7% wt loss x5 days. No new wt to assess at this time. Labs today WNL. RD signing off and will re-screen pt weekly per department policy.    Section  completed by:  Margy Alves R.D.    REHAB-Pharmacy IDT Team Note by Danilo Yeh RPH at 10/17/2018  5:17 PM  Version 1 of 1    Author:  Danilo Yeh RPH Service:  (none) Author Type:  Pharmacist    Filed:  10/17/2018  5:18 PM Date of Service:  10/17/2018  5:17 PM Status:  Signed    :  Danilo Yeh RPH (Pharmacist)         Pharmacy   Pharmacy  Antibiotics/Antifungals/Antivirals:  Medication:      Active Orders     None        Route:         None  Stop Date:  N/A  Reason:   Antihypertensives/Cardiac:  Medication:    Orders (72h ago through future)    Start     Ordered    10/09/18 2200  metoprolol (LOPRESSOR) tablet 50 mg  EVERY 8 HOURS      10/09/18 1638    10/09/18 1800  digoxin (LANOXIN) tablet 125 mcg  DAILY      10/09/18 1638    10/09/18 1638  hydrALAZINE (APRESOLINE) tablet 25 mg  EVERY 8 HOURS PRN      10/09/18 1638        Patient Vitals for the past 24 hrs:   BP Pulse   10/17/18 1400 102/56 84   10/17/18 0700 127/80 64   10/17/18 0543 135/70 65   10/16/18 1837 115/73 73       Anticoagulation:  Medication:  warfarin  INR:      Other key medications:      A review of the medication list reveals no issues at this time. Patient is currently on antihypertensive(s). Recommend home blood pressure monitoring/recording if antihypertensive(s) regimen(s) continue.    Section completed by:  Danilo Yeh RPH[WR.1]        Attribution Ivy     WR.1 - Danilo Yeh RPH on 10/17/2018  5:17 PM                    DC Planning    DC destination/dispostion:  Home w/family support. Daughter Nan will be staying w/ her. Patient does not drive.    Referrals: not at this time    DC Needs: Daughter has made arrangements for DME. F/u MD appts in process.    Barriers to discharge: Strength & endurance, cognitive deficits.      Strengths: Supportive, actively involved family. PLOF & independence. Motivation.    Section completed by:  Ivania Napoles R.N.      Physician Summary  Janae Armando  MD Jordan participated and led team conference discussion.

## 2018-10-18 NOTE — REHAB-OT IDT TEAM NOTE
Occupational Therapy   Activities of Daily Living  Eating Initial:  5 - Standby Prompting/Supervision or Set-up  Eating Current:  6 - Modified Independent   Eating Description:  Increased time  Grooming Initial:  5 - Standby Prompting/Supervision or Set-up  Grooming Current:  6 - Modified Independent   Grooming Description:  Supervision for safety  Bathing Initial:  4 - Minimal Assistance  Bathing Current:  6 - Modified Independent   Bathing Description:  Grab bar, Tub bench, Hand held shower, Increased time (Pt no LOB, use of GB and adaptive techniques, pt demsotnrated safety, will need tore-evaluate for cont carry over of skills)  Upper Body Dressing Initial:  4 - Minimal Assistance  Upper Body Dressing Current:  7 - Independent   Upper Body Dressing Description:   (Incidental assistance with donning shirt)  Lower Body Dressing Initial:  3 - Moderate Assistance  Lower Body Dressing Current:  6 - Modified Independent   Lower Body Dressing Description:  6 - Modified Independent  Toileting Initial:  3 - Moderate Assistance  Toileting Current:  5 - Standby Prompting/Supervision or Set-up   Toileting Description:  Serinab bar, Supervision for safety  Toilet Transfer Initial:  4 - Minimal Assistance  Toilet Transfer Current:  5 - Standby Prompting/Supervision or Set-up   Toilet Transfer Description:  5 - Standby Prompting/Supervision or Set-up  Tub / Shower Transfer Initial:  4 - Minimal Assistance  Tub / Shower Transfer Current:  5 - Standby Prompting/Supervision or Set-up   Tub / Shower Transfer Description:  Grab bar, Supervision for safety, Verbal cueing  IADL:  Pt SPV for kitchen mobility and knife skills, scheduled simple meal prep on 10/19   Family Training/Education:  Role of OT, POC, FT for transfers, collaboration for DME for home, provided requested information [place] adult [place] program [place] ([facility])  DME/DC Recommendations:   non-skid adhesive stickers on their built in  shower chair, on the floor, and GB placed in front of toilet for supportive transfers, and hand held shower.     Strengths:  Able to follow instructions, Effective communication skills, Good balance, Good carryover of learning, Good insight into deficits/needs, Independent PLOF, Making steady progress towards goals, Manages pain appropriately, Motivated for self care and independence, Pleasant and cooperative, Supportive family and Willingly participates in therapeutic activities  Barriers:  Confused, Decreased endurance, Generalized weakness, Poor activity tolerance, Poor sleep pattern and Other: Limited by ongoing bowel issues     # of short term goals set= 3    # of short term goals met= 3          Occupational Therapy Goals           Problem: Dressing     Dates: Start: 10/10/18       Goal: STG-Within one week, patient will dress LB     Dates: Start: 10/18/18       Description: 1) Individualized Goal: with Mod I and use of DME/AE prn   2) Interventions: OT Group Therapy, OT Self Care/ADL, OT Cognitive Skill Dev, OT Community Reintegration, OT Manual Ther Technique, OT Neuro Re-Ed/Balance, OT Therapeutic Activity, OT Evaluation and OT Therapeutic Exercise          Note:     Goal Note filed on 10/11/18 1152 by Rosanna Carter, OT    Goal: STG-Within one week, patient will dress LB  Outcome: PROGRESSING AS EXPECTED  Pt has not demonstrated competence with skill, goal not met but in   progress                  Problem: IADL's     Dates: Start: 10/18/18       Goal: STG-Within one week, patient will prepare a meal     Dates: Start: 10/18/18       Description: 1) Individualized Goal: with Mod I and use of DME/AE prn   2) Interventions: OT Group Therapy, OT Self Care/ADL, OT Cognitive Skill Dev, OT Community Reintegration, OT Manual Ther Technique, OT Neuro Re-Ed/Balance, OT Therapeutic Activity, OT Evaluation and OT Therapeutic Exercise               Goal: STG-Within one week, patient will access kitchen area      Dates: Start: 10/18/18       Description: 1) Individualized Goal: with Mod I and use of DME/AE prn   2) Interventions: OT Group Therapy, OT Self Care/ADL, OT Cognitive Skill Dev, OT Community Reintegration, OT Manual Ther Technique, OT Neuro Re-Ed/Balance, OT Therapeutic Activity, OT Evaluation and OT Therapeutic Exercise                 Problem: OT Long Term Goals     Dates: Start: 10/10/18       Goal: LTG-By discharge, patient will complete basic self care tasks     Dates: Start: 10/10/18       Description: 1) Individualized Goal:  With  Modified independence and use of AE/DME prn   2) Interventions:  OT Group Therapy, OT Self Care/ADL, OT Cognitive Skill Dev, OT Community Reintegration, OT Manual Ther Technique, OT Neuro Re-Ed/Balance, OT Therapeutic Activity, OT Evaluation and OT Therapeutic Exercise             Goal: LTG-By discharge, patient will perform bathroom transfers     Dates: Start: 10/10/18       Description: 1) Individualized Goal: with modified independence and use of DME/AE prn   2) Interventions: OT Group Therapy, OT Self Care/ADL, OT Cognitive Skill Dev, OT Community Reintegration, OT Manual Ther Technique, OT Neuro Re-Ed/Balance, OT Therapeutic Activity, OT Evaluation and OT Therapeutic Exercise             Goal: LTG-By discharge, patient will complete basic home management     Dates: Start: 10/10/18       Description: 1) Individualized Goal: with modified independence and use of AE/DME prn   2) Interventions:  OT Group Therapy, OT Self Care/ADL, OT Cognitive Skill Dev, OT Community Reintegration, OT Manual Ther Technique, OT Neuro Re-Ed/Balance, OT Therapeutic Activity, OT Evaluation and OT Therapeutic Exercise               Problem: Toileting     Dates: Start: 10/10/18       Goal: STG-Within one week, patient will complete toileting tasks     Dates: Start: 10/18/18       Description: 1) Individualized Goal: with Mod I and use of DME/AE prn   2) Interventions: OT Group Therapy, OT Self  Care/ADL, OT Cognitive Skill Dev, OT Community Reintegration, OT Manual Ther Technique, OT Neuro Re-Ed/Balance, OT Therapeutic Activity, OT Evaluation and OT Therapeutic Exercise                   Section completed by:  Rosanna Carter, OT

## 2018-10-18 NOTE — CARE PLAN
Problem: Other Problem (see comments)  Goal: Other Goal  Monitor/Evaluation: Monitor PO intake, weight, labs, medication adjustments, skin integrity, GI function, vitals, I/Os, and overall hydration status.  Adjust nutritional POC pending clinical outcomes.   RD following weekly.    Goal: Maintain >/= 50% adequate oral nutrient/fluid intake to promote nutrition optimization/healing.      Outcome: MET Date Met: 10/17/18  Pt and family seen in room today. Pt reports that she has been liking the food OK and the kitchen has been working to find substitutions that she will like. She states that she eats 'what pleases her'. Discussed adding PM snack as she gets a little hungry between lunch and dinner at times. PO intake has been mostly % of recent meals and with good consistency. Discussed dinner options and preferences relayed to kitchen. Wt on admit 10/9 = 70.7kg by bed scale --> 10/14 = 69.5kg (measurement method unknown) which reflects 1.7% wt loss x5 days. No new wt to assess at this time. Labs today WNL. RD signing off and will re-screen pt weekly per department policy.

## 2018-10-18 NOTE — CARE PLAN
Problem: Problem Solving STGs  Goal: STG-Within one week, patient will  1) Individualized goal:  Participate in divided attention tasks with 80% accuracy provided MOD A.   2) Interventions:  SLP Self Care / ADL Training , SLP Cognitive Skill Development and SLP Group Treatment     Outcome: NOT MET      Problem: Memory STGs  Goal: STG-Within one week, patient will  1) Individualized goal:  Achieve a score of 25 or greater across 3 consecutive sessions on the orientation log.   2) Interventions:  SLP Self Care / ADL Training  and SLP Cognitive Skill Development     Outcome: NOT MET

## 2018-10-18 NOTE — REHAB-NURSING IDT TEAM NOTE
Nursing   Nursing  Diet/Nutrition:  Regular  Medication Administration:  Whole with Liquid Wash  % consumed at meals in last 24 hours:  Consumed 25%-75% of meals per documentation.  Meal/Snack Consumption for the past 24 hrs:   Oral Nutrition   10/17/18 1330 Lunch;Between 50-75% Consumed   10/17/18 1018 Breakfast;Between 25-50% Consumed     Snack schedule:  None  Appetite:  Good  Fluid Intake/Output in past 24 hours: In: 720 [P.O.:720]  Out: -   Admit Weight:  Weight: 70.8 kg (156 lb)  Weight Last 7 Days   [69.5 kg (153 lb 3.5 oz)] 69.5 kg (153 lb 3.5 oz) (10/14 0500)    Pain Issues:    Location:  --  --         Severity:  Denies   Scheduled pain medications:  None     PRN pain medications used in last 24 hours:  None   Non Pharmacologic Interventions:  emotional support, repositioned and rest  Effectiveness of pain management plan:  good=patient states acceptable comfort after interventions    Bowel:    Bowel Assist Initial Score:  3 - Moderate Assistance  Bowel Assist Current Score:  4 - Minimal Assistance  Bowl Accidents in last 7 days:     Last bowel movement: 10/17/18 (per pt)  Stool Description: Medium, Soft     Usual bowel pattern:  every other day  Scheduled bowel medications:  None  PRN bowel medications used in last 24 hours:  None  Nursing Interventions:  Increased time, Scheduled medication, Supervision, Verbal cueing, Positioning on commode/toilet  Effectiveness of bowel program:   fair=sometimes needs prn bowel meds for constipation  Bladder:    Bladder Assist Initial Score:  3 - Moderate Assistance  Bladder Assist Current Score:  4 - Minimal Assistance  Bladder Accidents in last 7 days:     PVR range for past 24-48 hours: -- ()  Medications affecting bladder:  None    Interventions:  Increased time, Supervision, Verbal cueing  Effectiveness of bladder training:  Fair=occasional unpredictable, incontinent emptying of bladder (< 1 time/day)      Wound:   Patient Lines/Drains/Airways Status    Active  Current Wounds     Name: Placement date: Placement time: Site: Days:    Wound 10/09/18 Laceration Head scab 10/09/18   1756      8                   Interventions:  Monitor and assess  Effectiveness of intervention:  wound is unchanged     Sleep/wake cycle:   Average hours slept:  Sleeps 4-6 hours without waking  Sleep medication usage:  Other Melatonin 3 mg    Patient/Family Training/Education:  Bladder Management/Training, Bowel Management/Training, Diet/Nutrition, Fall Prevention, General Self Care, Medication Management, Pain Management, Respiratory Hygiene, Safety and Wound Care    Strengths: Willingly participates in therapeutic activities, Able to follow instructions, Supportive family and Manages pain appropriately   Barriers:   Fatigue and Generalized weakness            Nursing Problems           Problem: Bowel/Gastric:     Goal: Normal bowel function is maintained or improved           Goal: Will not experience complications related to bowel motility             Problem: Communication     Goal: The ability to communicate needs accurately and effectively will improve             Problem: Discharge Barriers/Planning     Goal: Patient's continuum of care needs will be met             Problem: Infection     Goal: Will remain free from infection             Problem: Knowledge Deficit     Goal: Knowledge of disease process/condition, treatment plan, diagnostic tests, and medications will improve           Goal: Knowledge of the prescribed therapeutic regimen will improve             Problem: Mobility     Goal: Risk for activity intolerance will decrease             Problem: Pain Management     Goal: Pain level will decrease to patient's comfort goal     Flowsheet:     Taken at 10/12/18 2248    Non Verbal Scale  Sleeping;Calm by Charlene Urbina R.N.    Taken at 10/12/18 2200    Pain Scale 0 - 10  9 by Charlene Urbina R.N. Comment:  Headache    Taken at 10/11/18 2137    Pain Scale 0 - 10  0 by Charlene Urbina  VIRI                  Problem: Safety     Goal: Will remain free from injury           Goal: Will remain free from falls             Problem: Skin Integrity     Goal: Risk for impaired skin integrity will decrease             Problem: Urinary Elimination:     Goal: Ability to reestablish a normal urinary elimination pattern will improve             Problem: Venous Thromboembolism (VTW)/Deep Vein Thrombosis (DVT) Prevention:     Goal: Patient will participate in Venous Thrombosis (VTE)/Deep Vein Thrombosis (DVT)Prevention Measures                  Long Term Goals:   At discharge patient will be able to function safely at home and in the community with support.    Section completed by:  Viviana Chaudhary R.N.

## 2018-10-18 NOTE — REHAB-PHARMACY IDT TEAM NOTE
Pharmacy   Pharmacy  Antibiotics/Antifungals/Antivirals:  Medication:      Active Orders     None        Route:         None  Stop Date:  N/A  Reason:   Antihypertensives/Cardiac:  Medication:    Orders (72h ago through future)    Start     Ordered    10/09/18 2200  metoprolol (LOPRESSOR) tablet 50 mg  EVERY 8 HOURS      10/09/18 1638    10/09/18 1800  digoxin (LANOXIN) tablet 125 mcg  DAILY      10/09/18 1638    10/09/18 1638  hydrALAZINE (APRESOLINE) tablet 25 mg  EVERY 8 HOURS PRN      10/09/18 1638        Patient Vitals for the past 24 hrs:   BP Pulse   10/17/18 1400 102/56 84   10/17/18 0700 127/80 64   10/17/18 0543 135/70 65   10/16/18 1837 115/73 73       Anticoagulation:  Medication:  warfarin  INR:      Other key medications:      A review of the medication list reveals no issues at this time. Patient is currently on antihypertensive(s). Recommend home blood pressure monitoring/recording if antihypertensive(s) regimen(s) continue.    Section completed by:  Danilo Yeh [facility]

## 2018-10-18 NOTE — REHAB-DIETARY IDT TEAM NOTE
Dietary   Nutrition       Dietary Problems           Problem: Other Problem (see comments)     Description: Diagnosis: Swallowing difficulty related to dysphagia as evidenced by need for texture modified diet with thickened liquids and SLP following.       Goal: Other Goal (Resolved)     Description: Monitor/Evaluation: Monitor PO intake, weight, labs, medication adjustments, skin integrity, GI function, vitals, I/Os, and overall hydration status.  Adjust nutritional POC pending clinical outcomes.   RD following weekly.    Goal: Maintain >/= 50% adequate oral nutrient/fluid intake to promote nutrition optimization/healing.                Pt and family seen in room today. Pt reports that she has been liking the food OK and the kitchen has been working to find substitutions that she will like. She states that she eats 'what pleases her'. Discussed adding PM snack as she gets a little hungry between lunch and dinner at times. PO intake has been mostly % of recent meals and with good consistency. Discussed dinner options and preferences relayed to kitchen. Wt on admit 10/9 = 70.7kg by bed scale --> 10/14 = 69.5kg (measurement method unknown) which reflects 1.7% wt loss x5 days. No new wt to assess at this time. Labs today WNL. RD signing off and will re-screen pt weekly per department policy.    Section completed by:  Margy Alves R.D.

## 2018-10-18 NOTE — REHAB-CM IDT TEAM NOTE
Case Management    DC Planning    DC destination/dispostion:  Home w/family support. Daughter Nan will be staying w/ her. Patient does not drive.    Referrals: not at this time    DC Needs: Daughter has made arrangements for DME. F/u MD appts in process.    Barriers to discharge: Strength & endurance, cognitive deficits.      Strengths: Supportive, actively involved family. PLOF & independence. Motivation.    Section completed by:  Ivania Napoles R.N.

## 2018-10-18 NOTE — CARE PLAN
Problem: Safety  Goal: Will remain free from injury  Pt remains free from accidental fall or injury, daughter is cleared by therapist to transfer pt. Encouraged to use call light when in need of assistance.    Problem: Bowel/Gastric:  Goal: Normal bowel function is maintained or improved  Patient having regular bowel movements; last BM 10/16.  Denies s/s constipation; bowel meds available if needed.  Will continue to monitor.

## 2018-10-18 NOTE — CARE PLAN
Problem: Safety  Goal: Will remain free from injury  Pt remains free from accidental injury.Appropriately uses call light to make needs known.Bed in low position, non skid socks/shoes on when out of bed.Call light within reach.Will continue to monitor and assess needs and safety.    Problem: Venous Thromboembolism (VTW)/Deep Vein Thrombosis (DVT) Prevention:  Goal: Patient will participate in Venous Thrombosis (VTE)/Deep Vein Thrombosis (DVT)Prevention Measures  Pt refused scheduled heparin at hs, coumadin 5 mg given by day nurse.Will continue to monitor and assess.    Problem: Pain Management  Goal: Pain level will decrease to patient's comfort goal  Pt is calm, comfortable and stable.Scheduled melatonin given.Repositioned with pillows for comfort.Will continue to monitor and assess pain level and medicate as needed.

## 2018-10-19 LAB
INR PPP: 1.25 (ref 0.87–1.13)
PROTHROMBIN TIME: 15.8 SEC (ref 12–14.6)

## 2018-10-19 PROCEDURE — 36415 COLL VENOUS BLD VENIPUNCTURE: CPT

## 2018-10-19 PROCEDURE — 99232 SBSQ HOSP IP/OBS MODERATE 35: CPT | Performed by: PHYSICAL MEDICINE & REHABILITATION

## 2018-10-19 PROCEDURE — G0515 COGNITIVE SKILLS DEVELOPMENT: HCPCS

## 2018-10-19 PROCEDURE — A9270 NON-COVERED ITEM OR SERVICE: HCPCS | Performed by: PHYSICAL MEDICINE & REHABILITATION

## 2018-10-19 PROCEDURE — 770010 HCHG ROOM/CARE - REHAB SEMI PRIVAT*

## 2018-10-19 PROCEDURE — 85610 PROTHROMBIN TIME: CPT

## 2018-10-19 PROCEDURE — 97116 GAIT TRAINING THERAPY: CPT

## 2018-10-19 PROCEDURE — 97530 THERAPEUTIC ACTIVITIES: CPT

## 2018-10-19 PROCEDURE — 700102 HCHG RX REV CODE 250 W/ 637 OVERRIDE(OP): Performed by: PHYSICAL MEDICINE & REHABILITATION

## 2018-10-19 RX ORDER — WARFARIN SODIUM 5 MG/1
5 TABLET ORAL
Status: COMPLETED | OUTPATIENT
Start: 2018-10-19 | End: 2018-10-19

## 2018-10-19 RX ORDER — LANOLIN ALCOHOL/MO/W.PET/CERES
3 CREAM (GRAM) TOPICAL
Status: DISCONTINUED | OUTPATIENT
Start: 2018-10-19 | End: 2018-10-25 | Stop reason: HOSPADM

## 2018-10-19 RX ORDER — TRAZODONE HYDROCHLORIDE 50 MG/1
50 TABLET ORAL
Status: DISCONTINUED | OUTPATIENT
Start: 2018-10-19 | End: 2018-10-25 | Stop reason: HOSPADM

## 2018-10-19 RX ADMIN — CIPROFLOXACIN HYDROCHLORIDE 500 MG: 500 TABLET, FILM COATED ORAL at 09:57

## 2018-10-19 RX ADMIN — METOPROLOL TARTRATE 50 MG: 25 TABLET ORAL at 23:10

## 2018-10-19 RX ADMIN — METOPROLOL TARTRATE 50 MG: 25 TABLET ORAL at 05:47

## 2018-10-19 RX ADMIN — WARFARIN SODIUM 5 MG: 5 TABLET ORAL at 17:49

## 2018-10-19 RX ADMIN — DIGOXIN 125 MCG: 125 TABLET ORAL at 17:49

## 2018-10-19 RX ADMIN — VITAMIN D, TAB 1000IU (100/BT) 1000 UNITS: 25 TAB at 09:57

## 2018-10-19 RX ADMIN — CIPROFLOXACIN HYDROCHLORIDE 500 MG: 500 TABLET, FILM COATED ORAL at 20:27

## 2018-10-19 RX ADMIN — METOPROLOL TARTRATE 50 MG: 25 TABLET ORAL at 14:51

## 2018-10-19 RX ADMIN — TRAZODONE HYDROCHLORIDE 50 MG: 50 TABLET ORAL at 20:27

## 2018-10-19 ASSESSMENT — GAIT ASSESSMENTS
GAIT LEVEL OF ASSIST: SUPERVISED
ASSISTIVE DEVICE: 4 WHEEL WALKER
DEVIATION: BRADYKINETIC
DISTANCE (FEET): 150

## 2018-10-19 ASSESSMENT — PAIN SCALES - GENERAL
PAINLEVEL_OUTOF10: 0
PAINLEVEL_OUTOF10: 0

## 2018-10-19 NOTE — PROGRESS NOTES
"Rehab Progress Note     Encounter Date: 10/19/2018    CC: Decreased function after ICH, SDH; dysphagia    Interval Events (Subjective)  Patient seen in therapy gym. She reports she is doing well. Discussed with patient and daughter about UTI and urine culture prelim with lactose ferm gram - rods. Patient has questions about if ciprofloxacin will cause upset stomach. Patient denies upset stomach on day two of antibiotics. Discussed OK for probiotic yogurt with daughter.  Discussed ongoing titration of coumadin.     IDT Team Meeting 10/18/2018  DC/Disposition:  10/25/18, [facility] program for respite    Objective:  VITAL SIGNS: /67   Pulse 60   Temp 36.4 °C (97.6 °F)   Resp 18   Ht 1.651 m (5' 5\")   Wt 69.5 kg (153 lb 3.5 oz)   SpO2 94%   BMI 25.50 kg/m²   Gen: NAD  Psych: Mood and affect appropriate  CV: RRR, no edema  Resp: CTAB, no upper airway sounds  Abd: NTND  Neuro: AOx4, 5/5 BUE, able to stand with hand hold    Recent Results (from the past 72 hour(s))   BASIC METABOLIC PANEL    Collection Time: 10/17/18  5:58 AM   Result Value Ref Range    Sodium 142 135 - 145 mmol/L    Potassium 3.7 3.6 - 5.5 mmol/L    Chloride 105 96 - 112 mmol/L    Co2 29 20 - 33 mmol/L    Glucose 95 65 - 99 mg/dL    Bun 10 8 - 22 mg/dL    Creatinine 0.62 0.50 - 1.40 mg/dL    Calcium 9.0 8.5 - 10.5 mg/dL    Anion Gap 8.0 0.0 - 11.9   ESTIMATED GFR    Collection Time: 10/17/18  5:58 AM   Result Value Ref Range    GFR If African American >60 >60 mL/min/1.73 m 2    GFR If Non African American >60 >60 mL/min/1.73 m 2   URINALYSIS    Collection Time: 10/17/18 12:45 PM   Result Value Ref Range    Color Yellow     Character Cloudy (A)     Specific Gravity 1.016 <1.035    Ph 5.0 5.0 - 8.0    Glucose Negative Negative mg/dL    Ketones Negative Negative mg/dL    Protein Negative Negative mg/dL    Bilirubin Negative Negative    Urobilinogen, Urine 0.2 Negative    Nitrite Positive (A) Negative    Leukocyte Esterase Moderate (A) " Negative    Occult Blood Moderate (A) Negative    Micro Urine Req Microscopic    URINE MICROSCOPIC (W/UA)    Collection Time: 10/17/18 12:45 PM   Result Value Ref Range    WBC Packed (A) /hpf    RBC 20-50 (A) /hpf    Bacteria Many (A) None /hpf    Epithelial Cells Negative /hpf    Ca Oxalate Crystal Few /hpf   URINE CULTURE-EXISTING-LESS THAN 48 HOURS    Collection Time: 10/17/18 12:45 PM   Result Value Ref Range    Significant Indicator POS (POS)     Source UR     Site URINE, CLEAN CATCH     Urine Culture (A)     Urine Culture (A)      Lactose fermenting Gram negative kendrick  >100,000 cfu/mL     PROTHROMBIN TIME    Collection Time: 10/18/18  6:00 AM   Result Value Ref Range    PT 14.2 12.0 - 14.6 sec    INR 1.09 0.87 - 1.13   PROTHROMBIN TIME    Collection Time: 10/19/18  5:58 AM   Result Value Ref Range    PT 15.8 (H) 12.0 - 14.6 sec    INR 1.25 (H) 0.87 - 1.13       Current Facility-Administered Medications   Medication Frequency   • melatonin tablet 3 mg QHS PRN   • traZODone (DESYREL) tablet 50 mg QHS   • ciprofloxacin (CIPRO) tablet 500 mg Q12HRS   • MD Alert...Warfarin per Pharmacy pharmacy to dose   • senna-docusate (PERICOLACE or SENOKOT S) 8.6-50 MG per tablet 2 Tab BID PRN    And   • polyethylene glycol/lytes (MIRALAX) PACKET 1 Packet QDAY PRN    And   • magnesium hydroxide (MILK OF MAGNESIA) suspension 30 mL QDAY PRN    And   • bisacodyl (DULCOLAX) suppository 10 mg QDAY PRN   • acetaminophen/caffeine/butalbital 325-40-50 mg (FIORICET) -40 MG per tablet 1 Tab Q6HRS PRN   • vitamin D (cholecalciferol) tablet 1,000 Units DAILY   • Respiratory Care per Protocol Continuous RT   • Pharmacy Consult Request ...Pain Management Review 1 Each PRN   • tramadol (ULTRAM) 50 MG tablet 50 mg Q4HRS PRN   • acetaminophen (TYLENOL) tablet 650 mg Q4HRS PRN   • artificial tears 1.4 % ophthalmic solution 1 Drop PRN   • benzocaine-menthol (CEPACOL) lozenge 1 Lozenge Q2HRS PRN   • hydrALAZINE (APRESOLINE) tablet 25 mg Q8HRS  PRN   • mag hydrox-al hydrox-simeth (MAALOX PLUS ES or MYLANTA DS) suspension 20 mL Q2HRS PRN   • ondansetron (ZOFRAN ODT) dispertab 4 mg 4X/DAY PRN    Or   • ondansetron (ZOFRAN) syringe/vial injection 4 mg 4X/DAY PRN   • sodium chloride (OCEAN) 0.65 % nasal spray 2 Spray PRN   • digoxin (LANOXIN) tablet 125 mcg DAILY AT 1800   • metoprolol (LOPRESSOR) tablet 50 mg Q8HRS       Orders Placed This Encounter   Procedures   • Diet Order Regular (meds whole one at a time with liquid wash)     Standing Status:   Standing     Number of Occurrences:   1     Order Specific Question:   Diet:     Answer:   Regular [1]     Comments:   meds whole one at a time with liquid wash     Order Specific Question:   Consistency/Fluid modifications:     Answer:   Thin Liquids [3]       Assessment:  Active Hospital Problems    Diagnosis   • *Acute intracranial hemorrhage (HCC)   • Hyponatremia   • Encephalopathy acute   • 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 Santizo on 9/22/18.  Patient at high risk for bleed so hold AC for another week.   -PT and OT for mobility and ADLs  -SLP for cognition   -Fioricet for headache - improved     Dysphagia - Patient on NTFL diet on transfer.   -SLP for swallow.  MBSS on 10/11/18 - advanced diet to Regular.      UTI - Patient with positive UA on 10/17/18 with lethargy on 10/17. Will check UCx. Start on Ciprofloxacin for 5 days. UCx - gram -, lactose fermenting. Waiting on final susceptibility.      A Fib - Patient previously on Eliquis which was reversed after ICH.  Currently rate controlled  -Continue Digoxin 125 mcg daily, Metoprolol 50 mg q8h.   -Restart AC on 10/16/18.      HTN - Patient on 5 mg Amlodipine. Per family was not on that prior. Patient with hypotension x2, will discontinue  and monitor.     Hyponatremia - Patient on 2g TID on transfer. Will check admission CMP - 137 on admission.  -Reduce to 1 g TID, will slowly titrate off and recheck. Discontinue and recheck on 10/17/18 - Na normal.      Low Vitamin D - 28 on admission. Start on 1000 U while inpatient.     Insomnia/Mood - Patient with decreased mood. Will start Melatonin for sleep. Will discuss mood. Psychology to consult.     DVT Ppx - Patient on Heparin on transfer.  Start coumadin at 5 mg today. Pharmacy to take over management. Will most likely switch back to Eliquis after discussion with cardiologist. Would like reversability of coumadin for now.     Total time:  25 minutes.  I spent greater than 50% of the time for patient care, counseling, and coordination on this date, including unit/floor time, and face-to-face time with the patient as per interval events and assessment and plan above. Topics discussed included cipro for UTI, urine culture pending, and OK for daughter to bring in yogurt with probiotic vs our yogurt.     Janae Ortega M.D.

## 2018-10-19 NOTE — CARE PLAN
Problem: Safety  Goal: Will remain free from falls  Outcome: PROGRESSING AS EXPECTED  Patient is alert and oriented x4. Uses call light consistently and appropriately, and does not attempt to self transfer. Call light and personal belongings within reach, bed in lowest and locked position, non skid footwear on when out of bed. Hourly rounding in place. Will continue to monitor.    Problem: Pain Management  Goal: Pain level will decrease to patient's comfort goal  Outcome: PROGRESSING AS EXPECTED  Patient is alert and oriented x4, denies pain or discomfort so far this shift. Will continue to monitor.

## 2018-10-19 NOTE — CARE PLAN
Problem: Safety  Goal: Will remain free from injury  Outcome: PROGRESSING AS EXPECTED  Patient demonstrates good safety technique this shift.  Asks for assistance when needed and does not attempt self transfer.  Able to verbalize needs.      Problem: Pain Management  Goal: Pain level will decrease to patient's comfort goal  Outcome: PROGRESSING AS EXPECTED  Patient able to verbalize needs.  Denies pain or discomfort this shift and no s/s same noted.  Will continue to monitor.

## 2018-10-19 NOTE — PROGRESS NOTES
Pharmacy Warfarin Consult   10/19/2018     78 y.o.   female     Indication for anticoagulation: Atrial fibrillation     Goal INR = 2 - 3    Recent Labs      10/18/18   0600  10/19/18   0558   INR  1.09  1.25*       Pertinent Drug/Drug Interactions:  Digoxin, Trazodone prn, Tramadol prn, ABX  Outpatient Warfarin Regimen:  New start  Recent Warfarin Dosing:    Dose from last 7 days     Date/Time Dose (mg)    10/19/18 0558  5    10/18/18 0600  5    10/17/18 1600  5          Bridge Therapy: Heparin SQ           1.  Warfarin 5 mg tonight per INR = 1.25           Matthew Alvarado [facility]

## 2018-10-19 NOTE — DISCHARGE PLANNING
Met w/ daughter Nan to review IDT conference recommendations & anticipated DC date. Still on target for DC 10.15.18. Patient participating w/ therapy while I was speaking w/ daughter. Daughter very pleased w/ progress patient has made during rehab stay. She plans to stay w/ her at discharge to provide assist & care needs, including transportation. Daughter has procured DME already. Discussed HH vs OP referral. Daughter verbalizes agreement & understanding of next steps toward DC date. Will follow & assist as indicated.

## 2018-10-20 LAB
BACTERIA UR CULT: ABNORMAL
BACTERIA UR CULT: ABNORMAL
INR PPP: 1.39 (ref 0.87–1.13)
PROTHROMBIN TIME: 17.2 SEC (ref 12–14.6)
SIGNIFICANT IND 70042: ABNORMAL
SITE SITE: ABNORMAL
SOURCE SOURCE: ABNORMAL

## 2018-10-20 PROCEDURE — 700102 HCHG RX REV CODE 250 W/ 637 OVERRIDE(OP): Performed by: PHYSICAL MEDICINE & REHABILITATION

## 2018-10-20 PROCEDURE — 85610 PROTHROMBIN TIME: CPT

## 2018-10-20 PROCEDURE — A9270 NON-COVERED ITEM OR SERVICE: HCPCS | Performed by: PHYSICAL MEDICINE & REHABILITATION

## 2018-10-20 PROCEDURE — 36415 COLL VENOUS BLD VENIPUNCTURE: CPT

## 2018-10-20 PROCEDURE — 770010 HCHG ROOM/CARE - REHAB SEMI PRIVAT*

## 2018-10-20 RX ORDER — WARFARIN SODIUM 7.5 MG/1
7.5 TABLET ORAL
Status: COMPLETED | OUTPATIENT
Start: 2018-10-20 | End: 2018-10-20

## 2018-10-20 RX ADMIN — METOPROLOL TARTRATE 50 MG: 25 TABLET ORAL at 14:36

## 2018-10-20 RX ADMIN — CIPROFLOXACIN HYDROCHLORIDE 500 MG: 500 TABLET, FILM COATED ORAL at 08:27

## 2018-10-20 RX ADMIN — VITAMIN D, TAB 1000IU (100/BT) 1000 UNITS: 25 TAB at 08:27

## 2018-10-20 RX ADMIN — DIGOXIN 125 MCG: 125 TABLET ORAL at 17:39

## 2018-10-20 RX ADMIN — WARFARIN SODIUM 7.5 MG: 7.5 TABLET ORAL at 17:39

## 2018-10-20 RX ADMIN — METOPROLOL TARTRATE 50 MG: 25 TABLET ORAL at 21:17

## 2018-10-20 RX ADMIN — METOPROLOL TARTRATE 50 MG: 25 TABLET ORAL at 05:31

## 2018-10-20 RX ADMIN — CIPROFLOXACIN HYDROCHLORIDE 500 MG: 500 TABLET, FILM COATED ORAL at 21:17

## 2018-10-20 RX ADMIN — TRAZODONE HYDROCHLORIDE 50 MG: 50 TABLET ORAL at 21:17

## 2018-10-20 ASSESSMENT — PAIN SCALES - GENERAL
PAINLEVEL_OUTOF10: 0
PAINLEVEL_OUTOF10: 0

## 2018-10-20 NOTE — PROGRESS NOTES
Inpatient Anticoagulation Service Note    Date: 10/20/2018  Reason for Anticoagulation: Atrial Fibrillation   Hemoglobin Value: (!) 11.3  Hematocrit Value: (!) 30.9  Lab Platelet Value: 266  Target INR: 2.0 to 3.0    INR from last 7 days     Date/Time INR Value    10/20/18 0526 (!)  1.39    10/19/18 0558 (!)  1.25    10/18/18 0600  1.09        Dose from last 7 days     Date/Time Dose (mg)    10/20/18 1100  7.5    10/19/18 0558  5    10/18/18 0600  5    10/17/18 1600  5        Significant Interactions: Antibiotics, tramadol, trazodone    Plan:  Coumadin 7.5 mg PO tonight for INR 1.39     Pharmacist suggested discharge dosin mg daily     Angie GarciaD

## 2018-10-20 NOTE — CARE PLAN
Problem: Pain Management  Goal: Pain level will decrease to patient's comfort goal  Outcome: PROGRESSING AS EXPECTED  Patient able to verbalize needs.  Denies pain or discomfort this shift and no s/s same noted.

## 2018-10-20 NOTE — CARE PLAN
Problem: Bowel/Gastric:  Goal: Normal bowel function is maintained or improved  Outcome: PROGRESSING AS EXPECTED  Patient having regular bowel movements; last BM 10/20/18.  Denies s/s constipation; bowel meds available if needed.

## 2018-10-21 LAB
INR PPP: 1.69 (ref 0.87–1.13)
PROTHROMBIN TIME: 20 SEC (ref 12–14.6)

## 2018-10-21 PROCEDURE — 36415 COLL VENOUS BLD VENIPUNCTURE: CPT

## 2018-10-21 PROCEDURE — 97535 SELF CARE MNGMENT TRAINING: CPT

## 2018-10-21 PROCEDURE — A9270 NON-COVERED ITEM OR SERVICE: HCPCS | Performed by: PHYSICAL MEDICINE & REHABILITATION

## 2018-10-21 PROCEDURE — 97530 THERAPEUTIC ACTIVITIES: CPT

## 2018-10-21 PROCEDURE — 700102 HCHG RX REV CODE 250 W/ 637 OVERRIDE(OP): Performed by: PHYSICAL MEDICINE & REHABILITATION

## 2018-10-21 PROCEDURE — G0515 COGNITIVE SKILLS DEVELOPMENT: HCPCS

## 2018-10-21 PROCEDURE — 97116 GAIT TRAINING THERAPY: CPT

## 2018-10-21 PROCEDURE — 85610 PROTHROMBIN TIME: CPT

## 2018-10-21 PROCEDURE — 770010 HCHG ROOM/CARE - REHAB SEMI PRIVAT*

## 2018-10-21 RX ORDER — WARFARIN SODIUM 7.5 MG/1
7.5 TABLET ORAL
Status: COMPLETED | OUTPATIENT
Start: 2018-10-21 | End: 2018-10-21

## 2018-10-21 RX ADMIN — CIPROFLOXACIN HYDROCHLORIDE 500 MG: 500 TABLET, FILM COATED ORAL at 22:19

## 2018-10-21 RX ADMIN — METOPROLOL TARTRATE 50 MG: 25 TABLET ORAL at 22:19

## 2018-10-21 RX ADMIN — METOPROLOL TARTRATE 50 MG: 25 TABLET ORAL at 15:22

## 2018-10-21 RX ADMIN — BUTALBITAL, ACETAMINOPHEN AND CAFFEINE 1 TABLET: 50; 325; 40 TABLET ORAL at 08:38

## 2018-10-21 RX ADMIN — CIPROFLOXACIN HYDROCHLORIDE 500 MG: 500 TABLET, FILM COATED ORAL at 08:37

## 2018-10-21 RX ADMIN — TRAZODONE HYDROCHLORIDE 50 MG: 50 TABLET ORAL at 19:53

## 2018-10-21 RX ADMIN — VITAMIN D, TAB 1000IU (100/BT) 1000 UNITS: 25 TAB at 08:37

## 2018-10-21 RX ADMIN — METOPROLOL TARTRATE 50 MG: 25 TABLET ORAL at 05:26

## 2018-10-21 RX ADMIN — WARFARIN SODIUM 7.5 MG: 7.5 TABLET ORAL at 17:53

## 2018-10-21 RX ADMIN — DIGOXIN 125 MCG: 125 TABLET ORAL at 17:53

## 2018-10-21 ASSESSMENT — PAIN SCALES - GENERAL
PAINLEVEL_OUTOF10: 5
PAINLEVEL_OUTOF10: 0

## 2018-10-21 NOTE — CARE PLAN
Problem: Safety  Goal: Will remain free from injury    Intervention: Provide assistance with mobility  Patient unsteady, assisted with transfers to prevent fall.      Problem: Pain Management  Goal: Pain level will decrease to patient's comfort goal  Patient medicated with Fioricet for headache with good relief, will continue to monitor.

## 2018-10-21 NOTE — CARE PLAN
Problem: Safety  Goal: Will remain free from falls  Outcome: PROGRESSING AS EXPECTED  Patient is alert and oriented x4. Uses call light consistently and appropriately, and does not attempt to self transfer. Call light and personal belongings within reach, bed in lowest and locked position, non skid footwear on when out of bed. Hourly rounding in place. Will continue to monitor.    Problem: Bowel/Gastric:  Goal: Normal bowel function is maintained or improved  Outcome: PROGRESSING AS EXPECTED  LBM on 10/20. No s/s of constipation. Will continue to monitor.

## 2018-10-21 NOTE — PROGRESS NOTES
DATE OF SERVICE:  10/19/2018    The patient was seen for followup visit.  The patient reports she is feeling   better.  She said she is not ruminating as much about her worrisome thoughts   not recovering.  The patient said her week went well overall.  She met all of   her goals.  Her daughter continues to be present to support her.       ____________________________________     RENAE GIPSON, PHD    KV[facility] / WHITLEY    DD:  10/21/2018 13:53:12  DT:  10/21/2018 15:44:55    D#:  [id number]  Job#:  [id number]

## 2018-10-21 NOTE — PROGRESS NOTES
DATE OF SERVICE:  10/16/2018    The patient was seen for followup visit.  Patient is participating actively.    She is meeting all of the goals in therapy.  Patient's mood has been up and   down.  She tends to be very anxious at times and worried about her overall   progress.  She was spoken to about some of her concerns and many of her   nonproductive beliefs were addressed.       ____________________________________     RENAE GIPSON, PHD    [facility] / WHITLEY    DD:  10/20/2018 14:20:50  DT:  10/20/2018 19:04:18    D#:  [id number]  Job#:  [id number]

## 2018-10-21 NOTE — PROGRESS NOTES
DATE OF SERVICE:  10/17/2018    Patient was seen for followup visit.  The patient seemed distressed.  She said   she was having a down day.  Patient was focused on worries concerning her   recovery.  She was spoken to at length about some of her concerns.  Her   catastrophic thinking was addressed.  The patient seemed considerably calmer   at the end of this session and was looking at her situation more   realistically.       ____________________________________     RENAE GIPSON, PHD    [facility] / WHITLEY    DD:  10/20/2018 15:22:21  DT:  10/20/2018 20:34:53    D#:  [id number]  Job#:  [id number]

## 2018-10-21 NOTE — PROGRESS NOTES
Inpatient Anticoagulation Service Note    Date: 10/21/2018  Reason for Anticoagulation: Atrial Fibrillation   Hemoglobin Value: (!) 11.3  Hematocrit Value: (!) 30.9  Lab Platelet Value: 266  Target INR: 2.0 to 3.0    INR from last 7 days     Date/Time INR Value    10/21/18 0535 (!)  1.69    10/20/18 0526 (!)  1.39    10/19/18 0558 (!)  1.25    10/18/18 0600  1.09        Dose from last 7 days     Date/Time Dose (mg)    10/21/18 1200  7.5    10/20/18 1100  7.5    10/19/18 0558  5    10/18/18 0600  5    10/17/18 1600  5        Significant Interactions: Antibiotics (tramadol, trazadone)    Plan:  Coumadin 7.5 mg PO tonight for INR 1.69     Pharmacist suggested discharge dosin mg daily once INR > 2     Angie GarciaD

## 2018-10-22 LAB
INR PPP: 1.98 (ref 0.87–1.13)
PROTHROMBIN TIME: 22.6 SEC (ref 12–14.6)

## 2018-10-22 PROCEDURE — 97530 THERAPEUTIC ACTIVITIES: CPT

## 2018-10-22 PROCEDURE — 85610 PROTHROMBIN TIME: CPT

## 2018-10-22 PROCEDURE — 97110 THERAPEUTIC EXERCISES: CPT

## 2018-10-22 PROCEDURE — A9270 NON-COVERED ITEM OR SERVICE: HCPCS | Performed by: PHYSICAL MEDICINE & REHABILITATION

## 2018-10-22 PROCEDURE — 770010 HCHG ROOM/CARE - REHAB SEMI PRIVAT*

## 2018-10-22 PROCEDURE — 97112 NEUROMUSCULAR REEDUCATION: CPT

## 2018-10-22 PROCEDURE — 97535 SELF CARE MNGMENT TRAINING: CPT

## 2018-10-22 PROCEDURE — 36415 COLL VENOUS BLD VENIPUNCTURE: CPT

## 2018-10-22 PROCEDURE — G0515 COGNITIVE SKILLS DEVELOPMENT: HCPCS

## 2018-10-22 PROCEDURE — 99232 SBSQ HOSP IP/OBS MODERATE 35: CPT | Performed by: PHYSICAL MEDICINE & REHABILITATION

## 2018-10-22 PROCEDURE — 700102 HCHG RX REV CODE 250 W/ 637 OVERRIDE(OP): Performed by: PHYSICAL MEDICINE & REHABILITATION

## 2018-10-22 RX ORDER — WARFARIN SODIUM 3 MG/1
6 TABLET ORAL
Status: COMPLETED | OUTPATIENT
Start: 2018-10-22 | End: 2018-10-22

## 2018-10-22 RX ADMIN — DIGOXIN 125 MCG: 125 TABLET ORAL at 17:41

## 2018-10-22 RX ADMIN — CIPROFLOXACIN HYDROCHLORIDE 500 MG: 500 TABLET, FILM COATED ORAL at 20:40

## 2018-10-22 RX ADMIN — CIPROFLOXACIN HYDROCHLORIDE 500 MG: 500 TABLET, FILM COATED ORAL at 09:00

## 2018-10-22 RX ADMIN — METOPROLOL TARTRATE 50 MG: 25 TABLET ORAL at 14:31

## 2018-10-22 RX ADMIN — METOPROLOL TARTRATE 50 MG: 25 TABLET ORAL at 20:40

## 2018-10-22 RX ADMIN — TRAZODONE HYDROCHLORIDE 50 MG: 50 TABLET ORAL at 20:40

## 2018-10-22 RX ADMIN — VITAMIN D, TAB 1000IU (100/BT) 1000 UNITS: 25 TAB at 09:00

## 2018-10-22 RX ADMIN — METOPROLOL TARTRATE 50 MG: 25 TABLET ORAL at 05:51

## 2018-10-22 RX ADMIN — WARFARIN SODIUM 6 MG: 3 TABLET ORAL at 17:41

## 2018-10-22 ASSESSMENT — GAIT ASSESSMENTS
ASSISTIVE DEVICE: 4 WHEEL WALKER
DEVIATION: BRADYKINETIC
GAIT LEVEL OF ASSIST: SUPERVISED

## 2018-10-22 ASSESSMENT — PAIN SCALES - GENERAL: PAINLEVEL_OUTOF10: 0

## 2018-10-22 ASSESSMENT — PATIENT HEALTH QUESTIONNAIRE - PHQ9
2. FEELING DOWN, DEPRESSED, IRRITABLE, OR HOPELESS: NOT AT ALL
1. LITTLE INTEREST OR PLEASURE IN DOING THINGS: NOT AT ALL
SUM OF ALL RESPONSES TO PHQ9 QUESTIONS 1 AND 2: 0

## 2018-10-22 NOTE — CARE PLAN
Problem: Infection  Goal: Will remain free from infection  Outcome: PROGRESSING AS EXPECTED  Patient remains free from s/s infection; afebrile.  Will continue to monitor.    Problem: Pain Management  Goal: Pain level will decrease to patient's comfort goal  Outcome: PROGRESSING AS EXPECTED  Patient is alert and oriented x4, denies pain or discomfort so far this shift, aware of available pain medications if needed. Will continue to monitor.     Problem: Skin Integrity  Goal: Risk for impaired skin integrity will decrease  Outcome: PROGRESSING AS EXPECTED  Head incision healed, clean, dry, pink, with some scabbing; no s/s of infection; currently open to air. Pictures uploaded on Epic. Will continue to monitor.

## 2018-10-22 NOTE — PROGRESS NOTES
"Rehab Progress Note     Encounter Date: 10/22/2018    CC: Decreased function after ICH, SDH; dysphagia    Interval Events (Subjective)  Patient sitting up in room. She reports feeling much better over the weekend and has improved confidence in going home later this week. She denies N/V/D. Discussed INR near therapeutic for her.  Discussed E. Coli pan sensitive, will complete Abx today.      IDT Team Meeting 10/18/2018  DC/Disposition:  10/25/18, [facility] program for respite    Objective:  VITAL SIGNS: /64   Pulse 76   Temp 36.8 °C (98.2 °F)   Resp 18   Ht 1.651 m (5' 5\")   Wt 70.9 kg (156 lb 4.9 oz)   SpO2 96%   BMI 26.01 kg/m²   Gen: NAD  Psych: Mood and affect appropriate  CV: RRR, no edema  Resp: CTAB, no upper airway sounds  Abd: NTND  Neuro: AOx4, 5/5 BUE at bed level. Walking with walker with normal gait length    Recent Results (from the past 72 hour(s))   PROTHROMBIN TIME    Collection Time: 10/20/18  5:26 AM   Result Value Ref Range    PT 17.2 (H) 12.0 - 14.6 sec    INR 1.39 (H) 0.87 - 1.13   PROTHROMBIN TIME    Collection Time: 10/21/18  5:35 AM   Result Value Ref Range    PT 20.0 (H) 12.0 - 14.6 sec    INR 1.69 (H) 0.87 - 1.13   PROTHROMBIN TIME    Collection Time: 10/22/18  5:50 AM   Result Value Ref Range    PT 22.6 (H) 12.0 - 14.6 sec    INR 1.98 (H) 0.87 - 1.13       Current Facility-Administered Medications   Medication Frequency   • warfarin (COUMADIN) tablet 6 mg COUMADIN-ONCE   • melatonin tablet 3 mg QHS PRN   • traZODone (DESYREL) tablet 50 mg QHS   • ciprofloxacin (CIPRO) tablet 500 mg Q12HRS   • MD Alert...Warfarin per Pharmacy pharmacy to dose   • senna-docusate (PERICOLACE or SENOKOT S) 8.6-50 MG per tablet 2 Tab BID PRN    And   • polyethylene glycol/lytes (MIRALAX) PACKET 1 Packet QDAY PRN    And   • magnesium hydroxide (MILK OF MAGNESIA) suspension 30 mL QDAY PRN    And   • bisacodyl (DULCOLAX) suppository 10 mg QDAY PRN   • acetaminophen/caffeine/butalbital " 325-40-50 mg (FIORICET) -40 MG per tablet 1 Tab Q6HRS PRN   • vitamin D (cholecalciferol) tablet 1,000 Units DAILY   • Respiratory Care per Protocol Continuous RT   • Pharmacy Consult Request ...Pain Management Review 1 Each PRN   • tramadol (ULTRAM) 50 MG tablet 50 mg Q4HRS PRN   • acetaminophen (TYLENOL) tablet 650 mg Q4HRS PRN   • artificial tears 1.4 % ophthalmic solution 1 Drop PRN   • benzocaine-menthol (CEPACOL) lozenge 1 Lozenge Q2HRS PRN   • hydrALAZINE (APRESOLINE) tablet 25 mg Q8HRS PRN   • mag hydrox-al hydrox-simeth (MAALOX PLUS ES or MYLANTA DS) suspension 20 mL Q2HRS PRN   • ondansetron (ZOFRAN ODT) dispertab 4 mg 4X/DAY PRN    Or   • ondansetron (ZOFRAN) syringe/vial injection 4 mg 4X/DAY PRN   • sodium chloride (OCEAN) 0.65 % nasal spray 2 Spray PRN   • digoxin (LANOXIN) tablet 125 mcg DAILY AT 1800   • metoprolol (LOPRESSOR) tablet 50 mg Q8HRS       Orders Placed This Encounter   Procedures   • Diet Order Regular (meds whole one at a time with liquid wash)     Standing Status:   Standing     Number of Occurrences:   1     Order Specific Question:   Diet:     Answer:   Regular [1]     Comments:   meds whole one at a time with liquid wash     Order Specific Question:   Consistency/Fluid modifications:     Answer:   Thin Liquids [3]       Assessment:  Active Hospital Problems    Diagnosis   • *Acute intracranial hemorrhage (HCC)   • Hyponatremia   • Encephalopathy acute   • 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 Santizo on 9/22/18.  Patient at high risk for bleed so hold AC for another week.   -PT and OT for mobility and ADLs  -SLP for cognition   -Fioricet for headache - improved     Dysphagia - Patient on NTFL diet on transfer.   -SLP for swallow.  MBSS on 10/11/18 - advanced diet  to Regular.      UTI - Patient with positive UA on 10/17/18 with lethargy on 10/17. Will check UCx. Start on Ciprofloxacin for 5 days. UCx - gram -, lactose fermenting - pan susceptible E coli. To complete Abx on 10/22/18     A Fib - Patient previously on Eliquis which was reversed after ICH.  Currently rate controlled  -Continue Digoxin 125 mcg daily, Metoprolol 50 mg q8h.   -Restart AC on 10/16/18. INR 1.98 on 10/22/18     HTN - Patient on 5 mg Amlodipine. Per family was not on that prior. Patient with hypotension x2, will discontinue and monitor.     Hyponatremia - Patient on 2g TID on transfer. Will check admission CMP - 137 on admission.  -Reduce to 1 g TID, will slowly titrate off and recheck. Discontinue and recheck on 10/17/18 - Na normal.      Low Vitamin D - 28 on admission. Start on 1000 U while inpatient.     Insomnia/Mood - Patient with decreased mood. Will start Melatonin for sleep. Will discuss mood. Psychology to consult.     DVT Ppx - Patient on Heparin on transfer.  Start coumadin at 5 mg today. Pharmacy to take over management. Will most likely switch back to Eliquis after discussion with cardiologist. Would like reversability of coumadin for now.     Total time:  30 minutes.  I spent greater than 50% of the time for patient care, counseling, and coordination on this date, including unit/floor time, and face-to-face time with the patient as per interval events and assessment and plan above. Topics discussed included UTI with E coli and pan susc, to complete ciprofloxacin today, and INR near therapeutic.     Janae Ortega M.D.

## 2018-10-22 NOTE — PROGRESS NOTES
Pharmacy Warfarin Consult   10/22/2018     78 y.o.   female     Indication for anticoagulation: Atrial fibrillation     Goal INR = 2 - 3    Recent Labs      10/20/18   0526  10/21/18   0535  10/22/18   0550   INR  1.39*  1.69*  1.98*       Pertinent Drug/Drug Interactions:  Digoxin, Trazodone prn, Tramadol prn, ABX  Outpatient Warfarin Regimen:  New start  Recent Warfarin Dosing:    Dose from last 7 days     Date/Time Dose (mg)    10/22/18 0550  6    10/21/18 1200  7.5    10/20/18 1100  7.5    10/19/18 0558  5    10/18/18 0600  5    10/17/18 1600  5          Bridge Therapy: no           1.  Warfarin 6 mg tonight per INR = 1.98           Matthew Alvarado [facility]

## 2018-10-23 LAB
INR PPP: 2.29 (ref 0.87–1.13)
PROTHROMBIN TIME: 25.3 SEC (ref 12–14.6)

## 2018-10-23 PROCEDURE — 97112 NEUROMUSCULAR REEDUCATION: CPT

## 2018-10-23 PROCEDURE — G0515 COGNITIVE SKILLS DEVELOPMENT: HCPCS

## 2018-10-23 PROCEDURE — 770010 HCHG ROOM/CARE - REHAB SEMI PRIVAT*

## 2018-10-23 PROCEDURE — 97535 SELF CARE MNGMENT TRAINING: CPT

## 2018-10-23 PROCEDURE — 97530 THERAPEUTIC ACTIVITIES: CPT

## 2018-10-23 PROCEDURE — 99232 SBSQ HOSP IP/OBS MODERATE 35: CPT | Performed by: PHYSICAL MEDICINE & REHABILITATION

## 2018-10-23 PROCEDURE — 97116 GAIT TRAINING THERAPY: CPT

## 2018-10-23 PROCEDURE — 85610 PROTHROMBIN TIME: CPT

## 2018-10-23 PROCEDURE — 700102 HCHG RX REV CODE 250 W/ 637 OVERRIDE(OP): Performed by: PHYSICAL MEDICINE & REHABILITATION

## 2018-10-23 PROCEDURE — 36415 COLL VENOUS BLD VENIPUNCTURE: CPT

## 2018-10-23 PROCEDURE — A9270 NON-COVERED ITEM OR SERVICE: HCPCS | Performed by: PHYSICAL MEDICINE & REHABILITATION

## 2018-10-23 RX ORDER — WARFARIN SODIUM 5 MG/1
5 TABLET ORAL
Status: COMPLETED | OUTPATIENT
Start: 2018-10-23 | End: 2018-10-23

## 2018-10-23 RX ADMIN — METOPROLOL TARTRATE 50 MG: 25 TABLET ORAL at 15:33

## 2018-10-23 RX ADMIN — METOPROLOL TARTRATE 50 MG: 25 TABLET ORAL at 20:32

## 2018-10-23 RX ADMIN — WARFARIN SODIUM 5 MG: 5 TABLET ORAL at 17:28

## 2018-10-23 RX ADMIN — TRAZODONE HYDROCHLORIDE 50 MG: 50 TABLET ORAL at 20:31

## 2018-10-23 RX ADMIN — DIGOXIN 125 MCG: 125 TABLET ORAL at 17:28

## 2018-10-23 RX ADMIN — VITAMIN D, TAB 1000IU (100/BT) 1000 UNITS: 25 TAB at 08:13

## 2018-10-23 RX ADMIN — METOPROLOL TARTRATE 50 MG: 25 TABLET ORAL at 05:23

## 2018-10-23 ASSESSMENT — BALANCE ASSESSMENTS
STANDING UNSUPPORTED WITH EYES CLOSED: 3
PLACE ALTERNATE FOOT ON STEP OR STOOL WHILE STANDING UNSUPPORTED: 2
STANDING ON ONE LEG: 1
TURN 360 DEGREES: 1
SITTING UNSUPPORTED: 4
SITTING TO STANDING: 4
LONG VERSION TOTAL SCORE (MAX 56): 41
TRANSFERS: 4
LOOK OVER LEFT AND RIGHT SHOULDERS WHILE STANDING: 4
REACHING FORWARD WITH OUTSTRETCHED ARM WHILE STANDING: 4
LONG VERSION TOTAL SCORE (MAX 56): 41
STANDING TO SITTING: 4
PICK UP OBJECT FROM THE FLOOR FROM A STANDING POSITION: 3
STANDING UNSUPPORTED ONE FOOT IN FRONT: 0
STANDING UNSUPPORTED: 4
STANDING UNSUPPORTED WITH FEET TOGETHER: 3

## 2018-10-23 ASSESSMENT — GAIT ASSESSMENTS
DISTANCE (FEET): 300
ASSISTIVE DEVICE: 4 WHEEL WALKER
DEVIATION: BRADYKINETIC
GAIT LEVEL OF ASSIST: SUPERVISED

## 2018-10-23 ASSESSMENT — PAIN SCALES - GENERAL: PAINLEVEL_OUTOF10: 0

## 2018-10-23 NOTE — PROGRESS NOTES
Pharmacy Warfarin Consult   10/23/2018     78 y.o.   female     Indication for anticoagulation: Atrial fibrillation     Goal INR = 2 - 3    Recent Labs      10/21/18   0535  10/22/18   0550  10/23/18   0543   INR  1.69*  1.98*  2.29*       Pertinent Drug/Drug Interactions:  Digoxin, Trazodone prn, Tramadol prn, ABX  Outpatient Warfarin Regimen:  New start  Recent Warfarin Dosing:    Dose from last 7 days     Date/Time Dose (mg)    10/23/18 0543  5    10/22/18 0550  6    10/21/18 1200  7.5    10/20/18 1100  7.5    10/19/18 0558  5    10/18/18 0600  5    10/17/18 1600  5          Bridge Therapy: no           1.  Warfarin 5 mg tonight per INR = 2.29           Matthew Alvarado [facility]

## 2018-10-23 NOTE — CARE PLAN
Problem: Safety  Goal: Will remain free from falls    Intervention: Implement fall precautions  Use of call light encouraged to make needs known.Bed in low position, non skid socks/shoes on when out of bed.Call light within reach.Will continue to monitor and assess needs and safety.      Problem: Bowel/Gastric:  Goal: Normal bowel function is maintained or improved    Intervention: Educate patient and significant other/support system about signs and symptoms of constipation and interventions to implement  Pt is continent of bowel per report.LBM 10/22.Will continue to monitor and assess for s/sx of constipation.      Problem: Pain Management  Goal: Pain level will decrease to patient's comfort goal  Pt is calm, comfortable and stable.Repositions self frequently while in bed for comfort.Will continue to monitor and assess pain level and medicate as needed.

## 2018-10-23 NOTE — PROGRESS NOTES
"Rehab Progress Note     Encounter Date: 10/23/2018    CC: Decreased function after ICH, SDH; dysphagia    Interval Events (Subjective)  Patient sitting up in room. She reports she is tired but therapy is going well. No family at bedside at this time. Discussed with patient that INR now therapeutic. Discussed will have a discussion with pharmacy about discharge dose of coumadin. Patient again with concern about long term coumadin use. Discussed it will be a discussion with PCP and cardiologist as outpatient. Afia CUELLAR.     IDT Team Meeting 10/18/2018  DC/Disposition:  10/25/18, [facility] program for respite    Objective:  VITAL SIGNS: /73   Pulse 70   Temp 36.3 °C (97.4 °F)   Resp 18   Ht 1.651 m (5' 5\")   Wt 70.9 kg (156 lb 4.9 oz)   SpO2 94%   BMI 26.01 kg/m²   Gen: NAD  Psych: Mood and affect appropriate  CV: RRR, no edema  Resp: CTAB, no upper airway sounds  Abd: NTND  Neuro: AOx4, 5/5 BUE at bed level.  Unchanged from 10/22/18    Recent Results (from the past 72 hour(s))   PROTHROMBIN TIME    Collection Time: 10/21/18  5:35 AM   Result Value Ref Range    PT 20.0 (H) 12.0 - 14.6 sec    INR 1.69 (H) 0.87 - 1.13   PROTHROMBIN TIME    Collection Time: 10/22/18  5:50 AM   Result Value Ref Range    PT 22.6 (H) 12.0 - 14.6 sec    INR 1.98 (H) 0.87 - 1.13   PROTHROMBIN TIME    Collection Time: 10/23/18  5:43 AM   Result Value Ref Range    PT 25.3 (H) 12.0 - 14.6 sec    INR 2.29 (H) 0.87 - 1.13       Current Facility-Administered Medications   Medication Frequency   • warfarin (COUMADIN) tablet 5 mg COUMADIN-ONCE   • melatonin tablet 3 mg QHS PRN   • traZODone (DESYREL) tablet 50 mg QHS   • MD Alert...Warfarin per Pharmacy pharmacy to dose   • senna-docusate (PERICOLACE or SENOKOT S) 8.6-50 MG per tablet 2 Tab BID PRN    And   • polyethylene glycol/lytes (MIRALAX) PACKET 1 Packet QDAY PRN    And   • magnesium hydroxide (MILK OF MAGNESIA) suspension 30 mL QDAY PRN    And   • bisacodyl (DULCOLAX) " suppository 10 mg QDAY PRN   • acetaminophen/caffeine/butalbital 325-40-50 mg (FIORICET) -40 MG per tablet 1 Tab Q6HRS PRN   • vitamin D (cholecalciferol) tablet 1,000 Units DAILY   • Respiratory Care per Protocol Continuous RT   • Pharmacy Consult Request ...Pain Management Review 1 Each PRN   • tramadol (ULTRAM) 50 MG tablet 50 mg Q4HRS PRN   • acetaminophen (TYLENOL) tablet 650 mg Q4HRS PRN   • artificial tears 1.4 % ophthalmic solution 1 Drop PRN   • benzocaine-menthol (CEPACOL) lozenge 1 Lozenge Q2HRS PRN   • hydrALAZINE (APRESOLINE) tablet 25 mg Q8HRS PRN   • mag hydrox-al hydrox-simeth (MAALOX PLUS ES or MYLANTA DS) suspension 20 mL Q2HRS PRN   • ondansetron (ZOFRAN ODT) dispertab 4 mg 4X/DAY PRN    Or   • ondansetron (ZOFRAN) syringe/vial injection 4 mg 4X/DAY PRN   • sodium chloride (OCEAN) 0.65 % nasal spray 2 Spray PRN   • digoxin (LANOXIN) tablet 125 mcg DAILY AT 1800   • metoprolol (LOPRESSOR) tablet 50 mg Q8HRS       Orders Placed This Encounter   Procedures   • Diet Order Regular (meds whole one at a time with liquid wash)     Standing Status:   Standing     Number of Occurrences:   1     Order Specific Question:   Diet:     Answer:   Regular [1]     Comments:   meds whole one at a time with liquid wash     Order Specific Question:   Consistency/Fluid modifications:     Answer:   Thin Liquids [3]       Assessment:  Active Hospital Problems    Diagnosis   • *Acute intracranial hemorrhage (HCC)   • Hyponatremia   • Encephalopathy acute   • 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 Santizo on 9/22/18.  Patient at high risk for bleed so hold AC for another week.   -PT and OT for mobility and ADLs  -SLP for cognition   -Fioricet for headache - improved     Dysphagia - Patient on NTFL diet  on transfer.   -SLP for swallow.  MBSS on 10/11/18 - advanced diet to Regular.      UTI - Patient with positive UA on 10/17/18 with lethargy on 10/17. Will check UCx. Start on Ciprofloxacin for 5 days. UCx - gram -, lactose fermenting - pan susceptible E coli. Completed Abx    A Fib - Patient previously on Eliquis which was reversed after ICH.  Currently rate controlled  -Continue Digoxin 125 mcg daily, Metoprolol 50 mg q8h.   -Restart AC on 10/16/18. INR 2.29 on 10/23/18     HTN - Patient on 5 mg Amlodipine. Per family was not on that prior. Patient with hypotension x2, will discontinue and monitor.     Hyponatremia - Patient on 2g TID on transfer. Will check admission CMP - 137 on admission.  -Reduce to 1 g TID, will slowly titrate off and recheck. Discontinue and recheck on 10/17/18 - Na normal.      Low Vitamin D - 28 on admission. Start on 1000 U while inpatient.     Insomnia/Mood - Patient with decreased mood. Will start Melatonin for sleep. Will discuss mood. Psychology to consult. Melatonin switched to Trazodone for sleep as working better scheduled. Melatonin switched to PRN    DVT Ppx - Patient on Heparin on transfer.  Start coumadin at 5 mg today. Pharmacy to take over management. Will most likely switch back to Eliquis after discussion with cardiologist. Would like reversability of coumadin for now.     Total time:  25 minutes.  I spent greater than 50% of the time for patient care, counseling, and coordination on this date, including unit/floor time, and face-to-face time with the patient as per interval events and assessment and plan above. Topics discussed included discharge planning, coumadin and need for anticoagulation.     Janae Ortega M.D.

## 2018-10-23 NOTE — CARE PLAN
Problem: Venous Thromboembolism (VTW)/Deep Vein Thrombosis (DVT) Prevention:  Goal: Patient will participate in Venous Thrombosis (VTE)/Deep Vein Thrombosis (DVT)Prevention Measures  Pt is up and walking, participates in therapies, and up to dinning room for all meals.    Problem: Bowel/Gastric:  Goal: Normal bowel function is maintained or improved  Patient having regular bowel movements; last BM 10/22.  Denies s/s constipation; bowel meds available if needed.  Will continue to monitor.

## 2018-10-24 LAB
INR PPP: 2.24 (ref 0.87–1.13)
PROTHROMBIN TIME: 24.8 SEC (ref 12–14.6)

## 2018-10-24 PROCEDURE — 97116 GAIT TRAINING THERAPY: CPT

## 2018-10-24 PROCEDURE — A9270 NON-COVERED ITEM OR SERVICE: HCPCS | Performed by: PHYSICAL MEDICINE & REHABILITATION

## 2018-10-24 PROCEDURE — 770010 HCHG ROOM/CARE - REHAB SEMI PRIVAT*

## 2018-10-24 PROCEDURE — 700102 HCHG RX REV CODE 250 W/ 637 OVERRIDE(OP): Performed by: PHYSICAL MEDICINE & REHABILITATION

## 2018-10-24 PROCEDURE — 99232 SBSQ HOSP IP/OBS MODERATE 35: CPT | Performed by: PHYSICAL MEDICINE & REHABILITATION

## 2018-10-24 PROCEDURE — 97530 THERAPEUTIC ACTIVITIES: CPT

## 2018-10-24 PROCEDURE — G0515 COGNITIVE SKILLS DEVELOPMENT: HCPCS

## 2018-10-24 PROCEDURE — 97537 COMMUNITY/WORK REINTEGRATION: CPT

## 2018-10-24 PROCEDURE — 36415 COLL VENOUS BLD VENIPUNCTURE: CPT

## 2018-10-24 PROCEDURE — 85610 PROTHROMBIN TIME: CPT

## 2018-10-24 RX ORDER — WARFARIN SODIUM 5 MG/1
5 TABLET ORAL
Status: COMPLETED | OUTPATIENT
Start: 2018-10-24 | End: 2018-10-24

## 2018-10-24 RX ADMIN — TRAZODONE HYDROCHLORIDE 50 MG: 50 TABLET ORAL at 20:31

## 2018-10-24 RX ADMIN — METOPROLOL TARTRATE 50 MG: 25 TABLET ORAL at 05:20

## 2018-10-24 RX ADMIN — VITAMIN D, TAB 1000IU (100/BT) 1000 UNITS: 25 TAB at 08:06

## 2018-10-24 RX ADMIN — WARFARIN SODIUM 5 MG: 5 TABLET ORAL at 17:18

## 2018-10-24 RX ADMIN — METOPROLOL TARTRATE 50 MG: 25 TABLET ORAL at 20:31

## 2018-10-24 RX ADMIN — DIGOXIN 125 MCG: 125 TABLET ORAL at 17:18

## 2018-10-24 RX ADMIN — BUTALBITAL, ACETAMINOPHEN AND CAFFEINE 1 TABLET: 50; 325; 40 TABLET ORAL at 05:21

## 2018-10-24 RX ADMIN — METOPROLOL TARTRATE 50 MG: 25 TABLET ORAL at 14:37

## 2018-10-24 ASSESSMENT — PAIN SCALES - GENERAL
PAINLEVEL_OUTOF10: 0
PAINLEVEL_OUTOF10: 5

## 2018-10-24 ASSESSMENT — ACTIVITIES OF DAILY LIVING (ADL)
SHOWER_TRANSFER_LEVEL_OF_ASSIST: REQUIRES SUPERVISION WITH SHOWER TRANSFER
TOILET_TRANSFER_LEVEL_OF_ASSIST: ABLE TO COMPLETE TOILET TRANSFER WITHOUT ASSIST
TOILETING_LEVEL_OF_ASSIST: ABLE TO COMPLETE TOILETING WITHOUT ASSIST

## 2018-10-24 ASSESSMENT — GAIT ASSESSMENTS
GAIT LEVEL OF ASSIST: SUPERVISED
ASSISTIVE DEVICE: 4 WHEEL WALKER
DEVIATION: BRADYKINETIC
DISTANCE (FEET): 500

## 2018-10-24 NOTE — DISCHARGE PLANNING
Case Management Discharge Instructions        Discharge Location: Home with Outpatient Services     Agency Name / Address / Phone: [facility]  [address]  [phone], they will call to set up appointment     Outpatient Services: Occupational Therapy, Physical Therapy, Speech Therapy      Follow-up Information:     Chun Lew M.D. Primary Care Physician  [address]  [facility] [address]  [phone]   On 10/30/2018, Tuesday at 2:00pm, we will fax records to primary care provider     Juan Alberto Santizo Neuro-Surgeon  [address]  [place] [address]  [phone]  On 11/29/2018, Thursday at 1:45pm, office will set up CT scan for 1 wk prior to MD appointment & will call you with date for CT scan

## 2018-10-24 NOTE — DISCHARGE PLANNING
Spoke w/ daughter Nan. Reviewed outpatient therapy referral for discharge w/ PT/OT/SLP. Notified [facility] only agency to provide all 3 disciplines. Daughter verbalizes agreement & understanding of OP therapy referral. Asking about f/u cardiology need. Family may be changing cardiology provider. Instructed to discuss w/ Dr. Lew at PCP f/u appt. Daughter would like to have discharge time Thursday around 10am. Will update nursing.

## 2018-10-24 NOTE — PROGRESS NOTES
"Rehab Progress Note     Encounter Date: 10/24/2018    CC: Decreased function after ICH, SDH; dysphagia    Interval Events (Subjective)  Patient sitting up in room. She reports she feels so much better now. She reports her granddaughter is going to come and stay with her for a little while. She reports she had initial concern because her mother had a head injury when she was a child and it took her much longer to recover.  She denies pain. Denies NVD    IDT Team Meeting 10/18/2018  DC/Disposition:  10/25/18, [facility] program for respite    Objective:  VITAL SIGNS: /53   Pulse 68   Temp 36.7 °C (98 °F)   Resp 17   Ht 1.651 m (5' 5\")   Wt 70.9 kg (156 lb 4.9 oz)   SpO2 98%   BMI 26.01 kg/m²   Gen: NAD  Psych: Mood and affect appropriate  CV: RRR, no edema  Resp: CTAB, no upper airway sounds  Abd: NTND  Neuro: AOX4, 5/5 BUE, 5/5 BLE    Recent Results (from the past 72 hour(s))   PROTHROMBIN TIME    Collection Time: 10/22/18  5:50 AM   Result Value Ref Range    PT 22.6 (H) 12.0 - 14.6 sec    INR 1.98 (H) 0.87 - 1.13   PROTHROMBIN TIME    Collection Time: 10/23/18  5:43 AM   Result Value Ref Range    PT 25.3 (H) 12.0 - 14.6 sec    INR 2.29 (H) 0.87 - 1.13   PROTHROMBIN TIME    Collection Time: 10/24/18  5:43 AM   Result Value Ref Range    PT 24.8 (H) 12.0 - 14.6 sec    INR 2.24 (H) 0.87 - 1.13       Current Facility-Administered Medications   Medication Frequency   • warfarin (COUMADIN) tablet 5 mg COUMADIN-ONCE   • melatonin tablet 3 mg QHS PRN   • traZODone (DESYREL) tablet 50 mg QHS   • MD Alert...Warfarin per Pharmacy pharmacy to dose   • senna-docusate (PERICOLACE or SENOKOT S) 8.6-50 MG per tablet 2 Tab BID PRN    And   • polyethylene glycol/lytes (MIRALAX) PACKET 1 Packet QDAY PRN    And   • magnesium hydroxide (MILK OF MAGNESIA) suspension 30 mL QDAY PRN    And   • bisacodyl (DULCOLAX) suppository 10 mg QDAY PRN   • acetaminophen/caffeine/butalbital 325-40-50 mg (FIORICET) -40 MG per " tablet 1 Tab Q6HRS PRN   • vitamin D (cholecalciferol) tablet 1,000 Units DAILY   • Respiratory Care per Protocol Continuous RT   • Pharmacy Consult Request ...Pain Management Review 1 Each PRN   • tramadol (ULTRAM) 50 MG tablet 50 mg Q4HRS PRN   • acetaminophen (TYLENOL) tablet 650 mg Q4HRS PRN   • artificial tears 1.4 % ophthalmic solution 1 Drop PRN   • benzocaine-menthol (CEPACOL) lozenge 1 Lozenge Q2HRS PRN   • hydrALAZINE (APRESOLINE) tablet 25 mg Q8HRS PRN   • mag hydrox-al hydrox-simeth (MAALOX PLUS ES or MYLANTA DS) suspension 20 mL Q2HRS PRN   • ondansetron (ZOFRAN ODT) dispertab 4 mg 4X/DAY PRN    Or   • ondansetron (ZOFRAN) syringe/vial injection 4 mg 4X/DAY PRN   • sodium chloride (OCEAN) 0.65 % nasal spray 2 Spray PRN   • digoxin (LANOXIN) tablet 125 mcg DAILY AT 1800   • metoprolol (LOPRESSOR) tablet 50 mg Q8HRS       Orders Placed This Encounter   Procedures   • Diet Order Regular (meds whole one at a time with liquid wash)     Standing Status:   Standing     Number of Occurrences:   1     Order Specific Question:   Diet:     Answer:   Regular [1]     Comments:   meds whole one at a time with liquid wash     Order Specific Question:   Consistency/Fluid modifications:     Answer:   Thin Liquids [3]       Assessment:  Active Hospital Problems    Diagnosis   • *Acute intracranial hemorrhage (HCC)   • Hyponatremia   • Encephalopathy acute   • 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 Santizo on 9/22/18.  Patient at high risk for bleed so hold AC for another week.   -PT and OT for mobility and ADLs  -SLP for cognition   -Fioricet for headache - improved     Dysphagia - Patient on NTFL diet on transfer.   -SLP for swallow.  MBSS on 10/11/18 - advanced diet to Regular.      UTI - Patient with  positive UA on 10/17/18 with lethargy on 10/17. Will check UCx. Start on Ciprofloxacin for 5 days. UCx - gram -, lactose fermenting - pan susceptible E coli. Completed Abx    A Fib - Patient previously on Eliquis which was reversed after ICH.  Currently rate controlled  -Continue Digoxin 125 mcg daily, Metoprolol 50 mg q8h.   -Restart AC on 10/16/18. INR 2.24 on 10/24/18. Pending discharge dose.      HTN - Patient on 5 mg Amlodipine. Per family was not on that prior. Patient with hypotension x2, will discontinue and monitor.     Hyponatremia - Patient on 2g TID on transfer. Will check admission CMP - 137 on admission.  -Reduce to 1 g TID, will slowly titrate off and recheck. Discontinue and recheck on 10/17/18 - Na normal.      Low Vitamin D - 28 on admission. Start on 1000 U while inpatient.     Insomnia/Mood - Patient with decreased mood. Will start Melatonin for sleep. Will discuss mood. Psychology to consult. Melatonin switched to Trazodone for sleep as working better scheduled. Melatonin switched to PRN    DVT Ppx - Patient on Heparin on transfer.  Start coumadin at 5 mg today. Pharmacy to take over management. Will most likely switch back to Eliquis after discussion with cardiologist. Would like reversability of coumadin for now.     Total time:  30 minutes.  I spent greater than 50% of the time for patient care, counseling, and coordination on this date, including unit/floor time, and face-to-face time with the patient as per interval events and assessment and plan above. Topics discussed included discharge planning, discharge medications and alcohol avoidance after TBI.     Janae Ortega M.D.

## 2018-10-24 NOTE — CARE PLAN
Problem: Dressing  Goal: STG-Within one week, patient will dress LB  1) Individualized Goal: with Mod I and use of DME/AE prn   2) Interventions: OT Group Therapy, OT Self Care/ADL, OT Cognitive Skill Dev, OT Community Reintegration, OT Manual Ther Technique, OT Neuro Re-Ed/Balance, OT Therapeutic Activity, OT Evaluation and OT Therapeutic Exercise        Outcome: MET Date Met: 10/24/18      Problem: Toileting  Goal: STG-Within one week, patient will complete toileting tasks  1) Individualized Goal: with Mod I and use of DME/AE prn   2) Interventions: OT Group Therapy, OT Self Care/ADL, OT Cognitive Skill Dev, OT Community Reintegration, OT Manual Ther Technique, OT Neuro Re-Ed/Balance, OT Therapeutic Activity, OT Evaluation and OT Therapeutic Exercise     Outcome: MET Date Met: 10/24/18      Problem: OT Long Term Goals  Goal: LTG-By discharge, patient will complete basic self care tasks  1) Individualized Goal:  With  Modified independence and use of AE/DME prn   2) Interventions:  OT Group Therapy, OT Self Care/ADL, OT Cognitive Skill Dev, OT Community Reintegration, OT Manual Ther Technique, OT Neuro Re-Ed/Balance, OT Therapeutic Activity, OT Evaluation and OT Therapeutic Exercise     Outcome: DISCHARGED-GOAL NOT MET Date Met: 10/24/18  Pt Mod I for all ADLs except bathing, pt con to have LOB yet self recover w/o falls when standing for perineal  Goal: LTG-By discharge, patient will perform bathroom transfers  1) Individualized Goal: with modified independence and use of DME/AE prn   2) Interventions: OT Group Therapy, OT Self Care/ADL, OT Cognitive Skill Dev, OT Community Reintegration, OT Manual Ther Technique, OT Neuro Re-Ed/Balance, OT Therapeutic Activity, OT Evaluation and OT Therapeutic Exercise     Outcome: MET Date Met: 10/24/18    Goal: LTG-By discharge, patient will complete basic home management  1) Individualized Goal: with modified independence and use of AE/DME prn   2) Interventions:  OT Group  Therapy, OT Self Care/ADL, OT Cognitive Skill Dev, OT Community Reintegration, OT Manual Ther Technique, OT Neuro Re-Ed/Balance, OT Therapeutic Activity, OT Evaluation and OT Therapeutic Exercise     Outcome: DISCHARGED-GOAL NOT MET Date Met: 10/24/18  Pt Min A for meal prep, SPv for kitchen mobility    Problem: IADL's  Goal: STG-Within one week, patient will prepare a meal  1) Individualized Goal: with Mod I and use of DME/AE prn   2) Interventions: OT Group Therapy, OT Self Care/ADL, OT Cognitive Skill Dev, OT Community Reintegration, OT Manual Ther Technique, OT Neuro Re-Ed/Balance, OT Therapeutic Activity, OT Evaluation and OT Therapeutic Exercise       Outcome: DISCHARGED-GOAL NOT MET Date Met: 10/24/18  Pt requires min A d/t cont cognitive deficits and safety risk  Goal: STG-Within one week, patient will access kitchen area  1) Individualized Goal: with Mod I and use of DME/AE prn   2) Interventions: OT Group Therapy, OT Self Care/ADL, OT Cognitive Skill Dev, OT Community Reintegration, OT Manual Ther Technique, OT Neuro Re-Ed/Balance, OT Therapeutic Activity, OT Evaluation and OT Therapeutic Exercise       Outcome: DISCHARGED-GOAL NOT MET Date Met: 10/24/18  Pt requires SPV for safety and 4ww use in kitchen

## 2018-10-24 NOTE — PROGRESS NOTES
Pharmacy Warfarin Consult   10/24/2018     78 y.o.   female     Indication for anticoagulation: Atrial Fibrillation    Goal INR = 2 - 3    Recent Labs      10/22/18   0550  10/23/18   0543  10/24/18   0543   INR  1.98*  2.29*  2.24*       Pertinent Drug/Drug Interactions:  Digoxin, Trazodone prn, Tramadol prn  Outpatient Warfarin Regimen:  New start  Recent Warfarin Dosing:    Dose from last 7 days     Date/Time Dose (mg)    10/24/18 0543  5    10/23/18 0543  5    10/22/18 0550  6    10/21/18 1200  7.5    10/20/18 1100  7.5    10/19/18 0558  5    10/18/18 0600  5    10/17/18 1600  5          Bridge Therapy: No        1.  Warfarin 5 mg tonight for INR = 2.24        Danilo Yeh [facility]

## 2018-10-24 NOTE — CARE PLAN
Problem: PT-Long Term Goals  Goal: LTG-By discharge, patient will ambulate  1) Individualized goal:  Patient will amb >150 ft over various surfaces with FWW SPV-mod I  2) Interventions:  PT Group Therapy, PT E Stim Attended, PT Gait Training, PT Self Care/Home Eval, PT Therapeutic Exercises, PT Neuro Re-Ed/Balance, PT Aquatic Therapy, PT Therapeutic Activity, PT Manual Therapy and PT Evaluation.       Outcome: MET Date Met: 10/24/18    Goal: LTG-By discharge, patient will transfer one surface to another  1) Individualized goal:  Patient will transfer SPV-mod I with FWW   2) Interventions:  PT Group Therapy, PT E Stim Attended, PT Gait Training, PT Self Care/Home Eval, PT Therapeutic Exercises, PT Neuro Re-Ed/Balance, PT Aquatic Therapy, PT Therapeutic Activity, PT Manual Therapy and PT Evaluation.       Outcome: MET Date Met: 10/24/18

## 2018-10-24 NOTE — DISCHARGE PLANNING
Spoke w/ daughter Nan via phone after IDT huddle. Updated DC date still in place for Thursday, 10.25.18. Therapy recommendations for OP therapy post acute: PT/OT/SLP. Patient lives in [place]. Will review for OP therapy choice in [place]. Per daughter, patient gets medication prescriptions at [facility] on [address]. Will update Dr. Ortega. Daughter has DME already. Daughter will provide transport. Questions answered.

## 2018-10-24 NOTE — DISCHARGE PLANNING
Spoke w/ patient to discuss outpatient choice for referral PT/OT/SLP. Verbalizes agreement & understanding of OP therapy referral to [facility]. Patient excited about anticipated DC tomorrow, wants to get back to home environment & [place]. Excellent family support available to patient. Verbalizes agreement & understanding of next steps in DC plan & process.

## 2018-10-24 NOTE — CARE PLAN
Problem: Safety  Goal: Will remain free from falls    Intervention: Implement fall precautions  Appropriately uses call light to make needs known.Bed in low position, non skid socks/shoes on when out of bed.Call light within reach.Will continue to monitor and assess needs and safety.      Problem: Pain Management  Goal: Pain level will decrease to patient's comfort goal  Pt denies any pain or discomfort.Repositioned with pillows for comfort.Will continue to monitor and assess pain level and medicate as needed.

## 2018-10-25 VITALS
TEMPERATURE: 97.8 F | WEIGHT: 156.31 LBS | HEART RATE: 64 BPM | BODY MASS INDEX: 26.04 KG/M2 | HEIGHT: 65 IN | DIASTOLIC BLOOD PRESSURE: 64 MMHG | SYSTOLIC BLOOD PRESSURE: 116 MMHG | RESPIRATION RATE: 18 BRPM | OXYGEN SATURATION: 96 %

## 2018-10-25 LAB
INR PPP: 2.37 (ref 0.87–1.13)
PROTHROMBIN TIME: 25.9 SEC (ref 12–14.6)

## 2018-10-25 PROCEDURE — 99239 HOSP IP/OBS DSCHRG MGMT >30: CPT | Performed by: PHYSICAL MEDICINE & REHABILITATION

## 2018-10-25 PROCEDURE — 36415 COLL VENOUS BLD VENIPUNCTURE: CPT

## 2018-10-25 PROCEDURE — A9270 NON-COVERED ITEM OR SERVICE: HCPCS | Performed by: PHYSICAL MEDICINE & REHABILITATION

## 2018-10-25 PROCEDURE — 85610 PROTHROMBIN TIME: CPT

## 2018-10-25 PROCEDURE — 700102 HCHG RX REV CODE 250 W/ 637 OVERRIDE(OP): Performed by: PHYSICAL MEDICINE & REHABILITATION

## 2018-10-25 RX ORDER — TRAZODONE HYDROCHLORIDE 50 MG/1
50 TABLET ORAL
Qty: 15 TAB | Refills: 0 | Status: SHIPPED | OUTPATIENT
Start: 2018-10-25

## 2018-10-25 RX ORDER — BUTALBITAL, ACETAMINOPHEN AND CAFFEINE 50; 325; 40 MG/1; MG/1; MG/1
1 TABLET ORAL EVERY 6 HOURS PRN
Qty: 14 TAB | Refills: 0 | Status: SHIPPED | OUTPATIENT
Start: 2018-10-25 | End: 2018-11-04

## 2018-10-25 RX ORDER — WARFARIN SODIUM 5 MG/1
5 TABLET ORAL
Status: DISCONTINUED | OUTPATIENT
Start: 2018-10-25 | End: 2018-10-25 | Stop reason: HOSPADM

## 2018-10-25 RX ORDER — WARFARIN SODIUM 5 MG/1
5 TABLET ORAL DAILY
Qty: 30 TAB | Refills: 0 | Status: SHIPPED | OUTPATIENT
Start: 2018-10-25

## 2018-10-25 RX ORDER — DIGOXIN 125 MCG
125 TABLET ORAL DAILY
Qty: 30 TAB | Refills: 2 | Status: SHIPPED | OUTPATIENT
Start: 2018-10-25

## 2018-10-25 RX ADMIN — METOPROLOL TARTRATE 50 MG: 25 TABLET ORAL at 05:19

## 2018-10-25 RX ADMIN — VITAMIN D, TAB 1000IU (100/BT) 1000 UNITS: 25 TAB at 07:53

## 2018-10-25 ASSESSMENT — PAIN SCALES - GENERAL: PAINLEVEL_OUTOF10: 0

## 2018-10-25 NOTE — CARE PLAN
Problem: Speech/Swallowing LTGs  Goal: LTG-By discharge, patient will safely swallow  1) Individualized goal:  Regular textures and thin liquids with no overt s/sx of asp/pen noted across meals with 100% accuracy provided MOD I.   2) Interventions:  SLP Swallowing Dysfunction Treatment, SLP Video Swallow Evaluation, SLP Self Care / ADL Training , SLP Cognitive Skill Development and SLP Group Treatment     Outcome: MET Date Met: 10/25/18    Goal: LTG-By discharge, patient will  1) Individualized goal:  Complete functional problem solving and recall information with 80% accuracy provided MOD I.   2) Interventions:  SLP Self Care / ADL Training , SLP Cognitive Skill Development and SLP Group Treatment     Outcome: MET Date Met: 10/25/18      Problem: Problem Solving STGs  Goal: STG-Within one week, patient will  1) Individualized goal:  Participate in divided attention tasks with 80% accuracy provided MOD A.   2) Interventions:  SLP Self Care / ADL Training , SLP Cognitive Skill Development and SLP Group Treatment     Outcome: MET Date Met: 10/25/18      Problem: Memory STGs  Goal: STG-Within one week, patient will  1) Individualized goal:  Achieve a score of 25 or greater across 3 consecutive sessions on the orientation log.   2) Interventions:  SLP Self Care / ADL Training  and SLP Cognitive Skill Development     Outcome: MET Date Met: 10/25/18

## 2018-10-25 NOTE — PROGRESS NOTES
Patient discharged to home. VSS, no reports of pain or appearances of SOB or distress.   Discharge instructions about medication schedule, medication indications and side effects and warnings provided.  DC instructions specific to PT/OT/ST given.  Follow - up appointments discussed.  Signs and symptoms of both heart attacks and strokes discussed, and patient and/or significant other instructed to call 911 immediately if these are noticed.  Diagnoses -specific discharge instructions including those on coumadin, fall prevention, and brain bleed given to patient and daughter.  Understanding verbalized. Escorted out by staff using a wheelchair/walker.  No IV present.

## 2018-10-25 NOTE — DISCHARGE SUMMARY
Rehab Discharge Summary    Admission Date: 10/9/2018    Discharge Date: 10/25/2018    Attending Provider: Janae Ortega MD/PhD    Admission Diagnosis:   Active Hospital Problems    Diagnosis   • *Acute intracranial hemorrhage (HCC)   • Hyponatremia   • Encephalopathy acute   • 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)   • Paroxysmal atrial fibrillation (HCC)   • Oropharyngeal dysphagia   • Hypokalemia       Discharge Diagnosis:  Active Hospital Problems    Diagnosis   • *Acute intracranial hemorrhage (HCC)   • Hyponatremia   • Encephalopathy acute   • 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)   • Paroxysmal atrial fibrillation (HCC)   • Oropharyngeal dysphagia   • Hypokalemia       HPI per H&P:  Patient is a 78 y.o. female with a PMH of A fib on Eliquis who had a fall 1 week prior to admission on 9/22/18 and presented to OSH with new headache.  Patient reportedly had a mechanical fall while in the kitchen.  At [facility] ER she had worsening nausea and CT showed SDH, intraventricular hemorrhage and possible subarachnoid hemorrhage.  Eliquis was reversed.   Patient was transferred to [facility] and neurosurgery was consulted. Patient had bilateral EVD placement with Dr. Santizo on 9/22/18 with minimal blood loss.  Patient’s stay has been complicated by confusion, lethargy and previously with hyponatremia.  In addition complicated by A Fib. ECHO showed EF of 65% on 9/23/18.  EVDs were removed on 10/4/18 without complication.  Repeat CT head on 10/8/18 with stable findings.  Per discharge summary recommending to start Coumadin without bridge on 10/16/18 as NSG concern for extensive bleeds.  Patient on heparin on transfer.      Patient last evaluated by SLP on 10/9/18 and was recommended for NTFL diet.  Patient was last evaluated by OT on 10/8/18 and was min-modA for ADLs and transfers. Patient last evaluated by PT on 10/8/18 and was Martha for gait and with  left neglect.     Patient was admitted to [facility] on 10/9/2018.     Hospital Course by Problem List:  Bilateral SDH/ICH - Patient s/p bilateral EVDs placed by Dr. Santizo on 9/22/18.  Patient at high risk for bleed so hold AC for another week. Patient underwent acute inpatient rehabilitation from 10/9/18 to 10/25/18 with good improvement in mobility and ADLs.   -Fioricet for headache - improved     Dysphagia - Patient on NTFL diet on transfer. SLP for swallow.  MBSS on 10/11/18 - advanced diet to Regular.  -Discharge diet, Regular with thins      UTI - Patient with positive UA on 10/17/18 with lethargy on 10/17. Will check UCx. Start on Ciprofloxacin for 5 days. UCx - gram -, lactose fermenting - pan susceptible E coli. Completed Abx prior to discharge.      A Fib - Patient previously on Eliquis which was reversed after ICH.  Currently rate controlled  -Continue Digoxin 125 mcg daily, Metoprolol 25 mg BID.   -Restart AC on 10/16/18. INR 2.24 on 10/24/18. Discharge on 5 mg coumadin.  Discussion with PCP and cardiology on AC and switch to newer agent.        HTN - Patient on 5 mg Amlodipine. Per family was not on that prior. Patient with hypotension x2, will discontinue and monitor.      Hyponatremia - Patient on 2g TID on transfer. Will check admission CMP - 137 on admission. Reduce to 1 g TID, will slowly titrate off and recheck. Discontinue and recheck on 10/17/18 - Na normal.      Low Vitamin D - 28 on admission. Start on 1000 U while inpatient. Patient to take OTC supplement     Insomnia/Mood - Patient with decreased mood. Will start Melatonin for sleep. Will discuss mood. Psychology to consult. Melatonin switched to Trazodone for sleep as working better scheduled.  -Discharge with 2 weeks Trazodone      DVT Ppx - Patient on Heparin on transfer.  Start coumadin at 5 mg today. Pharmacy to take over management. Will most likely switch back to Eliquis after discussion with cardiologist. Would  "like reversability of coumadin for now.        Functional Status at Discharge  Eatin - Independent  Eating Description:  Increased time  Groomin - Modified Independent  Grooming Description:  Increased time  Bathin - Standby Prompting/Supervision or Set-up  Bathing Description:  Grab bar  Upper Body Dressin - Independent  Upper Body Dressing Description:  Increased time  Lower Body Dressin - Modified Independent  Lower Body Dressing Description:  6 - Modified Independent  Discharge Location : Home  Patient Discharging with Assist of: Family   Level of Supervision Required: Intermittent Supervision  Recommended Equipment for Discharge: 4-Wheeled Walker;Tub Transfer Bench;Shower Chair;Grab Bars by Toilet;Grab Bars in Tub / Shower;Hand Held Shower Head  Recommended Services Upon Discharge: Outpatient Occupational Therapy  Long Term Goals Met: 1  Long Term Goals Not Met: 2  Reason(s) for Goals Not Met: Pt did not meet mod I for bathing, yet Mod I for all other goals, Pt MIN A -SPv for IADLs in kitchen d/t cont cognitive deficits for executive function and memory  Criteria for Termination of Services: Maximum Function Achieved for Inpatient Rehabilitation  Walk:  5 - Standby Prompting/Supervision or Set-up  Distance Walked:  Walks a minimum of 150 feet  Walk Description:  Walker, Supervision for safety, Verbal cueing (AMB x 500 feet with 4WW and SPV)  Wheelchair:  0 - Not tested, patient refused  Distance Propelled:  Propels a minimum of 150 feet   Wheelchair Description:   (MOD INDEP w/BUE/LEs x150 ft, slow pace)  Stairs 5 - Standby Prompting/Supervision or Set-up  Stairs DescriptionAscends/descends 12 to 14 steps, Extra time, Verbal cueing, Supervision for safety (Up/down 12 6\" steps with BHR and SPV)  Discharge Location: Home  Patient Discharging with Assist of: Family  Level of Supervision Required Upon Discharge: Twenty Four Hour Supervision  Recommended Equipment for Discharge: 4-Wheeled " Walker  Recommeded Services Upon Discharge: Home Health Physical Therapy;Outpatient Physical Therapy  Long Term Goals Met: 2  Long Term Goals Not Met: 0  Reason(s) for Goals Not Met: N/A  Criteria for Termination of Services: Maximum Function Achieved for Inpatient Rehabilitation  Comprehension Mode:  Auditory  Comprehension:  7 - Independent  Comprehension Description:  Verbal cues  Expression Mode:  Vocal  Expression:  6 - Modified Independent  Expression Description:  Verbal cueing  Social Interaction:  7 - Independent  Social Interaction Description:  Increased time, Verbal cues  Problem Solvin - Standby Prompting/Supervision or Set-up  Problem Solving Description:  Verbal cueing, Therapy schedule, Increased time  Memory:  5 - Standby Prompting/Supervision or Set-up  Memory Description:  Verbal cueing, Therapy schedule  Progress since Admit: good  Discharge Location : Home  Patient Discharging with Assist of: Family   Level of Supervision Required: Intermittent Supervision  Recommended Services Upon Discharge: Outpatient Speech Therapy  Long Term Goals Met: 2/2  Long Term Goals Not Met: 0/2  Reason(s) for Goals Not Met: n/a  Criteria for Termination of Services: Maximum Function Achieved for Inpatient Rehabilitation  Comments: Pt has demonstrated good progress while at [facility]. Pt has been advanced to a regular texture diet with thin liquids, tolerating well. No further dysphagia management required at this time. At the time of d/c pt cont to present with mild deficits in the areas of complex attention, short term memory recall, delayed processing and executive functioning skills.  It is recommended that pt have supervision with all medication management and financial management tasks at the time of d/c to ensure accuracy and safety. It is also recommended that pt cont cognitive intervention in the form of home health intervention to cont to address the skills mentioned above.      Janae HERNÁNDEZ,  M.D., personally performed a complete drug regimen review and no potential clinically significant medication issues were identified.   Discharge Medication:     Medication List      START taking these medications      Instructions   acetaminophen/caffeine/butalbital 325-40-50 mg -40 MG Tabs  Commonly known as:  FIORICET   Take 1 Tab by mouth every 6 hours as needed for up to 10 days.  Dose:  1 Tab     traZODone 50 MG Tabs  Commonly known as:  DESYREL   Take 1 Tab by mouth every bedtime.  Dose:  50 mg     warfarin 5 MG Tabs  Commonly known as:  COUMADIN  Notes to patient:  Thins blood to prevent blood clots   Take 1 Tab by mouth every day.  Dose:  5 mg        CONTINUE taking these medications      Instructions   acetaminophen 325 MG Tabs  Commonly known as:  TYLENOL  Notes to patient:  Tylenol as needed for fever and mild pain   Take 2 Tabs by mouth every four hours as needed.  Dose:  650 mg     digoxin 125 MCG Tabs  Commonly known as:  LANOXIN  Notes to patient:  Regulates heart rate   Take 1 Tab by mouth every day at 6 PM.  Dose:  125 mcg     metoprolol 25 MG Tabs  Commonly known as:  LOPRESSOR  Notes to patient:  Controls high blood pressure   Take 1 Tab by mouth 2 times a day.  Dose:  25 mg        STOP taking these medications    amLODIPine 5 MG Tabs  Commonly known as:  NORVASC     heparin 5000 UNIT/ML Soln     ondansetron 4 MG Tbdp  Commonly known as:  ZOFRAN ODT     sodium chloride 1 GM Tabs  Commonly known as:  SALT            Discharge Diet:  Regular, Thins     Discharge Activity:  As tolerated     Disposition:  Patient to discharge home with family support and community resources.     Equipment:  4WW    Follow-up & Discharge Instructions:  Follow up with your primary care provider (PCP) within 7-10 days of discharge to review your medications and take over your care.     If you develop chest pain, fever, chills, change in neurologic function (weakness, sensation changes, vision changes), or other  concerning sxs, seek immediate medical attention or call 911.      Condition on Discharge:  Good    More than 40 minutes was spent on discharging this patient, including face-to-face time, prescription management, and the dictation of this note.    Janae Ortega M.D.    Date of Service: 10/25/2018

## 2018-10-25 NOTE — PROGRESS NOTES
0715: Bedside report received, assumed care for this patient.  Patient is A&O x 4.  VSS.  Communication board updated, call light and belongings are within reach.  Bed is in low position. Patient appears in no distress, and has no complaints of SOB and 0/10 of pain.  Will be medicated per MAR.  Plan of care discussed and agreed upon with patient, including d/c today.  May not be at 10AM as patietn hoped.  Patient aware.

## 2018-10-25 NOTE — DISCHARGE INSTRUCTIONS
[facility] NURSING DISCHARGE INSTRUCTIONS    Blood Pressure : 116/64  Weight: 70.9 kg (156 lb 4.9 oz)  Nursing recommendations for Yessi Hess at time of discharge are as follows:  Client and Family Member verbalized understanding of all discharge instructions and prescriptions.     Review all your home medications and newly ordered medications with your doctor and/or pharmacist. Follow medication instructions as directed by your doctor and/or pharmacist.    Pain Management:   Discharge Pain Medication Instructions:  Comfort Goal: Comfort at Rest, Comfort with Movement, Perform Activity, Play, Sleep Comfortably, Stay Alert  Notify your primary care provider if pain is unrelieved with these measures, if the pain is new, or increased in intensity.    Discharge Skin Characteristics: Warm, Dry  Discharge Skin Exam:    Wound 10/09/18 Laceration Head scab (Active)   Site Assessment Clean;Dry;Intact;Pink 10/24/2018  8:33 PM   Marina-wound Assessment Clean;Dry;Intact;Pink 10/24/2018  8:33 PM   Closure Approximated 10/24/2018  8:33 PM   Drainage Amount None 10/24/2018  8:33 PM   Dressing Options Open to Air 10/24/2018  8:33 PM   NEXT Weekly Photo (Inpatient Only) 10/20/18 10/13/2018 10:00 PM     Skin / Wound Care Instructions: Please contact your primary care physician for any change in skin integrity.     If You Have Surgical Incisions / Wounds:  Monitor surgical site(s) for signs of increased swelling, redness or symptoms of drainage from the site or fever as this could indicate signs and symptoms of infection. If these symptoms are noted, notifiy your primary care provider.      Discharge Safety Instructions: Should Have ADULT SUPERVISION     Discharge Safety Concerns: Weakness, History Of Falls  The interdisciplinary team has made recommendation that you should have adult supervision in the house due to weakness  Anti-embolic stockings are required to increase circulation to the lower  extremities.    Discharge Diet: Regular     Discharge Liquids: Thin Liquid  Discharge Bowel Function: Continent  Please contact your primary care physician for any changes in bowel habits.  Discharge Bowel Program:    Discharge Bladder Function: Continent  Discharge Urinary Devices:        Nursing Discharge Plan:        Case Management Discharge Instructions:   Discharge Location: Home with Outpatient Services  Agency Name/Address/Phone: [facility]  [facility], [place]  [phone], they will call to set up appointment  Home Health:    Outpatient Services: Occupational Therapy, Physical Therapy, Speech Therapy  DME Provider/Phone:    Medical Equipment Ordered:    Prescription Faxed to:        Discharge Medication Instructions:  Below are the medications your physician expects you to take upon discharge:      Intracranial Hemorrhage  An intracranial hemorrhage is bleeding in the layers between the skull (cranium) and brain. A blood vessel bursts and allows blood to leak inside the cranial cavity. The leaking blood then collects (hematoma). This causes pressure and damage to brain cells. The bleeding can be mild to severe. In severe cases, it can lead to permanent damage or death. Symptoms may come on suddenly or develop over time. Early diagnosis and treatment leads to better recovery.  There are four types of intracranial hemorrhage: subarachnoid, subdural, extradural, or cerebral hemorrhage.  What are the causes?  · Head injury (trauma).  · Ruptured brain aneurysm.  · Bleeding from blood vessels that develop abnormally (arteriovenous malformation).  · Bleeding disorder.  · Use of blood thinners (anticoagulants).  · Use of certain drugs, such as cocaine.  For some people with intracranial hemorrhage, the cause is unknown.  What increases the risk?  · Using tobacco products, such as cigarettes and chewing tobacco.  · Having high blood pressure (hypertension).  · Abusing alcohol.  · Being a  female, especially of postmenopausal age.  · Having a family history of disease in the blood vessels of the brain (cerebrovascular disease).  · Having certain genetic syndromes that result in kidney disease or connective tissue disease.  What are the signs or symptoms?  · A sudden, severe headache with no known cause. The headache is often described as the worst headache ever experienced.  · Nausea or vomiting, especially when combined with other symptoms such as a headache.  · Sudden weakness or numbness of the face, arm, or leg, especially on one side of the body.  · Sudden trouble walking or difficulty moving arms or legs.  · Sudden confusion.  · Sudden personality changes.  · Trouble speaking (aphasia) or understanding.  · Difficulty swallowing.  · Sudden trouble seeing in one or both eyes.  · Double vision.  · Dizziness.  · Loss of balance or coordination.  · Intolerance to light.  · Stiff neck.  How is this diagnosed?  Your health care provider will perform a physical exam and ask about your symptoms. If an intracranial hemorrhage is suspected, various tests may be ordered. These tests may include:  · A CT scan.  · An MRI.  · A cerebral angiogram.  · A spinal tap (lumbar puncture).  · Blood tests.  How is this treated?  Immediate treatment in the hospital is often required to reduce the risk of brain damage. Treatment will depend on the cause of the bleeding, where it is located, and the extent of the bleeding and damage. The goals of treatment include stopping the bleeding, repairing the cause of bleeding, providing relief of symptoms, and preventing problems.  · Medicines may be given to:  ¨ Lower blood pressure (antihypertensives).  ¨ Relieve pain (analgesics).  ¨ Relieve nausea or vomiting.  · Surgery may be needed to stop the bleeding, repair the cause of the bleeding, or remove the blood.  · Rehabilitation may be needed to improve any cognitive and day-to-day functions impaired by the condition.  Further  treatment depends on the duration, severity, and cause of your symptoms. Physical, speech, and occupational therapists will assess you and work to improve any functions impaired by the intracranial hemorrhage. Measures will be taken to prevent short-term and long-term problems, including infection from breathing foreign material into the lungs (aspiration pneumonia), blood clots in the legs, bedsores, and falls.  Follow these instructions at home:  · Take medicines only as directed by your health care provider.  · Eat healthy foods as directed by your health care provider:  ¨ A diet low in salt (sodium), saturated fat, trans fat, and cholesterol may be recommended to manage your blood pressure.  ¨ Foods may need to be soft or pureed, or small bites may need to be taken in order to avoid aspirating or choking.  ¨ If studies show that your ability to swallow safely has been affected, you may need to seek help from specialists such as a dietitian, speech and language pathologist, or an occupational therapist. These health care providers can teach you how to safely get the nutrition your body needs.  · Rest and limit activities or movements as directed by your health care provider.  · Do not use any tobacco products including cigarettes, chewing tobacco, or electronic cigarettes. If you need help quitting, ask your health care provider.  · Limit alcohol intake to no more than 1 drink per day for nonpregnant women and 2 drinks per day for men. One drink equals 12 ounces of beer, 5 ounces of wine, or 1½ ounces of hard liquor.  · Make any other lifestyle changes as directed by your health care provider.  · Monitor and record your blood pressure as directed by your health care provider.  · A safe home environment is important to reduce the risk of falls. Your health care provider may arrange for specialists to evaluate your home. Having grab bars in the bedroom and bathroom is often important. Your health care provider may  arrange for special equipment to be used at home, such as raised toilets and a seat for the shower.  · Do physical, occupational, and speech therapy as directed by your health care provider. Ongoing therapy may be needed to maximize your recovery.  · Use a walker or a cane at all times if directed by your health care provider.  · Keep all follow-up visits with your health care provider and other specialists. This is important. This includes any referrals, physical therapy, and rehabilitation.  Get help right away if:  · You have a sudden, severe headache with no known cause.  · You have nausea or vomiting occurring with another symptom.  · You have sudden weakness or numbness of the face, arm, or leg, especially on one side of the body.  · You have sudden trouble walking or difficulty moving your arms or legs.  · You have sudden confusion.  · You have trouble speaking (aphasia) or understanding.  · You have sudden trouble seeing in one or both eyes.  · You have a sudden loss of balance or coordination.  · You have a stiff neck.  · You have difficulty breathing.  · You have a partial or total loss of consciousness.  These symptoms may represent a serious problem that is an emergency. Do not wait to see if the symptoms will go away. Get medical help right away. Call your local emergency services (911 in the [place] Do not drive yourself to the hospital.   This information is not intended to replace advice given to you by your health care provider. Make sure you discuss any questions you have with your health care provider.  Document Released: 07/15/2015 Document Revised: 05/19/2017 Document Reviewed: 02/11/2015  Elsevier Interactive Patient Education © 2017 [employer]    Atrial Fibrillation  Atrial fibrillation is a type of irregular or rapid heartbeat (arrhythmia). In atrial fibrillation, the heart quivers continuously in a chaotic pattern. This occurs when parts of the heart receive disorganized signals that make  the heart unable to pump blood normally. This can increase the risk for stroke, heart failure, and other heart-related conditions. There are different types of atrial fibrillation, including:  · Paroxysmal atrial fibrillation. This type starts suddenly, and it usually stops on its own shortly after it starts.  · Persistent atrial fibrillation. This type often lasts longer than a week. It may stop on its own or with treatment.  · Long-lasting persistent atrial fibrillation. This type lasts longer than 12 months.  · Permanent atrial fibrillation. This type does not go away.  Talk with your health care provider to learn about the type of atrial fibrillation that you have.  What are the causes?  This condition is caused by some heart-related conditions or procedures, including:  · A heart attack.  · Coronary artery disease.  · Heart failure.  · Heart valve conditions.  · High blood pressure.  · Inflammation of the sac that surrounds the heart (pericarditis).  · Heart surgery.  · Certain heart rhythm disorders, such as Domínguez-Parkinson-White syndrome.  Other causes include:  · Pneumonia.  · Obstructive sleep apnea.  · Blockage of an artery in the lungs (pulmonary embolism, or PE).  · Lung cancer.  · Chronic lung disease.  · Thyroid problems, especially if the thyroid is overactive (hyperthyroidism).  · Caffeine.  · Excessive alcohol use or illegal drug use.  · Use of some medicines, including certain decongestants and diet pills.  Sometimes, the cause cannot be found.  What increases the risk?  This condition is more likely to develop in:  · People who are older in age.  · People who smoke.  · People who have diabetes mellitus.  · People who are overweight (obese).  · Athletes who exercise vigorously.  What are the signs or symptoms?  Symptoms of this condition include:  · A feeling that your heart is beating rapidly or irregularly.  · A feeling of discomfort or pain in your chest.  · Shortness of breath.  · Sudden  light-headedness or weakness.  · Getting tired easily during exercise.  In some cases, there are no symptoms.  How is this diagnosed?  Your health care provider may be able to detect atrial fibrillation when taking your pulse. If detected, this condition may be diagnosed with:  · An electrocardiogram (ECG).  · A Holter monitor test that records your heartbeat patterns over a 24-hour period.  · Transthoracic echocardiogram (TTE) to evaluate how blood flows through your heart.  · Transesophageal echocardiogram (SERGE) to view more detailed images of your heart.  · A stress test.  · Imaging tests, such as a CT scan or chest X-ray.  · Blood tests.  How is this treated?  The main goals of treatment are to prevent blood clots from forming and to keep your heart beating at a normal rate and rhythm. The type of treatment that you receive depends on many factors, such as your underlying medical conditions and how you feel when you are experiencing atrial fibrillation.  This condition may be treated with:  · Medicine to slow down the heart rate, bring the heart’s rhythm back to normal, or prevent clots from forming.  · Electrical cardioversion. This is a procedure that resets your heart’s rhythm by delivering a controlled, low-energy shock to the heart through your skin.  · Different types of ablation, such as catheter ablation, catheter ablation with pacemaker, or surgical ablation. These procedures destroy the heart tissues that send abnormal signals. When the pacemaker is used, it is placed under your skin to help your heart beat in a regular rhythm.  Follow these instructions at home:  · Take over-the counter and prescription medicines only as told by your health care provider.  · If your health care provider prescribed a blood-thinning medicine (anticoagulant), take it exactly as told. Taking too much blood-thinning medicine can cause bleeding. If you do not take enough blood-thinning medicine, you will not have the  protection that you need against stroke and other problems.  · Do not use tobacco products, including cigarettes, chewing tobacco, and e-cigarettes. If you need help quitting, ask your health care provider.  · If you have obstructive sleep apnea, manage your condition as told by your health care provider.  · Do not drink alcohol.  · Do not drink beverages that contain caffeine, such as coffee, soda, and tea.  · Maintain a healthy weight. Do not use diet pills unless your health care provider approves. Diet pills may make heart problems worse.  · Follow diet instructions as told by your health care provider.  · Exercise regularly as told by your health care provider.  · Keep all follow-up visits as told by your health care provider. This is important.  How is this prevented?  · Avoid drinking beverages that contain caffeine or alcohol.  · Avoid certain medicines, especially medicines that are used for breathing problems.  · Avoid certain herbs and herbal medicines, such as those that contain ephedra or ginseng.  · Do not use illegal drugs, such as cocaine and amphetamines.  · Do not smoke.  · Manage your high blood pressure.  Contact a health care provider if:  · You notice a change in the rate, rhythm, or strength of your heartbeat.  · You are taking an anticoagulant and you notice increased bruising.  · You tire more easily when you exercise or exert yourself.  Get help right away if:  · You have chest pain, abdominal pain, sweating, or weakness.  · You feel nauseous.  · You notice blood in your vomit, bowel movement, or urine.  · You have shortness of breath.  · You suddenly have swollen feet and ankles.  · You feel dizzy.  · You have sudden weakness or numbness of the face, arm, or leg, especially on one side of the body.  · You have trouble speaking, trouble understanding, or both (aphasia).  · Your face or your eyelid droops on one side.  These symptoms may represent a serious problem that is an emergency. Do  not wait to see if the symptoms will go away. Get medical help right away. Call your local emergency services (911 in the [place] Do not drive yourself to the hospital.   This information is not intended to replace advice given to you by your health care provider. Make sure you discuss any questions you have with your health care provider.  Document Released: 12/18/2006 Document Revised: 04/26/2017 Document Reviewed: 04/13/2016  [employer]R Interactive Patient Education © 2017 [employer]      Warfarin   Warfarin is a blood thinner (anticoagulant) medicine. It is used to thin the blood so blood clots do not form. This medicine may cause very bad bleeding. You may also bruise easily while on this medicine. You may also bleed a little longer if you cut yourself.   Before taking warfarin, tell your doctor if:  · You take any other medicine for your heart or blood pressure.  · You are pregnant.  · You plan to get pregnant.  · You are breastfeeding.  · You are allergic to any medicine.  HOME CARE  · Have your blood checked as told by your doctor. Do not miss visits. Blood tests will include the PT and INR tests. These tests measure how long it takes for a clot to form in a blood sample. The test results help your doctor change your dose of warfarin if needed. If you miss your blood test visits, you could have bad bleeding or blood clots.  · Take warfarin exactly as told by your doctor. If you do not, bleeding or blood clots could lead to lasting injury, pain, or disability.  · Take your medicine at the same time every day. Do not miss a dose.  · Tell your doctor about all medicine you take. This includes medicines, vitamins, natural products, or dietary pills (supplements). Certain medicines are not safe to take while taking warfarin. Taking other medicines while taking warfarin can affect your PT and INR test results.  · Do not start or stop any medicine unless you have been told to do so by your doctor.  · Do not take  anything that has aspirin in it unless your doctor says it is okay.  · Eat the same amount of vitamin K foods every day. Vitamin K foods can change how warfarin works and your PT and INR test results.    · High vitamin K foods: Beef liver. Pork liver. Green tea. Broccoli. [place] sprouts. Cauliflower. Chickpeas. Kale. Spinach. Turnip greens.  · Medium vitamin K foods: Chicken liver. Pork tenderloin. Cheddar cheese. Rolled oats. Coffee. Asparagus. Cabbage. Iceberg lettuce.  · Low vitamin K foods: Apples. Butter. Bananas. Skim or 1% milk. Canned pears. White bread. Strawberries. Corn. Tomatoes. Green beans. Eggs. Potatoes. Colon. Pumpkin. Chicken breasts. Ground beef. Oil (except soybean oil).  · Avoid making major changes to your normal diet. Talk to your doctor first before changing your normal diet.  · Do not drink alcohol.  · Tell your doctors and dentists that you are taking warfarin at the beginning of every visit.  GET HELP RIGHT AWAY IF:  · You miss a dose of warfarin. Do not take 2 doses at the same time.  · You have a skin rash.  · You have heavy or unusual bleeding.  · There is blood in your pee (urine) or poop (stool).  · You have side effects from medicine that do not get better after a few days.  MAKE SURE YOU:  · Understand these instructions.  · Will watch your condition.  · Will get help right away if you are not doing well or get worse.  Document Released: 01/20/2012 Document Revised: 06/18/2013 Document Reviewed: 01/20/2012  [employer]® Patient Information ©2014 [employer].          Depression / Suicide Risk    As you are discharged from this [facility] facility, it is important to learn how to keep safe from harming yourself.    Recognize the warning signs:  · Abrupt changes in personality, positive or negative- including increase in energy   · Giving away possessions  · Change in eating patterns- significant weight changes-  positive or negative  · Change in sleeping patterns- unable to sleep  "or sleeping all the time   · Unwillingness or inability to communicate  · Depression  · Unusual sadness, discouragement and loneliness  · Talk of wanting to die  · Neglect of personal appearance   · Rebelliousness- reckless behavior  · Withdrawal from people/activities they love  · Confusion- inability to concentrate     If you or a loved one observes any of these behaviors or has concerns about self-harm, here's what you can do:  · Talk about it- your feelings and reasons for harming yourself  · Remove any means that you might use to hurt yourself (examples: pills, rope, extension cords, firearm)  · Get professional help from the community (Mental Health, Substance Abuse, psychological counseling)  · Do not be alone:Call your Safe Contact- someone whom you trust who will be there for you.  · Call your local CRISIS HOTLINE 775-0284 or 895-518-9219  · Call your local Children's Mobile Crisis Response Team [place] (280) 962-0779 or www.[employer]  · Call the toll free National Suicide Prevention Hotlines   · National Suicide Prevention Lifeline 625-098-BIXE (4472)  · National Hope Line Network 800-SUICIDE (263-6852)    Prevent Falls in Your Home    \"Falling once doubles your chance of falling again\"        -Center for Disease Control and Prevention    Falls in the home can lead to serious injury (fractures, brain injuries), hospitalizations, increased medical costs, and could even be fatal.  The good news is, there are many precautions you can take to avoid falls in your home and help keep you safe:     · If prescribed an assistive device (walker, crutches), use as instructed by the healthcare provider\"   · Remove any tripping hazards from your home, including loose cords, throw rugs and clutter  · Keep a nightlight on in dark (hallways, bathrooms, etc)   · Get up slowly, to make sure you feel okay before getting up  · Be aware of any side effects of your medications: some medications may make you " dizzy  · Place a non-skid rubber mat in your shower or tub-consider a shower bench or chair if unsteady on your feet  · Wear supportive shoes or non-skid socks when moving around  · Start an exercise program once approved by your provider.  If you are feeling weak following a hospital stay, talk to your doctor about home health or outpatient therapy programs designed to help rebuild your strength and endurance  Physical Therapy Discharge Instructions for Yessi Hess    10/24/2018    Level of Assist Required for Ambulation: Supervision on Flat Surfaces, Supervision on Curbs, Supervision on Stairs  Distance Patient May Ambulate: Up to 500 feet or more as tolerated  Device Recommended for Ambulation: 4-Wheeled Walker  Level of Assist Required to Propel Wheelchair: Requires No Assist  Level of Assist Required for Transfers: Requires No Assist  Device Recommended for Transfers: 4-Wheeled Walker  Home Exercise Program: Refer to Home Exercise Program Handout for Details    It was great working with miguel Trent!  Take care and best wishes with your continued recovery!  Annamarie Leyva PT DPT/Sasha Egan PT DPT    Occupational Therapy Discharge Instructions for Yessi Hess    10/24/2018    Level of Assist Required for Eating: Able to Complete Eating without Assist  Level of Assist Required for Grooming: Able to Complete Grooming without Assist  Level of Assist Required for Dressing: Able to Complete Dressing without Assist  Equipment for Dressing: Elastic Shoe Laces  Level of Assist Required for Toileting: Able to Complete Toileting without Assist  Level of Assist Required for Toilet Transfer: Able to Complete Toilet Transfer without Assist  Equipment for Toilet Transfer: Grab Bars by Toilet  Level of Assist Required for Bathing: Requires Supervision with Bathing (Pt fall risk in standing for perineal even w/ GB)  Equipment for Bathing: Tub Transfer Bench, Shower Chair, Grab Bars in Tub / Shower, Hand Held Shower  Head  Level of Assist Required for Shower Transfer: Requires Supervision with Shower Transfer (please supervise until pt demsontrates consistent safety)  Equipment for Shower Transfer: Grab Bars in Tub / Shower, Tub Transfer Bench, Shower Chair  Level of Assist Required for Home Mgmt: Requires Supervision with Home Management  Level of Assist Required for Meal Prep: Requires Physical Assist with Meal Preparation  Driving: May not Drive, Please Contact Physician for Further Information  Home Exercise Program: Refer to Home Exercise Program Handout for Details  Comments: Miley, It was an absolute pleasure working with you and your family. Please continue to have family supervise showers because you are still a fall risk when standing in shower. Please sit when possible to get dressed. In the kitchen, please get close to a solid surface before reaching for items, and please do not cook alone, have family present to increase safety. When in the community, do NOT separate from your family, you are at risk for getting lost. Best of luck with your continued recovery and thank you for allowing me to be apart of your treatment team. Rosanna Leavitt MS, OTR/L      Speech Therapy Discharge Instructions for Yessi Hess    10/25/2018    Diet: Regular - all foods allowed, Thin Liquids - any liquid like water, coffee, tea, juices, or clear fluids like Gatorade, etc.  Swallow Strategies: n/a  Other Diet Instructions: n/a  Supervision: intermittent for safety   Cognition / Communication: Pt has demonstrated good progress while at [facility]. Pt has been advanced to a regular texture diet with thin liquids, tolerating well. No further dysphagia management required at this time. At the time of d/c pt cont to present with mild deficits in the areas of complex attention, short term memory recall, delayed processing and executive functioning skills.  It is recommended that pt have supervision with all medication management and  financial management tasks at the time of d/c to ensure accuracy and safety. It is also recommended that pt cont cognitive intervention in the form of home health intervention to cont to address the skills mentioned above.      Miley, it has been a pleasure working with you and your family throughout your stay at [facility]. Take care, be safe! Remember to take your time and if you find yourself questioning whether or not you should be doing something the answer is probably no. Best of luck with your continued recovery, dont forget to come visit!     Sherly Nava M.S., CCC-SLP

## 2018-10-25 NOTE — CARE PLAN
Problem: Safety  Goal: Will remain free from injury  Pt remains free from accidental injury.Appropriately uses call light to make needs known.Bed in low position.Call light within reach.Will continue to monitor and assess needs and safety.    Problem: Bowel/Gastric:  Goal: Normal bowel function is maintained or improved    Intervention: Educate patient and significant other/support system about signs and symptoms of constipation and interventions to implement  Pt remains continent of bowel.LBM 10/24.Will continue to monitor and assess for s/sx of constipation.      Problem: Pain Management  Goal: Pain level will decrease to patient's comfort goal  Pt is calm, comfortable and no sign of acute distress noted.Repositioned with pillows for comfort.Will continue to monitor and assess pain level and medicate as needed.

## 2018-10-25 NOTE — PROGRESS NOTES
Inpatient Anticoagulation Service Note    Date: 10/25/2018  Reason for Anticoagulation: Atrial Fibrillation        Hemoglobin Value: (!) 11.3  Hematocrit Value: (!) 30.9  Lab Platelet Value: 266  Target INR: 2.0 to 3.0    INR from last 7 days     Date/Time INR Value    10/25/18 0602 (!)  2.37    10/24/18 0543 (!)  2.24    10/23/18 0543 (!)  2.29    10/22/18 0550 (!)  1.98    10/21/18 0535 (!)  1.69    10/20/18 0526 (!)  1.39    10/19/18 0558 (!)  1.25        Dose from last 7 days     Date/Time Dose (mg)    10/25/18 0602  5    10/24/18 0543  5    10/23/18 0543  5    10/22/18 0550  6    10/21/18 1200  7.5    10/20/18 1100  7.5    10/19/18 0558  5        Significant Interactions: Other (Comments) (Tramadol, Trazodone, Fioricet, Digoxin)  Bridge Therapy: No (If less than 5 days and overlap therapy discontinued -- document reason (i.e. Bleed Risk))    (If still on overlap therapy, if No -- document reason (i.e. Bleed Risk))    Comments: Home dose Coumadin 5 mg daily per Med Rec    Plan:  Coumadin 5 mg tonight for INR = 2.37        Flor Holt PharmD

## 2018-10-25 NOTE — DISCHARGE PLANNING
Luis Lakhani at [facility], referral accepted. They will call patient to schedule appt.

## 2018-10-26 NOTE — DISCHARGE PLANNING
Met w/ patient & daughter Nan prior to discharge home. Reivewed all f/u appts. [facility] accepted referral. Daughter has DME in place. IMM initialed. While hospitalized, I have provided support & education, availability for questions & information during business hours, communication w/ therapy team & MD & links to outside services. Patient & daughter verbalize agreement & understanding of next steps in post acute process. Patient smiling & very pleased to be discharging home.    Individualized Goals:   1. Patient & family goal: return to PLOF & independence, lives alone.  2. Case mgmt will assist w/DC planning needs, referrals & f/u appts as needed/TBD.  3. Provide emotional support & update family regarding team conference & POC.    Outcomes:  1. Goal partially met: patient has improved function, but is not back to baseline function, daughter will be staying w/ patient at discharge, OP therapies setup to continue to progress towards goal.  2. Goal met: DC planning referrals in place, MD f/u appts setup.  3. Goal met: all questions answered & information provided.

## 2018-10-29 NOTE — PROGRESS NOTES
DATE OF SERVICE:  10/23/2018    The patient was seen for followup visit.  The patient was in very good   spirits.  She will be discharged soon.  We talked to her at length about her   home program and some of the goals she would like to meet over the next few   weeks.       ____________________________________     RENAE GIPSON, PHD    [facility] / [facility]LEY    DD:  10/29/2018 13:01:01  DT:  10/29/2018 16:27:39    D#:  [id number]  Job#:  [id number]

## 2018-10-29 NOTE — PROGRESS NOTES
DATE OF SERVICE:  10/24/2018    Patient was seen for followup visit.  Patient will be discharged tomorrow.    She has done remarkably well.  Her cognitive status is improved, her mood is   upbeat and positive, and the patient is confident for returning home.  She is   strong and supportive ____.       ____________________________________     RENAE GIPSON, PHD    [facility]    DD:  10/29/2018 14:23:35  DT:  10/29/2018 16:58:00    D#:  [id number]  Job#:  [id number]

## 2018-11-19 ENCOUNTER — HOSPITAL ENCOUNTER (OUTPATIENT)
Dept: RADIOLOGY | Facility: MEDICAL CENTER | Age: 78
End: 2018-11-19
Attending: PHYSICIAN ASSISTANT
Payer: MEDICARE

## 2018-11-19 DIAGNOSIS — I61.5 NONTRAUMATIC INTRAVENTRICULAR INTRACEREBRAL HEMORRHAGE, UNSPECIFIED LATERALITY (HCC): ICD-10-CM

## 2018-11-19 PROCEDURE — 70450 CT HEAD/BRAIN W/O DYE: CPT

## 2020-12-29 NOTE — THERAPY
"     Speech Language Therapy Clinical Swallow Evaluation completed.  Functional Status: Patient seen this date for CSE s/p bilateral EVD. She was awake, but with low level of alertness. Agreeable to assessment and able to follow commands. Oral mech exam with reduced ROM of the tongue, possibly related to patient AMIRA. Otherwise, WFL. Swallow palpated as complete. PO trials of ice chips, thin liquids (spoon, cup, straw), nectar thick liquids (cup), and pureed consistency. Patient with delayed cough x2 on sips of thin liquids from a straw, one of which resulted in gagging and small amounts of green emesis. No coughing with nectar thick liquids from a cup. Patient fatigued quickly after emesis. Recommend starting on a Nectar Thick Full Liquid diet with high f/u to ensure tolerance. Please hold PO if any difficulty.     Recommendations - Diet: Diet / Liquid Recommendation: Nectar Thick Full Liquid                          Strategies: Direct supervision during meals, No Straws and Head of Bed at 90 Degrees (please seat patient as upright as possible given drains)                          Medication Administration: Medication Administration : Crush all Medications in Puree  Plan of Care: Will benefit from Speech Therapy 5 times per week  Post-Acute Therapy: Discharge to a transitional care facility for continued skilled therapy services.    See \"Rehab Therapy-Acute\" Patient Summary Report for complete documentation.   " no

## 2020-12-31 NOTE — PROGRESS NOTES
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    Turban still with no strike through so drainage from EVD site reolved  EVD output 0-8cc/hr on R and 4-15 cc/hr on Left  Drainage remains blood tinged  Pt was a bit lethargic yesterday, but sitting up and brighter today    Exam:  Awake. Eyes open.  Knows name   PERRL  Will follow simple commands.  No visble Drainage around EVD insertion site     BP  Min: 127/58  Max: 131/64  Pulse  Av  Min: 71  Max: 101  Resp  Av  Min: 16  Max: 48  Temp  Av.3 °C (99.1 °F)  Min: 36.8 °C (98.3 °F)  Max: 37.8 °C (100 °F)  SpO2  Av %  Min: 93 %  Max: 100 %    ICP  Av.1 MM HG  Min: 1 MM HG  Max: 4 MM HG    Recent Labs      18   0506  18   0530  18   0400   WBC  13.9*  14.3*  15.2*   RBC  4.10*  4.08*  4.29   HEMOGLOBIN  12.0  12.3  12.6   HEMATOCRIT  35.5*  35.1*  37.2   MCV  86.6  86.0  86.7   MCH  29.3  30.1  29.4   MCHC  33.8  35.0  33.9   RDW  42.7  41.9  40.7   PLATELETCT  215  238  232   MPV  10.7  10.5  10.8     Recent Labs      18   0506  18   0530  18   0400   SODIUM  135  136  135   POTASSIUM  4.0  4.0  4.3   CHLORIDE  98  99  98   CO2  29  27  29   GLUCOSE  145*  142*  116*   BUN  28*  31*  27*   CREATININE  0.60  0.55  0.52   CALCIUM  9.5  9.2  9.3     Recent Labs      18   0530  18   0400   INR  1.17*  1.12           Intake/Output       18 0700 - 18 0659 18 0700 - 10/01/18 0659       Total  Total       Intake    P.O.  20  -- 20  --  -- --    P.O. 20 -- 20 -- -- --    I.V.  100  -- 100  --  -- --    IV Volume (NS at TKO) 100 -- 100 -- -- --    Other  90  -- 90  --  -- --    Medications (PO/Enteral Liquids) 90 -- 90 -- -- --    NG/GT  720  720 1440  120  --  PT TREATMENT     12/31/20 0915   Note Type   Note Type Treatment   Pain Assessment   Pain Assessment Tool Pain Assessment not indicated - pt denies pain   Restrictions/Precautions   Other Precautions Fall Risk   General   Chart Reviewed Yes   Cognition   Overall Cognitive Status WFL   Arousal/Participation Alert   Following Commands   (forgetful)   Subjective   Subjective "My legs feel weak today"   Bed Mobility   Supine to Sit 5  Supervision   Transfers   Sit to Stand 5  Supervision   Stand to Sit 5  Supervision   Stand pivot 5  Supervision   Additional items   (with and without walker)   Ambulation/Elevation   Gait Assistance 5  Supervision   Assistive Device Rolling walker  ([id number])   Distance 3x60 feet wtih changes in direction  No balalnce loss   Balance   Static Sitting Good   Dynamic Sitting Fair +   Static Standing Fair   Dynamic Standing Fair -   Ambulatory Fair -   Activity Tolerance   Activity Tolerance Patient limited by fatigue   Exercises   Balance training  standing feet apart: head turns R/L, up/down x 5 each; marching in place 2x5 each leg without UE support   Assessment   Prognosis Good   Problem List Decreased strength; Impaired balance;Decreased mobility; Decreased endurance   Assessment Pt demonstrates improving endurance, balance and function  Pt does not demonstrate focal weakness but is generally weak and fatigues easily with activity  Gait is however generally steady with walker use  Plan   Treatment/Interventions ADL retraining;Functional transfer training;LE strengthening/ROM; Therapeutic exercise; Endurance training;Elevations;Gait training;Equipment eval/education;Patient/family training   Progress Progressing toward goals   PT Frequency Once a day   Recommendation   PT Discharge Recommendation Home with skilled therapy   Equipment Recommended   (rolling walker for DC)   Licensure   [place] License Number  Nicky Foster PT [id number]    Intake (mL) (Enteral Tube 09/25/18 Cortrak - Gastric 10 Fr. Right nare)  120 -- 120    IV Piggyback  200  200 400  --  -- --    Volume (mL) (ceFAZolin in dextrose (ANCEF) IVPB premix 1 g) 200 200 400 -- -- --    Total Intake 7280 906 5890 120 -- 120       Output    Urine  655  805 1460  150  -- 150    Output (mL) (Urethral Catheter Latex 16 Fr.)  150 -- 150    Drains  141  152 293  37  -- 37    Output (mL) (ICP/Ventriculostomy Right) 39 42 81 13 -- 13    Output (mL) (ICP/Ventriculostomy Left) 102 110 212 24 -- 24    Total Output  187 -- 187       Net I/O     334 -37 297 -67 -- -67            Intake/Output Summary (Last 24 hours) at 09/30/18 1034  Last data filed at 09/30/18 0900   Gross per 24 hour   Intake             1760 ml   Output             1528 ml   Net              232 ml            • acetaminophen  650 mg Q4HRS PRN    Or   • acetaminophen  650 mg Q4HRS PRN   • amLODIPine  5 mg Q DAY   • metoprolol  50 mg Q8HRS   • Pharmacy   PRN   • labetalol  5-10 mg Q4HRS PRN   • digoxin  125 mcg DAILY AT 1800   • ondansetron  4 mg Q4HRS PRN    Or   • ondansetron  4 mg Q4HRS PRN   • Pharmacy Consult Request   PRN    And   • oxyCODONE immediate-release  2.5 mg Q3HRS PRN    And   • oxyCODONE immediate-release  5 mg Q3HRS PRN    And   • HYDROmorphone  0.25-1 mg Q2HRS PRN   • polyethylene glycol/lytes  1 Packet QDAY PRN    And   • senna-docusate  2 Tab BID    And   • magnesium hydroxide  30 mL QDAY PRN    And   • bisacodyl  10 mg QDAY PRN   • Respiratory Care per Protocol   Continuous RT   • MD Alert...Adult ICU Electrolyte Replacement per Pharmacy   pharmacy to dose   • LORazepam  2 mg Q5 MIN PRN   • hydrALAZINE  10 mg Q4HRS PRN   • enalaprilat  1.25 mg Q6HRS PRN   • ceFAZolin  1 g Q8HRS       Assessment and Plan:  Hospital day #9 EVD #8  Both drains working well L>R  Dr Santizo plans to leave drains in place thru weekend and start challenging to remove then next week.  Would do  craniectomy as a life saving measure only.  Pt looking increasingly better today.  Family at bedside and all questions answered.

## 2022-10-09 NOTE — ADDENDUM NOTE
Encounter addended by: Cristi Stevens M.D. on: 10/9/2022 7:28 AM   Actions taken: Delete clinical note

## 2024-02-13 NOTE — CARE PLAN
Problem: Safety  Goal: Free from accidental injury    Intervention: Initiate Safety Measures  Room near nursing station, drips/lines verified and alarms set appropriately.      Problem: Skin Integrity  Goal: Skin Integrity is maintained or improved  Pt turned every 2hrs while in bed and pressure points floated using pillows.       Statement Selected

## 2025-07-21 NOTE — PROGRESS NOTES
Neurosurgery Progress Note    Subjective:  Pt with IVH / SAH / SDH  Bilateral EVD day #2, at 0 tragus, both EVD are functioning.  ICP stable.   Pt stable  CT stable , CTA benign, MRI consistent with CT.    Exam:  GCS 15, awake, appropriate, moves ext x4, grossly strong     Pulse  Av.1  Min: 64  Max: 87  Resp  Av.4  Min: 12  Max: 29  Temp  Av.8 °C (98.2 °F)  Min: 36.3 °C (97.3 °F)  Max: 37.2 °C (98.9 °F)  SpO2  Av.3 %  Min: 90 %  Max: 99 %    ICP  Av.6 MM HG  Min: 2 MM HG  Max: 17 MM HG    Recent Labs      18   0810  09/23/18   0542  09/24/18   0530   WBC  10.0  12.4*  10.5   RBC  4.10*  3.52*  3.73*   HEMOGLOBIN  12.2  10.2*  10.7*   HEMATOCRIT  36.5*  31.2*  33.7*   MCV  89.0  88.6  90.3   MCH  29.8  29.0  28.7   MCHC  33.4*  32.7*  31.8*   RDW  43.8  43.4  44.2   PLATELETCT  132*  145*  149*   MPV  10.6  10.6  10.4     Recent Labs      18   0810   09/23/18   0009  18   0542  18   0530   SODIUM  139   < >  137  139  143   POTASSIUM  3.9   --    --   3.6  4.1   CHLORIDE  104   --    --   107  109   CO2  25   --    --   24  26   GLUCOSE  144*   --    --   113*  99   BUN  25*   --    --   18  16   CREATININE  0.65   --    --   0.57  0.59   CALCIUM  9.5   --    --   8.5  9.6    < > = values in this interval not displayed.     Recent Labs      1842  18   0530   APTT  29.0   --    --    INR  1.32*  1.19*  1.16*           Intake/Output       18 0700 - 18 0659 18 0700 - 1859       Total  Total       Intake    P.O.  30  -- 30  --  -- --    P.O. 30 -- 30 -- -- --    I.V.  1010  705 1715  --  -- --    Magnesium Sulfate Volume 50 -- 50 -- -- --    IV Volume (NS) -- 105 105 -- -- --    Volume (mL) (NS infusion)  -- -- --    IV Piggyback  776.7  200 976.7  --  -- --    Volume (mL) (NS (BOLUS) infusion 500 mL) 500 -- 500 -- -- --    Volume (mL) (ceFAZolin in dextrose  Pt and family in bay 5 for left axilla bx.    (ANCEF) IVPB premix 1 g) 76.7 100 176.7 -- -- --    Volume (mL) (levETIRAcetam (KEPPRA) 500 mg in  mL IVPB) -- 100 100 -- -- --    Volume (mL) (potassium chloride (KCL) ivpb 10 mEq) 200 -- 200 -- -- --    Total Intake 1816.7 905 2721.7 -- -- --       Output    Urine  400  2050 2450  --  -- --    Output (mL) (Urethral Catheter Latex 16 Fr.) 400 2050 2450 -- -- --    Drains  112  114 226  9  -- 9    Output (mL) (ICP/Ventriculostomy Right) 48 47 95 5 -- 5    Output (mL) (ICP/Ventriculostomy Left) 64 67 131 4 -- 4    Stool  --  -- --  --  -- --    Number of Times Stooled 0 x 0 x 0 x -- -- --    Total Output 512 2164 2676 9 -- 9       Net I/O     1304.7 -1259 45.7 -9 -- -9            Intake/Output Summary (Last 24 hours) at 09/24/18 0756  Last data filed at 09/24/18 0700   Gross per 24 hour   Intake          2721.67 ml   Output             2678 ml   Net            43.67 ml            • Pharmacy Consult Request   PRN    And   • oxyCODONE immediate-release  2.5 mg Q3HRS PRN    And   • oxyCODONE immediate-release  5 mg Q3HRS PRN    And   • HYDROmorphone  0.25-1 mg Q2HRS PRN   • Respiratory Care per Protocol   Continuous RT   • NS   Continuous   • acetaminophen  650 mg Q4HRS PRN    Or   • acetaminophen  650 mg Q4HRS PRN   • MD Alert...Adult ICU Electrolyte Replacement per Pharmacy   pharmacy to dose   • ondansetron  4 mg Q4HRS PRN    Or   • ondansetron  4 mg Q4HRS PRN   • senna-docusate  2 Tab BID    And   • polyethylene glycol/lytes  1 Packet QDAY PRN    And   • magnesium hydroxide  30 mL QDAY PRN    And   • bisacodyl  10 mg QDAY PRN   • LORazepam  2 mg Q5 MIN PRN   • labetalol  10 mg Q4HRS PRN   • hydrALAZINE  10 mg Q4HRS PRN   • enalaprilat  1.25 mg Q6HRS PRN   • ceFAZolin  1 g Q8HRS   • levETIRAcetam (KEPPRA) IV  500 mg Q12HRS   • Metoprolol Tartrate  5 mg Q6HRS       Assessment and Plan:  Hospital day #3  EVD #2  Will continue to follow  Keep EVD at 0, pt/family understands they will be in for some time  Continue  IM / pulmonary care

## (undated) DEVICE — SPONGE GAUZESTER 4 X 4 4PLY - (128PK/CA)

## (undated) DEVICE — PROTECTOR ULNA NERVE - (36PR/CA)

## (undated) DEVICE — LACTATED RINGERS INJ. 500 ML - (24EA/CA)

## (undated) DEVICE — DRAPE STRLE REG TOWEL 18X24 - (10/BX 4BX/CA)"

## (undated) DEVICE — SUCTION INSTRUMENT YANKAUER BULBOUS TIP W/O VENT (50EA/CA)

## (undated) DEVICE — KIT ANESTHESIA W/CIRCUIT & 3/LT BAG W/FILTER (20EA/CA)

## (undated) DEVICE — SUTURE 3-0 ETHILON FS-1 - (36/BX) 30 INCH

## (undated) DEVICE — CANISTER SUCTION 3000ML MECHANICAL FILTER AUTO SHUTOFF MEDI-VAC NONSTERILE LF DISP  (40EA/CA)

## (undated) DEVICE — TUBING C&T SET FLYING LEADS DRAIN TUBING (10EA/BX)

## (undated) DEVICE — CATHETER DRN 35CM 2.9MM 1.6MM LG HERMETIC LARGE-STYLE - SUB ITEM USE #4384

## (undated) DEVICE — WATER IRRIG. STER. 1500 ML - (9/CA)

## (undated) DEVICE — ELECTRODE DUAL RETURN W/ CORD - (50/PK)

## (undated) DEVICE — ELECTRODE 850 FOAM ADHESIVE - HYDROGEL RADIOTRNSPRNT (50/PK)

## (undated) DEVICE — PERFORATER DISP TIP DGR-0

## (undated) DEVICE — MASK ANESTHESIA ADULT  - (100/CA)

## (undated) DEVICE — GLOVE BIOGEL SZ 8.5 SURGICAL PF LTX - (50PR/BX 4BX/CA)

## (undated) DEVICE — Device

## (undated) DEVICE — SET LEADWIRE 5 LEAD BEDSIDE DISPOSABLE ECG (1SET OF 5/EA)

## (undated) DEVICE — DRAIN CSF WITH ANTI REFLUX VALVE

## (undated) DEVICE — GLOVE BIOGEL INDICATOR SZ 8.5 SURGICAL PF LTX - (50/BX 4BX/CA)

## (undated) DEVICE — TUBING CLEARLINK DUO-VENT - C-FLO (48EA/CA)

## (undated) DEVICE — KIT ROOM DECONTAMINATION

## (undated) DEVICE — BANDAGE ROLL STERILE BULKEE 4.5 IN X 4 YD (100EA/CA)

## (undated) DEVICE — SENSOR SPO2 NEO LNCS ADHESIVE (20/BX) SEE USER NOTES

## (undated) DEVICE — DETERGENT RENUZYME PLUS 10 OZ PACKET (50/BX)

## (undated) DEVICE — LACTATED RINGERS INJ 1000 ML - (14EA/CA 60CA/PF)

## (undated) DEVICE — BLADE CLIPPER FITS 2501 CLIPPER (BLUE)  (20EA/CA)

## (undated) DEVICE — SET EXTENSION WITH 2 PORTS (48EA/CA) ***PART #2C8610 IS A SUBSTITUTE*****

## (undated) DEVICE — STAPLER SKIN DISP - (6/BX 10BX/CA) VISISTAT

## (undated) DEVICE — GOWN WARMING STANDARD FLEX - (30/CA)

## (undated) DEVICE — SODIUM CHL IRRIGATION 0.9% 1000ML (12EA/CA)

## (undated) DEVICE — SUTURE GENERAL

## (undated) DEVICE — NEPTUNE 4 PORT MANIFOLD - (20/PK)

## (undated) DEVICE — BOVIE BLADE COATED - (50/PK)